# Patient Record
Sex: FEMALE | Race: WHITE | Employment: OTHER | URBAN - METROPOLITAN AREA
[De-identification: names, ages, dates, MRNs, and addresses within clinical notes are randomized per-mention and may not be internally consistent; named-entity substitution may affect disease eponyms.]

---

## 2022-05-11 ENCOUNTER — HOSPITAL ENCOUNTER (OUTPATIENT)
Dept: MEDSURG UNIT | Age: 67
Discharge: ANOTHER HOSPITAL | End: 2022-05-11
Attending: INTERNAL MEDICINE | Admitting: INTERNAL MEDICINE

## 2022-05-12 LAB
CALCIUM IONIZED: 1.2 MMOL/L (ref 1.13–1.33)
ESTIMATED AVERAGE GLUCOSE: 117 MG/DL
HBA1C MFR BLD: 5.7 % (ref 4–6)
TSH SERPL DL<=0.05 MIU/L-ACNC: 9.15 UIU/ML (ref 0.3–5)
VITAMIN D 25-HYDROXY: <6 NG/ML

## 2022-05-12 PROCEDURE — 82306 VITAMIN D 25 HYDROXY: CPT

## 2022-05-12 PROCEDURE — 82330 ASSAY OF CALCIUM: CPT

## 2022-05-12 PROCEDURE — 83036 HEMOGLOBIN GLYCOSYLATED A1C: CPT

## 2022-05-12 PROCEDURE — 84443 ASSAY THYROID STIM HORMONE: CPT

## 2022-05-13 LAB — AMMONIA: 21 UMOL/L (ref 11–51)

## 2022-05-13 PROCEDURE — 82140 ASSAY OF AMMONIA: CPT

## 2022-05-14 LAB
T3 FREE: 1.97 PG/ML (ref 2.02–4.43)
THYROXINE, FREE: 0.73 NG/DL (ref 0.93–1.7)

## 2022-05-14 PROCEDURE — 84481 FREE ASSAY (FT-3): CPT

## 2022-05-14 PROCEDURE — 84439 ASSAY OF FREE THYROXINE: CPT

## 2022-05-16 LAB
ABSOLUTE EOS #: 0.58 K/UL (ref 0–0.44)
ABSOLUTE IMMATURE GRANULOCYTE: 0.18 K/UL (ref 0–0.3)
ABSOLUTE LYMPH #: 1.38 K/UL (ref 1.1–3.7)
ABSOLUTE MONO #: 1.14 K/UL (ref 0.1–1.2)
AMMONIA: 24 UMOL/L (ref 11–51)
BASOPHILS # BLD: 1 % (ref 0–2)
BASOPHILS ABSOLUTE: 0.09 K/UL (ref 0–0.2)
EOSINOPHILS RELATIVE PERCENT: 5 % (ref 1–4)
HCT VFR BLD CALC: 24.7 % (ref 36.3–47.1)
HEMOGLOBIN: 7.3 G/DL (ref 11.9–15.1)
IMMATURE GRANULOCYTES: 2 %
LYMPHOCYTES # BLD: 13 % (ref 24–43)
MCH RBC QN AUTO: 29.6 PG (ref 25.2–33.5)
MCHC RBC AUTO-ENTMCNC: 29.6 G/DL (ref 28.4–34.8)
MCV RBC AUTO: 100 FL (ref 82.6–102.9)
MONOCYTES # BLD: 11 % (ref 3–12)
NRBC AUTOMATED: 0 PER 100 WBC
PDW BLD-RTO: 16.1 % (ref 11.8–14.4)
PLATELET # BLD: 539 K/UL (ref 138–453)
PMV BLD AUTO: 10.6 FL (ref 8.1–13.5)
RBC # BLD: 2.47 M/UL (ref 3.95–5.11)
RBC # BLD: ABNORMAL 10*6/UL
SEG NEUTROPHILS: 68 % (ref 36–65)
SEGMENTED NEUTROPHILS ABSOLUTE COUNT: 7.41 K/UL (ref 1.5–8.1)
WBC # BLD: 10.8 K/UL (ref 3.5–11.3)

## 2022-05-16 PROCEDURE — 85025 COMPLETE CBC W/AUTO DIFF WBC: CPT

## 2022-05-16 PROCEDURE — 82140 ASSAY OF AMMONIA: CPT

## 2022-05-18 LAB
CALCIUM IONIZED: 1.36 MMOL/L (ref 1.13–1.33)
D-DIMER QUANTITATIVE: >20 MG/L FEU (ref 0–0.59)

## 2022-05-18 PROCEDURE — 82330 ASSAY OF CALCIUM: CPT

## 2022-05-18 PROCEDURE — 85379 FIBRIN DEGRADATION QUANT: CPT

## 2022-05-19 PROCEDURE — 86850 RBC ANTIBODY SCREEN: CPT

## 2022-05-19 PROCEDURE — 36415 COLL VENOUS BLD VENIPUNCTURE: CPT

## 2022-05-19 PROCEDURE — P9016 RBC LEUKOCYTES REDUCED: HCPCS

## 2022-05-19 PROCEDURE — 86920 COMPATIBILITY TEST SPIN: CPT

## 2022-05-19 PROCEDURE — 86900 BLOOD TYPING SEROLOGIC ABO: CPT

## 2022-05-19 PROCEDURE — 86901 BLOOD TYPING SEROLOGIC RH(D): CPT

## 2022-05-20 LAB
FERRITIN: 3054 NG/ML (ref 13–150)
FOLATE: 9.2 NG/ML
IRON SATURATION: 30 % (ref 20–55)
IRON: 49 UG/DL (ref 37–145)
TOTAL IRON BINDING CAPACITY: 164 UG/DL (ref 250–450)
UNSATURATED IRON BINDING CAPACITY: 115 UG/DL (ref 112–347)
VITAMIN B-12: 587 PG/ML (ref 232–1245)

## 2022-05-20 PROCEDURE — 83540 ASSAY OF IRON: CPT

## 2022-05-20 PROCEDURE — 82607 VITAMIN B-12: CPT

## 2022-05-20 PROCEDURE — 83550 IRON BINDING TEST: CPT

## 2022-05-20 PROCEDURE — 82746 ASSAY OF FOLIC ACID SERUM: CPT

## 2022-05-20 PROCEDURE — 83880 ASSAY OF NATRIURETIC PEPTIDE: CPT

## 2022-05-20 PROCEDURE — 82728 ASSAY OF FERRITIN: CPT

## 2022-05-23 LAB — AMMONIA: 35 UMOL/L (ref 11–51)

## 2022-05-23 PROCEDURE — 82140 ASSAY OF AMMONIA: CPT

## 2022-05-24 LAB
PRO-BNP: ABNORMAL PG/ML
TROPONIN, HIGH SENSITIVITY: 167 NG/L (ref 0–14)
TROPONIN, HIGH SENSITIVITY: 168 NG/L (ref 0–14)

## 2022-05-24 PROCEDURE — 84484 ASSAY OF TROPONIN QUANT: CPT

## 2022-05-25 LAB
PRO-BNP: ABNORMAL PG/ML
TROPONIN, HIGH SENSITIVITY: 206 NG/L (ref 0–14)
TROPONIN, HIGH SENSITIVITY: 226 NG/L (ref 0–14)

## 2022-05-25 PROCEDURE — 84484 ASSAY OF TROPONIN QUANT: CPT

## 2022-05-25 PROCEDURE — 83880 ASSAY OF NATRIURETIC PEPTIDE: CPT

## 2022-05-26 ENCOUNTER — HOSPITAL ENCOUNTER (OUTPATIENT)
Dept: ULTRASOUND IMAGING | Age: 67
Discharge: HOME | End: 2022-05-28
Payer: COMMERCIAL

## 2022-05-26 VITALS
HEART RATE: 106 BPM | SYSTOLIC BLOOD PRESSURE: 169 MMHG | TEMPERATURE: 97.7 F | RESPIRATION RATE: 24 BRPM | OXYGEN SATURATION: 97 % | DIASTOLIC BLOOD PRESSURE: 67 MMHG

## 2022-05-26 DIAGNOSIS — J90 PLEURAL EFFUSION: ICD-10-CM

## 2022-05-26 LAB
GLUCOSE, FLUID: 120 MG/DL
LACTATE DEHYDROGENASE, FLUID: 86 U/L
SPECIMEN TYPE: NORMAL
SPECIMEN TYPE: NORMAL

## 2022-05-26 PROCEDURE — 7100000001 HC PACU RECOVERY - ADDTL 15 MIN

## 2022-05-26 PROCEDURE — 7100000000 HC PACU RECOVERY - FIRST 15 MIN

## 2022-05-26 PROCEDURE — 88342 IMHCHEM/IMCYTCHM 1ST ANTB: CPT

## 2022-05-26 PROCEDURE — 87075 CULTR BACTERIA EXCEPT BLOOD: CPT

## 2022-05-26 PROCEDURE — 88305 TISSUE EXAM BY PATHOLOGIST: CPT

## 2022-05-26 PROCEDURE — 82945 GLUCOSE OTHER FLUID: CPT

## 2022-05-26 PROCEDURE — 87205 SMEAR GRAM STAIN: CPT

## 2022-05-26 PROCEDURE — 89051 BODY FLUID CELL COUNT: CPT

## 2022-05-26 PROCEDURE — 87070 CULTURE OTHR SPECIMN AEROBIC: CPT

## 2022-05-26 PROCEDURE — 88341 IMHCHEM/IMCYTCHM EA ADD ANTB: CPT

## 2022-05-26 PROCEDURE — 2709999900 US THORACENTESIS

## 2022-05-26 PROCEDURE — 83615 LACTATE (LD) (LDH) ENZYME: CPT

## 2022-05-26 PROCEDURE — 88112 CYTOPATH CELL ENHANCE TECH: CPT

## 2022-05-26 PROCEDURE — 84157 ASSAY OF PROTEIN OTHER: CPT

## 2022-05-26 PROCEDURE — 83986 ASSAY PH BODY FLUID NOS: CPT

## 2022-05-26 ASSESSMENT — PAIN - FUNCTIONAL ASSESSMENT: PAIN_FUNCTIONAL_ASSESSMENT: 0-10

## 2022-05-26 ASSESSMENT — PAIN DESCRIPTION - ORIENTATION: ORIENTATION: LEFT

## 2022-05-26 ASSESSMENT — PAIN DESCRIPTION - DESCRIPTORS: DESCRIPTORS: OTHER (COMMENT)

## 2022-05-26 ASSESSMENT — PAIN SCALES - GENERAL: PAINLEVEL_OUTOF10: 9

## 2022-05-26 ASSESSMENT — PAIN DESCRIPTION - LOCATION: LOCATION: BACK

## 2022-05-26 NOTE — H&P
History and Physical    Pt Name: Mary Luciano  MRN: [id number]  YOB: 1955  Date of evaluation: 5/26/2022  Primary Care Physician: Cecelia Osorio MD    SUBJECTIVE:   History of Chief Complaint:    Mary Luciano is a 77 y.o. female who is scheduled today for IR GUIDED THORACENTESIS PLEURAL. Patient is extremely anxious, vague historian, accompanied by respiratory therapist. Patient has a history of chronic respiratory failure, currently on nasal cannula, wears Bipap at night only. Patient states she has chronic shortness of breath, dependent on oxygen. Allergies  is allergic to compazine [prochlorperazine maleate], lithium, other, sulfa antibiotics, and tetracyclines & related. Medications  Prior to Admission medications    Medication Sig Start Date End Date Taking?  Authorizing Provider   amLODIPine (NORVASC) 10 MG tablet Take 10 mg by mouth daily    Historical Provider, MD   atorvastatin (LIPITOR) 10 MG tablet Take 10 mg by mouth daily    Historical Provider, MD   tiZANidine (ZANAFLEX) 4 MG tablet Take 4 mg by mouth every 6 hours as needed Indications: prn    Historical Provider, MD   clopidogrel (PLAVIX) 75 MG tablet Take 1 tablet by mouth daily 3/10/22   Tamir Figures, DO   lovastatin (MEVACOR) 10 MG tablet Take 1 tablet by mouth nightly  Patient not taking: Reported on 4/8/2022 3/10/22 4/9/22  Tamir Figures, DO   diazepam (VALIUM) 5 MG tablet Take 1 tablet by mouth every 8 hours as needed for Anxiety (pain, spasm)  Patient not taking: Reported on 4/8/2022 11/5/15   Bassam Mcpherson MD   traZODone (DESYREL) 100 MG tablet Take 200 mg by mouth nightly  Patient not taking: Reported on 4/8/2022    Historical Provider, MD   OXcarbazepine (TRILEPTAL PO) Take 450 mg by mouth daily q am  Patient not taking: Reported on 4/8/2022    Historical Provider, MD   mirtazapine (REMERON) 30 MG tablet Take 30 mg by mouth nightly  Patient not taking: Reported on 4/8/2022    Historical Provider, MD   QUEtiapine Fumarate (SEROQUEL PO) Take by mouth 50mg q am / 100mg q hs  Patient not taking: Reported on 4/8/2022    Historical Provider, MD   lisinopril (PRINIVIL;ZESTRIL) 10 MG tablet Take 10 mg by mouth daily    Historical Provider, MD   Amphetamine-Dextroamphetamine (ADDERALL PO) Take by mouth 20mg q am / 10 mg q noon  Patient not taking: Reported on 4/8/2022    Historical Provider, MD   ibuprofen (ADVIL;MOTRIN) 800 MG tablet Take 1 tablet by mouth every 8 hours as needed for Pain 5/31/15   Aleksander Pineda MD     Past Medical History    has a past medical history of ADHD (attention deficit hyperactivity disorder), Anxiety, BiPAP (biphasic positive airway pressure) dependence, Bipolar 1 disorder ([address] Chronic back pain, Depression, Valentino catheter in place, Hyperlipidemia, Hypertension, Nursing home resident, Pancreatitis, PICC (peripherally inserted central catheter) in place, Pleural effusion, and Respiratory failure ([address] Past Surgical History   has a past surgical history that includes Appendectomy; Hysterectomy; Cholecystectomy; tumor removal; back surgery; Endoscopy, colon, diagnostic; Foot surgery; and tumor removal.  Social History   reports that she has been smoking. She has never used smokeless tobacco.   reports no history of alcohol use. reports no history of drug use. Family History  No family status information on file. family history is not on file.     Review of Systems:  CONSTITUTIONAL:   negative for fevers, chills, fatigue and malaise    EYES:   negative for double vision, blurred vision and photophobia    HEENT:   negative for tinnitus, epistaxis and sore throat     RESPIRATORY:   Patient reports chronic shortness of breath   CARDIOVASCULAR:   negative for chest pain, palpitations, syncope, edema     GASTROINTESTINAL:   negative for nausea, vomiting     GENITOURINARY:   negative for incontinence     MUSCULOSKELETAL:   negative for neck or back pain     NEUROLOGICAL:   Negative for weakness and tingling  negative for headaches and dizziness     PSYCHIATRIC:   negative for anxiety       OBJECTIVE:   VITALS:  temporal temperature is 99.3 °F (37.4 °C). Her blood pressure is 185/70 (abnormal) and her pulse is 95. Her respiration is 16 and oxygen saturation is 94%. CONSTITUTIONAL:alert & oriented x 3, very anxious. SKIN:  Warm and dry, no rashes to exposed areas of skin. HEAD:  Normocephalic, atraumatic. EYES: PERRL. EOMs intact. EARS:  Intact and equal bilaterally. No edema or thickening, without lumps, lesions, or discharge. Hearing grossly WNL. NOSE:  Nares patent. No rhinorrhea. MOUTH/THROAT:  Mucous membranes pink and moist.  NECK: Supple, no lymphadenopathy. LUNGS: Respirations even and non-labored. Mild inspiratory wheezes to all lobes, on NC.  CARDIOVASCULAR: Regular rate and rhythm, no murmurs/rubs/gallops. ABDOMEN: soft, non-tender and non-distended. EXTREMITIES: Bilateral lower extremities in EPC cuffs. NEUROLOGIC: CN II-XII are grossly intact. Gait not assessed. IMPRESSIONS:   Chronic respiratory failure. PLANS:   IR GUIDED THORACENTESIS PLEURAL.     SARAHI Duke CNP   Electronically signed 5/26/2022 at 2:08 PM

## 2022-05-26 NOTE — PROGRESS NOTES
Pt arrives to room for left thora  Sitting up on side of bed   RDMS to bedside    Site prepped and draped  DIANA Stout PA to room  access obtained and draining dark yellow fluid  Specimen obtained  850ml removed  Tolerated well  Return to [facility]

## 2022-05-26 NOTE — PROGRESS NOTES
Bedside report from IR due to patient requires medical transport due to arrive at 1530. Patient to be transferred back to [facility].  Patient accompanied by RT Jarrod.

## 2022-05-26 NOTE — BRIEF OP NOTE
Brief Postoperative Note for Thoracentesis    Na Mascorro  YOB: 1955  [id number]    Pre-operative Diagnosis: left Pleural effusion      Post-operative Diagnosis: Same    Procedure: Ultrasound guided Thoracentesis     Anesthesia: 1% Lidocaine     Surgeons/Assistants: Mirza Hernandez PA-C    Complications: none    EBL: Minimal    Specimens: were obtained    Ultrasound guided left thoracentesis performed. 850 ml clear yellow fluid obtained. Dressing applied. Left pleural effusion identified.      Electronically signed by MARLO Brown on 5/26/2022 at 3:02 PM

## 2022-05-26 NOTE — PROGRESS NOTES
Writer attempted to call report to [facility]. Was unable to reach patients RN.

## 2022-05-27 LAB
AMYLASE: 152 U/L (ref 28–100)
CULTURE: NORMAL
LIPASE: 160 U/L (ref 13–60)
LYMPHOCYTES, BODY FLUID: 12 %
NEUTROPHIL, FLUID: 60 %
OTHER CELLS FLUID: NORMAL %
PH FLUID: 8.5
RBC FLUID: <3000 /MM3
SPECIMEN DESCRIPTION: NORMAL
SPECIMEN TYPE: NORMAL
TOTAL PROTEIN, BODY FLUID: 1.3 G/DL
WBC FLUID: 410 /MM3

## 2022-05-27 PROCEDURE — 82150 ASSAY OF AMYLASE: CPT

## 2022-05-27 PROCEDURE — 83690 ASSAY OF LIPASE: CPT

## 2022-05-30 LAB — AMMONIA: 15 UMOL/L (ref 11–51)

## 2022-05-30 PROCEDURE — 82140 ASSAY OF AMMONIA: CPT

## 2022-05-31 LAB — SURGICAL PATHOLOGY REPORT: NORMAL

## 2022-06-01 LAB
CALCIUM IONIZED: 1.39 MMOL/L (ref 1.13–1.33)
CULTURE: ABNORMAL
DIRECT EXAM: ABNORMAL
DIRECT EXAM: ABNORMAL
SPECIMEN DESCRIPTION: ABNORMAL

## 2022-06-01 PROCEDURE — 82330 ASSAY OF CALCIUM: CPT

## 2022-06-02 LAB
ABSOLUTE EOS #: 0.29 K/UL (ref 0–0.4)
ABSOLUTE IMMATURE GRANULOCYTE: 0.15 K/UL (ref 0–0.3)
ABSOLUTE LYMPH #: 1.74 K/UL (ref 1–4.8)
ABSOLUTE MONO #: 1.16 K/UL (ref 0.2–0.8)
BASOPHILS # BLD: 1 %
BASOPHILS ABSOLUTE: 0.15 K/UL (ref 0–0.2)
EOSINOPHILS RELATIVE PERCENT: 2 % (ref 1–4)
HCT VFR BLD CALC: 32.3 % (ref 36.3–47.1)
HEMOGLOBIN: 9.6 G/DL (ref 11.9–15.1)
IMMATURE GRANULOCYTES: 1 %
LYMPHOCYTES # BLD: 12 % (ref 24–44)
MCH RBC QN AUTO: 29.2 PG (ref 25.2–33.5)
MCHC RBC AUTO-ENTMCNC: 29.7 G/DL (ref 28.4–34.8)
MCV RBC AUTO: 98.2 FL (ref 82.6–102.9)
MONOCYTES # BLD: 8 % (ref 1–7)
PDW BLD-RTO: 15.4 % (ref 11.8–14.4)
PLATELET # BLD: 514 K/UL (ref 138–453)
PMV BLD AUTO: 11.7 FL (ref 8.1–13.5)
RBC # BLD: 3.29 M/UL (ref 3.95–5.11)
SEG NEUTROPHILS: 76 % (ref 36–66)
SEGMENTED NEUTROPHILS ABSOLUTE COUNT: 11.01 K/UL (ref 1.8–7.7)
WBC # BLD: 14.5 K/UL (ref 3.5–11.3)

## 2022-06-02 PROCEDURE — 85025 COMPLETE CBC W/AUTO DIFF WBC: CPT

## 2022-06-06 LAB — AMMONIA: 19 UMOL/L (ref 11–51)

## 2022-06-06 PROCEDURE — 82140 ASSAY OF AMMONIA: CPT

## 2022-06-08 LAB — CALCIUM IONIZED: 1.59 MMOL/L (ref 1.13–1.33)

## 2022-06-08 PROCEDURE — 82330 ASSAY OF CALCIUM: CPT

## 2022-06-15 LAB
ABO/RH: NORMAL
ANTIBODY SCREEN: NEGATIVE
ARM BAND NUMBER: NORMAL
BLD PROD TYP BPU: NORMAL
BPU ID: NORMAL
CROSSMATCH RESULT: NORMAL
DISPENSE STATUS BLOOD BANK: NORMAL
EXPIRATION DATE: NORMAL
TRANSFUSION STATUS: NORMAL
UNIT DIVISION: 0

## 2022-06-22 ENCOUNTER — HOSPITAL ENCOUNTER (OUTPATIENT)
Dept: MEDSURG UNIT | Age: 67
Discharge: NURSING, REHAB OR LONG-TERM CARE | End: 2022-06-22
Attending: INTERNAL MEDICINE | Admitting: INTERNAL MEDICINE

## 2022-06-23 LAB
TSH SERPL DL<=0.05 MIU/L-ACNC: 0.44 UIU/ML (ref 0.3–5)
VITAMIN D 25-HYDROXY: 26.8 NG/ML

## 2022-06-23 PROCEDURE — 83036 HEMOGLOBIN GLYCOSYLATED A1C: CPT

## 2022-06-23 PROCEDURE — 84443 ASSAY THYROID STIM HORMONE: CPT

## 2022-06-23 PROCEDURE — 82306 VITAMIN D 25 HYDROXY: CPT

## 2022-06-24 LAB
ESTIMATED AVERAGE GLUCOSE: 120 MG/DL
HBA1C MFR BLD: 5.8 % (ref 4–6)

## 2022-06-27 LAB
-: NORMAL
AMMONIA: 21 UMOL/L (ref 11–51)
BILIRUBIN URINE: NEGATIVE
COLOR: YELLOW
EPITHELIAL CELLS UA: NORMAL /HPF (ref 0–5)
GLUCOSE URINE: NEGATIVE
KETONES, URINE: NEGATIVE
LEUKOCYTE ESTERASE, URINE: NEGATIVE
NITRITE, URINE: NEGATIVE
PH UA: 7.5 (ref 5–8)
PROTEIN UA: NEGATIVE
RBC UA: NORMAL /HPF (ref 0–2)
SPECIFIC GRAVITY UA: 1.01 (ref 1–1.03)
TURBIDITY: ABNORMAL
URINE HGB: NEGATIVE
UROBILINOGEN, URINE: NORMAL
WBC UA: NORMAL /HPF (ref 0–5)

## 2022-06-27 PROCEDURE — 82140 ASSAY OF AMMONIA: CPT

## 2022-06-27 PROCEDURE — 87086 URINE CULTURE/COLONY COUNT: CPT

## 2022-06-27 PROCEDURE — 81001 URINALYSIS AUTO W/SCOPE: CPT

## 2022-06-28 LAB
CULTURE: NO GROWTH
SPECIMEN DESCRIPTION: NORMAL

## 2022-06-30 LAB
-: ABNORMAL
BACTERIA: ABNORMAL
BILIRUBIN URINE: NEGATIVE
CASTS UA: ABNORMAL /LPF
COLOR: YELLOW
CRYSTALS, UA: ABNORMAL /HPF
EPITHELIAL CELLS UA: ABNORMAL /HPF (ref 0–5)
GLUCOSE URINE: NEGATIVE
KETONES, URINE: NEGATIVE
LEUKOCYTE ESTERASE, URINE: NEGATIVE
NITRITE, URINE: NEGATIVE
PH UA: 8.5 (ref 5–8)
PROTEIN UA: ABNORMAL
RBC UA: ABNORMAL /HPF (ref 0–2)
SPECIFIC GRAVITY UA: 1.01 (ref 1–1.03)
TURBIDITY: ABNORMAL
URINE HGB: NEGATIVE
UROBILINOGEN, URINE: NORMAL
WBC UA: ABNORMAL /HPF (ref 0–5)

## 2022-06-30 PROCEDURE — 81001 URINALYSIS AUTO W/SCOPE: CPT

## 2022-07-04 LAB — AMMONIA: 29 UMOL/L (ref 11–51)

## 2022-07-04 PROCEDURE — 82140 ASSAY OF AMMONIA: CPT

## 2022-07-07 LAB
HCT VFR BLD CALC: 27.6 % (ref 36.3–47.1)
HEMOGLOBIN: 8.6 G/DL (ref 11.9–15.1)
MCH RBC QN AUTO: 29.6 PG (ref 25.2–33.5)
MCHC RBC AUTO-ENTMCNC: 31.2 G/DL (ref 28.4–34.8)
MCV RBC AUTO: 94.8 FL (ref 82.6–102.9)
NRBC AUTOMATED: 0 PER 100 WBC
PDW BLD-RTO: 15.5 % (ref 11.8–14.4)
PLATELET # BLD: 557 K/UL (ref 138–453)
PMV BLD AUTO: 11.9 FL (ref 8.1–13.5)
RBC # BLD: 2.91 M/UL (ref 3.95–5.11)
WBC # BLD: 11.1 K/UL (ref 3.5–11.3)

## 2022-07-07 PROCEDURE — 85027 COMPLETE CBC AUTOMATED: CPT

## 2022-07-11 LAB — AMMONIA: <10 UMOL/L (ref 11–51)

## 2022-07-11 PROCEDURE — 82140 ASSAY OF AMMONIA: CPT

## 2022-07-13 PROCEDURE — U0005 INFEC AGEN DETEC AMPLI PROBE: HCPCS

## 2022-07-13 PROCEDURE — U0003 INFECTIOUS AGENT DETECTION BY NUCLEIC ACID (DNA OR RNA); SEVERE ACUTE RESPIRATORY SYNDROME CORONAVIRUS 2 (SARS-COV-2) (CORONAVIRUS DISEASE [COVID-19]), AMPLIFIED PROBE TECHNIQUE, MAKING USE OF HIGH THROUGHPUT TECHNOLOGIES AS DESCRIBED BY CMS-2020-01-R: HCPCS

## 2022-07-14 LAB
SARS-COV-2: ABNORMAL
SARS-COV-2: DETECTED
SOURCE: ABNORMAL

## 2022-07-14 PROCEDURE — 87077 CULTURE AEROBIC IDENTIFY: CPT

## 2022-07-14 PROCEDURE — 81001 URINALYSIS AUTO W/SCOPE: CPT

## 2022-07-14 PROCEDURE — 87088 URINE BACTERIA CULTURE: CPT

## 2022-07-14 PROCEDURE — 87086 URINE CULTURE/COLONY COUNT: CPT

## 2022-07-14 PROCEDURE — 87186 SC STD MICRODIL/AGAR DIL: CPT

## 2022-07-15 LAB
-: ABNORMAL
BACTERIA: ABNORMAL
BILIRUBIN URINE: NEGATIVE
C-REACTIVE PROTEIN: 26.1 MG/L (ref 0–5)
CASTS UA: ABNORMAL /LPF (ref 0–8)
COLOR: YELLOW
EPITHELIAL CELLS UA: ABNORMAL /HPF (ref 0–5)
GLUCOSE URINE: NEGATIVE
KETONES, URINE: NEGATIVE
LEUKOCYTE ESTERASE, URINE: ABNORMAL
NITRITE, URINE: POSITIVE
PH UA: 7.5 (ref 5–8)
PROTEIN UA: ABNORMAL
RBC UA: ABNORMAL /HPF (ref 0–4)
SPECIFIC GRAVITY UA: 1.02 (ref 1–1.03)
TURBIDITY: ABNORMAL
URINE HGB: ABNORMAL
UROBILINOGEN, URINE: NORMAL
WBC UA: ABNORMAL /HPF (ref 0–5)

## 2022-07-15 PROCEDURE — 86140 C-REACTIVE PROTEIN: CPT

## 2022-07-15 PROCEDURE — 83880 ASSAY OF NATRIURETIC PEPTIDE: CPT

## 2022-07-16 LAB
C-REACTIVE PROTEIN: 28.5 MG/L (ref 0–5)
CULTURE: ABNORMAL
CULTURE: ABNORMAL
Lab: ABNORMAL
Lab: ABNORMAL
PRO-BNP: 427 PG/ML
SPECIMEN DESCRIPTION: ABNORMAL
SPECIMEN DESCRIPTION: ABNORMAL

## 2022-07-16 PROCEDURE — 86140 C-REACTIVE PROTEIN: CPT

## 2022-07-17 LAB — C-REACTIVE PROTEIN: 30 MG/L (ref 0–5)

## 2022-07-17 PROCEDURE — 86140 C-REACTIVE PROTEIN: CPT

## 2022-07-18 LAB
AMMONIA: 17 UMOL/L (ref 11–51)
SARS-COV-2: NORMAL
SARS-COV-2: NOT DETECTED
SOURCE: NORMAL

## 2022-07-18 PROCEDURE — 82140 ASSAY OF AMMONIA: CPT

## 2022-07-20 LAB — C-REACTIVE PROTEIN: 66.1 MG/L (ref 0–5)

## 2022-07-20 PROCEDURE — 86140 C-REACTIVE PROTEIN: CPT

## 2022-07-21 LAB
C-REACTIVE PROTEIN: 47.3 MG/L (ref 0–5)
POTASSIUM SERPL-SCNC: 3.4 MMOL/L (ref 3.7–5.3)

## 2022-07-21 PROCEDURE — 84132 ASSAY OF SERUM POTASSIUM: CPT

## 2022-07-21 PROCEDURE — 86140 C-REACTIVE PROTEIN: CPT

## 2022-07-22 LAB — C-REACTIVE PROTEIN: 20.9 MG/L (ref 0–5)

## 2022-07-22 PROCEDURE — 86140 C-REACTIVE PROTEIN: CPT

## 2022-07-25 LAB
AMMONIA: 27 UMOL/L (ref 11–51)
SARS-COV-2: NORMAL
SARS-COV-2: NOT DETECTED
SOURCE: NORMAL

## 2022-07-25 PROCEDURE — 82140 ASSAY OF AMMONIA: CPT

## 2022-07-27 ENCOUNTER — APPOINTMENT (OUTPATIENT)
Dept: GENERAL RADIOLOGY | Age: 67
End: 2022-07-27
Payer: MEDICARE

## 2022-07-27 ENCOUNTER — HOSPITAL ENCOUNTER (EMERGENCY)
Age: 67
Discharge: HOME | End: 2022-07-27
Attending: EMERGENCY MEDICINE
Payer: MEDICARE

## 2022-07-27 ENCOUNTER — APPOINTMENT (OUTPATIENT)
Dept: CT IMAGING | Age: 67
End: 2022-07-27
Payer: MEDICARE

## 2022-07-27 VITALS
WEIGHT: 102 LBS | DIASTOLIC BLOOD PRESSURE: 55 MMHG | RESPIRATION RATE: 15 BRPM | HEART RATE: 64 BPM | SYSTOLIC BLOOD PRESSURE: 135 MMHG | OXYGEN SATURATION: 95 % | HEIGHT: 64 IN | BODY MASS INDEX: 17.42 KG/M2 | TEMPERATURE: 98.6 F
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
## 2022-07-27 DIAGNOSIS — M79.18 MUSCULOSKELETAL PAIN: ICD-10-CM

## 2022-07-27 PROCEDURE — 72125 CT NECK SPINE W/O DYE: CPT

## 2022-07-27 PROCEDURE — 72128 CT CHEST SPINE W/O DYE: CPT

## 2022-07-27 PROCEDURE — 96372 THER/PROPH/DIAG INJ SC/IM: CPT

## 2022-07-27 PROCEDURE — 6370000000 HC RX 637 (ALT 250 FOR IP): Performed by: EMERGENCY MEDICINE

## 2022-07-27 PROCEDURE — 99284 EMERGENCY DEPT VISIT MOD MDM: CPT

## 2022-07-27 PROCEDURE — 6360000002 HC RX W HCPCS: Performed by: EMERGENCY MEDICINE

## 2022-07-27 PROCEDURE — 73030 X-RAY EXAM OF SHOULDER: CPT

## 2022-07-27 PROCEDURE — 70450 CT HEAD/BRAIN W/O DYE: CPT

## 2022-07-27 RX ORDER — OXYCODONE HYDROCHLORIDE AND ACETAMINOPHEN 5; 325 MG/1; MG/1
1 TABLET ORAL ONCE
Status: COMPLETED | OUTPATIENT
Start: 2022-07-27 | End: 2022-07-27

## 2022-07-27 RX ORDER — HYDROCODONE BITARTRATE AND ACETAMINOPHEN 5; 325 MG/1; MG/1
1 TABLET ORAL EVERY 4 HOURS PRN
Qty: 12 TABLET | Refills: 0 | Status: SHIPPED | OUTPATIENT
Start: 2022-07-27 | End: 2022-07-30

## 2022-07-27 RX ORDER — MORPHINE SULFATE 4 MG/ML
4 INJECTION, SOLUTION INTRAMUSCULAR; INTRAVENOUS ONCE
Status: COMPLETED | OUTPATIENT
Start: 2022-07-27 | End: 2022-07-27

## 2022-07-27 RX ORDER — ACETAMINOPHEN 500 MG
1000 TABLET ORAL ONCE
Status: DISCONTINUED | OUTPATIENT
Start: 2022-07-27 | End: 2022-07-27 | Stop reason: HOSPADM

## 2022-07-27 RX ADMIN — OXYCODONE AND ACETAMINOPHEN 1 TABLET: 5; 325 TABLET ORAL at 07:50

## 2022-07-27 RX ADMIN — MORPHINE SULFATE 4 MG: 4 INJECTION, SOLUTION INTRAMUSCULAR; INTRAVENOUS at 06:43

## 2022-07-27 ASSESSMENT — ENCOUNTER SYMPTOMS
VOMITING: 0
SHORTNESS OF BREATH: 0
CHEST TIGHTNESS: 0
SINUS PAIN: 0
BACK PAIN: 1
DIARRHEA: 0
CONSTIPATION: 0
EYES NEGATIVE: 1
ABDOMINAL DISTENTION: 0
COLOR CHANGE: 0
APNEA: 0

## 2022-07-27 ASSESSMENT — PAIN SCALES - GENERAL
PAINLEVEL_OUTOF10: 10
PAINLEVEL_OUTOF10: 10

## 2022-07-27 ASSESSMENT — PAIN - FUNCTIONAL ASSESSMENT: PAIN_FUNCTIONAL_ASSESSMENT: 0-10

## 2022-07-27 NOTE — ED PROVIDER NOTES
EMERGENCY DEPARTMENT ENCOUNTER    Pt Name: Karyn Bob  MRN: [id number]  [place] 1955  Date of evaluation: 7/27/22  CHIEF COMPLAINT       Chief Complaint   Patient presents with    Fall    Head Injury     HISTORY OF PRESENT ILLNESS   31-year-old female presents emergency room after a fall. Patient lives at an extended care facility at the moment and she reports the beds are very narrow and do not have rales. She had fallen out of the bed this morning. She did not lose consciousness but did hit her head. She is having pain to her head neck right shoulder and right back. REVIEW OF SYSTEMS     Review of Systems   Constitutional:  Negative for activity change, chills and diaphoresis. HENT:  Negative for congestion, sinus pain and tinnitus. Eyes: Negative. Respiratory:  Negative for apnea, chest tightness and shortness of breath. Gastrointestinal:  Negative for abdominal distention, constipation, diarrhea and vomiting. Genitourinary:  Negative for difficulty urinating and frequency. Musculoskeletal:  Positive for arthralgias, back pain and neck pain. Negative for myalgias. Skin:  Negative for color change and rash. Neurological:  Negative for dizziness. Hematological: Negative. Psychiatric/Behavioral: Negative. PASTMEDICAL HISTORY     Past Medical History:   Diagnosis Date    ADHD (attention deficit hyperactivity disorder)     Anxiety     BiPAP (biphasic positive airway pressure) dependence     Bipolar 1 disorder (HCC)     Chronic back pain     denies pain mgmnt    Depression     Valentino catheter in place     Hyperlipidemia     Hypertension     Nursing home resident     [facility] / [address] Pancreatitis     PICC (peripherally inserted central catheter) in place     Pleural effusion     bilat. Respiratory failure ([address]     acute hypoxemic     Past Problem List  There is no problem list on file for this patient.     SURGICAL HISTORY       Past Surgical History:   Procedure Laterality Date    APPENDECTOMY      BACK SURGERY      CHOLECYSTECTOMY      ENDOSCOPY, COLON, DIAGNOSTIC      egd    FOOT SURGERY      HYSTERECTOMY (CERVIX STATUS UNKNOWN)      THORACENTESIS Left 05/26/2022     THORACENTESIS    TUMOR REMOVAL      benign tumor removed from rt leg as child    TUMOR REMOVAL       CURRENT MEDICATIONS       Previous Medications    AMLODIPINE (NORVASC) 10 MG TABLET    Take 10 mg by mouth daily    AMPHETAMINE-DEXTROAMPHETAMINE (ADDERALL PO)    Take by mouth 20mg q am / 10 mg q noon    ATORVASTATIN (LIPITOR) 10 MG TABLET    Take 10 mg by mouth daily    CLOPIDOGREL (PLAVIX) 75 MG TABLET    Take 1 tablet by mouth daily    DIAZEPAM (VALIUM) 5 MG TABLET    Take 1 tablet by mouth every 8 hours as needed for Anxiety (pain, spasm)    IBUPROFEN (ADVIL;MOTRIN) 800 MG TABLET    Take 1 tablet by mouth every 8 hours as needed for Pain    LISINOPRIL (PRINIVIL;ZESTRIL) 10 MG TABLET    Take 10 mg by mouth daily    LOVASTATIN (MEVACOR) 10 MG TABLET    Take 1 tablet by mouth nightly    MIRTAZAPINE (REMERON) 30 MG TABLET    Take 30 mg by mouth nightly    OXCARBAZEPINE (TRILEPTAL PO)    Take 450 mg by mouth daily q am    QUETIAPINE FUMARATE (SEROQUEL PO)    Take by mouth 50mg q am / 100mg q hs    TIZANIDINE (ZANAFLEX) 4 MG TABLET    Take 4 mg by mouth every 6 hours as needed Indications: prn    TRAZODONE (DESYREL) 100 MG TABLET    Take 200 mg by mouth nightly     ALLERGIES     is allergic to compazine [prochlorperazine maleate], lithium, other, sulfa antibiotics, and tetracyclines & related. FAMILY HISTORY     has no family status information on file.       SOCIAL HISTORY       Social History     Tobacco Use    Smoking status: Former     Types: Cigarettes    Smokeless tobacco: Never   Substance Use Topics    Alcohol use: No    Drug use: No     PHYSICAL EXAM     INITIAL VITALS: BP (!) 135/55   Pulse 64   Temp 98.6 °F (37 °C) (Oral)   Resp 15   Ht 5' 4\" (1.626 m)   Wt 102 lb (46.3 kg)   SpO2 95%   BMI 17.51 kg/m²    Physical Exam  Constitutional:       General: She is not in acute distress. Appearance: She is well-developed. HENT:      Head: Normocephalic. Eyes:      Pupils: Pupils are equal, round, and reactive to light. Cardiovascular:      Rate and Rhythm: Normal rate and regular rhythm. Heart sounds: Normal heart sounds. Pulmonary:      Effort: Pulmonary effort is normal. No respiratory distress. Breath sounds: Normal breath sounds. Abdominal:      General: Bowel sounds are normal.      Palpations: Abdomen is soft. Tenderness: There is no abdominal tenderness. Musculoskeletal:         General: Normal range of motion. Arms:    Skin:     General: Skin is warm and dry. Neurological:      Mental Status: She is alert and oriented to person, place, and time. MEDICAL DECISION MAKIN-year-old female presenting to the emergency room with right-sided head neck upper and lower back pain after a fall out of bed. Patient has generalized soreness and tenderness to the right side of the body. She does not have any obvious bruising. There is no visible trauma to the head. CT imaging of the head neck and thoracic spine were completed and are negative. Patient did have x-ray of the shoulder which is also negative for acute bony pathology. I have low suspicion for malignant or emergent process. I updated patient results here in the ED. disposition and plan were reviewed with the patient. Patient is aware that ED testing may be falsely reassuring and patient understands if symptoms worsen reassessment may be necessary.      CRITICAL CARE:       PROCEDURES:    Procedures    DIAGNOSTIC RESULTS   EKG:All EKG's are interpreted by the Emergency Department Physician who either signs or Co-signs this chart in the absence of a cardiologist.        RADIOLOGY:All plain film, CT, MRI, and formal ultrasound images (except ED bedside ultrasound) are read by the radiologist, see reports below, unless otherwisenoted in MDM or here. XR SHOULDER RIGHT (MIN 2 VIEWS)   Final Result   No acute fracture or dislocation right shoulder. CT THORACIC SPINE WO CONTRAST   Final Result   CT HEAD:      No CT evidence for acute intracranial abnormality. .      Cerebral atrophy. CT CERVICAL SPINE:      No acute fracture or subluxation of the cervical spine. Mild degenerative changes. CT THORACIC SPINE:      No evidence for acute fracture of the thoracic spine. Incidental bilateral layering small pleural effusion left larger than right. CT CERVICAL SPINE WO CONTRAST   Final Result   CT HEAD:      No CT evidence for acute intracranial abnormality. .      Cerebral atrophy. CT CERVICAL SPINE:      No acute fracture or subluxation of the cervical spine. Mild degenerative changes. CT THORACIC SPINE:      No evidence for acute fracture of the thoracic spine. Incidental bilateral layering small pleural effusion left larger than right. CT HEAD WO CONTRAST   Final Result   CT HEAD:      No CT evidence for acute intracranial abnormality. .      Cerebral atrophy. CT CERVICAL SPINE:      No acute fracture or subluxation of the cervical spine. Mild degenerative changes. CT THORACIC SPINE:      No evidence for acute fracture of the thoracic spine. Incidental bilateral layering small pleural effusion left larger than right. LABS: All lab results were reviewed by myself, and all abnormals are listed below.   Labs Reviewed - No data to display    EMERGENCY DEPARTMENTCOURSE:         Vitals:    Vitals:    07/27/22 0618 07/27/22 0630 07/27/22 0645   BP: (!) 163/59 113/83 (!) 135/55   Pulse: 70 77 64   Resp: 18 12 15   Temp: 98.6 °F (37 °C)     TempSrc: Oral     SpO2: 95% 98% 95%   Weight: 102 lb (46.3 kg)     Height: 5' 4\" (1.626 m)         The patient was given the following medications while in the emergency department:  Orders Placed This Encounter   Medications    acetaminophen (TYLENOL) tablet 1,000 mg    morphine sulfate (PF) injection 4 mg    oxyCODONE-acetaminophen (PERCOCET) 5-325 MG per tablet 1 tablet    HYDROcodone-acetaminophen (NORCO) 5-325 MG per tablet     Sig: Take 1 tablet by mouth every 4 hours as needed for Pain for up to 3 days. Intended supply: 3 days. Take lowest dose possible to manage pain     Dispense:  12 tablet     Refill:  0     CONSULTS:  None    FINAL IMPRESSION      1. Fall, initial encounter    2. Musculoskeletal pain          DISPOSITION/PLAN   DISPOSITION Decision To Discharge 07/27/2022 08:31:11 AM      PATIENT REFERRED TO:  Christina Weiner MD  [address] [place] [address]  [phone]    Schedule an appointment as soon as possible for a visit   As needed    DISCHARGE MEDICATIONS:  New Prescriptions    HYDROCODONE-ACETAMINOPHEN (NORCO) 5-325 MG PER TABLET    Take 1 tablet by mouth every 4 hours as needed for Pain for up to 3 days. Intended supply: 3 days. Take lowest dose possible to manage pain     Lanny Arzate MD  Attending Emergency Physician      Care during this encounter was due to an unprecedented national emergency due to COVID-19.              Adolfo Stanford MD  07/27/22 0769

## 2022-07-27 NOTE — ED NOTES
Pt to ED via EMS coming from [facility]. Pt reports she is at the facility for gangrene on her L foot. Pt c/o fall out of bed while sleeping. Pt reports \"the beds are small and there's no side rails, I woke up to me falling on the ground\". Pt c/o L and R sided neck pain, and frontal lobe head pain. Pt reports she did hit her head on the R side. Pt states she does not know if she lost consciousness but the staff helped her back into bed. Pt reports she is on Plavix. Pt A&Ox4. Pt answering questions appropriately. C-collar on and aligned upon arrival to ED.        KESHA Garcia  07/27/22 Chago Raymundo RN  07/27/22 [id number]

## 2022-09-26 NOTE — PROGRESS NOTES
[facility] - Initial Office Consult Note  Today's Date and Time: 9/26/2022, 3:51 PM    Impression :   Prior Acute hypoxic respiratory failure  S/P Mechanical ventilation. 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 Depression  Essential HTN  Prior Bilateral pleural effusions  S/P Lt thoracentesis with removal 850 ml fluid on 5-26-22. Prior small bowel obstruction. S/P laparotomy with lysis of adhesions, resection of affected segment on 8-18-22  S/P PEG tube insertion on 9-26-22    Recommendations:   Patient cleared for angiogram and angioplasty of left leg as planned by Dr Gustavo Foster. No current evidence of active infection to prevent procedures    Medical Decision Making/Summary/Discussion:9/26/2022       Infection Control Recommendations   Ivel Precautions    Antimicrobial Stewardship Recommendations     Discontinuation of therapy  Coordination of Outpatient Care:   Estimated Length of IV antimicrobials: None   Patient will need Midline Catheter Insertion: No  Patient will need PICC line Insertion:No  Patient will need: Home IV , [place],  SNF,  LTAC: No  Patient will need outpatient wound care: No    Chief complaint/reason for consultation:   ID clearance for angioplasty      History of Present Illness:   Ras Vang is a 79y.o.-year-old  female who was seen in the office on 9/27/2022. Patient seen at the request of Logan Gonzalez. INITIAL HISTORY:    Patient known to our service from prior admission to [facility] on 6-22-22.     [facility] admission: 4-16-22:   Patient admitted to [facility] on 4-16-22 until transfer to [facility] on 5-11-22. Patient has a past medical history of Bipolar disorder, depression, hyperlipidemia, and essential hypertension. The patient was admitted because of generalized weakness, abdominal pain and nausea vomiting. The patient lived with her sister in [place] until her sister had to be hospitalized. Her daughters then moved her to [place]. She apparently had not been doing well for the last few months. She lost about 50 lb. She was unable to maintain adequate oral intake and felt severe nausea and sometimes vomited after eating. She reported persistent epigastric pain. She also stabbed her toes. A fracture was excluded but she developed dry gangrene of the left third and fourth toes. In the ER she had leukocytosis WBC 56445. She was started on cefepime and vancomycin. She underwent CT scan of the abdomen on 4-17-22 which showed a masslike enlargement in the superior recess of the lesser sac between stomach and pancreas. The patient had had a prior CT at [facility] which also had suggested a prior pancreatic abnormality that needed to be followed. An EGD on 4-18-22 showed erosive gastritis. A MRCP on 4-21-22 showed that the pancreatic lesion centered in the body-tail junction, displacing the stomach, multiple additional pancreatic cysts were also present. A CT A/P 4/25; showed cystic-appearing pancreatic mass, new edema around pancreas, ascites and diffuse anasarca. Further evaluation by the GI service came to the conclusion that the abnormality likely represented the effects of prior necrotizing pancreatitis. Her CA 19.9 was elevated but a EUS was considered too risky. The patient also developed acute hypoxic respiratory failure and was intubated from 4-1-22 to 4-25-22. This was attributed to anxiety and exacerbation of COPD. She was evaluated by the ID service of the [facility] and recieved antimicrobial treatment for a presumptive multifocal health care associated pneumonia.  Sepsis small-bowel resection, side-to-side stapled enteroenterostomy 8/18/2022. She subsequently completed a seven day course of Zosyn. CURRENT EVALUATION: 9/27/2022    Office Visit 9/27/2022     Patient has residual problems with nutrition so she underwent a PEG tube insertion on 9-26-22. She has also been evaluated by Dr Gregor Christiansen because of the Lt foot ischemic toes. He has suggested angioplasty to improve her circulation    Patient evaluated and examined in the office  Patient stable  No new complaints reported  Her abdominal incision has healed well. Her PEG site looks fine. She is currently residing at [facility] 3rd and 4th toes with dry gangrene. Sacral area with excoriation of skin. Exam does not show any signs of residual infection. Pertinent Labs and X rays reviewed:     BUN: 38-->35-->32-->36  Cr. : 0.74-->0.69-->0.79    WBC: 16.5-->14.5-->12.2  Hb: 9.0-->8.6-->9.6-->9.2  Plat: 514-->527-->514-->482    CXR:   5-11-22: Multifocal patchy airspace disease in both lungs   5-18-22: Pulmonary edema   5-20-22: Cardiomegaly, with moderate congestive heart failure, bilateral lower lobe infiltrate and pleural effusions. Overall, these findings have slightly improved since the prior study. 5-23-22: Improving pulmonary edema, persistent left base infiltrate. 5-27-22: Patchy opacity of both lung fields    CT angiogram:  · 5-19-22: Has bilateral pleural effusions with small areas of compressive atelectasis. CT abdomen:   · 5-27-22: Large residual Rt side effusion. Smaller Lt side effusion. Abdomen with distended bowel loops, thickened wall of proximal small bowel. Bilateral renal cysts. Loculated pockets of fluid in the abdomen. Ileus appears to be present. Cultures:  Urine:  · 8-10-22 E coli and Klebsiella  Blood:  · 8-10-22: E coli    Wound:  · 5-26-22: Thoracentesis: No growth    Discussed with patient.     9/27/2022 Ischemic left toes        I have personally reviewed the past medical history, past surgical history, medications, social history, and family history, and I have updated the database accordingly. Past Medical History:     Past Medical History:   Diagnosis Date    ADHD (attention deficit hyperactivity disorder)     Anxiety     BiPAP (biphasic positive airway pressure) dependence     Bipolar 1 disorder (HCC)     Chronic back pain     denies pain mgmnt    Depression     Valentino catheter in place     Hyperlipidemia     Hypertension     Nursing home resident     [facility] [address] Pancreatitis     PICC (peripherally inserted central catheter) in place     Pleural effusion     bilat. Respiratory failure ([address]     acute hypoxemic       Past Surgical  History:     Past Surgical History:   Procedure Laterality Date    APPENDECTOMY      BACK SURGERY      CHOLECYSTECTOMY      ENDOSCOPY, COLON, DIAGNOSTIC      egd    FOOT SURGERY      HYSTERECTOMY (CERVIX STATUS UNKNOWN)      THORACENTESIS Left 05/26/2022     THORACENTESIS    TUMOR REMOVAL      benign tumor removed from rt leg as child    TUMOR REMOVAL         Medications:       Social History:     Social History     Socioeconomic History    Marital status:       Spouse name: Not on file    Number of children: Not on file    Years of education: Not on file    Highest education level: Not on file   Occupational History    Not on file   Tobacco Use    Smoking status: Former     Types: Cigarettes    Smokeless tobacco: Never   Substance and Sexual Activity    Alcohol use: No    Drug use: No    Sexual activity: Not on file   Other Topics Concern    Not on file   Social History Narrative    Not on file     Social Determinants of Health     Financial Resource Strain: Not on file   Food Insecurity: Not on file   Transportation Needs: Not on file   Physical Activity: Not on file   Stress: Not on file   Social Connections: Not on file   Intimate Partner Violence: Not on file   Housing Stability: Not on file       Family History:   No family history on file. Allergies:   Compazine [prochlorperazine maleate], Lithium, Other, Sulfa antibiotics, and Tetracyclines & related     Review of Systems:   Constitutional: No fevers or chills. No systemic complaints  Head: No headaches  Eyes: No double vision or blurry vision. No conjunctival inflammation. ENT: No sore throat or runny nose. . No hearing loss, tinnitus or vertigo. Cardiovascular: No chest pain or palpitations. No shortness of breath. No PARRISH  Lung: No shortness of breath or cough. No sputum production  Abdomen: No nausea, vomiting, diarrhea, or abdominal pain. [place] No cramps. Genitourinary: No increased urinary frequency, or dysuria. No hematuria. No suprapubic or CVA pain  Musculoskeletal: No muscle aches or pains. No joint effusions, swelling or deformities  Hematologic: No bleeding or bruising. Neurologic: No headache, weakness, numbness, or tingling. Integument: No rash, no ulcers. Psychiatric: No depression. Endocrine: No polyuria, no polydipsia, no polyphagia. Physical Examination :   No data found. General Appearance: Awake, alert, and in no apparent distress  Head:  Normocephalic, no trauma  Eyes: Pupils equal, round, reactive to light and accommodation; extraocular movements intact; sclera anicteric; conjunctivae pink. No embolic phenomena. ENT: Oropharynx clear, without erythema, exudate, or thrush. No tenderness of sinuses. Mouth/throat: mucosa pink and moist. No lesions. Dentition in good repair. Neck:Supple, without lymphadenopathy. Thyroid normal, No bruits. Pulmonary/Chest: Clear to auscultation, without wheezes, rales, or rhonchi. No dullness to percussion. Cardiovascular: Regular rate and rhythm without murmurs, rubs, or gallops. Abdomen: Soft, non tender. Bowel sounds normal. No organomegaly. PEG in place  All four Extremities: No cyanosis, clubbing, edema, or effusions. Ischemic toes Lt foot  Neurologic: No gross sensory or motor deficits.   Skin: Warm and dry with good turgor. Signs of peripheral arterial insufficiency. schemic toes Lt foot    Medical Decision Making -Laboratory:   I have independently reviewed/ordered the following labs:    CBC with Differential: No results for input(s): WBC, HGB, HCT, PLT, SEGSPCT, BANDSPCT, LYMPHOPCT, MONOPCT, EOSPCT in the last 72 hours. BMP: No results for input(s): NA, K, CL, CO2, BUN, CREATININE, CA, MG in the last 72 hours. Hepatic Function Panel: No results for input(s): PROT, LABALBU, BILIDIR, IBILI, BILITOT, ALKPHOS, ALT, AST in the last 72 hours. No results for input(s): RPR in the last 72 hours. No results for input(s): HIV in the last 72 hours. No results for input(s): BC in the last 72 hours. Lab Results   Component Value Date/Time    MUCUS NOT REPORTED 05/31/2015 01:19 PM    RBC 2.91 07/07/2022 04:15 AM    RBC 4.45 03/10/2022 11:37 AM    TRICHOMONAS NOT REPORTED 05/31/2015 01:19 PM    WBC 11.1 07/07/2022 04:15 AM    YEAST NOT REPORTED 05/31/2015 01:19 PM    TURBIDITY Turbid 07/14/2022 04:35 PM     Lab Results   Component Value Date/Time    CREATININE 0.73 03/10/2022 11:37 AM    GLUCOSE 104 03/10/2022 11:37 AM       Medical Decision Making-Imaging:       Medical Decision Jygset-Bbhfvdzh-Cxidg:       Medical Decision Making-Other:     Note:  Labs, medications, radiologic studies were reviewed with personal review of films  Moderate Large amounts of data were reviewed  Discussed with nursing Staff, Discharge planner  Infection Control and Prevention measures reviewed  All prior entries were reviewed  Administer medications as ordered  Prognosis: Good  Discharge planning reviewed  Follow up as outpatient. Thank you for allowing us to participate in the care of this patient. Please call with questions.     Bren Valencia MD  Pager: [phone] - Office: [phone]

## 2022-09-27 ENCOUNTER — OFFICE VISIT (OUTPATIENT)
Dept: INFECTIOUS DISEASES | Age: 67
End: 2022-09-27
Payer: MEDICARE

## 2022-09-27 VITALS
DIASTOLIC BLOOD PRESSURE: 47 MMHG | BODY MASS INDEX: 17.07 KG/M2 | WEIGHT: 100 LBS | HEART RATE: 83 BPM | SYSTOLIC BLOOD PRESSURE: 75 MMHG | OXYGEN SATURATION: 97 % | HEIGHT: 64 IN | RESPIRATION RATE: 16 BRPM

## 2022-09-27 DIAGNOSIS — K86.1 OTHER CHRONIC PANCREATITIS (HCC): ICD-10-CM

## 2022-09-27 DIAGNOSIS — I96 GANGRENE OF TOE OF LEFT FOOT (HCC): ICD-10-CM

## 2022-09-27 DIAGNOSIS — I73.9 PERIPHERAL ARTERIAL DISEASE (HCC): ICD-10-CM

## 2022-09-27 PROCEDURE — G8400 PT W/DXA NO RESULTS DOC: HCPCS | Performed by: INTERNAL MEDICINE

## 2022-09-27 PROCEDURE — G8419 CALC BMI OUT NRM PARAM NOF/U: HCPCS | Performed by: INTERNAL MEDICINE

## 2022-09-27 PROCEDURE — 3017F COLORECTAL CA SCREEN DOC REV: CPT | Performed by: INTERNAL MEDICINE

## 2022-09-27 PROCEDURE — 1123F ACP DISCUSS/DSCN MKR DOCD: CPT | Performed by: INTERNAL MEDICINE

## 2022-09-27 PROCEDURE — 99213 OFFICE O/P EST LOW 20 MIN: CPT | Performed by: INTERNAL MEDICINE

## 2022-09-27 PROCEDURE — 1036F TOBACCO NON-USER: CPT | Performed by: INTERNAL MEDICINE

## 2022-09-27 PROCEDURE — G8427 DOCREV CUR MEDS BY ELIG CLIN: HCPCS | Performed by: INTERNAL MEDICINE

## 2022-09-27 PROCEDURE — 1090F PRES/ABSN URINE INCON ASSESS: CPT | Performed by: INTERNAL MEDICINE

## 2022-09-27 NOTE — PATIENT INSTRUCTIONS
Patient evaluated to provide clearance for angioplasty Left leg by Dr Christiana Boeck. Patient has no active infection that would preclude the angioplasty. Letter will be sent to Dr Christiana Boeck giving ID clearance for the procedure.     No follow up visit needed with ID Service

## 2022-09-30 ENCOUNTER — TELEPHONE (OUTPATIENT)
Dept: INFECTIOUS DISEASES | Age: 67
End: 2022-09-30

## 2022-09-30 NOTE — TELEPHONE ENCOUNTER
Dr Lloyd Viera asked Jose Boyle to fax his office note from 9/27/22 to Dr Franc Pendleton (at [facility]). Faxed as requested to [phone].

## 2022-12-17 ENCOUNTER — HOSPITAL ENCOUNTER (EMERGENCY)
Age: 67
Discharge: HOME | End: 2022-12-18
Attending: EMERGENCY MEDICINE
Payer: MEDICARE

## 2022-12-17 ENCOUNTER — APPOINTMENT (OUTPATIENT)
Dept: CT IMAGING | Age: 67
End: 2022-12-17
Payer: MEDICARE

## 2022-12-17 VITALS
HEIGHT: 64 IN | SYSTOLIC BLOOD PRESSURE: 150 MMHG | HEART RATE: 86 BPM | OXYGEN SATURATION: 99 % | TEMPERATURE: 97.7 F | DIASTOLIC BLOOD PRESSURE: 71 MMHG | RESPIRATION RATE: 16 BRPM | WEIGHT: 104 LBS | BODY MASS INDEX: 17.75 KG/M2

## 2022-12-17 DIAGNOSIS — S00.83XA CONTUSION OF OTHER PART OF HEAD, INITIAL ENCOUNTER: ICD-10-CM

## 2022-12-17 DIAGNOSIS — S09.90XA INJURY OF HEAD, INITIAL ENCOUNTER: Primary | ICD-10-CM

## 2022-12-17 LAB
ABSOLUTE EOS #: 0.2 K/UL (ref 0–0.4)
ABSOLUTE LYMPH #: 2.3 K/UL (ref 1–4.8)
ABSOLUTE MONO #: 0.9 K/UL (ref 0.1–1.2)
ANION GAP SERPL CALCULATED.3IONS-SCNC: 9 MMOL/L (ref 9–17)
BASOPHILS # BLD: 1 % (ref 0–2)
BASOPHILS ABSOLUTE: 0.1 K/UL (ref 0–0.2)
BUN BLDV-MCNC: 19 MG/DL (ref 8–23)
CALCIUM SERPL-MCNC: 8.9 MG/DL (ref 8.6–10.4)
CHLORIDE BLD-SCNC: 104 MMOL/L (ref 98–107)
CO2: 24 MMOL/L (ref 20–31)
CREAT SERPL-MCNC: 0.98 MG/DL (ref 0.5–0.9)
EOSINOPHILS RELATIVE PERCENT: 2 % (ref 1–4)
GFR SERPL CREATININE-BSD FRML MDRD: >60 ML/MIN/1.73M2
GLUCOSE BLD-MCNC: 105 MG/DL (ref 70–99)
HCT VFR BLD CALC: 33.8 % (ref 36–46)
HEMOGLOBIN: 11 G/DL (ref 12–16)
LYMPHOCYTES # BLD: 23 % (ref 24–44)
MCH RBC QN AUTO: 28 PG (ref 26–34)
MCHC RBC AUTO-ENTMCNC: 32.7 G/DL (ref 31–37)
MCV RBC AUTO: 85.8 FL (ref 80–100)
MONOCYTES # BLD: 9 % (ref 2–11)
PDW BLD-RTO: 13.8 % (ref 12.5–15.4)
PLATELET # BLD: 399 K/UL (ref 140–450)
PMV BLD AUTO: 8.1 FL (ref 6–12)
POTASSIUM SERPL-SCNC: 4 MMOL/L (ref 3.7–5.3)
RBC # BLD: 3.94 M/UL (ref 4–5.2)
SEG NEUTROPHILS: 65 % (ref 36–66)
SEGMENTED NEUTROPHILS ABSOLUTE COUNT: 6.5 K/UL (ref 1.8–7.7)
SODIUM BLD-SCNC: 137 MMOL/L (ref 135–144)
WBC # BLD: 9.9 K/UL (ref 3.5–11)

## 2022-12-17 PROCEDURE — 80048 BASIC METABOLIC PNL TOTAL CA: CPT

## 2022-12-17 PROCEDURE — 6360000002 HC RX W HCPCS

## 2022-12-17 PROCEDURE — 96361 HYDRATE IV INFUSION ADD-ON: CPT

## 2022-12-17 PROCEDURE — 99284 EMERGENCY DEPT VISIT MOD MDM: CPT

## 2022-12-17 PROCEDURE — 36415 COLL VENOUS BLD VENIPUNCTURE: CPT

## 2022-12-17 PROCEDURE — 96375 TX/PRO/DX INJ NEW DRUG ADDON: CPT

## 2022-12-17 PROCEDURE — 6360000002 HC RX W HCPCS: Performed by: EMERGENCY MEDICINE

## 2022-12-17 PROCEDURE — 2580000003 HC RX 258: Performed by: EMERGENCY MEDICINE

## 2022-12-17 PROCEDURE — 70450 CT HEAD/BRAIN W/O DYE: CPT

## 2022-12-17 PROCEDURE — 85025 COMPLETE CBC W/AUTO DIFF WBC: CPT

## 2022-12-17 PROCEDURE — 6370000000 HC RX 637 (ALT 250 FOR IP): Performed by: EMERGENCY MEDICINE

## 2022-12-17 PROCEDURE — 96374 THER/PROPH/DIAG INJ IV PUSH: CPT

## 2022-12-17 RX ORDER — METOCLOPRAMIDE HYDROCHLORIDE 5 MG/ML
10 INJECTION INTRAMUSCULAR; INTRAVENOUS ONCE
Status: COMPLETED | OUTPATIENT
Start: 2022-12-18 | End: 2022-12-18

## 2022-12-17 RX ORDER — 0.9 % SODIUM CHLORIDE 0.9 %
1000 INTRAVENOUS SOLUTION INTRAVENOUS ONCE
Status: DISCONTINUED | OUTPATIENT
Start: 2022-12-18 | End: 2022-12-18 | Stop reason: HOSPADM

## 2022-12-17 RX ORDER — OXYCODONE HYDROCHLORIDE AND ACETAMINOPHEN 5; 325 MG/1; MG/1
1 TABLET ORAL ONCE
Status: COMPLETED | OUTPATIENT
Start: 2022-12-17 | End: 2022-12-17

## 2022-12-17 RX ORDER — AMOXICILLIN 250 MG
2 CAPSULE ORAL NIGHTLY
COMMUNITY

## 2022-12-17 RX ORDER — 0.9 % SODIUM CHLORIDE 0.9 %
1000 INTRAVENOUS SOLUTION INTRAVENOUS ONCE
Status: COMPLETED | OUTPATIENT
Start: 2022-12-17 | End: 2022-12-17

## 2022-12-17 RX ORDER — PANTOPRAZOLE SODIUM 40 MG/1
40 TABLET, DELAYED RELEASE ORAL DAILY
COMMUNITY

## 2022-12-17 RX ORDER — GABAPENTIN 300 MG/1
300 CAPSULE ORAL 3 TIMES DAILY
COMMUNITY

## 2022-12-17 RX ORDER — DEXAMETHASONE SODIUM PHOSPHATE 10 MG/ML
10 INJECTION, SOLUTION INTRAMUSCULAR; INTRAVENOUS ONCE
Status: COMPLETED | OUTPATIENT
Start: 2022-12-18 | End: 2022-12-18

## 2022-12-17 RX ORDER — ONDANSETRON 2 MG/ML
4 INJECTION INTRAMUSCULAR; INTRAVENOUS ONCE
Status: COMPLETED | OUTPATIENT
Start: 2022-12-17 | End: 2022-12-17

## 2022-12-17 RX ORDER — ACETAMINOPHEN 325 MG/1
650 TABLET ORAL ONCE
Status: COMPLETED | OUTPATIENT
Start: 2022-12-17 | End: 2022-12-17

## 2022-12-17 RX ORDER — DULOXETINE 40 MG/1
CAPSULE, DELAYED RELEASE ORAL DAILY
COMMUNITY

## 2022-12-17 RX ORDER — ONDANSETRON 2 MG/ML
INJECTION INTRAMUSCULAR; INTRAVENOUS
Status: COMPLETED
Start: 2022-12-17 | End: 2022-12-17

## 2022-12-17 RX ORDER — KETOROLAC TROMETHAMINE 30 MG/ML
30 INJECTION, SOLUTION INTRAMUSCULAR; INTRAVENOUS ONCE
Status: COMPLETED | OUTPATIENT
Start: 2022-12-18 | End: 2022-12-17

## 2022-12-17 RX ORDER — DIPHENHYDRAMINE HYDROCHLORIDE 50 MG/ML
25 INJECTION INTRAMUSCULAR; INTRAVENOUS ONCE
Status: COMPLETED | OUTPATIENT
Start: 2022-12-18 | End: 2022-12-18

## 2022-12-17 RX ORDER — MAGNESIUM OXIDE 400 MG/1
800 TABLET ORAL 2 TIMES DAILY
COMMUNITY

## 2022-12-17 RX ORDER — CILOSTAZOL 50 MG/1
50 TABLET ORAL 2 TIMES DAILY
COMMUNITY

## 2022-12-17 RX ORDER — LORAZEPAM 1 MG/1
1 TABLET ORAL EVERY 8 HOURS PRN
COMMUNITY

## 2022-12-17 RX ORDER — BUSPIRONE HYDROCHLORIDE 5 MG/1
5 TABLET ORAL 3 TIMES DAILY
COMMUNITY

## 2022-12-17 RX ADMIN — KETOROLAC TROMETHAMINE 30 MG: 30 INJECTION, SOLUTION INTRAMUSCULAR; INTRAVENOUS at 23:59

## 2022-12-17 RX ADMIN — OXYCODONE HYDROCHLORIDE AND ACETAMINOPHEN 1 TABLET: 5; 325 TABLET ORAL at 22:39

## 2022-12-17 RX ADMIN — SODIUM CHLORIDE 1000 ML: 9 INJECTION, SOLUTION INTRAVENOUS at 21:36

## 2022-12-17 RX ADMIN — ONDANSETRON 4 MG: 2 INJECTION INTRAMUSCULAR; INTRAVENOUS at 21:36

## 2022-12-17 RX ADMIN — ACETAMINOPHEN 650 MG: 325 TABLET ORAL at 22:15

## 2022-12-17 ASSESSMENT — PAIN DESCRIPTION - ORIENTATION
ORIENTATION: RIGHT
ORIENTATION: RIGHT

## 2022-12-17 ASSESSMENT — PAIN - FUNCTIONAL ASSESSMENT: PAIN_FUNCTIONAL_ASSESSMENT: 0-10

## 2022-12-17 ASSESSMENT — ENCOUNTER SYMPTOMS
RESPIRATORY NEGATIVE: 1
GASTROINTESTINAL NEGATIVE: 1
ALLERGIC/IMMUNOLOGIC NEGATIVE: 1
EYES NEGATIVE: 1

## 2022-12-17 ASSESSMENT — PAIN DESCRIPTION - LOCATION
LOCATION: HEAD
LOCATION: HEAD

## 2022-12-17 ASSESSMENT — LIFESTYLE VARIABLES: HOW OFTEN DO YOU HAVE A DRINK CONTAINING ALCOHOL: NEVER

## 2022-12-17 ASSESSMENT — PAIN DESCRIPTION - DESCRIPTORS: DESCRIPTORS: ACHING

## 2022-12-17 ASSESSMENT — PAIN SCALES - GENERAL
PAINLEVEL_OUTOF10: 9
PAINLEVEL_OUTOF10: 9

## 2022-12-18 PROCEDURE — 6360000002 HC RX W HCPCS: Performed by: EMERGENCY MEDICINE

## 2022-12-18 RX ADMIN — DIPHENHYDRAMINE HYDROCHLORIDE 25 MG: 50 INJECTION, SOLUTION INTRAMUSCULAR; INTRAVENOUS at 00:00

## 2022-12-18 RX ADMIN — DEXAMETHASONE SODIUM PHOSPHATE 10 MG: 10 INJECTION, SOLUTION INTRAMUSCULAR; INTRAVENOUS at 00:00

## 2022-12-18 RX ADMIN — METOCLOPRAMIDE 10 MG: 5 INJECTION, SOLUTION INTRAMUSCULAR; INTRAVENOUS at 00:00

## 2022-12-18 NOTE — ED TRIAGE NOTES
Pt arrives to ED via EMS after getting dizzy and hitting head on floor while washing it. Pt does not think she had LOC but is not sure. Pt takes Plavix. Pt c/o headache over R eye and forehead area. Ice pack applied.

## 2022-12-18 NOTE — ED PROVIDER NOTES
eMERGENCY dEPARTMENT eNCOUnter      Pt Name: Terri Alvarado  MRN: [id number]  [place] 1955  Date of evaluation: 12/17/2022      CHIEF COMPLAINT       Chief Complaint   Patient presents with    Head Injury          HISTORY OF PRESENT ILLNESS    Terri Alvarado is a 79 y.o. female who presents to the emergency department for evaluation of injury sustained to her frontal head status post a fall at home she was on the ground basically standing was trying to dust off a [place] trees bent over and bent over too far and lost her balance. Patient states though that she has been feeling little bit lightheaded throughout the day. She denies loss of consciousness or neck pain she has no nausea no vomiting no changes in mental status. REVIEW OF SYSTEMS         Review of Systems   Constitutional: Negative. HENT:          Contusion head   Eyes: Negative. Respiratory: Negative. Cardiovascular: Negative. Gastrointestinal: Negative. Endocrine: Negative. Genitourinary: Negative. Musculoskeletal: Negative. Skin: Negative. Allergic/Immunologic: Negative. Neurological: Negative. Hematological: Negative. Psychiatric/Behavioral: Negative. PAST MEDICAL HISTORY    has a past medical history of ADHD (attention deficit hyperactivity disorder), Anxiety, BiPAP (biphasic positive airway pressure) dependence, Bipolar 1 disorder ([address] Chronic back pain, Depression, Valentino catheter in place, Hyperlipidemia, Hypertension, Nursing home resident, Pancreatitis, PICC (peripherally inserted central catheter) in place, Pleural effusion, and Respiratory failure ([address] SURGICAL HISTORY      has a past surgical history that includes Appendectomy; Hysterectomy; Cholecystectomy; tumor removal; back surgery; Endoscopy, colon, diagnostic; Foot surgery (Left, 11/04/2022); tumor removal; and thoracentesis (Left, 05/26/2022).     CURRENT MEDICATIONS       Previous Medications    AMLODIPINE (NORVASC) 5 MG TABLET    Take 5 mg by mouth daily    ATORVASTATIN (LIPITOR) 10 MG TABLET    Take 10 mg by mouth daily    BUSPIRONE (BUSPAR) 5 MG TABLET    Take 5 mg by mouth 3 times daily    CILOSTAZOL (PLETAL) 50 MG TABLET    Take 50 mg by mouth 2 times daily    CLOPIDOGREL (PLAVIX) 75 MG TABLET    Take 1 tablet by mouth daily    DULOXETINE HCL 40 MG CPEP    Take by mouth daily    GABAPENTIN (NEURONTIN) 300 MG CAPSULE    Take 300 mg by mouth 3 times daily. IBUPROFEN (ADVIL;MOTRIN) 800 MG TABLET    Take 1 tablet by mouth every 8 hours as needed for Pain    LIPASE-PROTEASE-AMYLASE (CREON) 05899-88851 UNITS DELAYED RELEASE CAPSULE    Take 12,000 Units by mouth 3 times daily (with meals)    LORAZEPAM (ATIVAN) 1 MG TABLET    Take 1 mg by mouth every 8 hours as needed for Anxiety. MAGNESIUM OXIDE (MAG-OX) 400 MG TABLET    Take 800 mg by mouth 2 times daily    MIRTAZAPINE (REMERON) 30 MG TABLET    Take 30 mg by mouth nightly    OXCARBAZEPINE (TRILEPTAL PO)    Take 450 mg by mouth daily q am    PANTOPRAZOLE (PROTONIX) 40 MG TABLET    Take 40 mg by mouth daily    SENNA-DOCUSATE (PERICOLACE) 8.6-50 MG PER TABLET    Take 2 tablets by mouth nightly    TRAZODONE (DESYREL) 100 MG TABLET    Take 200 mg by mouth nightly       ALLERGIES     is allergic to compazine [prochlorperazine maleate], lithium, other, sulfa antibiotics, and tetracyclines & related. FAMILY HISTORY     has no family status information on file. family history is not on file. SOCIAL HISTORY      reports that she has quit smoking. Her smoking use included cigarettes. She has never used smokeless tobacco. She reports that she does not drink alcohol and does not use drugs. PHYSICAL EXAM     INITIAL VITALS:  height is 5' 4\" (1.626 m) and weight is 47.2 kg (104 lb). Her oral temperature is 97.7 °F (36.5 °C). Her blood pressure is 150/71 (abnormal) and her pulse is 86. Her respiration is 16 and oxygen saturation is 99%.      Constitutional: Alert, oriented x3, nontoxic, afebrile, answering questions appropriately, acting properly for age, in no acute distress  HEENT: Extraocular muscles intact, mucus membranes moist, TMs clear bilaterally, no posterior pharyngeal erythema or exudates, Pupils equal, round, reactive to light,   Neck: Trachea midline, Supple without lymphadenopathy, no posterior midline neck tenderness to palpation  Cardiovascular: Regular rhythm and rate no S3, S4, or murmurs  Respiratory: Clear to auscultation bilaterally no wheezes, rhonchi, rales, no respiratory distress  Gastrointestinal: Soft, nontender, nondistended, positive bowel sounds. No rebound, rigidity, or guarding. Musculoskeletal: No extremity pain or swelling  Neurologic: Moving all 4 extremities without difficulty there are no gross focal neurologic deficits  Skin: Warm and dry    DIFFERENTIAL DIAGNOSIS/ MDM:     Fall with head injury patient will get some x-rays and reevaluation. Also get some fluid boluses and some laboratories.     DIAGNOSTIC RESULTS     EKG: All EKG's are interpreted by the Emergency Department Physician who either signs or Co-signs this chart in the absence of a cardiologist.        Not indicated unless otherwise documented above    LABS:  Results for orders placed or performed during the hospital encounter of 12/17/22   CBC with Auto Differential   Result Value Ref Range    WBC 9.9 3.5 - 11.0 k/uL    RBC 3.94 (L) 4.0 - 5.2 m/uL    Hemoglobin 11.0 (L) 12.0 - 16.0 g/dL    Hematocrit 33.8 (L) 36 - 46 %    MCV 85.8 80 - 100 fL    MCH 28.0 26 - 34 pg    MCHC 32.7 31 - 37 g/dL    RDW 13.8 12.5 - 15.4 %    Platelets 105 606 - 806 k/uL    MPV 8.1 6.0 - 12.0 fL    Seg Neutrophils 65 36 - 66 %    Lymphocytes 23 (L) 24 - 44 %    Monocytes 9 2 - 11 %    Eosinophils % 2 1 - 4 %    Basophils 1 0 - 2 %    Segs Absolute 6.50 1.8 - 7.7 k/uL    Absolute Lymph # 2.30 1.0 - 4.8 k/uL    Absolute Mono # 0.90 0.1 - 1.2 k/uL    Absolute Eos # 0.20 0.0 - 0.4 k/uL    Basophils Absolute 0.10 0.0 - 0.2 k/uL   Basic Metabolic Panel   Result Value Ref Range    Glucose 105 (H) 70 - 99 mg/dL    BUN 19 8 - 23 mg/dL    Creatinine 0.98 (H) 0.50 - 0.90 mg/dL    Est, Glom Filt Rate >60 >60 mL/min/1.73m2    Calcium 8.9 8.6 - 10.4 mg/dL    Sodium 137 135 - 144 mmol/L    Potassium 4.0 3.7 - 5.3 mmol/L    Chloride 104 98 - 107 mmol/L    CO2 24 20 - 31 mmol/L    Anion Gap 9 9 - 17 mmol/L       Not indicated unless otherwise documented above    RADIOLOGY:   I reviewed the radiologist interpretations:  CT HEAD WO CONTRAST   Final Result   No acute intracranial abnormality. Not indicated unless otherwise documented above    EMERGENCY DEPARTMENT COURSE:     The patient was given the following medications:  Orders Placed This Encounter   Medications    0.9 % sodium chloride bolus    ondansetron (ZOFRAN) injection 4 mg    ondansetron (ZOFRAN) 4 MG/2ML injection     Jennifer Wadsworth: cabinet override    acetaminophen (TYLENOL) tablet 650 mg        Vitals:    Vitals:    12/17/22 2116 12/17/22 2226   BP: 132/85 (!) 150/71   Pulse: 87 86   Resp: 16 16   Temp: 97.7 °F (36.5 °C)    TempSrc: Oral    SpO2: 100% 99%   Weight: 47.2 kg (104 lb)    Height: 5' 4\" (1.626 m)      -------------------------  BP (!) 150/71   Pulse 86   Temp 97.7 °F (36.5 °C) (Oral)   Resp 16   Ht 5' 4\" (1.626 m)   Wt 47.2 kg (104 lb)   SpO2 99%   BMI 17.85 kg/m²         I have reviewed the disposition diagnosis with the patient and or their family/guardian. I have answered their questions and given discharge instructions. They voiced understanding of these instructions and did not have any further questions or complaints. CRITICAL CARE:    None    CONSULTS:    None    PROCEDURES:    None      OARRS Report if indicated             FINAL IMPRESSION      1. Injury of head, initial encounter    2.  Contusion of other part of head, initial encounter          DISPOSITION/PLAN   DISPOSITION Decision To Discharge    I have reviewed the disposition diagnosis with the patient and or their family/guardian. I have answered their questions and given discharge instructions. They voiced understanding of these instructions and did not have any further questions or complaints. Reevaluation: Patient CT of her head is negative for any acute disease her laboratories are unremarkable she has been given some fluids and antiemetics I believe the patient is stable for discharge home.       PATIENT REFERRED TO:  Garrick Youg, [address]  [phone]    In 2 days      DISCHARGE MEDICATIONS:  New Prescriptions    No medications on file       (Please note that portions of this note were completed with a voice recognition program.  Efforts were made to edit the dictations but occasionally words are mis-transcribed.)    Tricia Patterson MD,  Attending Emergency Physician           Tricia Patterson MD  12/17/22 [id number]

## 2022-12-18 NOTE — DISCHARGE INSTRUCTIONS
For pain ice to the painful areas, make sure you are drinking plenty of fluids, follow-up with your doctor in 2 days and return to emergency setting if worsening in any way.

## 2022-12-19 ENCOUNTER — HOSPITAL ENCOUNTER (EMERGENCY)
Age: 67
Discharge: HOME | End: 2022-12-19
Attending: EMERGENCY MEDICINE
Payer: MEDICARE

## 2022-12-19 ENCOUNTER — APPOINTMENT (OUTPATIENT)
Dept: CT IMAGING | Age: 67
End: 2022-12-19
Payer: MEDICARE

## 2022-12-19 VITALS
SYSTOLIC BLOOD PRESSURE: 139 MMHG | OXYGEN SATURATION: 96 % | HEART RATE: 82 BPM | DIASTOLIC BLOOD PRESSURE: 79 MMHG | TEMPERATURE: 98.4 F | WEIGHT: 112 LBS | BODY MASS INDEX: 19.12 KG/M2 | HEIGHT: 64 IN | RESPIRATION RATE: 16 BRPM

## 2022-12-19 DIAGNOSIS — R10.33 PERIUMBILICAL ABDOMINAL PAIN: Primary | ICD-10-CM

## 2022-12-19 LAB
ABSOLUTE EOS #: 0.1 K/UL (ref 0–0.4)
ABSOLUTE LYMPH #: 2.2 K/UL (ref 1–4.8)
ABSOLUTE MONO #: 1 K/UL (ref 0.1–1.2)
ALBUMIN SERPL-MCNC: 3.5 G/DL (ref 3.5–5.2)
ALBUMIN/GLOBULIN RATIO: 1.4 (ref 1–2.5)
ALP BLD-CCNC: 148 U/L (ref 35–104)
ALT SERPL-CCNC: 6 U/L (ref 5–33)
ANION GAP SERPL CALCULATED.3IONS-SCNC: 10 MMOL/L (ref 9–17)
AST SERPL-CCNC: 8 U/L
BASOPHILS # BLD: 1 % (ref 0–2)
BASOPHILS ABSOLUTE: 0.1 K/UL (ref 0–0.2)
BILIRUB SERPL-MCNC: <0.1 MG/DL (ref 0.3–1.2)
BILIRUBIN URINE: NEGATIVE
BUN BLDV-MCNC: 21 MG/DL (ref 8–23)
CALCIUM SERPL-MCNC: 8.7 MG/DL (ref 8.6–10.4)
CHLORIDE BLD-SCNC: 107 MMOL/L (ref 98–107)
CO2: 21 MMOL/L (ref 20–31)
COLOR: YELLOW
COMMENT UA: NORMAL
CREAT SERPL-MCNC: 1.14 MG/DL (ref 0.5–0.9)
EOSINOPHILS RELATIVE PERCENT: 1 % (ref 1–4)
GFR SERPL CREATININE-BSD FRML MDRD: 53 ML/MIN/1.73M2
GLUCOSE BLD-MCNC: 106 MG/DL (ref 70–99)
GLUCOSE URINE: NEGATIVE
HCT VFR BLD CALC: 32.3 % (ref 36–46)
HEMOGLOBIN: 10.5 G/DL (ref 12–16)
KETONES, URINE: NEGATIVE
LEUKOCYTE ESTERASE, URINE: NEGATIVE
LIPASE: 38 U/L (ref 13–60)
LYMPHOCYTES # BLD: 20 % (ref 24–44)
MAGNESIUM: 1.6 MG/DL (ref 1.6–2.6)
MCH RBC QN AUTO: 28 PG (ref 26–34)
MCHC RBC AUTO-ENTMCNC: 32.6 G/DL (ref 31–37)
MCV RBC AUTO: 86 FL (ref 80–100)
MONOCYTES # BLD: 9 % (ref 2–11)
NITRITE, URINE: NEGATIVE
PDW BLD-RTO: 13.9 % (ref 12.5–15.4)
PH UA: 6 (ref 5–8)
PLATELET # BLD: 415 K/UL (ref 140–450)
PMV BLD AUTO: 8.2 FL (ref 6–12)
POTASSIUM SERPL-SCNC: 3.9 MMOL/L (ref 3.7–5.3)
PROTEIN UA: NEGATIVE
RBC # BLD: 3.76 M/UL (ref 4–5.2)
SEG NEUTROPHILS: 69 % (ref 36–66)
SEGMENTED NEUTROPHILS ABSOLUTE COUNT: 7.7 K/UL (ref 1.8–7.7)
SODIUM BLD-SCNC: 138 MMOL/L (ref 135–144)
SPECIFIC GRAVITY UA: 1.02 (ref 1–1.03)
TOTAL PROTEIN: 6 G/DL (ref 6.4–8.3)
TROPONIN, HIGH SENSITIVITY: 16 NG/L (ref 0–14)
TROPONIN, HIGH SENSITIVITY: 17 NG/L (ref 0–14)
TURBIDITY: CLEAR
URINE HGB: NEGATIVE
UROBILINOGEN, URINE: NORMAL
WBC # BLD: 11.1 K/UL (ref 3.5–11)

## 2022-12-19 PROCEDURE — 81003 URINALYSIS AUTO W/O SCOPE: CPT

## 2022-12-19 PROCEDURE — 96374 THER/PROPH/DIAG INJ IV PUSH: CPT

## 2022-12-19 PROCEDURE — 96375 TX/PRO/DX INJ NEW DRUG ADDON: CPT

## 2022-12-19 PROCEDURE — 84484 ASSAY OF TROPONIN QUANT: CPT

## 2022-12-19 PROCEDURE — 2580000003 HC RX 258: Performed by: EMERGENCY MEDICINE

## 2022-12-19 PROCEDURE — 74176 CT ABD & PELVIS W/O CONTRAST: CPT

## 2022-12-19 PROCEDURE — 99284 EMERGENCY DEPT VISIT MOD MDM: CPT

## 2022-12-19 PROCEDURE — 93005 ELECTROCARDIOGRAM TRACING: CPT | Performed by: EMERGENCY MEDICINE

## 2022-12-19 PROCEDURE — 85025 COMPLETE CBC W/AUTO DIFF WBC: CPT

## 2022-12-19 PROCEDURE — 80053 COMPREHEN METABOLIC PANEL: CPT

## 2022-12-19 PROCEDURE — A4216 STERILE WATER/SALINE, 10 ML: HCPCS | Performed by: EMERGENCY MEDICINE

## 2022-12-19 PROCEDURE — 2500000003 HC RX 250 WO HCPCS: Performed by: EMERGENCY MEDICINE

## 2022-12-19 PROCEDURE — 83735 ASSAY OF MAGNESIUM: CPT

## 2022-12-19 PROCEDURE — 83690 ASSAY OF LIPASE: CPT

## 2022-12-19 PROCEDURE — 6370000000 HC RX 637 (ALT 250 FOR IP): Performed by: EMERGENCY MEDICINE

## 2022-12-19 PROCEDURE — 36415 COLL VENOUS BLD VENIPUNCTURE: CPT

## 2022-12-19 PROCEDURE — 6360000002 HC RX W HCPCS: Performed by: EMERGENCY MEDICINE

## 2022-12-19 RX ORDER — KETOROLAC TROMETHAMINE 30 MG/ML
30 INJECTION, SOLUTION INTRAMUSCULAR; INTRAVENOUS ONCE
Status: DISCONTINUED | OUTPATIENT
Start: 2022-12-19 | End: 2022-12-20 | Stop reason: HOSPADM

## 2022-12-19 RX ORDER — OXYCODONE HYDROCHLORIDE AND ACETAMINOPHEN 5; 325 MG/1; MG/1
1 TABLET ORAL ONCE
Status: COMPLETED | OUTPATIENT
Start: 2022-12-19 | End: 2022-12-19

## 2022-12-19 RX ORDER — MORPHINE SULFATE 4 MG/ML
4 INJECTION, SOLUTION INTRAMUSCULAR; INTRAVENOUS ONCE
Status: COMPLETED | OUTPATIENT
Start: 2022-12-19 | End: 2022-12-19

## 2022-12-19 RX ORDER — 0.9 % SODIUM CHLORIDE 0.9 %
1000 INTRAVENOUS SOLUTION INTRAVENOUS ONCE
Status: COMPLETED | OUTPATIENT
Start: 2022-12-19 | End: 2022-12-19

## 2022-12-19 RX ORDER — ONDANSETRON 2 MG/ML
4 INJECTION INTRAMUSCULAR; INTRAVENOUS ONCE
Status: COMPLETED | OUTPATIENT
Start: 2022-12-19 | End: 2022-12-19

## 2022-12-19 RX ADMIN — FAMOTIDINE 20 MG: 10 INJECTION, SOLUTION INTRAVENOUS at 20:06

## 2022-12-19 RX ADMIN — MORPHINE SULFATE 4 MG: 4 INJECTION INTRAVENOUS at 21:40

## 2022-12-19 RX ADMIN — OXYCODONE HYDROCHLORIDE AND ACETAMINOPHEN 1 TABLET: 5; 325 TABLET ORAL at 23:28

## 2022-12-19 RX ADMIN — ONDANSETRON 4 MG: 2 INJECTION INTRAMUSCULAR; INTRAVENOUS at 20:06

## 2022-12-19 RX ADMIN — SODIUM CHLORIDE 1000 ML: 9 INJECTION, SOLUTION INTRAVENOUS at 20:07

## 2022-12-19 ASSESSMENT — ENCOUNTER SYMPTOMS
SORE THROAT: 0
ABDOMINAL PAIN: 1
COUGH: 0
SHORTNESS OF BREATH: 0
EYE PAIN: 0
VOMITING: 0
NAUSEA: 1
RHINORRHEA: 0
BACK PAIN: 0
DIARRHEA: 0

## 2022-12-19 ASSESSMENT — PAIN SCALES - GENERAL
PAINLEVEL_OUTOF10: 10
PAINLEVEL_OUTOF10: 9
PAINLEVEL_OUTOF10: 10

## 2022-12-19 ASSESSMENT — PAIN - FUNCTIONAL ASSESSMENT: PAIN_FUNCTIONAL_ASSESSMENT: 0-10

## 2022-12-19 ASSESSMENT — PAIN DESCRIPTION - PAIN TYPE: TYPE: ACUTE PAIN;CHRONIC PAIN

## 2022-12-19 ASSESSMENT — PAIN DESCRIPTION - LOCATION: LOCATION: ABDOMEN

## 2022-12-20 NOTE — ED PROVIDER NOTES
[facility] Emergency Department  [id number] [facility] [address] [facility] [address]  Phone: [phone]  Fax: [phone] [facility]          Pt Name: Kaelyn Calderon  MRN: [id number]  [place] 1955  Date of evaluation: 12/19/2022      CHIEF COMPLAINT       Chief Complaint   Patient presents with    Abdominal Pain     Pt c/o upper left abd pain. Pt states pain started approx 45 mins pta. Pt states she has history of pancreatitis and last flare up was 3 wks ago. Pt states she was given antibiotics and pain medicine. HISTORY OF PRESENT ILLNESS       Kaelyn Calderon is a 79 y.o. female who presents with an exacerbation of her chronic pancreatitis. Began earlier today. Last exacerbation was about a month ago. Does have a history of chronic pancreatitis and sounds like a pancreatic mass as well. She reports no history of pancreatic cancer. She arrived via EMS. No prearrival medications given by EMS. Patient does not take anything for the pain. She does have chronic opiates prescribed to her. She denies other symptoms or concerns at this time. Some nausea without vomiting    REVIEW OF SYSTEMS       Review of Systems   Constitutional:  Negative for chills, fatigue and fever. HENT:  Negative for rhinorrhea and sore throat. Eyes:  Negative for pain. Respiratory:  Negative for cough and shortness of breath. Cardiovascular:  Negative for chest pain. Gastrointestinal:  Positive for abdominal pain and nausea. Negative for diarrhea and vomiting. Genitourinary:  Negative for difficulty urinating. Musculoskeletal:  Negative for back pain and neck pain. Skin:  Negative for rash. Neurological:  Negative for weakness and headaches.       PAST MEDICAL HISTORY    has a past medical history of ADHD (attention deficit hyperactivity disorder), Anxiety, BiPAP (biphasic positive airway pressure) dependence, Bipolar 1 disorder ([facility] [address] Chronic back pain, Depression, Valentino catheter in place, Hyperlipidemia, Hypertension, Nursing home resident, Pancreatitis, PICC (peripherally inserted central catheter) in place, Pleural effusion, and Respiratory failure ([facility] [address] SURGICAL HISTORY      has a past surgical history that includes Appendectomy; Hysterectomy; Cholecystectomy; tumor removal; back surgery; Endoscopy, colon, diagnostic; Foot surgery (Left, 11/04/2022); tumor removal; and thoracentesis (Left, 05/26/2022). CURRENT MEDICATIONS       Discharge Medication List as of 12/19/2022 11:19 PM        CONTINUE these medications which have NOT CHANGED    Details   busPIRone (BUSPAR) 5 MG tablet Take 5 mg by mouth 3 times dailyHistorical Med      cilostazol (PLETAL) 50 MG tablet Take 50 mg by mouth 2 times dailyHistorical Med      lipase-protease-amylase (CREON) 03055-57472 units delayed release capsule Take 12,000 Units by mouth 3 times daily (with meals), Oral, 3 TIMES DAILY WITH MEALS, Historical Med      DULoxetine HCl 40 MG CPEP Take by mouth dailyHistorical Med      gabapentin (NEURONTIN) 300 MG capsule Take 300 mg by mouth 3 times daily. Historical Med      LORazepam (ATIVAN) 1 MG tablet Take 1 mg by mouth every 8 hours as needed for Anxiety. Historical Med      magnesium oxide (MAG-OX) 400 MG tablet Take 800 mg by mouth 2 times dailyHistorical Med      pantoprazole (PROTONIX) 40 MG tablet Take 40 mg by mouth dailyHistorical Med      senna-docusate (PERICOLACE) 8.6-50 MG per tablet Take 2 tablets by mouth nightlyHistorical Med      amLODIPine (NORVASC) 5 MG tablet Take 5 mg by mouth dailyHistorical Med      atorvastatin (LIPITOR) 10 MG tablet Take 10 mg by mouth dailyHistorical Med      clopidogrel (PLAVIX) 75 MG tablet Take 1 tablet by mouth daily, Disp-30 tablet, R-0Print      traZODone (DESYREL) 100 MG tablet Take 200 mg by mouth nightlyHistorical Med      OXcarbazepine (TRILEPTAL PO) Take 450 mg by mouth daily q amHistorical Med      mirtazapine (REMERON) 30 MG tablet Take 30 mg by mouth nightlyHistorical Med      ibuprofen (ADVIL;MOTRIN) 800 MG tablet Take 1 tablet by mouth every 8 hours as needed for Pain, Disp-20 tablet, R-0             ALLERGIES     is allergic to compazine [prochlorperazine maleate], lithium, other, sulfa antibiotics, and tetracyclines & related. FAMILY HISTORY     has no family status information on file. family history is not on file. SOCIAL HISTORY      reports that she has quit smoking. Her smoking use included cigarettes. She has never used smokeless tobacco. She reports that she does not drink alcohol and does not use drugs. PHYSICAL EXAM     INITIAL VITALS:  height is 5' 4\" (1.626 m) and weight is 50.8 kg (112 lb). Her oral temperature is 98.4 °F (36.9 °C). Her blood pressure is 139/79 and her pulse is 82. Her respiration is 16 and oxygen saturation is 96%. Physical Exam  Vitals reviewed. Constitutional:       General: She is not in acute distress. Appearance: She is well-developed. She is not ill-appearing or toxic-appearing. HENT:      Head: Normocephalic and atraumatic. Right Ear: External ear normal.      Left Ear: External ear normal.   Eyes:      General: Lids are normal.   Neck:      Trachea: No tracheal deviation. Cardiovascular:      Rate and Rhythm: Normal rate and regular rhythm. Pulmonary:      Effort: Pulmonary effort is normal. No respiratory distress. Breath sounds: Normal breath sounds. Abdominal:      Palpations: Abdomen is soft. Tenderness: There is abdominal tenderness in the epigastric area and periumbilical area. Comments: Mild periumbilical and epigastric lower tenderness. Otherwise benign abdominal exam.  No peritoneal signs   Skin:     General: Skin is warm and dry. Neurological:      Mental Status: She is alert. GCS: GCS eye subscore is 4. GCS verbal subscore is 5. GCS motor subscore is 6.    Psychiatric:         Speech: Speech normal.         DIFFERENTIAL DIAGNOSIS/ MDM:     Plan at this time be to check baseline labs. Clinically she does not appear toxic or septic. We will treat her pain as well. OARRS report reviewed. Does not seem like she is obviously opiate seeking however she does have a fairly high score. DIAGNOSTIC RESULTS     EKG: All EKG's are interpreted by the Emergency Department Physician who either signs or Co-signs this chart in the absence of a cardiologist.  Interpreted by Javier Miranda, DO    Rhythm: normal sinus   Rate: 76  Axis: Leftward  Ectopy: None  Conduction: Sinus rhythm. Normal ME interval.  Normal QRS duration. Normal QTC. ST Segments: No elevation or depression  T Waves: No acute findings    Clinical Impression: Nonspecific ECG. Sinus rhythm. No acute infarction/ischemia noted. RADIOLOGY:   Interpretation per the Radiologist below, if available at the time of this note:  CT ABDOMEN PELVIS WO CONTRAST Additional Contrast? None   Final Result   Increased stool. Localized ileus/adynamic segment and anastomotic sutures   mid segment small bowel. Sensitivity limited without IV and oral contrast.      Simple and hemorrhagic bilateral renal cortical cysts. Follow-up nonemergent   CT urogram recommended             CT HEAD WO CONTRAST    Result Date: 12/17/2022  EXAMINATION: CT OF THE HEAD WITHOUT CONTRAST  12/17/2022 9:58 pm TECHNIQUE: CT of the head was performed without the administration of intravenous contrast. Automated exposure control, iterative reconstruction, and/or weight based adjustment of the mA/kV was utilized to reduce the radiation dose to as low as reasonably achievable.  COMPARISON: 07/27/2022 HISTORY: ORDERING SYSTEM PROVIDED HISTORY: Trauma TECHNOLOGIST PROVIDED HISTORY: Trauma Decision Support Exception - unselect if not a suspected or confirmed emergency medical condition->Emergency Medical Condition (MA) Reason for Exam: injury FINDINGS: BRAIN/VENTRICLES: There is no acute intracranial hemorrhage, mass effect or midline shift. No abnormal extra-axial fluid collection. The gray-white differentiation is maintained without evidence of an acute infarct. There is no evidence of hydrocephalus. There is an empty sella. ORBITS: The visualized portion of the orbits demonstrate no acute abnormality. SINUSES: The visualized paranasal sinuses and mastoid air cells demonstrate no acute abnormality. SOFT TISSUES/SKULL:  No acute abnormality of the visualized skull or soft tissues. No acute intracranial abnormality.        LABS:  Results for orders placed or performed during the hospital encounter of 12/19/22   CBC with Auto Differential   Result Value Ref Range    WBC 11.1 (H) 3.5 - 11.0 k/uL    RBC 3.76 (L) 4.0 - 5.2 m/uL    Hemoglobin 10.5 (L) 12.0 - 16.0 g/dL    Hematocrit 32.3 (L) 36 - 46 %    MCV 86.0 80 - 100 fL    MCH 28.0 26 - 34 pg    MCHC 32.6 31 - 37 g/dL    RDW 13.9 12.5 - 15.4 %    Platelets 512 916 - 234 k/uL    MPV 8.2 6.0 - 12.0 fL    Seg Neutrophils 69 (H) 36 - 66 %    Lymphocytes 20 (L) 24 - 44 %    Monocytes 9 2 - 11 %    Eosinophils % 1 1 - 4 %    Basophils 1 0 - 2 %    Segs Absolute 7.70 1.8 - 7.7 k/uL    Absolute Lymph # 2.20 1.0 - 4.8 k/uL    Absolute Mono # 1.00 0.1 - 1.2 k/uL    Absolute Eos # 0.10 0.0 - 0.4 k/uL    Basophils Absolute 0.10 0.0 - 0.2 k/uL   Comprehensive Metabolic Panel   Result Value Ref Range    Glucose 106 (H) 70 - 99 mg/dL    BUN 21 8 - 23 mg/dL    Creatinine 1.14 (H) 0.50 - 0.90 mg/dL    Est, Glom Filt Rate 53 (L) >60 mL/min/1.73m2    Calcium 8.7 8.6 - 10.4 mg/dL    Sodium 138 135 - 144 mmol/L    Potassium 3.9 3.7 - 5.3 mmol/L    Chloride 107 98 - 107 mmol/L    CO2 21 20 - 31 mmol/L    Anion Gap 10 9 - 17 mmol/L    Alkaline Phosphatase 148 (H) 35 - 104 U/L    ALT 6 5 - 33 U/L    AST 8 <32 U/L    Total Bilirubin <0.1 (L) 0.3 - 1.2 mg/dL    Total Protein 6.0 (L) 6.4 - 8.3 g/dL    Albumin 3.5 3.5 - 5.2 g/dL    Albumin/Globulin Ratio 1.4 1.0 - 2.5   Lipase   Result Value Ref Range    Lipase 38 13 - 60 U/L   Magnesium   Result Value Ref Range    Magnesium 1.6 1.6 - 2.6 mg/dL   Urinalysis with Reflex to Culture    Specimen: Urine, clean catch   Result Value Ref Range    Color, UA Yellow Yellow    Turbidity UA Clear Clear    Glucose, Ur NEGATIVE NEGATIVE    Bilirubin Urine NEGATIVE NEGATIVE    Ketones, Urine NEGATIVE NEGATIVE    Specific Gravity, UA 1.025 1.005 - 1.030    Urine Hgb NEGATIVE NEGATIVE    pH, UA 6.0 5.0 - 8.0    Protein, UA NEGATIVE NEGATIVE    Urobilinogen, Urine Normal Normal    Nitrite, Urine NEGATIVE NEGATIVE    Leukocyte Esterase, Urine NEGATIVE NEGATIVE    Urinalysis Comments       Microscopic exam not performed based on chemical results unless requested in original order. Urinalysis Comments          Urinalysis Comments       Utilizing a urinalysis as the only screening method to exclude a potential uropathogen can be unreliable in many patient populations. Rapid screening tests are less sensitive than culture and if UTI is a clinical possibility, culture should be considered despite a negative urinalysis.      Troponin   Result Value Ref Range    Troponin, High Sensitivity 17 (H) 0 - 14 ng/L   Troponin   Result Value Ref Range    Troponin, High Sensitivity 16 (H) 0 - 14 ng/L       EMERGENCY DEPARTMENT COURSE:     The patient was given the following medications:  Orders Placed This Encounter   Medications    0.9 % sodium chloride bolus    DISCONTD: ketorolac (TORADOL) injection 30 mg    ondansetron (ZOFRAN) injection 4 mg    famotidine (PEPCID) 20 mg in sodium chloride (PF) 0.9 % 10 mL injection    morphine injection 4 mg    oxyCODONE-acetaminophen (PERCOCET) 5-325 MG per tablet 1 tablet        Vitals:    Vitals:    12/19/22 1926   BP: 139/79   Pulse: 82   Resp: 16   Temp: 98.4 °F (36.9 °C)   TempSrc: Oral   SpO2: 96%   Weight: 50.8 kg (112 lb)   Height: 5' 4\" (1.626 m)     -------------------------  BP: 139/79, Temp: 98.4 °F (36.9 °C), Heart Rate: 82, Resp: 16      Re-evaluation Notes    Patient doing better on reevaluation. No acute changes. Electrolytes are within normal limits. Cardiac enzymes are at baseline and both are stable. CT scan does show increased stool but no other acute findings. Clinically I do not feel she has an ileus or any small bowel obstruction. I did advise MiraLAX to help with the increase stool load. Advised to follow-up with her PCP or return right away if worsening or for any new or concerning symptoms. Her vital signs are normal.  Clinically she appears well and nontoxic or septic. Lactic acid is negative. Very mild elevation of renal function. She was advised to discuss further with her PCP. Urinalysis shows no evidence of infection. She is comfortable with this plan and knows to follow-up or return right away if worsening. I estimate there is LOW risk for ACUTE APPENDICITIS, BOWEL OBSTRUCTION, CHOLECYSTITIS, DIVERTICULITIS, INCARCERATED HERNIA, PANCREATITIS, or PERFORATED BOWEL or ULCER, thus I consider the discharge disposition reasonable. Re-evaluation of the abdomen is benign. No guarding, peritoneal signs or significant tenderness noted. Also, there is no evidence or peritonitis, sepsis, or toxicity. The patient and/or family and I have discussed the diagnosis and risks, and we agree with discharging home to follow-up with their primary doctor. The patient presents with abdominal pain without signs of peritonitis or other life-threatening or serious etiology. The patient appears stable for discharge and has been instructed to return immediately if the symptoms worsen in any way, or in 8-12 hr if not improved for re-evaluation. We also discussed returning to the Emergency Department immediately if new or worsening symptoms occur.  We have discussed the symptoms which are most concerning (e.g., bloody stool, fever, changing or worsening pain, persistent vomiting, chest pain shortness of breath, numbness or weakness to the arms or legs, coolness or color change to the arms or legs) that necessitate immediate return. The patient understands that at this time there is no evidence for a more malignant underlying process, but the patient also understands that early in the process of an illness or injury, an emergency department workup can be falsely reassuring. Routine discharge counseling was given, and the patient understands that worsening, changing or persistent symptoms should prompt an immediate call or follow up with their primary physician or return to the emergency department. The importance of appropriate follow up was also discussed. I have reviewed the disposition diagnosis with the patient and or their family/guardian. I have answered their questions and given discharge instructions. They voiced understanding of these instructions and did not have any further questions or complaints. CONSULTS:    None    CRITICAL CARE:     None    PROCEDURES:    None    FINAL IMPRESSION      1.  Periumbilical abdominal pain          DISPOSITION/PLAN   DISPOSITION Decision To Discharge 12/19/2022 11:18:30 PM      Condition on Disposition    Improved    PATIENT REFERRED TO:  Azael Marshall MD  [address] [place] [address]  [phone]    Schedule an appointment as soon as possible for a visit in 1 day      DISCHARGE MEDICATIONS:  Discharge Medication List as of 12/19/2022 11:19 PM          (Please note that portions of this note were completed with a voice recognition program.  Efforts were made to edit the dictations but occasionally words are mis-transcribed.)    Mayda Lr DO, DO  Attending Emergency Physician         Mayda Lr,   12/20/22 [facility],   12/20/22 2192

## 2022-12-20 NOTE — DISCHARGE INSTRUCTIONS
PLEASE RETURN TO THE EMERGENCY DEPARTMENT IMMEDIATELY if your symptoms worsen in anyway or in 8-12 hours if not improved for re-evaluation. You should immediately return to the ER for symptoms such as increasing pain, bloody stool, fever, a feeling of passing out, light headed, dizziness, chest pain, shortness of breath, persistent nausea and/or vomiting, numbness or weakness to the arms or legs, coolness or color change of the arms or legs. Take your medication as indicated and prescribed. If you are given an antibiotic then, make sure you get the prescription filled and take the antibiotics until finished. Please understand that at this time there is no evidence for a more serious underlying process, but that early in the process of an illness or injury, an emergency department workup can be falsely reassuring. You should contact your family doctor within the next 24 hours for a follow up appointment    Yvrose Angel!!!    From [facility] ([facility]) and [facility] Emergency Services    On behalf of the Emergency Department staff at [facility]), I would like to thank you for giving us the opportunity to address your health care needs and concerns. We hope that during your visit, our service was delivered in a professional and caring manner. Please keep [facility] ([facility]) in mind as we walk with you down the path to your own personal wellness. Please expect an automated text message or email from us so we can ask a few questions about your health and progress. Based on your answers, a clinician may call you back to offer help and instructions. Please understand that early in the process of an illness or injury, an emergency department workup can be falsely reassuring. If you notice any worsening, changing or persistent symptoms please call your family doctor or return to the ER immediately. Tell us how we did during your visit at [email or web address]   and let us know about your experience

## 2022-12-22 LAB
EKG ATRIAL RATE: 76 BPM
EKG P AXIS: 48 DEGREES
EKG P-R INTERVAL: 136 MS
EKG Q-T INTERVAL: 396 MS
EKG QRS DURATION: 76 MS
EKG QTC CALCULATION (BAZETT): 445 MS
EKG R AXIS: -14 DEGREES
EKG T AXIS: 39 DEGREES
EKG VENTRICULAR RATE: 76 BPM

## 2023-01-08 ENCOUNTER — HOSPITAL ENCOUNTER (EMERGENCY)
Age: 68
Discharge: HOME | End: 2023-01-09
Attending: EMERGENCY MEDICINE
Payer: MEDICARE

## 2023-01-08 ENCOUNTER — APPOINTMENT (OUTPATIENT)
Dept: GENERAL RADIOLOGY | Age: 68
End: 2023-01-08
Payer: MEDICARE

## 2023-01-08 VITALS
SYSTOLIC BLOOD PRESSURE: 159 MMHG | HEIGHT: 64 IN | DIASTOLIC BLOOD PRESSURE: 84 MMHG | WEIGHT: 129 LBS | RESPIRATION RATE: 18 BRPM | TEMPERATURE: 98.1 F | HEART RATE: 98 BPM | BODY MASS INDEX: 22.02 KG/M2 | OXYGEN SATURATION: 95 %

## 2023-01-08 DIAGNOSIS — S39.012A BACK STRAIN, INITIAL ENCOUNTER: Primary | ICD-10-CM

## 2023-01-08 PROCEDURE — 93005 ELECTROCARDIOGRAM TRACING: CPT | Performed by: EMERGENCY MEDICINE

## 2023-01-08 PROCEDURE — 96374 THER/PROPH/DIAG INJ IV PUSH: CPT

## 2023-01-08 PROCEDURE — 96375 TX/PRO/DX INJ NEW DRUG ADDON: CPT

## 2023-01-08 PROCEDURE — 71045 X-RAY EXAM CHEST 1 VIEW: CPT

## 2023-01-08 PROCEDURE — 99285 EMERGENCY DEPT VISIT HI MDM: CPT

## 2023-01-08 RX ORDER — ONDANSETRON 2 MG/ML
4 INJECTION INTRAMUSCULAR; INTRAVENOUS ONCE
Status: COMPLETED | OUTPATIENT
Start: 2023-01-08 | End: 2023-01-09

## 2023-01-08 RX ORDER — MORPHINE SULFATE 4 MG/ML
4 INJECTION, SOLUTION INTRAMUSCULAR; INTRAVENOUS ONCE
Status: COMPLETED | OUTPATIENT
Start: 2023-01-08 | End: 2023-01-09

## 2023-01-08 RX ORDER — 0.9 % SODIUM CHLORIDE 0.9 %
1000 INTRAVENOUS SOLUTION INTRAVENOUS ONCE
Status: COMPLETED | OUTPATIENT
Start: 2023-01-08 | End: 2023-01-09

## 2023-01-08 ASSESSMENT — PAIN - FUNCTIONAL ASSESSMENT: PAIN_FUNCTIONAL_ASSESSMENT: 0-10

## 2023-01-08 ASSESSMENT — PAIN SCALES - GENERAL: PAINLEVEL_OUTOF10: 7

## 2023-01-09 LAB
ABSOLUTE EOS #: 0.3 K/UL (ref 0–0.4)
ABSOLUTE LYMPH #: 2.6 K/UL (ref 1–4.8)
ABSOLUTE MONO #: 0.9 K/UL (ref 0.1–1.2)
ALBUMIN SERPL-MCNC: 3.7 G/DL (ref 3.5–5.2)
ALBUMIN/GLOBULIN RATIO: 1.3 (ref 1–2.5)
ALP BLD-CCNC: 251 U/L (ref 35–104)
ALT SERPL-CCNC: 13 U/L (ref 5–33)
ANION GAP SERPL CALCULATED.3IONS-SCNC: 14 MMOL/L (ref 9–17)
AST SERPL-CCNC: 13 U/L
BASOPHILS # BLD: 1 % (ref 0–2)
BASOPHILS ABSOLUTE: 0.1 K/UL (ref 0–0.2)
BILIRUB SERPL-MCNC: <0.1 MG/DL (ref 0.3–1.2)
BUN BLDV-MCNC: 23 MG/DL (ref 8–23)
CALCIUM SERPL-MCNC: 9.3 MG/DL (ref 8.6–10.4)
CHLORIDE BLD-SCNC: 104 MMOL/L (ref 98–107)
CO2: 22 MMOL/L (ref 20–31)
CREAT SERPL-MCNC: 1.14 MG/DL (ref 0.5–0.9)
EKG ATRIAL RATE: 96 BPM
EKG P AXIS: 44 DEGREES
EKG P-R INTERVAL: 130 MS
EKG Q-T INTERVAL: 368 MS
EKG QRS DURATION: 74 MS
EKG QTC CALCULATION (BAZETT): 464 MS
EKG R AXIS: -12 DEGREES
EKG T AXIS: 77 DEGREES
EKG VENTRICULAR RATE: 96 BPM
EOSINOPHILS RELATIVE PERCENT: 3 % (ref 1–4)
GFR SERPL CREATININE-BSD FRML MDRD: 53 ML/MIN/1.73M2
GLUCOSE BLD-MCNC: 122 MG/DL (ref 70–99)
HCT VFR BLD CALC: 38.1 % (ref 36–46)
HEMOGLOBIN: 12.6 G/DL (ref 12–16)
LIPASE: 53 U/L (ref 13–60)
LYMPHOCYTES # BLD: 27 % (ref 24–44)
MAGNESIUM: 1.9 MG/DL (ref 1.6–2.6)
MCH RBC QN AUTO: 28.4 PG (ref 26–34)
MCHC RBC AUTO-ENTMCNC: 33.1 G/DL (ref 31–37)
MCV RBC AUTO: 85.8 FL (ref 80–100)
MONOCYTES # BLD: 9 % (ref 2–11)
PDW BLD-RTO: 13.8 % (ref 12.5–15.4)
PLATELET # BLD: 400 K/UL (ref 140–450)
PMV BLD AUTO: 8.4 FL (ref 6–12)
POTASSIUM SERPL-SCNC: 4.2 MMOL/L (ref 3.7–5.3)
RBC # BLD: 4.44 M/UL (ref 4–5.2)
SEG NEUTROPHILS: 60 % (ref 36–66)
SEGMENTED NEUTROPHILS ABSOLUTE COUNT: 5.8 K/UL (ref 1.8–7.7)
SODIUM BLD-SCNC: 140 MMOL/L (ref 135–144)
TOTAL PROTEIN: 6.6 G/DL (ref 6.4–8.3)
TROPONIN, HIGH SENSITIVITY: 18 NG/L (ref 0–14)
WBC # BLD: 9.7 K/UL (ref 3.5–11)

## 2023-01-09 PROCEDURE — 2580000003 HC RX 258: Performed by: EMERGENCY MEDICINE

## 2023-01-09 PROCEDURE — 6360000002 HC RX W HCPCS: Performed by: EMERGENCY MEDICINE

## 2023-01-09 PROCEDURE — 85025 COMPLETE CBC W/AUTO DIFF WBC: CPT

## 2023-01-09 PROCEDURE — 84484 ASSAY OF TROPONIN QUANT: CPT

## 2023-01-09 PROCEDURE — A4216 STERILE WATER/SALINE, 10 ML: HCPCS | Performed by: EMERGENCY MEDICINE

## 2023-01-09 PROCEDURE — 80053 COMPREHEN METABOLIC PANEL: CPT

## 2023-01-09 PROCEDURE — 83735 ASSAY OF MAGNESIUM: CPT

## 2023-01-09 PROCEDURE — 96375 TX/PRO/DX INJ NEW DRUG ADDON: CPT

## 2023-01-09 PROCEDURE — 83690 ASSAY OF LIPASE: CPT

## 2023-01-09 PROCEDURE — 2500000003 HC RX 250 WO HCPCS: Performed by: EMERGENCY MEDICINE

## 2023-01-09 PROCEDURE — 96374 THER/PROPH/DIAG INJ IV PUSH: CPT

## 2023-01-09 RX ORDER — ORPHENADRINE CITRATE 30 MG/ML
60 INJECTION INTRAMUSCULAR; INTRAVENOUS ONCE
Status: COMPLETED | OUTPATIENT
Start: 2023-01-09 | End: 2023-01-09

## 2023-01-09 RX ORDER — KETOROLAC TROMETHAMINE 15 MG/ML
15 INJECTION, SOLUTION INTRAMUSCULAR; INTRAVENOUS ONCE
Status: COMPLETED | OUTPATIENT
Start: 2023-01-09 | End: 2023-01-09

## 2023-01-09 RX ORDER — CYCLOBENZAPRINE HCL 10 MG
10 TABLET ORAL 3 TIMES DAILY PRN
Qty: 30 TABLET | Refills: 0 | Status: SHIPPED | OUTPATIENT
Start: 2023-01-09 | End: 2023-01-19

## 2023-01-09 RX ORDER — NAPROXEN 500 MG/1
500 TABLET ORAL 2 TIMES DAILY WITH MEALS
Qty: 30 TABLET | Refills: 0 | Status: SHIPPED | OUTPATIENT
Start: 2023-01-09 | End: 2023-01-24

## 2023-01-09 RX ADMIN — KETOROLAC TROMETHAMINE 15 MG: 15 INJECTION, SOLUTION INTRAMUSCULAR; INTRAVENOUS at 01:47

## 2023-01-09 RX ADMIN — FAMOTIDINE 20 MG: 10 INJECTION, SOLUTION INTRAVENOUS at 00:04

## 2023-01-09 RX ADMIN — MORPHINE SULFATE 4 MG: 4 INJECTION, SOLUTION INTRAMUSCULAR; INTRAVENOUS at 00:04

## 2023-01-09 RX ADMIN — SODIUM CHLORIDE 1000 ML: 9 INJECTION, SOLUTION INTRAVENOUS at 00:04

## 2023-01-09 RX ADMIN — ORPHENADRINE CITRATE 60 MG: 30 INJECTION INTRAMUSCULAR; INTRAVENOUS at 01:47

## 2023-01-09 RX ADMIN — ONDANSETRON 4 MG: 2 INJECTION INTRAMUSCULAR; INTRAVENOUS at 00:05

## 2023-01-09 ASSESSMENT — ENCOUNTER SYMPTOMS
SORE THROAT: 0
ABDOMINAL PAIN: 1
SHORTNESS OF BREATH: 0
BACK PAIN: 1
NAUSEA: 0
VOMITING: 0
DIARRHEA: 0

## 2023-01-09 NOTE — ED PROVIDER NOTES
[address] Emergency Department  [address] [facility] [address] [address]  Phone: [phone]  Fax: [phone] [facility]          Pt Name: Emma Yao  MRN: [id number]  [place] 1955  Date of evaluation: 1/8/2023      CHIEF COMPLAINT       Chief Complaint   Patient presents with    Back Pain       HISTORY OF PRESENT ILLNESS       Emma Yao is a 79 y.o. female who presents via EMS with right-sided midthoracic pain. Began a few days ago but worse today. Does have a history of back pain and back surgeries in the past.  She cannot think of any trauma. She reports it does radiate minimally cephalad. She also reports some epigastric abdominal pain. Does have a history of chronic pancreatitis. Nothing otherwise makes it better or worse. Has not taken anything for pain. No nausea or vomiting. No other symptoms at this time. Denies any neurologic symptoms. REVIEW OF SYSTEMS       Review of Systems   Constitutional:  Negative for chills and fever. HENT:  Negative for congestion and sore throat. Respiratory:  Negative for shortness of breath. Cardiovascular:  Negative for chest pain. Gastrointestinal:  Positive for abdominal pain. Negative for diarrhea, nausea and vomiting. Musculoskeletal:  Positive for back pain. Negative for gait problem and neck pain. Skin:  Negative for rash. Neurological:  Negative for weakness and numbness. PAST MEDICAL HISTORY    has a past medical history of ADHD (attention deficit hyperactivity disorder), Anxiety, BiPAP (biphasic positive airway pressure) dependence, Bipolar 1 disorder ([address] Chronic back pain, Depression, Valentino catheter in place, Hyperlipidemia, Hypertension, Nursing home resident, Pancreatitis, PICC (peripherally inserted central catheter) in place, Pleural effusion, and Respiratory failure ([address]     SURGICAL HISTORY      has a past surgical history that includes Appendectomy; Hysterectomy; Cholecystectomy; tumor removal; back surgery; Endoscopy, colon, diagnostic; Foot surgery (Left, 11/04/2022); tumor removal; and thoracentesis (Left, 05/26/2022). CURRENT MEDICATIONS       Previous Medications    AMLODIPINE (NORVASC) 5 MG TABLET    Take 5 mg by mouth daily    ATORVASTATIN (LIPITOR) 10 MG TABLET    Take 10 mg by mouth daily    BUSPIRONE (BUSPAR) 5 MG TABLET    Take 5 mg by mouth 3 times daily    CILOSTAZOL (PLETAL) 50 MG TABLET    Take 50 mg by mouth 2 times daily    CLOPIDOGREL (PLAVIX) 75 MG TABLET    Take 1 tablet by mouth daily    DULOXETINE HCL 40 MG CPEP    Take by mouth daily    GABAPENTIN (NEURONTIN) 300 MG CAPSULE    Take 300 mg by mouth 3 times daily. IBUPROFEN (ADVIL;MOTRIN) 800 MG TABLET    Take 1 tablet by mouth every 8 hours as needed for Pain    LIPASE-PROTEASE-AMYLASE (CREON) 02844-98533 UNITS DELAYED RELEASE CAPSULE    Take 12,000 Units by mouth 3 times daily (with meals)    LORAZEPAM (ATIVAN) 1 MG TABLET    Take 1 mg by mouth every 8 hours as needed for Anxiety. MAGNESIUM OXIDE (MAG-OX) 400 MG TABLET    Take 800 mg by mouth 2 times daily    MIRTAZAPINE (REMERON) 30 MG TABLET    Take 30 mg by mouth nightly    OXCARBAZEPINE (TRILEPTAL PO)    Take 450 mg by mouth daily q am    PANTOPRAZOLE (PROTONIX) 40 MG TABLET    Take 40 mg by mouth daily    SENNA-DOCUSATE (PERICOLACE) 8.6-50 MG PER TABLET    Take 2 tablets by mouth nightly    TRAZODONE (DESYREL) 100 MG TABLET    Take 200 mg by mouth nightly       ALLERGIES     is allergic to compazine [prochlorperazine maleate], lithium, other, sulfa antibiotics, and tetracyclines & related. FAMILY HISTORY     has no family status information on file. family history is not on file. SOCIAL HISTORY      reports that she has quit smoking. Her smoking use included cigarettes. She has never used smokeless tobacco. She reports that she does not drink alcohol and does not use drugs.     PHYSICAL EXAM     INITIAL VITALS: height is 5' 4\" (1.626 m) and weight is 58.5 kg (129 lb). Her oral temperature is 98.1 °F (36.7 °C). Her blood pressure is 159/84 (abnormal) and her pulse is 98. Her respiration is 18 and oxygen saturation is 95%. Physical Exam  Vitals reviewed. Constitutional:       General: She is not in acute distress. Appearance: She is well-developed. She is not ill-appearing or toxic-appearing. HENT:      Head: Normocephalic and atraumatic. Right Ear: External ear normal.      Left Ear: External ear normal.   Eyes:      General: Lids are normal.   Neck:      Trachea: No tracheal deviation. Cardiovascular:      Rate and Rhythm: Normal rate and regular rhythm. Pulmonary:      Effort: Pulmonary effort is normal. No respiratory distress. Breath sounds: Normal breath sounds. Comments: Benign pulmonary exam  Abdominal:      Palpations: Abdomen is soft. Tenderness: There is abdominal tenderness in the epigastric area. Comments: Patient does have mild epigastric tenderness. Otherwise benign abdominal exam.  No peritoneal signs   Musculoskeletal:      Comments: Patient does have some right mid thoracic tenderness in the muscle area that reproduces the symptoms she is experiencing. Otherwise unremarkable back exam.  No midline tenderness. Skin exam normal without evidence of rash, shingles or other abnormality. Skin:     General: Skin is warm and dry. Neurological:      Mental Status: She is alert. GCS: GCS eye subscore is 4. GCS verbal subscore is 5. GCS motor subscore is 6. Psychiatric:         Speech: Speech normal.         DIFFERENTIAL DIAGNOSIS/ MDM:     Plan at this time will be to check a chest x-ray. Clinically I feel that her back pain is secondary to muscle spasms as it seems to spasm on her. We will give morphine to help with the pain. I do not feel she requires CT imaging at this time.   She does have a CT scan when I saw her the last time she was here for the abdominal pain. We will check baseline labs as well to evaluate for possible acute on chronic pancreatitis exacerbation. She is comfortable with this plan. DIAGNOSTIC RESULTS     EKG: All EKG's are interpreted by the Emergency Department Physician who either signs or Co-signs this chart in the absence of a cardiologist.    Interpreted by Alma Power DO     Rhythm: normal sinus   Rate: normal  Axis: normal  Ectopy: none  Conduction: normal  ST Segments: no acute change  T Waves: no acute change    Clinical Impression: normal sinus rhythm with no acute changes/normal EKG. No acute infarction/ischemia noted. RADIOLOGY:   Interpretation per the Radiologist below, if available at the time of this note:  XR CHEST PORTABLE   Final Result   No acute cardiopulmonary abnormality. Evidence of old granulomatous disease. CT ABDOMEN PELVIS WO CONTRAST Additional Contrast? None    Result Date: 12/19/2022  EXAMINATION: CT OF THE ABDOMEN AND PELVIS WITHOUT CONTRAST 12/19/2022 10:20 pm TECHNIQUE: CT of the abdomen and pelvis was performed without the administration of intravenous contrast. Multiplanar reformatted images are provided for review. Automated exposure control, iterative reconstruction, and/or weight based adjustment of the mA/kV was utilized to reduce the radiation dose to as low as reasonably achievable. COMPARISON: None. HISTORY: ORDERING SYSTEM PROVIDED HISTORY: abdominal pain TECHNOLOGIST PROVIDED HISTORY: abdominal pain Decision Support Exception - unselect if not a suspected or confirmed emergency medical condition->Emergency Medical Condition (MA) Reason for Exam: LEFT SIDE ABDOMINAL PAIN FINDINGS: Lower Chest: Clear Organs: The abdominal wall appears normal. The liver, spleen,, and adrenals appear normal.  Gallbladder surgically absent. Calcifications noted in the pancreas. Gallbladder surgically absent.  Multiple simple bilateral renal cortical cysts and hemorrhagic cysts measuring up to 15 mm on the right. The bladder appears normal. GI/Bowel: The stomach,small bowel, and colon appear normal. Increased stool throughout the colon. Anastomotic sutures and adynamic segment mid segment small bowel. Appendix not identified. Pelvis: Normal Peritoneum/Retroperitoneum: The abdominal aorta and iliac arteries are normal in caliber. There is no pathologic adenopathy. Calcified plaque along the aorta and its branches. Bones/Soft Tissues: Laminectomies L4-5 and L3-4. Increased stool. Localized ileus/adynamic segment and anastomotic sutures mid segment small bowel. Sensitivity limited without IV and oral contrast. Simple and hemorrhagic bilateral renal cortical cysts. Follow-up nonemergent CT urogram recommended     CT HEAD WO CONTRAST    Result Date: 12/17/2022  EXAMINATION: CT OF THE HEAD WITHOUT CONTRAST  12/17/2022 9:58 pm TECHNIQUE: CT of the head was performed without the administration of intravenous contrast. Automated exposure control, iterative reconstruction, and/or weight based adjustment of the mA/kV was utilized to reduce the radiation dose to as low as reasonably achievable. COMPARISON: 07/27/2022 HISTORY: ORDERING SYSTEM PROVIDED HISTORY: Trauma TECHNOLOGIST PROVIDED HISTORY: Trauma Decision Support Exception - unselect if not a suspected or confirmed emergency medical condition->Emergency Medical Condition (MA) Reason for Exam: injury FINDINGS: BRAIN/VENTRICLES: There is no acute intracranial hemorrhage, mass effect or midline shift. No abnormal extra-axial fluid collection. The gray-white differentiation is maintained without evidence of an acute infarct. There is no evidence of hydrocephalus. There is an empty sella. ORBITS: The visualized portion of the orbits demonstrate no acute abnormality. SINUSES: The visualized paranasal sinuses and mastoid air cells demonstrate no acute abnormality.  SOFT TISSUES/SKULL:  No acute abnormality of the visualized skull or soft tissues. No acute intracranial abnormality. XR CHEST PORTABLE    Result Date: 1/9/2023  EXAMINATION: ONE XRAY VIEW OF THE CHEST 1/8/2023 11:46 pm COMPARISON: 05/31/2015 HISTORY: ORDERING SYSTEM PROVIDED HISTORY: upper abd pain TECHNOLOGIST PROVIDED HISTORY: upper abd pain Reason for Exam: abd pain FINDINGS: Heart size and pulmonary vasculature are normal.  The lungs are clear and normally expanded. Incidental note of stable calcified granuloma laterally at right lower lung zone. Surrounding osseous and soft tissue structures are unremarkable. No acute cardiopulmonary abnormality. Evidence of old granulomatous disease.        LABS:  Results for orders placed or performed during the hospital encounter of 01/08/23   Comprehensive Metabolic Panel   Result Value Ref Range    Glucose 122 (H) 70 - 99 mg/dL    BUN 23 8 - 23 mg/dL    Creatinine 1.14 (H) 0.50 - 0.90 mg/dL    Est, Glom Filt Rate 53 (L) >60 mL/min/1.73m2    Calcium 9.3 8.6 - 10.4 mg/dL    Sodium 140 135 - 144 mmol/L    Potassium 4.2 3.7 - 5.3 mmol/L    Chloride 104 98 - 107 mmol/L    CO2 22 20 - 31 mmol/L    Anion Gap 14 9 - 17 mmol/L    Alkaline Phosphatase 251 (H) 35 - 104 U/L    ALT 13 5 - 33 U/L    AST 13 <32 U/L    Total Bilirubin <0.1 (L) 0.3 - 1.2 mg/dL    Total Protein 6.6 6.4 - 8.3 g/dL    Albumin 3.7 3.5 - 5.2 g/dL    Albumin/Globulin Ratio 1.3 1.0 - 2.5   CBC with Auto Differential   Result Value Ref Range    WBC 9.7 3.5 - 11.0 k/uL    RBC 4.44 4.0 - 5.2 m/uL    Hemoglobin 12.6 12.0 - 16.0 g/dL    Hematocrit 38.1 36 - 46 %    MCV 85.8 80 - 100 fL    MCH 28.4 26 - 34 pg    MCHC 33.1 31 - 37 g/dL    RDW 13.8 12.5 - 15.4 %    Platelets 418 284 - 695 k/uL    MPV 8.4 6.0 - 12.0 fL    Seg Neutrophils 60 36 - 66 %    Lymphocytes 27 24 - 44 %    Monocytes 9 2 - 11 %    Eosinophils % 3 1 - 4 %    Basophils 1 0 - 2 %    Segs Absolute 5.80 1.8 - 7.7 k/uL    Absolute Lymph # 2.60 1.0 - 4.8 k/uL    Absolute Mono # 0.90 0.1 - 1.2 k/uL    Absolute Eos # 0.30 0.0 - 0.4 k/uL    Basophils Absolute 0.10 0.0 - 0.2 k/uL   Lipase   Result Value Ref Range    Lipase 53 13 - 60 U/L   Magnesium   Result Value Ref Range    Magnesium 1.9 1.6 - 2.6 mg/dL   Troponin   Result Value Ref Range    Troponin, High Sensitivity 18 (H) 0 - 14 ng/L   EKG 12 Lead   Result Value Ref Range    Ventricular Rate 96 BPM    Atrial Rate 96 BPM    P-R Interval 130 ms    QRS Duration 74 ms    Q-T Interval 368 ms    QTc Calculation (Bazett) 464 ms    P Axis 44 degrees    R Axis -12 degrees    T Axis 77 degrees       EMERGENCY DEPARTMENT COURSE:     The patient was given the following medications:  Orders Placed This Encounter   Medications    0.9 % sodium chloride bolus    ondansetron (ZOFRAN) injection 4 mg    morphine injection 4 mg    famotidine (PEPCID) 20 mg in sodium chloride (PF) 0.9 % 10 mL injection    orphenadrine (NORFLEX) injection 60 mg    cyclobenzaprine (FLEXERIL) 10 MG tablet     Sig: Take 1 tablet by mouth 3 times daily as needed for Muscle spasms     Dispense:  30 tablet     Refill:  0    naproxen (NAPROSYN) 500 MG tablet     Sig: Take 1 tablet by mouth 2 times daily (with meals) for 30 doses     Dispense:  30 tablet     Refill:  0    ketorolac (TORADOL) injection 15 mg        Vitals:    Vitals:    01/08/23 2336   BP: (!) 159/84   Pulse: 98   Resp: 18   Temp: 98.1 °F (36.7 °C)   TempSrc: Oral   SpO2: 95%   Weight: 58.5 kg (129 lb)   Height: 5' 4\" (1.626 m)     -------------------------  BP: (!) 159/84, Temp: 98.1 °F (36.7 °C), Heart Rate: 98, Resp: 18      Re-evaluation Notes    Patient is feeling better on reevaluation. No acute changes. Plan to be to discharge patient home. Lipase is normal.  Creatinine is at baseline. Rest of labs show no acute significant findings or changes. No leukocytosis. I do not feel she requires CT imaging. Do not feel she has An acute intrathoracic or intra-abdominal Pathologic process. She was advised follow-up with her PCP.   Advised to return right away if worsening or for any new or concerning symptoms. She is comfortable with this plan. The patient presents with low back pain without signs of spinal cord compression, cauda equina syndrome, infection, aneurysm or other serious etiology. The patient is neurologically intact and appears stable for discharge. No skin lesions were seen. The pulses are 2/4 bilaterally. The motor is 5/5 bilaterally. The sensation is intact. Given the extremely low risk of these diagnoses further testing and evaluation for these possibilities does not appear to be indicated at this time. The patient was advised to return to the Emergency Department for worsening pain, numbness, weakness, difficulty with urination or defecation, difficulty with ambulation, fever, abdominal pain, coolness or color change to the extremity. The patient understands that at this time there is no evidence for a more malignant underlying process, but the patient also understands that early in the process of an illness or injury, an emergency department workup can be falsely reassuring. Routine discharge counseling was given, and the patient understands that worsening, changing or persistent symptoms should prompt an immediate call or follow up with their primary physician or return to the emergency department. The importance of appropriate follow up was also discussed. I have reviewed the disposition diagnosis with the patient and or their family/guardian. I have answered their questions and given discharge instructions. They voiced understanding of these instructions and did not have any further questions or complaints. CONSULTS:    None    CRITICAL CARE:     None    PROCEDURES:    None    FINAL IMPRESSION      1.  Back strain, initial encounter          DISPOSITION/PLAN   DISPOSITION Decision To Discharge 01/09/2023 01:40:18 AM      Condition on Disposition    Improved    PATIENT REFERRED TO:  [address] BRIGGS MD  [address] [place] [address]  [phone]    Schedule an appointment as soon as possible for a visit in 2 days      DISCHARGE MEDICATIONS:  New Prescriptions    CYCLOBENZAPRINE (FLEXERIL) 10 MG TABLET    Take 1 tablet by mouth 3 times daily as needed for Muscle spasms    NAPROXEN (NAPROSYN) 500 MG TABLET    Take 1 tablet by mouth 2 times daily (with meals) for 30 doses       (Please note that portions of this note were completed with a voice recognition program.  Efforts were made to edit the dictations but occasionally words are mis-transcribed.)    Mayda Lr DO, DO  Attending Emergency Physician       Mayda Lr DO  01/09/23 0144

## 2023-01-09 NOTE — DISCHARGE INSTRUCTIONS
PLEASE RETURN TO THE EMERGENCY DEPARTMENT IMMEDIATELY if your symptoms worsen in anyway or in 1-2 days if not improved for re-evaluation. You should immediately return to the ER for symptoms such as worsening pain, abdominal pain, bloody stools, numbness or weakness to the arms or legs, difficulty with urination or defecation, difficulty with ambulation, fever, coolness or color change to the extremity, chest pain, shortness of breath, a feeling that you are going to pass out, light headed, dizziness. Take your medication as indicated and prescribed. If you are given an antibiotic then, make sure you get the prescription filled and take the antibiotics until finished. Please understand that at this time there is no evidence for a more serious underlying process, but that early in the process of an illness or injury, an emergency department workup can be falsely reassuring. You should contact your family doctor within the next 48 hours for a follow up appointment    Miriam Quiñonez!!!    From [facility]) and [facility] Emergency Services    On behalf of the Emergency Department staff at [facility]), I would like to thank you for giving us the opportunity to address your health care needs and concerns. We hope that during your visit, our service was delivered in a professional and caring manner. Please keep [facility]) in mind as we walk with you down the path to your own personal wellness. Please expect an automated text message or email from us so we can ask a few questions about your health and progress. Based on your answers, a clinician may call you back to offer help and instructions. Please understand that early in the process of an illness or injury, an emergency department workup can be falsely reassuring. If you notice any worsening, changing or persistent symptoms please call your family doctor or return to the ER immediately.      Tell us how we did during your visit at [email or web address]   and let us know about your experience

## 2023-05-04 ENCOUNTER — APPOINTMENT (OUTPATIENT)
Dept: CT IMAGING | Age: 68
End: 2023-05-04
Payer: MEDICARE

## 2023-05-04 ENCOUNTER — HOSPITAL ENCOUNTER (EMERGENCY)
Age: 68
Discharge: HOME | End: 2023-05-04
Attending: EMERGENCY MEDICINE
Payer: MEDICARE

## 2023-05-04 VITALS
HEIGHT: 64 IN | DIASTOLIC BLOOD PRESSURE: 90 MMHG | WEIGHT: 140 LBS | OXYGEN SATURATION: 93 % | RESPIRATION RATE: 16 BRPM | BODY MASS INDEX: 23.9 KG/M2 | TEMPERATURE: 98.5 F | SYSTOLIC BLOOD PRESSURE: 186 MMHG | HEART RATE: 101 BPM

## 2023-05-04 DIAGNOSIS — K85.90 ACUTE PANCREATITIS, UNSPECIFIED COMPLICATION STATUS, UNSPECIFIED PANCREATITIS TYPE: Primary | ICD-10-CM

## 2023-05-04 DIAGNOSIS — K86.1 CHRONIC PANCREATITIS, UNSPECIFIED PANCREATITIS TYPE (HCC): ICD-10-CM

## 2023-05-04 LAB
ABSOLUTE EOS #: 0.2 K/UL (ref 0–0.4)
ABSOLUTE LYMPH #: 2.7 K/UL (ref 1–4.8)
ABSOLUTE MONO #: 1.1 K/UL (ref 0.1–1.2)
ALBUMIN SERPL-MCNC: 4.1 G/DL (ref 3.5–5.2)
ALBUMIN/GLOBULIN RATIO: 1.5 (ref 1–2.5)
ALP SERPL-CCNC: 331 U/L (ref 35–104)
ALT SERPL-CCNC: 13 U/L (ref 5–33)
ANION GAP SERPL CALCULATED.3IONS-SCNC: 13 MMOL/L (ref 9–17)
AST SERPL-CCNC: 18 U/L
BACTERIA: ABNORMAL
BASOPHILS # BLD: 1 % (ref 0–2)
BASOPHILS ABSOLUTE: 0.1 K/UL (ref 0–0.2)
BILIRUB SERPL-MCNC: 0.2 MG/DL (ref 0.3–1.2)
BILIRUBIN URINE: NEGATIVE
BUN SERPL-MCNC: 14 MG/DL (ref 8–23)
CALCIUM SERPL-MCNC: 9.5 MG/DL (ref 8.6–10.4)
CHLORIDE SERPL-SCNC: 105 MMOL/L (ref 98–107)
CO2 SERPL-SCNC: 21 MMOL/L (ref 20–31)
COLOR: YELLOW
CREAT SERPL-MCNC: 1.09 MG/DL (ref 0.5–0.9)
EOSINOPHILS RELATIVE PERCENT: 1 % (ref 1–4)
EPITHELIAL CELLS UA: ABNORMAL /HPF (ref 0–5)
GFR SERPL CREATININE-BSD FRML MDRD: 56 ML/MIN/1.73M2
GLUCOSE SERPL-MCNC: 138 MG/DL (ref 70–99)
GLUCOSE UR STRIP.AUTO-MCNC: NEGATIVE MG/DL
HCT VFR BLD AUTO: 40.5 % (ref 36–46)
HGB BLD-MCNC: 13.4 G/DL (ref 12–16)
KETONES UR STRIP.AUTO-MCNC: NEGATIVE MG/DL
LEUKOCYTE ESTERASE UR QL STRIP.AUTO: NEGATIVE
LIPASE SERPL-CCNC: 38 U/L (ref 13–60)
LYMPHOCYTES # BLD: 18 % (ref 24–44)
MCH RBC QN AUTO: 28.6 PG (ref 26–34)
MCHC RBC AUTO-ENTMCNC: 33.2 G/DL (ref 31–37)
MCV RBC AUTO: 86.3 FL (ref 80–100)
MONOCYTES # BLD: 7 % (ref 2–11)
NITRITE UR QL STRIP.AUTO: NEGATIVE
OTHER OBSERVATIONS UA: ABNORMAL
PDW BLD-RTO: 13.7 % (ref 12.5–15.4)
PLATELET # BLD AUTO: 286 K/UL (ref 140–450)
PMV BLD AUTO: 8.8 FL (ref 6–12)
POTASSIUM SERPL-SCNC: 4 MMOL/L (ref 3.7–5.3)
PROT SERPL-MCNC: 6.9 G/DL (ref 6.4–8.3)
PROT UR STRIP.AUTO-MCNC: 6.5 MG/DL (ref 5–8)
PROT UR STRIP.AUTO-MCNC: NEGATIVE MG/DL
RBC # BLD: 4.69 M/UL (ref 4–5.2)
RBC CLUMPS #/AREA URNS AUTO: ABNORMAL /HPF (ref 0–2)
SEG NEUTROPHILS: 73 % (ref 36–66)
SEGMENTED NEUTROPHILS ABSOLUTE COUNT: 10.8 K/UL (ref 1.8–7.7)
SODIUM SERPL-SCNC: 139 MMOL/L (ref 135–144)
SPECIFIC GRAVITY UA: 1.01 (ref 1–1.03)
TROPONIN I SERPL DL<=0.01 NG/ML-MCNC: 14 NG/L (ref 0–14)
TURBIDITY: CLEAR
URINE HGB: ABNORMAL
UROBILINOGEN, URINE: NORMAL
WBC # BLD AUTO: 14.8 K/UL (ref 3.5–11)
WBC UA: ABNORMAL /HPF (ref 0–5)

## 2023-05-04 PROCEDURE — 6360000002 HC RX W HCPCS: Performed by: NURSE PRACTITIONER

## 2023-05-04 PROCEDURE — 96375 TX/PRO/DX INJ NEW DRUG ADDON: CPT

## 2023-05-04 PROCEDURE — 85025 COMPLETE CBC W/AUTO DIFF WBC: CPT

## 2023-05-04 PROCEDURE — 93005 ELECTROCARDIOGRAM TRACING: CPT | Performed by: NURSE PRACTITIONER

## 2023-05-04 PROCEDURE — 84484 ASSAY OF TROPONIN QUANT: CPT

## 2023-05-04 PROCEDURE — 2580000003 HC RX 258: Performed by: EMERGENCY MEDICINE

## 2023-05-04 PROCEDURE — 80053 COMPREHEN METABOLIC PANEL: CPT

## 2023-05-04 PROCEDURE — 74177 CT ABD & PELVIS W/CONTRAST: CPT

## 2023-05-04 PROCEDURE — 36415 COLL VENOUS BLD VENIPUNCTURE: CPT

## 2023-05-04 PROCEDURE — 96374 THER/PROPH/DIAG INJ IV PUSH: CPT

## 2023-05-04 PROCEDURE — 83690 ASSAY OF LIPASE: CPT

## 2023-05-04 PROCEDURE — 81001 URINALYSIS AUTO W/SCOPE: CPT

## 2023-05-04 PROCEDURE — 2580000003 HC RX 258: Performed by: NURSE PRACTITIONER

## 2023-05-04 PROCEDURE — 6360000004 HC RX CONTRAST MEDICATION: Performed by: EMERGENCY MEDICINE

## 2023-05-04 PROCEDURE — 99285 EMERGENCY DEPT VISIT HI MDM: CPT

## 2023-05-04 RX ORDER — OXYCODONE HYDROCHLORIDE AND ACETAMINOPHEN 5; 325 MG/1; MG/1
1 TABLET ORAL EVERY 6 HOURS PRN
Qty: 12 TABLET | Refills: 0 | Status: ON HOLD | OUTPATIENT
Start: 2023-05-04 | End: 2023-05-09 | Stop reason: HOSPADM

## 2023-05-04 RX ORDER — ONDANSETRON 2 MG/ML
4 INJECTION INTRAMUSCULAR; INTRAVENOUS ONCE
Status: COMPLETED | OUTPATIENT
Start: 2023-05-04 | End: 2023-05-04

## 2023-05-04 RX ORDER — 0.9 % SODIUM CHLORIDE 0.9 %
1000 INTRAVENOUS SOLUTION INTRAVENOUS ONCE
Status: COMPLETED | OUTPATIENT
Start: 2023-05-04 | End: 2023-05-04

## 2023-05-04 RX ORDER — MORPHINE SULFATE 4 MG/ML
4 INJECTION, SOLUTION INTRAMUSCULAR; INTRAVENOUS ONCE
Status: COMPLETED | OUTPATIENT
Start: 2023-05-04 | End: 2023-05-04

## 2023-05-04 RX ORDER — 0.9 % SODIUM CHLORIDE 0.9 %
80 INTRAVENOUS SOLUTION INTRAVENOUS ONCE
Status: DISCONTINUED | OUTPATIENT
Start: 2023-05-04 | End: 2023-05-04 | Stop reason: HOSPADM

## 2023-05-04 RX ORDER — ONDANSETRON 4 MG/1
4 TABLET, ORALLY DISINTEGRATING ORAL 3 TIMES DAILY PRN
Qty: 21 TABLET | Refills: 0 | Status: SHIPPED | OUTPATIENT
Start: 2023-05-04

## 2023-05-04 RX ORDER — SODIUM CHLORIDE 0.9 % (FLUSH) 0.9 %
10 SYRINGE (ML) INJECTION PRN
Status: DISCONTINUED | OUTPATIENT
Start: 2023-05-04 | End: 2023-05-04 | Stop reason: HOSPADM

## 2023-05-04 RX ORDER — KETOROLAC TROMETHAMINE 15 MG/ML
15 INJECTION, SOLUTION INTRAMUSCULAR; INTRAVENOUS ONCE
Status: COMPLETED | OUTPATIENT
Start: 2023-05-04 | End: 2023-05-04

## 2023-05-04 RX ADMIN — KETOROLAC TROMETHAMINE 15 MG: 15 INJECTION, SOLUTION INTRAMUSCULAR; INTRAVENOUS at 15:44

## 2023-05-04 RX ADMIN — ONDANSETRON 4 MG: 2 INJECTION INTRAMUSCULAR; INTRAVENOUS at 15:35

## 2023-05-04 RX ADMIN — SODIUM CHLORIDE 1000 ML: 9 INJECTION, SOLUTION INTRAVENOUS at 15:35

## 2023-05-04 RX ADMIN — SODIUM CHLORIDE, PRESERVATIVE FREE 10 ML: 5 INJECTION INTRAVENOUS at 16:04

## 2023-05-04 RX ADMIN — Medication 80 ML: at 16:03

## 2023-05-04 RX ADMIN — MORPHINE SULFATE 4 MG: 4 INJECTION, SOLUTION INTRAMUSCULAR; INTRAVENOUS at 16:29

## 2023-05-04 RX ADMIN — IOPAMIDOL 75 ML: 755 INJECTION, SOLUTION INTRAVENOUS at 16:04

## 2023-05-04 ASSESSMENT — PAIN DESCRIPTION - LOCATION: LOCATION: ABDOMEN

## 2023-05-04 ASSESSMENT — PAIN DESCRIPTION - ORIENTATION: ORIENTATION: LOWER

## 2023-05-04 ASSESSMENT — ENCOUNTER SYMPTOMS
DIARRHEA: 1
RECTAL PAIN: 0
ABDOMINAL DISTENTION: 0
ABDOMINAL PAIN: 1
BLOOD IN STOOL: 0
VOMITING: 0
NAUSEA: 1
CONSTIPATION: 1
RESPIRATORY NEGATIVE: 1
ANAL BLEEDING: 0
EYES NEGATIVE: 1

## 2023-05-04 ASSESSMENT — PAIN - FUNCTIONAL ASSESSMENT: PAIN_FUNCTIONAL_ASSESSMENT: 0-10

## 2023-05-04 ASSESSMENT — PAIN SCALES - GENERAL
PAINLEVEL_OUTOF10: 10
PAINLEVEL_OUTOF10: 9
PAINLEVEL_OUTOF10: 8

## 2023-05-04 NOTE — ED PROVIDER NOTES
Abnormal; Notable for the following components:    Glucose 138 (*)     Creatinine 1.09 (*)     Est, Glom Filt Rate 56 (*)     Alkaline Phosphatase 331 (*)     Total Bilirubin 0.2 (*)     All other components within normal limits   URINALYSIS WITH REFLEX TO CULTURE - Abnormal; Notable for the following components:    Urine Hgb TRACE (*)     All other components within normal limits   MICROSCOPIC URINALYSIS - Abnormal; Notable for the following components:    Bacteria, UA FEW (*)     Other Observations UA   (*)     Value: Utilizing a urinalysis as the only screening method to exclude a potential uropathogen can be unreliable in many patient populations. Rapid screening tests are less sensitive than culture and if UTI is a clinical possibility, culture should be considered despite a negative urinalysis. All other components within normal limits   LIPASE   TROPONIN        CT ABDOMEN PELVIS W IV CONTRAST Additional Contrast? None (Final result)  Result time 05/04/23 16:24:40  Final result by Clementina Napoles MD (05/04/23 16:24:40)                Impression:    1. Acute on chronic pancreatitis. Narrative:    EXAMINATION:   CT OF THE ABDOMEN AND PELVIS WITH CONTRAST 5/4/2023 3:55 pm     TECHNIQUE:   CT of the abdomen and pelvis was performed with the administration of   intravenous contrast. Multiplanar reformatted images are provided for review. Automated exposure control, iterative reconstruction, and/or weight based   adjustment of the mA/kV was utilized to reduce the radiation dose to as low   as reasonably achievable. COMPARISON:   None.      HISTORY:   ORDERING SYSTEM PROVIDED HISTORY: constipation; nausea/diarrhea; pain   TECHNOLOGIST PROVIDED HISTORY:   constipation; nausea/diarrhea; pain     Decision Support Exception - unselect if not a suspected or confirmed   emergency medical condition->Emergency Medical Condition (MA)   Reason for Exam: pt c/o abdominal pain, constipation, nausea,
NEGATIVE NEGATIVE    Ketones, Urine NEGATIVE NEGATIVE    Specific Gravity, UA 1.007 1.005 - 1.030    Urine Hgb TRACE (A) NEGATIVE    pH, UA 6.5 5.0 - 8.0    Protein, UA NEGATIVE NEGATIVE    Urobilinogen, Urine Normal Normal    Nitrite, Urine NEGATIVE NEGATIVE    Leukocyte Esterase, Urine NEGATIVE NEGATIVE   Troponin   Result Value Ref Range    Troponin, High Sensitivity 14 0 - 14 ng/L   Microscopic Urinalysis   Result Value Ref Range    WBC, UA 0 TO 2 0 - 5 /HPF    RBC, UA 0 TO 2 0 - 2 /HPF    Epithelial Cells UA 2 TO 5 0 - 5 /HPF    Bacteria, UA FEW (A) None    Other Observations UA (A) NOT REQ. Utilizing a urinalysis as the only screening method to exclude a potential uropathogen can be unreliable in many patient populations. Rapid screening tests are less sensitive than culture and if UTI is a clinical possibility, culture should be considered despite a negative urinalysis. EMERGENCY DEPARTMENT COURSE           Vitals:    Vitals:    05/04/23 1506   BP: (!) 186/90   Pulse: (!) 101   Resp: 16   Temp: 98.5 °F (36.9 °C)   TempSrc: Oral   SpO2: 93%   Weight: 63.5 kg (140 lb)   Height: 5' 4\" (1.626 m)     -------------------------  BP: (!) 186/90, Temp: 98.5 °F (36.9 °C), Pulse: (!) 101, Respirations: 16      RE-EVALUATION:  See ED Course notes above. CONSULTS:  None    PROCEDURES:  None    FINAL IMPRESSION      1. Acute pancreatitis, unspecified complication status, unspecified pancreatitis type    2. Chronic pancreatitis, unspecified pancreatitis type ([facility]          DISPOSITION / PLAN     CONDITION ON DISPOSITION:   Good / Stable for discharge. PATIENT REFERRED TO:  Johnny Leon, [address] [facility]  [phone]    Schedule an appointment as soon as possible for a visit       [facility] Emergency Department  [address] [facility].   [address] [place], [place] [address] [phone]  Go to   If symptoms worsen      DISCHARGE MEDICATIONS:  Discharge Medication List as

## 2023-05-05 ENCOUNTER — HOSPITAL ENCOUNTER (INPATIENT)
Age: 68
LOS: 3 days | Discharge: HOME | DRG: 439 | End: 2023-05-09
Attending: EMERGENCY MEDICINE | Admitting: STUDENT IN AN ORGANIZED HEALTH CARE EDUCATION/TRAINING PROGRAM
Payer: MEDICARE

## 2023-05-05 ENCOUNTER — APPOINTMENT (OUTPATIENT)
Dept: CT IMAGING | Age: 68
DRG: 439 | End: 2023-05-05
Payer: MEDICARE

## 2023-05-05 ENCOUNTER — APPOINTMENT (OUTPATIENT)
Dept: GENERAL RADIOLOGY | Age: 68
DRG: 439 | End: 2023-05-05
Payer: MEDICARE

## 2023-05-05 DIAGNOSIS — S22.000A COMPRESSION FX, THORACIC SPINE, CLOSED, INITIAL ENCOUNTER (HCC): ICD-10-CM

## 2023-05-05 DIAGNOSIS — R10.9 ACUTE ABDOMINAL PAIN: ICD-10-CM

## 2023-05-05 DIAGNOSIS — K86.1 OTHER CHRONIC PANCREATITIS (HCC): ICD-10-CM

## 2023-05-05 DIAGNOSIS — K86.1 ACUTE ON CHRONIC PANCREATITIS (HCC): Primary | ICD-10-CM

## 2023-05-05 DIAGNOSIS — K85.90 ACUTE ON CHRONIC PANCREATITIS (HCC): Primary | ICD-10-CM

## 2023-05-05 LAB
ABSOLUTE EOS #: 0.1 K/UL (ref 0–0.4)
ABSOLUTE LYMPH #: 2.3 K/UL (ref 1–4.8)
ABSOLUTE MONO #: 0.9 K/UL (ref 0.1–1.2)
ALBUMIN SERPL-MCNC: 4 G/DL (ref 3.5–5.2)
ALBUMIN/GLOBULIN RATIO: 1.4 (ref 1–2.5)
ALP SERPL-CCNC: 303 U/L (ref 35–104)
ALT SERPL-CCNC: 18 U/L (ref 5–33)
ANION GAP SERPL CALCULATED.3IONS-SCNC: 10 MMOL/L (ref 9–17)
AST SERPL-CCNC: 25 U/L
BACTERIA: ABNORMAL
BASOPHILS # BLD: 1 % (ref 0–2)
BASOPHILS ABSOLUTE: 0.1 K/UL (ref 0–0.2)
BILIRUB DIRECT SERPL-MCNC: 0.1 MG/DL
BILIRUB INDIRECT SERPL-MCNC: 0.2 MG/DL (ref 0–1)
BILIRUB SERPL-MCNC: 0.3 MG/DL (ref 0.3–1.2)
BILIRUBIN URINE: NEGATIVE
BUN SERPL-MCNC: 10 MG/DL (ref 8–23)
CALCIUM SERPL-MCNC: 9.2 MG/DL (ref 8.6–10.4)
CHLORIDE SERPL-SCNC: 102 MMOL/L (ref 98–107)
CO2 SERPL-SCNC: 25 MMOL/L (ref 20–31)
COLOR: YELLOW
CREAT SERPL-MCNC: 1.04 MG/DL (ref 0.5–0.9)
D DIMER BLD IA.RAPID-MCNC: 0.73 UG/ML FEU
EKG ATRIAL RATE: 90 BPM
EKG P AXIS: 37 DEGREES
EKG P-R INTERVAL: 132 MS
EKG Q-T INTERVAL: 362 MS
EKG QRS DURATION: 82 MS
EKG QTC CALCULATION (BAZETT): 442 MS
EKG R AXIS: -24 DEGREES
EKG T AXIS: 72 DEGREES
EKG VENTRICULAR RATE: 90 BPM
EOSINOPHILS RELATIVE PERCENT: 1 % (ref 1–4)
EPITHELIAL CELLS UA: ABNORMAL /HPF (ref 0–5)
GFR SERPL CREATININE-BSD FRML MDRD: 59 ML/MIN/1.73M2
GLUCOSE SERPL-MCNC: 118 MG/DL (ref 70–99)
GLUCOSE UR STRIP.AUTO-MCNC: NEGATIVE MG/DL
HCT VFR BLD AUTO: 38.8 % (ref 36–46)
HGB BLD-MCNC: 12.9 G/DL (ref 12–16)
KETONES UR STRIP.AUTO-MCNC: NEGATIVE MG/DL
LEUKOCYTE ESTERASE UR QL STRIP.AUTO: NEGATIVE
LIPASE SERPL-CCNC: 25 U/L (ref 13–60)
LYMPHOCYTES # BLD: 17 % (ref 24–44)
MCH RBC QN AUTO: 29.1 PG (ref 26–34)
MCHC RBC AUTO-ENTMCNC: 33.1 G/DL (ref 31–37)
MCV RBC AUTO: 87.8 FL (ref 80–100)
MONOCYTES # BLD: 7 % (ref 2–11)
MUCUS: ABNORMAL
NITRITE UR QL STRIP.AUTO: NEGATIVE
PDW BLD-RTO: 14.2 % (ref 12.5–15.4)
PLATELET # BLD AUTO: 282 K/UL (ref 140–450)
PMV BLD AUTO: 8.6 FL (ref 6–12)
POTASSIUM SERPL-SCNC: 4.5 MMOL/L (ref 3.7–5.3)
PROT SERPL-MCNC: 6.8 G/DL (ref 6.4–8.3)
PROT UR STRIP.AUTO-MCNC: 5.5 MG/DL (ref 5–8)
PROT UR STRIP.AUTO-MCNC: NEGATIVE MG/DL
RBC # BLD: 4.42 M/UL (ref 4–5.2)
RBC CLUMPS #/AREA URNS AUTO: ABNORMAL /HPF (ref 0–2)
SARS-COV-2 RDRP RESP QL NAA+PROBE: NOT DETECTED
SEG NEUTROPHILS: 74 % (ref 36–66)
SEGMENTED NEUTROPHILS ABSOLUTE COUNT: 10.4 K/UL (ref 1.8–7.7)
SODIUM SERPL-SCNC: 137 MMOL/L (ref 135–144)
SPECIFIC GRAVITY UA: 1.02 (ref 1–1.03)
SPECIMEN DESCRIPTION: NORMAL
TROPONIN I SERPL DL<=0.01 NG/ML-MCNC: 15 NG/L (ref 0–14)
TROPONIN I SERPL DL<=0.01 NG/ML-MCNC: 15 NG/L (ref 0–14)
TURBIDITY: ABNORMAL
URINE HGB: ABNORMAL
UROBILINOGEN, URINE: NORMAL
WBC # BLD AUTO: 13.8 K/UL (ref 3.5–11)
WBC UA: ABNORMAL /HPF (ref 0–5)

## 2023-05-05 PROCEDURE — 84484 ASSAY OF TROPONIN QUANT: CPT

## 2023-05-05 PROCEDURE — 93005 ELECTROCARDIOGRAM TRACING: CPT | Performed by: EMERGENCY MEDICINE

## 2023-05-05 PROCEDURE — 85025 COMPLETE CBC W/AUTO DIFF WBC: CPT

## 2023-05-05 PROCEDURE — 83690 ASSAY OF LIPASE: CPT

## 2023-05-05 PROCEDURE — 81001 URINALYSIS AUTO W/SCOPE: CPT

## 2023-05-05 PROCEDURE — 70450 CT HEAD/BRAIN W/O DYE: CPT

## 2023-05-05 PROCEDURE — 71260 CT THORAX DX C+: CPT

## 2023-05-05 PROCEDURE — 6360000002 HC RX W HCPCS: Performed by: EMERGENCY MEDICINE

## 2023-05-05 PROCEDURE — 99285 EMERGENCY DEPT VISIT HI MDM: CPT

## 2023-05-05 PROCEDURE — 80048 BASIC METABOLIC PNL TOTAL CA: CPT

## 2023-05-05 PROCEDURE — 2580000003 HC RX 258: Performed by: EMERGENCY MEDICINE

## 2023-05-05 PROCEDURE — 36415 COLL VENOUS BLD VENIPUNCTURE: CPT

## 2023-05-05 PROCEDURE — 96374 THER/PROPH/DIAG INJ IV PUSH: CPT

## 2023-05-05 PROCEDURE — 6360000004 HC RX CONTRAST MEDICATION: Performed by: EMERGENCY MEDICINE

## 2023-05-05 PROCEDURE — 85379 FIBRIN DEGRADATION QUANT: CPT

## 2023-05-05 PROCEDURE — 80076 HEPATIC FUNCTION PANEL: CPT

## 2023-05-05 PROCEDURE — 87635 SARS-COV-2 COVID-19 AMP PRB: CPT

## 2023-05-05 PROCEDURE — 6360000002 HC RX W HCPCS: Performed by: SPECIALIST

## 2023-05-05 PROCEDURE — 96376 TX/PRO/DX INJ SAME DRUG ADON: CPT

## 2023-05-05 PROCEDURE — 71045 X-RAY EXAM CHEST 1 VIEW: CPT

## 2023-05-05 RX ORDER — 0.9 % SODIUM CHLORIDE 0.9 %
80 INTRAVENOUS SOLUTION INTRAVENOUS ONCE
Status: DISCONTINUED | OUTPATIENT
Start: 2023-05-05 | End: 2023-05-09 | Stop reason: HOSPADM

## 2023-05-05 RX ORDER — ONDANSETRON 2 MG/ML
4 INJECTION INTRAMUSCULAR; INTRAVENOUS ONCE
Status: COMPLETED | OUTPATIENT
Start: 2023-05-05 | End: 2023-05-06

## 2023-05-05 RX ORDER — FENTANYL CITRATE 50 UG/ML
50 INJECTION, SOLUTION INTRAMUSCULAR; INTRAVENOUS ONCE
Status: COMPLETED | OUTPATIENT
Start: 2023-05-05 | End: 2023-05-05

## 2023-05-05 RX ORDER — SODIUM CHLORIDE 0.9 % (FLUSH) 0.9 %
10 SYRINGE (ML) INJECTION PRN
Status: DISCONTINUED | OUTPATIENT
Start: 2023-05-05 | End: 2023-05-09 | Stop reason: HOSPADM

## 2023-05-05 RX ADMIN — SODIUM CHLORIDE, PRESERVATIVE FREE 10 ML: 5 INJECTION INTRAVENOUS at 19:25

## 2023-05-05 RX ADMIN — IOPAMIDOL 100 ML: 755 INJECTION, SOLUTION INTRAVENOUS at 19:23

## 2023-05-05 RX ADMIN — Medication 80 ML: at 19:25

## 2023-05-05 RX ADMIN — FENTANYL CITRATE 50 MCG: 50 INJECTION, SOLUTION INTRAMUSCULAR; INTRAVENOUS at 17:45

## 2023-05-05 RX ADMIN — FENTANYL CITRATE 50 MCG: 50 INJECTION, SOLUTION INTRAMUSCULAR; INTRAVENOUS at 20:44

## 2023-05-05 ASSESSMENT — PAIN - FUNCTIONAL ASSESSMENT: PAIN_FUNCTIONAL_ASSESSMENT: 0-10

## 2023-05-05 ASSESSMENT — ENCOUNTER SYMPTOMS
BACK PAIN: 1
SHORTNESS OF BREATH: 1
CHEST TIGHTNESS: 1
CONSTIPATION: 0
VOMITING: 0
DIARRHEA: 1
COUGH: 0
PHOTOPHOBIA: 0
NAUSEA: 0
SORE THROAT: 0
ABDOMINAL PAIN: 1

## 2023-05-05 ASSESSMENT — PAIN SCALES - GENERAL
PAINLEVEL_OUTOF10: 4
PAINLEVEL_OUTOF10: 9
PAINLEVEL_OUTOF10: 8
PAINLEVEL_OUTOF10: 9

## 2023-05-06 PROBLEM — K85.90 ACUTE PANCREATITIS WITHOUT INFECTION OR NECROSIS: Status: ACTIVE | Noted: 2023-05-06

## 2023-05-06 PROBLEM — F41.9 ANXIETY: Status: ACTIVE | Noted: 2023-05-06

## 2023-05-06 PROBLEM — K86.1 ACUTE ON CHRONIC PANCREATITIS (HCC): Status: ACTIVE | Noted: 2023-05-06

## 2023-05-06 PROBLEM — R09.02 HYPOXIA: Status: ACTIVE | Noted: 2023-05-06

## 2023-05-06 PROBLEM — F31.9 BIPOLAR 1 DISORDER (HCC): Status: ACTIVE | Noted: 2023-05-06

## 2023-05-06 PROBLEM — K56.699: Status: ACTIVE | Noted: 2023-05-06

## 2023-05-06 PROBLEM — S22.000A COMPRESSION FRACTURE OF THORACIC VERTEBRA (HCC): Status: ACTIVE | Noted: 2023-05-06

## 2023-05-06 PROBLEM — I10 HYPERTENSION: Status: ACTIVE | Noted: 2023-05-06

## 2023-05-06 LAB
EKG ATRIAL RATE: 80 BPM
EKG P AXIS: 54 DEGREES
EKG P-R INTERVAL: 132 MS
EKG Q-T INTERVAL: 360 MS
EKG QRS DURATION: 78 MS
EKG QTC CALCULATION (BAZETT): 415 MS
EKG R AXIS: -18 DEGREES
EKG T AXIS: 90 DEGREES
EKG VENTRICULAR RATE: 80 BPM
LIPASE SERPL-CCNC: 22 U/L (ref 13–60)

## 2023-05-06 PROCEDURE — 36415 COLL VENOUS BLD VENIPUNCTURE: CPT

## 2023-05-06 PROCEDURE — 99222 1ST HOSP IP/OBS MODERATE 55: CPT | Performed by: INTERNAL MEDICINE

## 2023-05-06 PROCEDURE — 83690 ASSAY OF LIPASE: CPT

## 2023-05-06 PROCEDURE — 94761 N-INVAS EAR/PLS OXIMETRY MLT: CPT

## 2023-05-06 PROCEDURE — 2700000000 HC OXYGEN THERAPY PER DAY

## 2023-05-06 PROCEDURE — 94760 N-INVAS EAR/PLS OXIMETRY 1: CPT

## 2023-05-06 PROCEDURE — 2060000000 HC ICU INTERMEDIATE R&B

## 2023-05-06 PROCEDURE — 2580000003 HC RX 258: Performed by: NURSE PRACTITIONER

## 2023-05-06 PROCEDURE — 6360000002 HC RX W HCPCS: Performed by: SPECIALIST

## 2023-05-06 PROCEDURE — 6360000002 HC RX W HCPCS: Performed by: NURSE PRACTITIONER

## 2023-05-06 PROCEDURE — 6370000000 HC RX 637 (ALT 250 FOR IP): Performed by: INTERNAL MEDICINE

## 2023-05-06 RX ORDER — SENNA AND DOCUSATE SODIUM 50; 8.6 MG/1; MG/1
2 TABLET, FILM COATED ORAL NIGHTLY
Status: DISCONTINUED | OUTPATIENT
Start: 2023-05-06 | End: 2023-05-09 | Stop reason: HOSPADM

## 2023-05-06 RX ORDER — BUSPIRONE HYDROCHLORIDE 5 MG/1
5 TABLET ORAL 3 TIMES DAILY
Status: DISCONTINUED | OUTPATIENT
Start: 2023-05-06 | End: 2023-05-09 | Stop reason: HOSPADM

## 2023-05-06 RX ORDER — MAGNESIUM SULFATE 1 G/100ML
1000 INJECTION INTRAVENOUS PRN
Status: DISCONTINUED | OUTPATIENT
Start: 2023-05-06 | End: 2023-05-09 | Stop reason: HOSPADM

## 2023-05-06 RX ORDER — ONDANSETRON 4 MG/1
4 TABLET, ORALLY DISINTEGRATING ORAL EVERY 8 HOURS PRN
Status: DISCONTINUED | OUTPATIENT
Start: 2023-05-06 | End: 2023-05-09 | Stop reason: HOSPADM

## 2023-05-06 RX ORDER — PANTOPRAZOLE SODIUM 40 MG/1
40 TABLET, DELAYED RELEASE ORAL DAILY
Status: DISCONTINUED | OUTPATIENT
Start: 2023-05-06 | End: 2023-05-09 | Stop reason: HOSPADM

## 2023-05-06 RX ORDER — SODIUM CHLORIDE 9 MG/ML
INJECTION, SOLUTION INTRAVENOUS CONTINUOUS
Status: DISCONTINUED | OUTPATIENT
Start: 2023-05-06 | End: 2023-05-09 | Stop reason: HOSPADM

## 2023-05-06 RX ORDER — ATORVASTATIN CALCIUM 10 MG/1
10 TABLET, FILM COATED ORAL DAILY
Status: DISCONTINUED | OUTPATIENT
Start: 2023-05-06 | End: 2023-05-09 | Stop reason: HOSPADM

## 2023-05-06 RX ORDER — ONDANSETRON 2 MG/ML
4 INJECTION INTRAMUSCULAR; INTRAVENOUS EVERY 6 HOURS PRN
Status: DISCONTINUED | OUTPATIENT
Start: 2023-05-06 | End: 2023-05-09 | Stop reason: HOSPADM

## 2023-05-06 RX ORDER — TRAZODONE HYDROCHLORIDE 100 MG/1
200 TABLET ORAL NIGHTLY
Status: DISCONTINUED | OUTPATIENT
Start: 2023-05-06 | End: 2023-05-09 | Stop reason: HOSPADM

## 2023-05-06 RX ORDER — LORAZEPAM 1 MG/1
1 TABLET ORAL EVERY 8 HOURS PRN
Status: DISCONTINUED | OUTPATIENT
Start: 2023-05-06 | End: 2023-05-09 | Stop reason: HOSPADM

## 2023-05-06 RX ORDER — POTASSIUM CHLORIDE 7.45 MG/ML
10 INJECTION INTRAVENOUS PRN
Status: DISCONTINUED | OUTPATIENT
Start: 2023-05-06 | End: 2023-05-09 | Stop reason: HOSPADM

## 2023-05-06 RX ORDER — SODIUM CHLORIDE 0.9 % (FLUSH) 0.9 %
5-40 SYRINGE (ML) INJECTION EVERY 12 HOURS SCHEDULED
Status: DISCONTINUED | OUTPATIENT
Start: 2023-05-06 | End: 2023-05-09 | Stop reason: HOSPADM

## 2023-05-06 RX ORDER — AMLODIPINE BESYLATE 5 MG/1
5 TABLET ORAL DAILY
Status: DISCONTINUED | OUTPATIENT
Start: 2023-05-06 | End: 2023-05-09 | Stop reason: HOSPADM

## 2023-05-06 RX ORDER — CILOSTAZOL 100 MG/1
50 TABLET ORAL 2 TIMES DAILY
Status: DISCONTINUED | OUTPATIENT
Start: 2023-05-06 | End: 2023-05-09 | Stop reason: HOSPADM

## 2023-05-06 RX ORDER — CLOPIDOGREL BISULFATE 75 MG/1
75 TABLET ORAL DAILY
Status: DISCONTINUED | OUTPATIENT
Start: 2023-05-06 | End: 2023-05-09 | Stop reason: HOSPADM

## 2023-05-06 RX ORDER — MIRTAZAPINE 15 MG/1
30 TABLET, FILM COATED ORAL NIGHTLY
Status: DISCONTINUED | OUTPATIENT
Start: 2023-05-06 | End: 2023-05-09 | Stop reason: HOSPADM

## 2023-05-06 RX ORDER — ENOXAPARIN SODIUM 100 MG/ML
40 INJECTION SUBCUTANEOUS DAILY
Status: DISCONTINUED | OUTPATIENT
Start: 2023-05-06 | End: 2023-05-09 | Stop reason: HOSPADM

## 2023-05-06 RX ORDER — LANOLIN ALCOHOL/MO/W.PET/CERES
800 CREAM (GRAM) TOPICAL 2 TIMES DAILY
Status: DISCONTINUED | OUTPATIENT
Start: 2023-05-06 | End: 2023-05-09 | Stop reason: HOSPADM

## 2023-05-06 RX ORDER — SODIUM CHLORIDE 0.9 % (FLUSH) 0.9 %
5-40 SYRINGE (ML) INJECTION PRN
Status: DISCONTINUED | OUTPATIENT
Start: 2023-05-06 | End: 2023-05-09 | Stop reason: HOSPADM

## 2023-05-06 RX ORDER — SODIUM CHLORIDE 9 MG/ML
INJECTION, SOLUTION INTRAVENOUS PRN
Status: DISCONTINUED | OUTPATIENT
Start: 2023-05-06 | End: 2023-05-09 | Stop reason: HOSPADM

## 2023-05-06 RX ADMIN — HYDROMORPHONE HYDROCHLORIDE 0.5 MG: 1 INJECTION, SOLUTION INTRAMUSCULAR; INTRAVENOUS; SUBCUTANEOUS at 17:00

## 2023-05-06 RX ADMIN — ONDANSETRON 4 MG: 2 INJECTION INTRAMUSCULAR; INTRAVENOUS at 10:12

## 2023-05-06 RX ADMIN — HYDROMORPHONE HYDROCHLORIDE 0.5 MG: 1 INJECTION, SOLUTION INTRAMUSCULAR; INTRAVENOUS; SUBCUTANEOUS at 13:54

## 2023-05-06 RX ADMIN — CILOSTAZOL 50 MG: 100 TABLET ORAL at 13:51

## 2023-05-06 RX ADMIN — ENOXAPARIN SODIUM 40 MG: 100 INJECTION SUBCUTANEOUS at 10:12

## 2023-05-06 RX ADMIN — HYDROMORPHONE HYDROCHLORIDE 0.5 MG: 1 INJECTION, SOLUTION INTRAMUSCULAR; INTRAVENOUS; SUBCUTANEOUS at 00:31

## 2023-05-06 RX ADMIN — Medication 800 MG: at 21:37

## 2023-05-06 RX ADMIN — ONDANSETRON 4 MG: 2 INJECTION INTRAMUSCULAR; INTRAVENOUS at 04:22

## 2023-05-06 RX ADMIN — PANCRELIPASE LIPASE, PANCRELIPASE PROTEASE, PANCRELIPASE AMYLASE 10000 UNITS: 5000; 17000; 24000 CAPSULE, DELAYED RELEASE ORAL at 13:50

## 2023-05-06 RX ADMIN — AMLODIPINE BESYLATE 5 MG: 5 TABLET ORAL at 13:51

## 2023-05-06 RX ADMIN — CILOSTAZOL 50 MG: 100 TABLET ORAL at 21:37

## 2023-05-06 RX ADMIN — HYDROMORPHONE HYDROCHLORIDE 0.5 MG: 1 INJECTION, SOLUTION INTRAMUSCULAR; INTRAVENOUS; SUBCUTANEOUS at 04:22

## 2023-05-06 RX ADMIN — Medication 800 MG: at 13:50

## 2023-05-06 RX ADMIN — HYDROMORPHONE HYDROCHLORIDE 0.5 MG: 1 INJECTION, SOLUTION INTRAMUSCULAR; INTRAVENOUS; SUBCUTANEOUS at 10:12

## 2023-05-06 RX ADMIN — MIRTAZAPINE 30 MG: 15 TABLET, FILM COATED ORAL at 21:37

## 2023-05-06 RX ADMIN — CLOPIDOGREL BISULFATE 75 MG: 75 TABLET ORAL at 13:50

## 2023-05-06 RX ADMIN — ONDANSETRON 4 MG: 2 INJECTION INTRAMUSCULAR; INTRAVENOUS at 17:00

## 2023-05-06 RX ADMIN — ONDANSETRON 4 MG: 2 INJECTION INTRAMUSCULAR; INTRAVENOUS at 21:37

## 2023-05-06 RX ADMIN — BUSPIRONE HYDROCHLORIDE 5 MG: 5 TABLET ORAL at 21:37

## 2023-05-06 RX ADMIN — PANTOPRAZOLE SODIUM 40 MG: 40 TABLET, DELAYED RELEASE ORAL at 13:50

## 2023-05-06 RX ADMIN — SODIUM CHLORIDE, PRESERVATIVE FREE 10 ML: 5 INJECTION INTRAVENOUS at 21:38

## 2023-05-06 RX ADMIN — SODIUM CHLORIDE: 9 INJECTION, SOLUTION INTRAVENOUS at 00:51

## 2023-05-06 RX ADMIN — TRAZODONE HYDROCHLORIDE 200 MG: 100 TABLET ORAL at 21:37

## 2023-05-06 RX ADMIN — SENNOSIDES AND DOCUSATE SODIUM 2 TABLET: 50; 8.6 TABLET ORAL at 21:37

## 2023-05-06 RX ADMIN — BUSPIRONE HYDROCHLORIDE 5 MG: 5 TABLET ORAL at 13:51

## 2023-05-06 RX ADMIN — HYDROMORPHONE HYDROCHLORIDE 0.5 MG: 1 INJECTION, SOLUTION INTRAMUSCULAR; INTRAVENOUS; SUBCUTANEOUS at 20:13

## 2023-05-06 RX ADMIN — ONDANSETRON 4 MG: 2 INJECTION INTRAMUSCULAR; INTRAVENOUS at 00:30

## 2023-05-06 RX ADMIN — ATORVASTATIN CALCIUM 10 MG: 10 TABLET, FILM COATED ORAL at 13:51

## 2023-05-06 ASSESSMENT — PAIN SCALES - GENERAL
PAINLEVEL_OUTOF10: 8
PAINLEVEL_OUTOF10: 8

## 2023-05-06 ASSESSMENT — PAIN DESCRIPTION - ORIENTATION: ORIENTATION: MID;LOWER

## 2023-05-06 ASSESSMENT — PAIN DESCRIPTION - LOCATION
LOCATION: ABDOMEN;BACK
LOCATION: ABDOMEN;BACK

## 2023-05-06 ASSESSMENT — PAIN DESCRIPTION - DESCRIPTORS: DESCRIPTORS: ACHING

## 2023-05-07 LAB
ABSOLUTE EOS #: 0.1 K/UL (ref 0–0.4)
ABSOLUTE LYMPH #: 1.4 K/UL (ref 1–4.8)
ABSOLUTE MONO #: 0.6 K/UL (ref 0.1–1.2)
ALBUMIN SERPL-MCNC: 3.5 G/DL (ref 3.5–5.2)
ALBUMIN/GLOBULIN RATIO: 1.3 (ref 1–2.5)
ALP SERPL-CCNC: 247 U/L (ref 35–104)
ALT SERPL-CCNC: 14 U/L (ref 5–33)
ANION GAP SERPL CALCULATED.3IONS-SCNC: 12 MMOL/L (ref 9–17)
AST SERPL-CCNC: 17 U/L
BASOPHILS # BLD: 1 % (ref 0–2)
BASOPHILS ABSOLUTE: 0 K/UL (ref 0–0.2)
BILIRUB SERPL-MCNC: 0.4 MG/DL (ref 0.3–1.2)
BUN SERPL-MCNC: 8 MG/DL (ref 8–23)
CA-I BLD-SCNC: 1.25 MMOL/L (ref 1.13–1.33)
CALCIUM SERPL-MCNC: 9 MG/DL (ref 8.6–10.4)
CHLORIDE SERPL-SCNC: 102 MMOL/L (ref 98–107)
CO2 SERPL-SCNC: 25 MMOL/L (ref 20–31)
CREAT SERPL-MCNC: 1.08 MG/DL (ref 0.5–0.9)
EOSINOPHILS RELATIVE PERCENT: 1 % (ref 1–4)
GFR SERPL CREATININE-BSD FRML MDRD: 56 ML/MIN/1.73M2
GLUCOSE SERPL-MCNC: 121 MG/DL (ref 70–99)
HCT VFR BLD AUTO: 38.1 % (ref 36–46)
HGB BLD-MCNC: 12.6 G/DL (ref 12–16)
LYMPHOCYTES # BLD: 16 % (ref 24–44)
MAGNESIUM SERPL-MCNC: 2 MG/DL (ref 1.6–2.6)
MCH RBC QN AUTO: 28.6 PG (ref 26–34)
MCHC RBC AUTO-ENTMCNC: 33.2 G/DL (ref 31–37)
MCV RBC AUTO: 86.2 FL (ref 80–100)
MONOCYTES # BLD: 8 % (ref 2–11)
PDW BLD-RTO: 13.7 % (ref 12.5–15.4)
PHOSPHATE SERPL-MCNC: 3.9 MG/DL (ref 2.6–4.5)
PLATELET # BLD AUTO: 266 K/UL (ref 140–450)
PMV BLD AUTO: 8.8 FL (ref 6–12)
POTASSIUM SERPL-SCNC: 4.4 MMOL/L (ref 3.7–5.3)
PROT SERPL-MCNC: 6.3 G/DL (ref 6.4–8.3)
RBC # BLD: 4.42 M/UL (ref 4–5.2)
SEG NEUTROPHILS: 74 % (ref 36–66)
SEGMENTED NEUTROPHILS ABSOLUTE COUNT: 6.3 K/UL (ref 1.8–7.7)
SODIUM SERPL-SCNC: 139 MMOL/L (ref 135–144)
WBC # BLD AUTO: 8.5 K/UL (ref 3.5–11)

## 2023-05-07 PROCEDURE — 2580000003 HC RX 258: Performed by: NURSE PRACTITIONER

## 2023-05-07 PROCEDURE — 99232 SBSQ HOSP IP/OBS MODERATE 35: CPT | Performed by: STUDENT IN AN ORGANIZED HEALTH CARE EDUCATION/TRAINING PROGRAM

## 2023-05-07 PROCEDURE — 83735 ASSAY OF MAGNESIUM: CPT

## 2023-05-07 PROCEDURE — 2700000000 HC OXYGEN THERAPY PER DAY

## 2023-05-07 PROCEDURE — 84100 ASSAY OF PHOSPHORUS: CPT

## 2023-05-07 PROCEDURE — 85025 COMPLETE CBC W/AUTO DIFF WBC: CPT

## 2023-05-07 PROCEDURE — 80053 COMPREHEN METABOLIC PANEL: CPT

## 2023-05-07 PROCEDURE — 82330 ASSAY OF CALCIUM: CPT

## 2023-05-07 PROCEDURE — 2060000000 HC ICU INTERMEDIATE R&B

## 2023-05-07 PROCEDURE — 94760 N-INVAS EAR/PLS OXIMETRY 1: CPT

## 2023-05-07 PROCEDURE — 36415 COLL VENOUS BLD VENIPUNCTURE: CPT

## 2023-05-07 PROCEDURE — 6360000002 HC RX W HCPCS: Performed by: NURSE PRACTITIONER

## 2023-05-07 PROCEDURE — 6370000000 HC RX 637 (ALT 250 FOR IP): Performed by: INTERNAL MEDICINE

## 2023-05-07 RX ADMIN — CLOPIDOGREL BISULFATE 75 MG: 75 TABLET ORAL at 08:16

## 2023-05-07 RX ADMIN — HYDROMORPHONE HYDROCHLORIDE 0.5 MG: 1 INJECTION, SOLUTION INTRAMUSCULAR; INTRAVENOUS; SUBCUTANEOUS at 04:37

## 2023-05-07 RX ADMIN — HYDROMORPHONE HYDROCHLORIDE 0.5 MG: 1 INJECTION, SOLUTION INTRAMUSCULAR; INTRAVENOUS; SUBCUTANEOUS at 00:05

## 2023-05-07 RX ADMIN — ONDANSETRON 4 MG: 2 INJECTION INTRAMUSCULAR; INTRAVENOUS at 11:03

## 2023-05-07 RX ADMIN — PANCRELIPASE LIPASE, PANCRELIPASE PROTEASE, PANCRELIPASE AMYLASE 10000 UNITS: 5000; 17000; 24000 CAPSULE, DELAYED RELEASE ORAL at 12:00

## 2023-05-07 RX ADMIN — HYDROMORPHONE HYDROCHLORIDE 0.5 MG: 1 INJECTION, SOLUTION INTRAMUSCULAR; INTRAVENOUS; SUBCUTANEOUS at 08:14

## 2023-05-07 RX ADMIN — HYDROMORPHONE HYDROCHLORIDE 0.5 MG: 1 INJECTION, SOLUTION INTRAMUSCULAR; INTRAVENOUS; SUBCUTANEOUS at 11:25

## 2023-05-07 RX ADMIN — SODIUM CHLORIDE, PRESERVATIVE FREE 10 ML: 5 INJECTION INTRAVENOUS at 21:02

## 2023-05-07 RX ADMIN — SODIUM CHLORIDE, PRESERVATIVE FREE 10 ML: 5 INJECTION INTRAVENOUS at 08:17

## 2023-05-07 RX ADMIN — PANTOPRAZOLE SODIUM 40 MG: 40 TABLET, DELAYED RELEASE ORAL at 08:16

## 2023-05-07 RX ADMIN — HYDROMORPHONE HYDROCHLORIDE 0.5 MG: 1 INJECTION, SOLUTION INTRAMUSCULAR; INTRAVENOUS; SUBCUTANEOUS at 18:05

## 2023-05-07 RX ADMIN — Medication 800 MG: at 08:16

## 2023-05-07 RX ADMIN — CILOSTAZOL 50 MG: 100 TABLET ORAL at 21:00

## 2023-05-07 RX ADMIN — HYDROMORPHONE HYDROCHLORIDE 0.5 MG: 1 INJECTION, SOLUTION INTRAMUSCULAR; INTRAVENOUS; SUBCUTANEOUS at 21:05

## 2023-05-07 RX ADMIN — LORAZEPAM 1 MG: 1 TABLET ORAL at 11:03

## 2023-05-07 RX ADMIN — MIRTAZAPINE 30 MG: 15 TABLET, FILM COATED ORAL at 21:00

## 2023-05-07 RX ADMIN — Medication 800 MG: at 21:00

## 2023-05-07 RX ADMIN — ONDANSETRON 4 MG: 2 INJECTION INTRAMUSCULAR; INTRAVENOUS at 04:37

## 2023-05-07 RX ADMIN — ONDANSETRON 4 MG: 2 INJECTION INTRAMUSCULAR; INTRAVENOUS at 18:05

## 2023-05-07 RX ADMIN — CILOSTAZOL 50 MG: 100 TABLET ORAL at 08:16

## 2023-05-07 RX ADMIN — BUSPIRONE HYDROCHLORIDE 5 MG: 5 TABLET ORAL at 08:16

## 2023-05-07 RX ADMIN — LORAZEPAM 1 MG: 1 TABLET ORAL at 19:10

## 2023-05-07 RX ADMIN — TRAZODONE HYDROCHLORIDE 200 MG: 100 TABLET ORAL at 21:00

## 2023-05-07 RX ADMIN — ENOXAPARIN SODIUM 40 MG: 100 INJECTION SUBCUTANEOUS at 08:16

## 2023-05-07 RX ADMIN — ATORVASTATIN CALCIUM 10 MG: 10 TABLET, FILM COATED ORAL at 08:16

## 2023-05-07 RX ADMIN — PANCRELIPASE LIPASE, PANCRELIPASE PROTEASE, PANCRELIPASE AMYLASE 10000 UNITS: 5000; 17000; 24000 CAPSULE, DELAYED RELEASE ORAL at 18:07

## 2023-05-07 RX ADMIN — BUSPIRONE HYDROCHLORIDE 5 MG: 5 TABLET ORAL at 14:05

## 2023-05-07 RX ADMIN — BUSPIRONE HYDROCHLORIDE 5 MG: 5 TABLET ORAL at 21:00

## 2023-05-07 RX ADMIN — AMLODIPINE BESYLATE 5 MG: 5 TABLET ORAL at 08:16

## 2023-05-07 ASSESSMENT — PAIN SCALES - GENERAL
PAINLEVEL_OUTOF10: 7
PAINLEVEL_OUTOF10: 9
PAINLEVEL_OUTOF10: 8
PAINLEVEL_OUTOF10: 8

## 2023-05-07 ASSESSMENT — PAIN DESCRIPTION - LOCATION
LOCATION: ABDOMEN;BACK
LOCATION: HEAD;ABDOMEN;BACK
LOCATION: GENERALIZED
LOCATION: ARM

## 2023-05-07 ASSESSMENT — PAIN DESCRIPTION - DESCRIPTORS
DESCRIPTORS: ACHING
DESCRIPTORS: ACHING

## 2023-05-08 LAB
BNP SERPL-MCNC: 155 PG/ML
GLUCOSE BLD-MCNC: 101 MG/DL (ref 65–105)
GLUCOSE BLD-MCNC: 110 MG/DL (ref 65–105)

## 2023-05-08 PROCEDURE — 6370000000 HC RX 637 (ALT 250 FOR IP): Performed by: NURSE PRACTITIONER

## 2023-05-08 PROCEDURE — 6370000000 HC RX 637 (ALT 250 FOR IP): Performed by: INTERNAL MEDICINE

## 2023-05-08 PROCEDURE — 94760 N-INVAS EAR/PLS OXIMETRY 1: CPT

## 2023-05-08 PROCEDURE — 2700000000 HC OXYGEN THERAPY PER DAY

## 2023-05-08 PROCEDURE — 83880 ASSAY OF NATRIURETIC PEPTIDE: CPT

## 2023-05-08 PROCEDURE — 99232 SBSQ HOSP IP/OBS MODERATE 35: CPT | Performed by: STUDENT IN AN ORGANIZED HEALTH CARE EDUCATION/TRAINING PROGRAM

## 2023-05-08 PROCEDURE — 6360000002 HC RX W HCPCS: Performed by: NURSE PRACTITIONER

## 2023-05-08 PROCEDURE — 2060000000 HC ICU INTERMEDIATE R&B

## 2023-05-08 PROCEDURE — 2580000003 HC RX 258: Performed by: NURSE PRACTITIONER

## 2023-05-08 PROCEDURE — 2580000003 HC RX 258: Performed by: INTERNAL MEDICINE

## 2023-05-08 PROCEDURE — 36415 COLL VENOUS BLD VENIPUNCTURE: CPT

## 2023-05-08 PROCEDURE — 82947 ASSAY GLUCOSE BLOOD QUANT: CPT

## 2023-05-08 RX ADMIN — HYDROMORPHONE HYDROCHLORIDE 0.5 MG: 1 INJECTION, SOLUTION INTRAMUSCULAR; INTRAVENOUS; SUBCUTANEOUS at 21:50

## 2023-05-08 RX ADMIN — BUSPIRONE HYDROCHLORIDE 5 MG: 5 TABLET ORAL at 21:45

## 2023-05-08 RX ADMIN — PANCRELIPASE LIPASE, PANCRELIPASE PROTEASE, PANCRELIPASE AMYLASE 10000 UNITS: 5000; 17000; 24000 CAPSULE, DELAYED RELEASE ORAL at 08:07

## 2023-05-08 RX ADMIN — MIRTAZAPINE 30 MG: 15 TABLET, FILM COATED ORAL at 21:46

## 2023-05-08 RX ADMIN — CILOSTAZOL 50 MG: 100 TABLET ORAL at 08:07

## 2023-05-08 RX ADMIN — AMLODIPINE BESYLATE 5 MG: 5 TABLET ORAL at 08:07

## 2023-05-08 RX ADMIN — SODIUM CHLORIDE, PRESERVATIVE FREE 10 ML: 5 INJECTION INTRAVENOUS at 08:01

## 2023-05-08 RX ADMIN — HYDROMORPHONE HYDROCHLORIDE 0.5 MG: 1 INJECTION, SOLUTION INTRAMUSCULAR; INTRAVENOUS; SUBCUTANEOUS at 03:40

## 2023-05-08 RX ADMIN — LORAZEPAM 1 MG: 1 TABLET ORAL at 10:27

## 2023-05-08 RX ADMIN — SODIUM CHLORIDE: 9 INJECTION, SOLUTION INTRAVENOUS at 15:52

## 2023-05-08 RX ADMIN — BUSPIRONE HYDROCHLORIDE 5 MG: 5 TABLET ORAL at 08:07

## 2023-05-08 RX ADMIN — ENOXAPARIN SODIUM 40 MG: 100 INJECTION SUBCUTANEOUS at 08:06

## 2023-05-08 RX ADMIN — ONDANSETRON 4 MG: 4 TABLET, ORALLY DISINTEGRATING ORAL at 18:45

## 2023-05-08 RX ADMIN — SODIUM CHLORIDE: 9 INJECTION, SOLUTION INTRAVENOUS at 08:12

## 2023-05-08 RX ADMIN — HYDROMORPHONE HYDROCHLORIDE 0.5 MG: 1 INJECTION, SOLUTION INTRAMUSCULAR; INTRAVENOUS; SUBCUTANEOUS at 08:01

## 2023-05-08 RX ADMIN — HYDROMORPHONE HYDROCHLORIDE 0.5 MG: 1 INJECTION, SOLUTION INTRAMUSCULAR; INTRAVENOUS; SUBCUTANEOUS at 15:46

## 2023-05-08 RX ADMIN — ONDANSETRON 4 MG: 2 INJECTION INTRAMUSCULAR; INTRAVENOUS at 03:40

## 2023-05-08 RX ADMIN — PANTOPRAZOLE SODIUM 40 MG: 40 TABLET, DELAYED RELEASE ORAL at 08:08

## 2023-05-08 RX ADMIN — CILOSTAZOL 50 MG: 100 TABLET ORAL at 21:46

## 2023-05-08 RX ADMIN — ATORVASTATIN CALCIUM 10 MG: 10 TABLET, FILM COATED ORAL at 08:07

## 2023-05-08 RX ADMIN — PANCRELIPASE LIPASE, PANCRELIPASE PROTEASE, PANCRELIPASE AMYLASE 10000 UNITS: 5000; 17000; 24000 CAPSULE, DELAYED RELEASE ORAL at 18:34

## 2023-05-08 RX ADMIN — HYDROMORPHONE HYDROCHLORIDE 0.5 MG: 1 INJECTION, SOLUTION INTRAMUSCULAR; INTRAVENOUS; SUBCUTANEOUS at 11:21

## 2023-05-08 RX ADMIN — ONDANSETRON 4 MG: 4 TABLET, ORALLY DISINTEGRATING ORAL at 10:28

## 2023-05-08 RX ADMIN — TRAZODONE HYDROCHLORIDE 200 MG: 100 TABLET ORAL at 22:40

## 2023-05-08 RX ADMIN — PANCRELIPASE LIPASE, PANCRELIPASE PROTEASE, PANCRELIPASE AMYLASE 10000 UNITS: 5000; 17000; 24000 CAPSULE, DELAYED RELEASE ORAL at 11:18

## 2023-05-08 RX ADMIN — Medication 800 MG: at 21:45

## 2023-05-08 RX ADMIN — HYDROMORPHONE HYDROCHLORIDE 0.5 MG: 1 INJECTION, SOLUTION INTRAMUSCULAR; INTRAVENOUS; SUBCUTANEOUS at 18:37

## 2023-05-08 RX ADMIN — BUSPIRONE HYDROCHLORIDE 5 MG: 5 TABLET ORAL at 15:38

## 2023-05-08 RX ADMIN — CLOPIDOGREL BISULFATE 75 MG: 75 TABLET ORAL at 08:07

## 2023-05-08 RX ADMIN — LORAZEPAM 1 MG: 1 TABLET ORAL at 19:29

## 2023-05-08 RX ADMIN — Medication 800 MG: at 08:07

## 2023-05-08 ASSESSMENT — PAIN DESCRIPTION - ORIENTATION
ORIENTATION: LOWER;MID
ORIENTATION: LOWER;MID
ORIENTATION: LEFT

## 2023-05-08 ASSESSMENT — PAIN DESCRIPTION - DESCRIPTORS
DESCRIPTORS: ACHING
DESCRIPTORS: ACHING
DESCRIPTORS: ACHING;SHARP

## 2023-05-08 ASSESSMENT — PAIN SCALES - GENERAL
PAINLEVEL_OUTOF10: 0
PAINLEVEL_OUTOF10: 8
PAINLEVEL_OUTOF10: 7
PAINLEVEL_OUTOF10: 9
PAINLEVEL_OUTOF10: 8
PAINLEVEL_OUTOF10: 7
PAINLEVEL_OUTOF10: 7

## 2023-05-08 ASSESSMENT — PAIN DESCRIPTION - LOCATION
LOCATION: ABDOMEN

## 2023-05-08 NOTE — PLAN OF CARE
Problem: Discharge Planning  Goal: Discharge to home or other facility with appropriate resources  Outcome: Progressing  Flowsheets (Taken 5/8/2023 0800)  Discharge to home or other facility with appropriate resources: Identify barriers to discharge with patient and caregiver     Problem: Pain  Goal: Verbalizes/displays adequate comfort level or baseline comfort level  Outcome: Progressing     Problem: Safety - Adult  Goal: Free from fall injury  Outcome: Progressing

## 2023-05-08 NOTE — PROGRESS NOTES
[unfilled]    [facility] 16.    Progress Note    5/8/2023    10:43 AM    Name:   Debbie Jaimes  MRN:     [id number]     [place]:      [de-identified]   Room:   323/[facility] Day:  2  Admit Date:  5/5/2023  4:04 PM    PCP:   Earline Holloway MD  Code Status:  Full Code    Subjective:     Patient is a 70-year-old female with acute on chronic pancreatitis and hypoxia. Patient was seen and examined at bedside. She is currently afebrile on 2 L oxygen nasal cannula satting 95%. She continues to report left-sided abdominal pain that is an 8 out of 10 and back pain. She is currently being managed with IV fluids and Dilaudid as needed for pain providing adequate relief. She was able to tolerate clear fluids yesterday and will advance diet as tolerated. She also reported a single episode of loose stools and fecal incontinence yesterday. She reports improvement in shortness of breath. Patient denies nausea, vomiting, chest pain, palpitations, dysuria. Patient reports sweats, chills, abdominal pain with loose stools. Medications: Allergies:     Allergies   Allergen Reactions    Compazine [Prochlorperazine Maleate]     Lithium     Other      Unknown \"mental health medication\"    Sulfa Antibiotics     Tetracyclines & Related        Current Meds:   Scheduled Meds:    sodium chloride flush  5-40 mL IntraVENous 2 times per day    enoxaparin  40 mg SubCUTAneous Daily    traZODone  200 mg Oral Nightly    mirtazapine  30 mg Oral Nightly    clopidogrel  75 mg Oral Daily    amLODIPine  5 mg Oral Daily    atorvastatin  10 mg Oral Daily    busPIRone  5 mg Oral TID    cilostazol  50 mg Oral BID    magnesium oxide  800 mg Oral BID    pantoprazole  40 mg Oral Daily    sennosides-docusate sodium  2 tablet Oral Nightly    lipase-protease-amylase  10,000 Units Oral TID WC    sodium chloride  80 mL IntraVENous Once    sodium chloride  80 mL IntraVENous Once     Continuous Infusions:

## 2023-05-08 NOTE — CARE COORDINATION
Case Management Assessment  Initial Evaluation    Date/Time of Evaluation: 5/8/2023 5:10 PM  Assessment Completed by: Jimena Hamilton RN    If patient is discharged prior to next notation, then this note serves as note for discharge by case management. Patient Name: Natali Ocasio                   YOB: 1955  Diagnosis: Compression fx, thoracic spine, closed, initial encounter [facility]) [S22.000A]  Acute on chronic pancreatitis ([facility] [K85.90, K86.1]  Acute pancreatitis without infection or necrosis [K85.90]                   Date / Time: 5/5/2023  4:04 PM    Patient Admission Status: Inpatient   Readmission Risk (Low < 19, Mod (19-27), High > 27): Readmission Risk Score: 10.1    Current PCP: Sandra Malone MD  PCP verified by CM? Yes    Chart Reviewed: Yes      History Provided by: Patient  Patient Orientation: Alert and Oriented    Patient Cognition: Alert    Hospitalization in the last 30 days (Readmission):  No    If yes, Readmission Assessment in CM Navigator will be completed. Advance Directives:      Code Status: Full Code   Patient's Primary Decision Maker is: Legal Next of Kin      Discharge Planning:    Patient lives with: Alone Type of Home: Independent Living  Primary Care Giver: Self  Patient Support Systems include: Family Members   Current Financial resources: None  Current community resources: Housing  Current services prior to admission: Durable Medical Equipment            Current DME: Walker            Type of Home Care services:  None    ADLS  Prior functional level: Independent in ADLs/IADLs  Current functional level: Independent in ADLs/IADLs    PT AM-PAC:   /24  OT AM-PAC:   /24    Family can provide assistance at DC: Would you like Case Management to discuss the discharge plan with any other family members/significant others, and if so, who?     Plans to Return to Present Housing: Yes  Other Identified Issues/Barriers to RETURNING to current housing:   Potential

## 2023-05-09 VITALS
SYSTOLIC BLOOD PRESSURE: 149 MMHG | HEIGHT: 64 IN | HEART RATE: 74 BPM | BODY MASS INDEX: 27.66 KG/M2 | DIASTOLIC BLOOD PRESSURE: 80 MMHG | RESPIRATION RATE: 16 BRPM | WEIGHT: 162.04 LBS | OXYGEN SATURATION: 97 % | TEMPERATURE: 98.1 F

## 2023-05-09 LAB — GLUCOSE BLD-MCNC: 98 MG/DL (ref 65–105)

## 2023-05-09 PROCEDURE — 2580000003 HC RX 258: Performed by: NURSE PRACTITIONER

## 2023-05-09 PROCEDURE — 6370000000 HC RX 637 (ALT 250 FOR IP): Performed by: INTERNAL MEDICINE

## 2023-05-09 PROCEDURE — 2580000003 HC RX 258: Performed by: INTERNAL MEDICINE

## 2023-05-09 PROCEDURE — 6370000000 HC RX 637 (ALT 250 FOR IP)

## 2023-05-09 PROCEDURE — 82947 ASSAY GLUCOSE BLOOD QUANT: CPT

## 2023-05-09 PROCEDURE — 6360000002 HC RX W HCPCS: Performed by: NURSE PRACTITIONER

## 2023-05-09 RX ORDER — HYDROCODONE BITARTRATE AND ACETAMINOPHEN 5; 325 MG/1; MG/1
1 TABLET ORAL EVERY 6 HOURS PRN
Qty: 28 TABLET | Refills: 0 | Status: SHIPPED | OUTPATIENT
Start: 2023-05-09 | End: 2023-05-16

## 2023-05-09 RX ORDER — OXYCODONE HYDROCHLORIDE AND ACETAMINOPHEN 5; 325 MG/1; MG/1
1 TABLET ORAL EVERY 4 HOURS PRN
Status: DISCONTINUED | OUTPATIENT
Start: 2023-05-09 | End: 2023-05-09 | Stop reason: HOSPADM

## 2023-05-09 RX ORDER — HYDROCODONE BITARTRATE AND ACETAMINOPHEN 5; 325 MG/1; MG/1
1 TABLET ORAL EVERY 6 HOURS PRN
Qty: 28 TABLET | Refills: 0 | Status: SHIPPED | OUTPATIENT
Start: 2023-05-09 | End: 2023-05-09 | Stop reason: SDUPTHER

## 2023-05-09 RX ADMIN — BUSPIRONE HYDROCHLORIDE 5 MG: 5 TABLET ORAL at 12:15

## 2023-05-09 RX ADMIN — SODIUM CHLORIDE: 9 INJECTION, SOLUTION INTRAVENOUS at 10:12

## 2023-05-09 RX ADMIN — CILOSTAZOL 50 MG: 100 TABLET ORAL at 09:44

## 2023-05-09 RX ADMIN — PANCRELIPASE LIPASE, PANCRELIPASE PROTEASE, PANCRELIPASE AMYLASE 10000 UNITS: 5000; 17000; 24000 CAPSULE, DELAYED RELEASE ORAL at 08:14

## 2023-05-09 RX ADMIN — HYDROMORPHONE HYDROCHLORIDE 0.5 MG: 1 INJECTION, SOLUTION INTRAMUSCULAR; INTRAVENOUS; SUBCUTANEOUS at 09:49

## 2023-05-09 RX ADMIN — LORAZEPAM 1 MG: 1 TABLET ORAL at 12:51

## 2023-05-09 RX ADMIN — ATORVASTATIN CALCIUM 10 MG: 10 TABLET, FILM COATED ORAL at 09:45

## 2023-05-09 RX ADMIN — HYDROMORPHONE HYDROCHLORIDE 0.5 MG: 1 INJECTION, SOLUTION INTRAMUSCULAR; INTRAVENOUS; SUBCUTANEOUS at 03:05

## 2023-05-09 RX ADMIN — PANCRELIPASE LIPASE, PANCRELIPASE PROTEASE, PANCRELIPASE AMYLASE 10000 UNITS: 5000; 17000; 24000 CAPSULE, DELAYED RELEASE ORAL at 12:14

## 2023-05-09 RX ADMIN — OXYCODONE HYDROCHLORIDE AND ACETAMINOPHEN 1 TABLET: 5; 325 TABLET ORAL at 13:59

## 2023-05-09 RX ADMIN — ENOXAPARIN SODIUM 40 MG: 100 INJECTION SUBCUTANEOUS at 09:15

## 2023-05-09 RX ADMIN — SODIUM CHLORIDE: 9 INJECTION, SOLUTION INTRAVENOUS at 00:18

## 2023-05-09 RX ADMIN — SODIUM CHLORIDE, PRESERVATIVE FREE 10 ML: 5 INJECTION INTRAVENOUS at 09:46

## 2023-05-09 RX ADMIN — BUSPIRONE HYDROCHLORIDE 5 MG: 5 TABLET ORAL at 09:45

## 2023-05-09 RX ADMIN — Medication 800 MG: at 09:45

## 2023-05-09 RX ADMIN — AMLODIPINE BESYLATE 5 MG: 5 TABLET ORAL at 09:47

## 2023-05-09 RX ADMIN — PANTOPRAZOLE SODIUM 40 MG: 40 TABLET, DELAYED RELEASE ORAL at 09:45

## 2023-05-09 RX ADMIN — CLOPIDOGREL BISULFATE 75 MG: 75 TABLET ORAL at 09:46

## 2023-05-09 ASSESSMENT — PAIN SCALES - GENERAL
PAINLEVEL_OUTOF10: 0
PAINLEVEL_OUTOF10: 0
PAINLEVEL_OUTOF10: 8
PAINLEVEL_OUTOF10: 0
PAINLEVEL_OUTOF10: 0
PAINLEVEL_OUTOF10: 8
PAINLEVEL_OUTOF10: 8
PAINLEVEL_OUTOF10: 0
PAINLEVEL_OUTOF10: 0

## 2023-05-09 ASSESSMENT — PAIN - FUNCTIONAL ASSESSMENT: PAIN_FUNCTIONAL_ASSESSMENT: ACTIVITIES ARE NOT PREVENTED

## 2023-05-09 ASSESSMENT — PAIN DESCRIPTION - DESCRIPTORS
DESCRIPTORS: ACHING;DISCOMFORT;SORE;TENDER
DESCRIPTORS: SHARP

## 2023-05-09 ASSESSMENT — PAIN DESCRIPTION - LOCATION
LOCATION: ABDOMEN;OTHER (COMMENT)
LOCATION: ABDOMEN

## 2023-05-09 ASSESSMENT — PAIN DESCRIPTION - ORIENTATION
ORIENTATION: LEFT;UPPER
ORIENTATION: LEFT

## 2023-05-09 NOTE — CARE COORDINATION
Social work: Per patient request, transport arranged for return to [facility] via ambulette(cost explained).

## 2023-05-09 NOTE — PLAN OF CARE
Problem: Discharge Planning  Goal: Discharge to home or other facility with appropriate resources  Outcome: Progressing  Flowsheets (Taken 5/9/2023 0815)  Discharge to home or other facility with appropriate resources:   Identify barriers to discharge with patient and caregiver   Arrange for needed discharge resources and transportation as appropriate   Identify discharge learning needs (meds, wound care, etc)     Problem: Pain  Goal: Verbalizes/displays adequate comfort level or baseline comfort level  Outcome: Progressing     Problem: Safety - Adult  Goal: Free from fall injury  Outcome: Progressing

## 2023-05-09 NOTE — PLAN OF CARE
Problem: Discharge Planning  Goal: Discharge to home or other facility with appropriate resources  5/9/2023 1832 by Lorraine Barriga RN  Outcome: Adequate for Discharge  5/9/2023 1831 by Lorraine Barriga RN  Outcome: Progressing  Flowsheets (Taken 5/9/2023 0815)  Discharge to home or other facility with appropriate resources:   Identify barriers to discharge with patient and caregiver   Arrange for needed discharge resources and transportation as appropriate   Identify discharge learning needs (meds, wound care, etc)     Problem: Pain  Goal: Verbalizes/displays adequate comfort level or baseline comfort level  5/9/2023 1832 by Lorraine Barriga RN  Outcome: Adequate for Discharge  5/9/2023 1831 by Lorraine Barriga RN  Outcome: Progressing     Problem: Safety - Adult  Goal: Free from fall injury  5/9/2023 1832 by Lorraine Barriga RN  Outcome: Adequate for Discharge  5/9/2023 1831 by Lorraine Barriga RN  Outcome: Progressing

## 2023-05-09 NOTE — DISCHARGE INSTRUCTIONS
Please follow up with your PCP within the next 2-3 days. If you develop a fever greater than 104 F  that does not respond to tylenol, nausea or vomiting that prevents oral intake, or severe abdominal pain go to the emergency department to be evaluated.

## 2023-05-09 NOTE — PLAN OF CARE
Problem: Discharge Planning  Goal: Discharge to home or other facility with appropriate resources  5/8/2023 2353 by Rosalia Guajardo RN  Outcome: Progressing     Problem: Pain  Goal: Verbalizes/displays adequate comfort level or baseline comfort level  5/8/2023 2353 by Rosalia Guajardo RN  Outcome: Progressing     Problem: Safety - Adult  Goal: Free from fall injury  5/8/2023 2353 by Rosalia Guajardo RN  Outcome: Progressing

## 2023-05-09 NOTE — PROGRESS NOTES
[unfilled]    [facility] 16.    Progress Note    5/9/2023    8:47 AM    Name:   Wendie Del Real  MRN:     [id number]     Sandra:      [de-identified]   Room:   323/[facility] Day:  3  Admit Date:  5/5/2023  4:04 PM    PCP:   John Grady MD  Code Status:  Full Code    Subjective:     Patient is a 26-year-old female with acute on chronic pancreatitis and hypoxia. Patient was seen and examined at bedside this morning. She is currently afebrile on room air satting 91%. She continues to report left-sided abdominal pain that is an 8 out of 10 and right-sided lower back pain. She is currently being managed with IV fluids and Dilaudid as needed for pain providing adequate relief. She was advanced to regular diet yesterday and has tolerated well. She reports 3 stools overnight with solid consistency and no episodes of incontinence. She reports improvement in shortness of breath since admission. Patient denies vomiting, chest pain. Patient reports nausea, abdominal pain, mild shortness of breath and palpitations on occasion. Medications: Allergies:     Allergies   Allergen Reactions    Compazine [Prochlorperazine Maleate]     Lithium     Other      Unknown \"mental health medication\"    Sulfa Antibiotics     Tetracyclines & Related        Current Meds:   Scheduled Meds:    sodium chloride flush  5-40 mL IntraVENous 2 times per day    enoxaparin  40 mg SubCUTAneous Daily    traZODone  200 mg Oral Nightly    mirtazapine  30 mg Oral Nightly    clopidogrel  75 mg Oral Daily    amLODIPine  5 mg Oral Daily    atorvastatin  10 mg Oral Daily    busPIRone  5 mg Oral TID    cilostazol  50 mg Oral BID    magnesium oxide  800 mg Oral BID    pantoprazole  40 mg Oral Daily    sennosides-docusate sodium  2 tablet Oral Nightly    lipase-protease-amylase  10,000 Units Oral TID WC    sodium chloride  80 mL IntraVENous Once    sodium chloride  80 mL IntraVENous Once     Continuous

## 2023-05-09 NOTE — DISCHARGE SUMMARY
[unfilled]    [facility] 16.    Discharge Summary     Patient ID: Lolis Zhao  :  1955   MRN: [id number]     ACCOUNT:  [de-identified]   Patient's PCP: Uri Melara MD  Admit Date: 2023   Discharge Date: 2023  Length of Stay: 3  Code Status:  Full Code  Admitting Physician: Jamal Lane DO  Discharge Physician: Chago Hughes MD     Active Discharge Diagnoses:     Hospital Problem Lists:  Principal Problem:    Acute on chronic pancreatitis [facility])  Active Problems:    Anxiety    Primary hypertension    Bipolar 1 disorder ([facility]    Stenosis of intestine ([facility]    Hypoxia    Multiple compression fractures of thoracic vertebra ([facility]  Resolved Problems:    * No resolved hospital problems. *      Admission Condition:  poor     Discharged Condition: good    Hospital Stay:     Hospital Course:    Lolis Zhao is a 79 y.o. female presented via EMS for dizziness, vertigo, abdominal pain, and hypoxia 86% on room air and admitted to the hospital for management of acute on chronic pancreatitis. Patient has a known history of chronic pancreatitis with h/o necrotizing pancreatitis and subsequent partial bowel resection history. During admission she received IVF, pain control, and antiemetics. Hypoxia was thought to be secondary to atelectasis as she was weaned off oxygen use of incentive spirometer. Pain was well controlled and she was able to tolerate p.o. pain medication and tolerate oral intake without difficulty. Patient discharged at her baseline function in stable condition. Patient to follow up with PCP and gastroenterology. Significant therapeutic interventions: IV pain control, supplemental oxygen, pain control.   Significant Diagnostic Studies: CT angio chest, CT abdomen and pelvis  Labs:  Hematology:  Recent Labs     23  0459   WBC 8.5   RBC 4.42   HGB 12.6   HCT 38.1   MCV 86.2   MCH 28.6   MCHC 33.2   RDW 13.7      MPV 8.8

## 2023-07-10 ENCOUNTER — APPOINTMENT (OUTPATIENT)
Dept: CT IMAGING | Age: 68
End: 2023-07-10
Payer: MEDICARE

## 2023-07-10 ENCOUNTER — HOSPITAL ENCOUNTER (EMERGENCY)
Age: 68
Discharge: HOME | End: 2023-07-10
Attending: EMERGENCY MEDICINE
Payer: MEDICARE

## 2023-07-10 VITALS
RESPIRATION RATE: 14 BRPM | DIASTOLIC BLOOD PRESSURE: 69 MMHG | WEIGHT: 162 LBS | SYSTOLIC BLOOD PRESSURE: 143 MMHG | BODY MASS INDEX: 27.66 KG/M2 | HEART RATE: 93 BPM | HEIGHT: 64 IN | OXYGEN SATURATION: 94 %

## 2023-07-10 DIAGNOSIS — R10.30 LOWER ABDOMINAL PAIN: Primary | ICD-10-CM

## 2023-07-10 LAB
ALBUMIN SERPL-MCNC: 4.6 G/DL (ref 3.5–5.2)
ALBUMIN/GLOB SERPL: 1.6 {RATIO} (ref 1–2.5)
ALP SERPL-CCNC: 304 U/L (ref 35–104)
ALT SERPL-CCNC: 9 U/L (ref 5–33)
ANION GAP SERPL CALCULATED.3IONS-SCNC: 12 MMOL/L (ref 9–17)
AST SERPL-CCNC: 11 U/L
BACTERIA URNS QL MICRO: ABNORMAL
BASOPHILS # BLD: 0.1 K/UL (ref 0–0.2)
BASOPHILS NFR BLD: 1 % (ref 0–2)
BILIRUB SERPL-MCNC: 0.2 MG/DL (ref 0.3–1.2)
BILIRUB UR QL STRIP: NEGATIVE
BUN SERPL-MCNC: 14 MG/DL (ref 8–23)
CALCIUM SERPL-MCNC: 9.8 MG/DL (ref 8.6–10.4)
CHARACTER UR: ABNORMAL
CHLORIDE SERPL-SCNC: 104 MMOL/L (ref 98–107)
CLARITY UR: CLEAR
CO2 SERPL-SCNC: 25 MMOL/L (ref 20–31)
COLOR UR: YELLOW
CREAT SERPL-MCNC: 1 MG/DL (ref 0.5–0.9)
EOSINOPHIL # BLD: 0.1 K/UL (ref 0–0.4)
EOSINOPHILS RELATIVE PERCENT: 2 % (ref 1–4)
EPI CELLS #/AREA URNS HPF: ABNORMAL /HPF (ref 0–5)
ERYTHROCYTE [DISTWIDTH] IN BLOOD BY AUTOMATED COUNT: 13.9 % (ref 12.5–15.4)
GFR SERPL CREATININE-BSD FRML MDRD: >60 ML/MIN/1.73M2
GLUCOSE SERPL-MCNC: 97 MG/DL (ref 70–99)
GLUCOSE UR STRIP-MCNC: NEGATIVE MG/DL
HCT VFR BLD AUTO: 41.6 % (ref 36–46)
HGB BLD-MCNC: 14.2 G/DL (ref 12–16)
HGB UR QL STRIP.AUTO: ABNORMAL
KETONES UR STRIP-MCNC: NEGATIVE MG/DL
LEUKOCYTE ESTERASE UR QL STRIP: NEGATIVE
LIPASE SERPL-CCNC: 101 U/L (ref 13–60)
LYMPHOCYTES # BLD: 18 % (ref 24–44)
LYMPHOCYTES NFR BLD: 1.5 K/UL (ref 1–4.8)
MCH RBC QN AUTO: 29.4 PG (ref 26–34)
MCHC RBC AUTO-ENTMCNC: 34.1 G/DL (ref 31–37)
MCV RBC AUTO: 86.2 FL (ref 80–100)
MONOCYTES NFR BLD: 0.7 K/UL (ref 0.1–1.2)
MONOCYTES NFR BLD: 8 % (ref 2–11)
NEUTROPHILS NFR BLD: 71 % (ref 36–66)
NEUTS SEG NFR BLD: 6 K/UL (ref 1.8–7.7)
NITRITE UR QL STRIP: NEGATIVE
PH UR STRIP: 6.5 [PH] (ref 5–8)
PLATELET # BLD AUTO: 290 K/UL (ref 140–450)
PMV BLD AUTO: 8.6 FL (ref 6–12)
POTASSIUM SERPL-SCNC: 3.8 MMOL/L (ref 3.7–5.3)
PROT SERPL-MCNC: 7.4 G/DL (ref 6.4–8.3)
PROT UR STRIP-MCNC: NEGATIVE MG/DL
RBC # BLD AUTO: 4.83 M/UL (ref 4–5.2)
RBC #/AREA URNS HPF: ABNORMAL /HPF (ref 0–2)
SODIUM SERPL-SCNC: 141 MMOL/L (ref 135–144)
SP GR UR STRIP: 1 (ref 1–1.03)
TROPONIN I SERPL HS-MCNC: 14 NG/L (ref 0–14)
UROBILINOGEN UR STRIP-ACNC: NORMAL
WBC #/AREA URNS HPF: ABNORMAL /HPF (ref 0–5)
WBC OTHER # BLD: 8.5 K/UL (ref 3.5–11)

## 2023-07-10 PROCEDURE — 96374 THER/PROPH/DIAG INJ IV PUSH: CPT

## 2023-07-10 PROCEDURE — 85027 COMPLETE CBC AUTOMATED: CPT

## 2023-07-10 PROCEDURE — 36415 COLL VENOUS BLD VENIPUNCTURE: CPT

## 2023-07-10 PROCEDURE — 83690 ASSAY OF LIPASE: CPT

## 2023-07-10 PROCEDURE — 99284 EMERGENCY DEPT VISIT MOD MDM: CPT

## 2023-07-10 PROCEDURE — 81001 URINALYSIS AUTO W/SCOPE: CPT

## 2023-07-10 PROCEDURE — 84484 ASSAY OF TROPONIN QUANT: CPT

## 2023-07-10 PROCEDURE — 6360000002 HC RX W HCPCS: Performed by: NURSE PRACTITIONER

## 2023-07-10 PROCEDURE — 2580000003 HC RX 258: Performed by: NURSE PRACTITIONER

## 2023-07-10 PROCEDURE — 74176 CT ABD & PELVIS W/O CONTRAST: CPT

## 2023-07-10 PROCEDURE — 80053 COMPREHEN METABOLIC PANEL: CPT

## 2023-07-10 PROCEDURE — 96372 THER/PROPH/DIAG INJ SC/IM: CPT

## 2023-07-10 RX ORDER — DICYCLOMINE HYDROCHLORIDE 10 MG/ML
20 INJECTION INTRAMUSCULAR ONCE
Status: COMPLETED | OUTPATIENT
Start: 2023-07-10 | End: 2023-07-10

## 2023-07-10 RX ORDER — 0.9 % SODIUM CHLORIDE 0.9 %
1000 INTRAVENOUS SOLUTION INTRAVENOUS ONCE
Status: COMPLETED | OUTPATIENT
Start: 2023-07-10 | End: 2023-07-10

## 2023-07-10 RX ORDER — FENTANYL CITRATE 50 UG/ML
25 INJECTION, SOLUTION INTRAMUSCULAR; INTRAVENOUS ONCE
Status: COMPLETED | OUTPATIENT
Start: 2023-07-10 | End: 2023-07-10

## 2023-07-10 RX ORDER — KETOROLAC TROMETHAMINE 15 MG/ML
15 INJECTION, SOLUTION INTRAMUSCULAR; INTRAVENOUS ONCE
Status: DISCONTINUED | OUTPATIENT
Start: 2023-07-10 | End: 2023-07-10 | Stop reason: HOSPADM

## 2023-07-10 RX ADMIN — FENTANYL CITRATE 25 MCG: 50 INJECTION, SOLUTION INTRAMUSCULAR; INTRAVENOUS at 14:38

## 2023-07-10 RX ADMIN — DICYCLOMINE HYDROCHLORIDE 20 MG: 20 INJECTION, SOLUTION INTRAMUSCULAR at 14:33

## 2023-07-10 RX ADMIN — SODIUM CHLORIDE 1000 ML: 9 INJECTION, SOLUTION INTRAVENOUS at 13:32

## 2023-07-10 ASSESSMENT — ENCOUNTER SYMPTOMS
VOMITING: 0
ABDOMINAL PAIN: 1
CONSTIPATION: 0
COUGH: 0
SHORTNESS OF BREATH: 0
DIARRHEA: 0
NAUSEA: 0

## 2023-07-10 ASSESSMENT — PAIN DESCRIPTION - ORIENTATION: ORIENTATION: LOWER

## 2023-07-10 ASSESSMENT — PAIN DESCRIPTION - LOCATION: LOCATION: ABDOMEN

## 2023-07-10 ASSESSMENT — PAIN - FUNCTIONAL ASSESSMENT: PAIN_FUNCTIONAL_ASSESSMENT: 0-10

## 2023-07-10 ASSESSMENT — PAIN SCALES - GENERAL: PAINLEVEL_OUTOF10: 8

## 2023-07-10 NOTE — ED NOTES
Patient provided with discharge instructions, prescriptions, and follow up information. Verbalized understanding. IV discontinued and dry dressing in place. A&OX3. Steady gait noted at discharge. Wheelchair declined by patient.       Adelfo Ames RN  07/10/23 8551

## 2023-07-10 NOTE — ED PROVIDER NOTES
[address] [facility]  Emergency Department Encounter  Mid Level Provider     Pt Name: Rubina Ortega  MRN: [id number]  [address] [facility] [place] 1955  Date of evaluation: 7/10/23  PCP:  Ace Saez MD    [address]       Chief Complaint   Patient presents with    Abdominal Pain     Started this morning, diffuse across lower abdomen, hx of bowel resections    Loss of Consciousness     X2 once with ems and once on arrival, arm dropped and patient went out        HISTORY OF PRESENT ILLNESS  (Location/Symptom, Timing/Onset,Context/Setting, Quality, Duration, Modifying Factors, Severity.)      Rubina Ortega is a 76 y.o. female who presents with diffuse lower abdominal pain that started today. No nausea vomiting diarrhea or constipation. No fever or chills. No cough or cold symptoms. No urinary symptoms. No chest pain or shortness of breath. She was brought in by EMS. PAST MEDICAL /SURGICAL / SOCIAL / FAMILY HISTORY      has a past medical history of ADHD (attention deficit hyperactivity disorder), Anxiety, BiPAP (biphasic positive airway pressure) dependence, Bipolar 1 disorder ([address]), Chronic back pain, Depression, Valentino catheter in place, Hyperlipidemia, Hypertension, Nursing home resident, Pancreatitis, PICC (peripherally inserted central catheter) in place, Pleural effusion, Respiratory failure ([address]), and Stenosis of intestine ([address] has a past surgical history that includes Appendectomy; Hysterectomy; Cholecystectomy; tumor removal; back surgery; Endoscopy, colon, diagnostic; Foot surgery (Left, 11/04/2022); tumor removal; and thoracentesis (Left, 05/26/2022). Social History     Socioeconomic History    Marital status:       Spouse name: Not on file    Number of children: Not on file    Years of education: Not on file    Highest education level: Not on file   Occupational History    Not on file   Tobacco Use    Smoking status: Former     Types: Cigarettes

## 2023-07-10 NOTE — ED PROVIDER NOTES
[facility] Emergency Department  [address] [address] [facility] [address]  Phone: [phone]  Fax: [phone]      Attending Physician Attestation    I performed a history and physical examination of the patient and discussed management with the mid level provider. I reviewed the mid level provider's note and agree with the documented findings and plan of care. Any areas of disagreement are noted on the chart. I was personally present for the key portions of any procedures. I have documented in the chart those procedures where I was not present during the key portions. I have reviewed the emergency nurses triage note. I agree with the chief complaint, past medical history, past surgical history, allergies, medications, social and family history as documented unless otherwise noted below. Documentation of the HPI, Physical Exam and Medical Decision Making performed by mid level providers is based on my personal performance of the HPI, PE and MDM. For Physician Assistant/ Nurse Practitioner cases/documentation I have personally evaluated this patient and have completed at least one if not all key elements of the E/M (history, physical exam, and MDM). Additional findings are as noted. CHIEF COMPLAINT       Chief Complaint   Patient presents with    Abdominal Pain     Started this morning, diffuse across lower abdomen, hx of bowel resections    Loss of Consciousness     X2 once with ems and once on arrival, arm dropped and patient went out          [address] is a 76 y.o. female who presents complaining of lower abdominal pain. She has had a history of chronic pancreatitis as well as chronic abdominal pain. She reports that she had a syncopal event. She had short prodrome of feeling lightheaded and dizzy like she was going to pass out and woke up on the floor. She denies injuring herself. She denies striking her head.   She never had problems

## 2023-07-11 LAB
EKG ATRIAL RATE: 85 BPM
EKG P AXIS: 52 DEGREES
EKG P-R INTERVAL: 142 MS
EKG Q-T INTERVAL: 386 MS
EKG QRS DURATION: 78 MS
EKG QTC CALCULATION (BAZETT): 459 MS
EKG R AXIS: -9 DEGREES
EKG T AXIS: 72 DEGREES
EKG VENTRICULAR RATE: 85 BPM

## 2023-07-12 ASSESSMENT — ENCOUNTER SYMPTOMS
ABDOMINAL PAIN: 1
DIARRHEA: 0
VOMITING: 0
NAUSEA: 0
SHORTNESS OF BREATH: 0
COUGH: 0
CONSTIPATION: 0

## 2023-08-08 ENCOUNTER — APPOINTMENT (OUTPATIENT)
Dept: CT IMAGING | Age: 68
End: 2023-08-08
Payer: MEDICARE

## 2023-08-08 ENCOUNTER — HOSPITAL ENCOUNTER (EMERGENCY)
Age: 68
Discharge: HOME | End: 2023-08-08
Attending: EMERGENCY MEDICINE
Payer: MEDICARE

## 2023-08-08 VITALS
TEMPERATURE: 97.7 F | HEIGHT: 64 IN | HEART RATE: 85 BPM | OXYGEN SATURATION: 96 % | WEIGHT: 150 LBS | BODY MASS INDEX: 25.61 KG/M2 | RESPIRATION RATE: 16 BRPM | DIASTOLIC BLOOD PRESSURE: 91 MMHG | SYSTOLIC BLOOD PRESSURE: 179 MMHG

## 2023-08-08 DIAGNOSIS — R10.84 GENERALIZED ABDOMINAL PAIN: Primary | ICD-10-CM

## 2023-08-08 DIAGNOSIS — K86.1 CHRONIC PANCREATITIS, UNSPECIFIED PANCREATITIS TYPE (HCC): ICD-10-CM

## 2023-08-08 LAB
ALBUMIN SERPL-MCNC: 4.4 G/DL (ref 3.5–5.2)
ALBUMIN/GLOB SERPL: 1.6 {RATIO} (ref 1–2.5)
ALP SERPL-CCNC: 281 U/L (ref 35–104)
ALT SERPL-CCNC: 17 U/L (ref 5–33)
ANION GAP SERPL CALCULATED.3IONS-SCNC: 12 MMOL/L (ref 9–17)
AST SERPL-CCNC: 15 U/L
BASOPHILS # BLD: 0.09 K/UL (ref 0–0.2)
BASOPHILS NFR BLD: 1 % (ref 0–2)
BILIRUB SERPL-MCNC: 0.2 MG/DL (ref 0.3–1.2)
BUN SERPL-MCNC: 17 MG/DL (ref 8–23)
CALCIUM SERPL-MCNC: 9.5 MG/DL (ref 8.6–10.4)
CHLORIDE SERPL-SCNC: 105 MMOL/L (ref 98–107)
CO2 SERPL-SCNC: 24 MMOL/L (ref 20–31)
CREAT SERPL-MCNC: 0.9 MG/DL (ref 0.5–0.9)
EOSINOPHIL # BLD: 0.18 K/UL (ref 0–0.4)
EOSINOPHILS RELATIVE PERCENT: 2 % (ref 1–4)
ERYTHROCYTE [DISTWIDTH] IN BLOOD BY AUTOMATED COUNT: 14 % (ref 12.5–15.4)
GFR SERPL CREATININE-BSD FRML MDRD: >60 ML/MIN/1.73M2
GLUCOSE SERPL-MCNC: 107 MG/DL (ref 70–99)
HCT VFR BLD AUTO: 43.2 % (ref 36–46)
HGB BLD-MCNC: 13.9 G/DL (ref 12–16)
LACTATE BLDV-SCNC: 1.4 MMOL/L (ref 0.5–2.2)
LIPASE SERPL-CCNC: 67 U/L (ref 13–60)
LYMPHOCYTES NFR BLD: 1.5 K/UL (ref 1–4.8)
LYMPHOCYTES RELATIVE PERCENT: 17 % (ref 24–44)
MCH RBC QN AUTO: 28.5 PG (ref 26–34)
MCHC RBC AUTO-ENTMCNC: 32.2 G/DL (ref 31–37)
MCV RBC AUTO: 88.5 FL (ref 80–100)
METAMYELOCYTES ABSOLUTE COUNT: 0.09 K/UL
METAMYELOCYTES: 1 %
MONOCYTES NFR BLD: 0.7 K/UL (ref 0.1–0.8)
MONOCYTES NFR BLD: 8 % (ref 1–7)
MORPHOLOGY: NORMAL
NEUTROPHILS NFR BLD: 71 % (ref 36–66)
NEUTS SEG NFR BLD: 6.24 K/UL (ref 1.8–7.7)
PLATELET # BLD AUTO: 288 K/UL (ref 140–450)
PMV BLD AUTO: 11.3 FL (ref 8–14)
POTASSIUM SERPL-SCNC: 3.8 MMOL/L (ref 3.7–5.3)
PROT SERPL-MCNC: 7.2 G/DL (ref 6.4–8.3)
RBC # BLD AUTO: 4.88 M/UL (ref 4–5.2)
SODIUM SERPL-SCNC: 141 MMOL/L (ref 135–144)
WBC OTHER # BLD: 8.8 K/UL (ref 3.5–11)

## 2023-08-08 PROCEDURE — 2580000003 HC RX 258: Performed by: EMERGENCY MEDICINE

## 2023-08-08 PROCEDURE — 96376 TX/PRO/DX INJ SAME DRUG ADON: CPT

## 2023-08-08 PROCEDURE — 6360000002 HC RX W HCPCS: Performed by: NURSE PRACTITIONER

## 2023-08-08 PROCEDURE — 96374 THER/PROPH/DIAG INJ IV PUSH: CPT

## 2023-08-08 PROCEDURE — 83690 ASSAY OF LIPASE: CPT

## 2023-08-08 PROCEDURE — 99285 EMERGENCY DEPT VISIT HI MDM: CPT

## 2023-08-08 PROCEDURE — 85025 COMPLETE CBC W/AUTO DIFF WBC: CPT

## 2023-08-08 PROCEDURE — 2580000003 HC RX 258: Performed by: NURSE PRACTITIONER

## 2023-08-08 PROCEDURE — 6360000004 HC RX CONTRAST MEDICATION: Performed by: EMERGENCY MEDICINE

## 2023-08-08 PROCEDURE — 36415 COLL VENOUS BLD VENIPUNCTURE: CPT

## 2023-08-08 PROCEDURE — 80053 COMPREHEN METABOLIC PANEL: CPT

## 2023-08-08 PROCEDURE — 83605 ASSAY OF LACTIC ACID: CPT

## 2023-08-08 PROCEDURE — 96375 TX/PRO/DX INJ NEW DRUG ADDON: CPT

## 2023-08-08 PROCEDURE — 74177 CT ABD & PELVIS W/CONTRAST: CPT

## 2023-08-08 PROCEDURE — 6370000000 HC RX 637 (ALT 250 FOR IP): Performed by: NURSE PRACTITIONER

## 2023-08-08 RX ORDER — OXCARBAZEPINE 150 MG/1
150 TABLET, FILM COATED ORAL 2 TIMES DAILY
COMMUNITY

## 2023-08-08 RX ORDER — 0.9 % SODIUM CHLORIDE 0.9 %
80 INTRAVENOUS SOLUTION INTRAVENOUS ONCE
Status: DISCONTINUED | OUTPATIENT
Start: 2023-08-08 | End: 2023-08-08 | Stop reason: HOSPADM

## 2023-08-08 RX ORDER — FENTANYL CITRATE 50 UG/ML
25 INJECTION, SOLUTION INTRAMUSCULAR; INTRAVENOUS ONCE
Status: COMPLETED | OUTPATIENT
Start: 2023-08-08 | End: 2023-08-08

## 2023-08-08 RX ORDER — SODIUM CHLORIDE 0.9 % (FLUSH) 0.9 %
10 SYRINGE (ML) INJECTION PRN
Status: DISCONTINUED | OUTPATIENT
Start: 2023-08-08 | End: 2023-08-08 | Stop reason: HOSPADM

## 2023-08-08 RX ORDER — HYDROCODONE BITARTRATE AND ACETAMINOPHEN 5; 325 MG/1; MG/1
1 TABLET ORAL ONCE
Status: COMPLETED | OUTPATIENT
Start: 2023-08-08 | End: 2023-08-08

## 2023-08-08 RX ORDER — HYDROCODONE BITARTRATE AND ACETAMINOPHEN 5; 325 MG/1; MG/1
1 TABLET ORAL EVERY 6 HOURS PRN
Qty: 12 TABLET | Refills: 0 | Status: SHIPPED | OUTPATIENT
Start: 2023-08-08 | End: 2023-08-11

## 2023-08-08 RX ORDER — DULOXETIN HYDROCHLORIDE 20 MG/1
20 CAPSULE, DELAYED RELEASE ORAL DAILY
COMMUNITY

## 2023-08-08 RX ORDER — ONDANSETRON 2 MG/ML
4 INJECTION INTRAMUSCULAR; INTRAVENOUS ONCE
Status: COMPLETED | OUTPATIENT
Start: 2023-08-08 | End: 2023-08-08

## 2023-08-08 RX ORDER — 0.9 % SODIUM CHLORIDE 0.9 %
1000 INTRAVENOUS SOLUTION INTRAVENOUS ONCE
Status: COMPLETED | OUTPATIENT
Start: 2023-08-08 | End: 2023-08-08

## 2023-08-08 RX ADMIN — FENTANYL CITRATE 25 MCG: 50 INJECTION INTRAMUSCULAR; INTRAVENOUS at 17:43

## 2023-08-08 RX ADMIN — IOPAMIDOL 75 ML: 755 INJECTION, SOLUTION INTRAVENOUS at 18:34

## 2023-08-08 RX ADMIN — ONDANSETRON 4 MG: 2 INJECTION INTRAMUSCULAR; INTRAVENOUS at 17:43

## 2023-08-08 RX ADMIN — SODIUM CHLORIDE 1000 ML: 9 INJECTION, SOLUTION INTRAVENOUS at 17:43

## 2023-08-08 RX ADMIN — FENTANYL CITRATE 25 MCG: 50 INJECTION INTRAMUSCULAR; INTRAVENOUS at 19:11

## 2023-08-08 RX ADMIN — SODIUM CHLORIDE, PRESERVATIVE FREE 10 ML: 5 INJECTION INTRAVENOUS at 18:34

## 2023-08-08 RX ADMIN — Medication 80 ML: at 18:34

## 2023-08-08 RX ADMIN — HYDROCODONE BITARTRATE AND ACETAMINOPHEN 1 TABLET: 5; 325 TABLET ORAL at 20:03

## 2023-08-08 ASSESSMENT — ENCOUNTER SYMPTOMS
NAUSEA: 1
ABDOMINAL PAIN: 1
VOMITING: 1

## 2023-08-08 ASSESSMENT — PAIN DESCRIPTION - LOCATION: LOCATION: ABDOMEN

## 2023-08-08 ASSESSMENT — PAIN - FUNCTIONAL ASSESSMENT: PAIN_FUNCTIONAL_ASSESSMENT: 0-10

## 2023-08-08 ASSESSMENT — PAIN SCALES - GENERAL
PAINLEVEL_OUTOF10: 8
PAINLEVEL_OUTOF10: 9
PAINLEVEL_OUTOF10: 7

## 2023-08-08 NOTE — DISCHARGE INSTRUCTIONS
Follow up with GI and pcp  in 1-2 days. Return to the ER for pain that worsens, nausea, vomiting, fevers, any worsening symptoms or concerns.

## 2023-08-08 NOTE — ED PROVIDER NOTES
[address] [facility]      Pt Name: Rhonda Espinoza  MRN: [id number]  [address] [facility] [place] 1955  Date of evaluation: 8/8/2023  Provider: SARAHI Burns CNP    CHIEF COMPLAINT       Chief Complaint   Patient presents with    Abdominal Pain     For a few hours, nausea, no vomiting, hx of pancreatitits         HISTORY OF PRESENT ILLNESS   (Location/Symptom, Timing/Onset, Context/Setting, Quality, Duration, Modifying Factors, Severity)  Note limiting factors. Rhonda Espinoza is a 76 y.o. female who presents to the emergency department for evaluation of what she believes might be her pancreatitis flaring up. Patient states she has a known history of a cyst on the pancreas. She will get these bouts of pancreatitis frequently. She was last recently admitted here in May, in July she went to [facility] and was evaluated for a bowel obstruction. She states she typically tries to watch what she eats. She does not drink any alcohol. No fevers or chills. She is only had the pain for a few hours today but she states she knows exactly what type of pain this is. She feels bloated and distended. No urinary symptoms no flank pain no back pain no chest pain no shortness of breath    HPI    Nursing Notes were reviewed. REVIEW OF SYSTEMS    (2-9 systems for level 4, 10 or more for level 5)     Review of Systems   Gastrointestinal:  Positive for abdominal pain, nausea and vomiting. All other systems reviewed and are negative. Except as noted above the remainder of the review of systems was reviewed and negative.        PAST MEDICAL HISTORY     Past Medical History:   Diagnosis Date    ADHD (attention deficit hyperactivity disorder)     Anxiety     BiPAP (biphasic positive airway pressure) dependence     Bipolar 1 disorder (HCC)     Chronic back pain     denies pain mgmnt    Depression     Valentino catheter in place     Hyperlipidemia     Hypertension     Nursing

## 2023-08-09 NOTE — ED NOTES
Patient provided with discharge instructions, prescriptions, and follow up information. Verbalized understanding. IV discontinued and dry dressing in place. A&OX3. Steady gait noted at discharge. Wheelchair declined by patient.       Asia Archibald RN  08/08/23 2016

## 2023-10-09 ENCOUNTER — APPOINTMENT (OUTPATIENT)
Dept: CT IMAGING | Age: 68
End: 2023-10-09
Payer: MEDICARE

## 2023-10-09 ENCOUNTER — APPOINTMENT (OUTPATIENT)
Dept: GENERAL RADIOLOGY | Age: 68
End: 2023-10-09
Payer: MEDICARE

## 2023-10-09 ENCOUNTER — HOSPITAL ENCOUNTER (EMERGENCY)
Age: 68
Discharge: HOME | End: 2023-10-09
Attending: STUDENT IN AN ORGANIZED HEALTH CARE EDUCATION/TRAINING PROGRAM
Payer: MEDICARE

## 2023-10-09 VITALS
HEART RATE: 74 BPM | TEMPERATURE: 97.9 F | DIASTOLIC BLOOD PRESSURE: 82 MMHG | OXYGEN SATURATION: 97 % | WEIGHT: 150 LBS | RESPIRATION RATE: 16 BRPM | BODY MASS INDEX: 25.61 KG/M2 | HEIGHT: 64 IN | SYSTOLIC BLOOD PRESSURE: 128 MMHG

## 2023-10-09 DIAGNOSIS — B34.9 VIRAL ILLNESS: Primary | ICD-10-CM

## 2023-10-09 DIAGNOSIS — G44.89 OTHER HEADACHE SYNDROME: ICD-10-CM

## 2023-10-09 LAB
ANION GAP SERPL CALCULATED.3IONS-SCNC: 14 MMOL/L (ref 9–17)
BASOPHILS # BLD: 0.1 K/UL (ref 0–0.2)
BASOPHILS NFR BLD: 1 % (ref 0–2)
BILIRUB UR QL STRIP: NEGATIVE
BUN SERPL-MCNC: 12 MG/DL (ref 8–23)
CALCIUM SERPL-MCNC: 9.7 MG/DL (ref 8.6–10.4)
CHLORIDE SERPL-SCNC: 102 MMOL/L (ref 98–107)
CLARITY UR: CLEAR
CO2 SERPL-SCNC: 23 MMOL/L (ref 20–31)
COLOR UR: YELLOW
COMMENT: NORMAL
CREAT SERPL-MCNC: 1.1 MG/DL (ref 0.5–0.9)
EOSINOPHIL # BLD: 0.2 K/UL (ref 0–0.4)
EOSINOPHILS RELATIVE PERCENT: 2 % (ref 1–4)
ERYTHROCYTE [DISTWIDTH] IN BLOOD BY AUTOMATED COUNT: 14.5 % (ref 12.5–15.4)
FLUAV AG SPEC QL: NEGATIVE
FLUBV AG SPEC QL: NEGATIVE
GFR SERPL CREATININE-BSD FRML MDRD: 55 ML/MIN/1.73M2
GLUCOSE SERPL-MCNC: 103 MG/DL (ref 70–99)
GLUCOSE UR STRIP-MCNC: NEGATIVE MG/DL
HCT VFR BLD AUTO: 40.3 % (ref 36–46)
HGB BLD-MCNC: 13.7 G/DL (ref 12–16)
HGB UR QL STRIP.AUTO: NEGATIVE
KETONES UR STRIP-MCNC: NEGATIVE MG/DL
LEUKOCYTE ESTERASE UR QL STRIP: NEGATIVE
LYMPHOCYTES NFR BLD: 1.7 K/UL (ref 1–4.8)
LYMPHOCYTES RELATIVE PERCENT: 18 % (ref 24–44)
MAGNESIUM SERPL-MCNC: 2.1 MG/DL (ref 1.6–2.6)
MCH RBC QN AUTO: 29.7 PG (ref 26–34)
MCHC RBC AUTO-ENTMCNC: 34 G/DL (ref 31–37)
MCV RBC AUTO: 87.4 FL (ref 80–100)
MONOCYTES NFR BLD: 0.8 K/UL (ref 0.1–1.2)
MONOCYTES NFR BLD: 8 % (ref 2–11)
NEUTROPHILS NFR BLD: 71 % (ref 36–66)
NEUTS SEG NFR BLD: 6.9 K/UL (ref 1.8–7.7)
NITRITE UR QL STRIP: NEGATIVE
PH UR STRIP: 7 [PH] (ref 5–8)
PLATELET # BLD AUTO: 295 K/UL (ref 140–450)
PMV BLD AUTO: 9.1 FL (ref 6–12)
POTASSIUM SERPL-SCNC: 3.9 MMOL/L (ref 3.7–5.3)
PROT UR STRIP-MCNC: NEGATIVE MG/DL
RBC # BLD AUTO: 4.62 M/UL (ref 4–5.2)
SARS-COV-2 RDRP RESP QL NAA+PROBE: NOT DETECTED
SODIUM SERPL-SCNC: 139 MMOL/L (ref 135–144)
SP GR UR STRIP: 1.01 (ref 1–1.03)
SPECIMEN DESCRIPTION: NORMAL
TROPONIN I SERPL HS-MCNC: 10 NG/L (ref 0–14)
UROBILINOGEN UR STRIP-ACNC: NORMAL EU/DL (ref 0–1)
WBC OTHER # BLD: 9.6 K/UL (ref 3.5–11)

## 2023-10-09 PROCEDURE — 96374 THER/PROPH/DIAG INJ IV PUSH: CPT

## 2023-10-09 PROCEDURE — 71046 X-RAY EXAM CHEST 2 VIEWS: CPT

## 2023-10-09 PROCEDURE — 84484 ASSAY OF TROPONIN QUANT: CPT

## 2023-10-09 PROCEDURE — 87635 SARS-COV-2 COVID-19 AMP PRB: CPT

## 2023-10-09 PROCEDURE — 80048 BASIC METABOLIC PNL TOTAL CA: CPT

## 2023-10-09 PROCEDURE — 81003 URINALYSIS AUTO W/O SCOPE: CPT

## 2023-10-09 PROCEDURE — 36415 COLL VENOUS BLD VENIPUNCTURE: CPT

## 2023-10-09 PROCEDURE — 70450 CT HEAD/BRAIN W/O DYE: CPT

## 2023-10-09 PROCEDURE — 83735 ASSAY OF MAGNESIUM: CPT

## 2023-10-09 PROCEDURE — 6360000002 HC RX W HCPCS

## 2023-10-09 PROCEDURE — 96375 TX/PRO/DX INJ NEW DRUG ADDON: CPT

## 2023-10-09 PROCEDURE — 93005 ELECTROCARDIOGRAM TRACING: CPT

## 2023-10-09 PROCEDURE — 6370000000 HC RX 637 (ALT 250 FOR IP)

## 2023-10-09 PROCEDURE — 99285 EMERGENCY DEPT VISIT HI MDM: CPT

## 2023-10-09 PROCEDURE — 2580000003 HC RX 258

## 2023-10-09 PROCEDURE — 87804 INFLUENZA ASSAY W/OPTIC: CPT

## 2023-10-09 PROCEDURE — 85025 COMPLETE CBC W/AUTO DIFF WBC: CPT

## 2023-10-09 RX ORDER — ACETAMINOPHEN 500 MG
1000 TABLET ORAL ONCE
Status: COMPLETED | OUTPATIENT
Start: 2023-10-09 | End: 2023-10-09

## 2023-10-09 RX ORDER — METOCLOPRAMIDE HYDROCHLORIDE 5 MG/ML
5 INJECTION INTRAMUSCULAR; INTRAVENOUS ONCE
Status: COMPLETED | OUTPATIENT
Start: 2023-10-09 | End: 2023-10-09

## 2023-10-09 RX ORDER — WATER 1000 ML/1000ML
INJECTION, SOLUTION INTRAVENOUS
Status: DISCONTINUED
Start: 2023-10-09 | End: 2023-10-10 | Stop reason: HOSPADM

## 2023-10-09 RX ORDER — KETOROLAC TROMETHAMINE 15 MG/ML
15 INJECTION, SOLUTION INTRAMUSCULAR; INTRAVENOUS ONCE
Status: COMPLETED | OUTPATIENT
Start: 2023-10-09 | End: 2023-10-09

## 2023-10-09 RX ORDER — 0.9 % SODIUM CHLORIDE 0.9 %
1000 INTRAVENOUS SOLUTION INTRAVENOUS ONCE
Status: COMPLETED | OUTPATIENT
Start: 2023-10-09 | End: 2023-10-09

## 2023-10-09 RX ADMIN — ACETAMINOPHEN 1000 MG: 500 TABLET ORAL at 18:12

## 2023-10-09 RX ADMIN — SODIUM CHLORIDE 1000 ML: 9 INJECTION, SOLUTION INTRAVENOUS at 17:09

## 2023-10-09 RX ADMIN — KETOROLAC TROMETHAMINE 15 MG: 15 INJECTION, SOLUTION INTRAMUSCULAR; INTRAVENOUS at 18:13

## 2023-10-09 RX ADMIN — METOCLOPRAMIDE 5 MG: 5 INJECTION, SOLUTION INTRAMUSCULAR; INTRAVENOUS at 21:13

## 2023-10-09 ASSESSMENT — PAIN DESCRIPTION - LOCATION
LOCATION: HEAD;OTHER (COMMENT)
LOCATION: HEAD
LOCATION: HEAD

## 2023-10-09 ASSESSMENT — PAIN - FUNCTIONAL ASSESSMENT: PAIN_FUNCTIONAL_ASSESSMENT: 0-10

## 2023-10-09 ASSESSMENT — PAIN SCALES - GENERAL
PAINLEVEL_OUTOF10: 8

## 2023-10-11 LAB
EKG ATRIAL RATE: 74 BPM
EKG P AXIS: 45 DEGREES
EKG P-R INTERVAL: 146 MS
EKG Q-T INTERVAL: 388 MS
EKG QRS DURATION: 80 MS
EKG QTC CALCULATION (BAZETT): 430 MS
EKG R AXIS: -18 DEGREES
EKG T AXIS: 59 DEGREES
EKG VENTRICULAR RATE: 74 BPM

## 2023-10-11 ASSESSMENT — ENCOUNTER SYMPTOMS
CHEST TIGHTNESS: 1
SORE THROAT: 1
DIARRHEA: 1
RHINORRHEA: 1
COUGH: 1

## 2025-01-21 ENCOUNTER — HOSPITAL ENCOUNTER (OUTPATIENT)
Age: 70
Setting detail: SPECIMEN
Discharge: HOME | End: 2025-01-21
Payer: MEDICARE

## 2025-01-21 LAB
ANION GAP SERPL CALCULATED.3IONS-SCNC: 10 MMOL/L (ref 9–16)
BUN SERPL-MCNC: 26 MG/DL (ref 8–23)
CALCIUM SERPL-MCNC: 9.2 MG/DL (ref 8.8–10.2)
CHLORIDE SERPL-SCNC: 104 MMOL/L (ref 98–107)
CO2 SERPL-SCNC: 24 MMOL/L (ref 20–31)
CREAT SERPL-MCNC: 1.3 MG/DL (ref 0.5–0.9)
ERYTHROCYTE [DISTWIDTH] IN BLOOD BY AUTOMATED COUNT: 13.2 % (ref 11.8–14.4)
EST. AVERAGE GLUCOSE BLD GHB EST-MCNC: 160 MG/DL
GFR, ESTIMATED: 44 ML/MIN/1.73M2
GLUCOSE SERPL-MCNC: 181 MG/DL (ref 82–115)
HBA1C MFR BLD: 7.2 % (ref 4–6)
HCT VFR BLD AUTO: 32.7 % (ref 36.3–47.1)
HGB BLD-MCNC: 10.5 G/DL (ref 11.9–15.1)
MCH RBC QN AUTO: 30.1 PG (ref 25.2–33.5)
MCHC RBC AUTO-ENTMCNC: 32.1 G/DL (ref 28.4–34.8)
MCV RBC AUTO: 93.7 FL (ref 82.6–102.9)
NRBC BLD-RTO: 0 PER 100 WBC
PLATELET # BLD AUTO: 234 K/UL (ref 138–453)
PMV BLD AUTO: 11.9 FL (ref 8.1–13.5)
POTASSIUM SERPL-SCNC: 5.8 MMOL/L (ref 3.7–5.3)
RBC # BLD AUTO: 3.49 M/UL (ref 3.95–5.11)
SODIUM SERPL-SCNC: 138 MMOL/L (ref 136–145)
WBC OTHER # BLD: 8 K/UL (ref 3.5–11.3)

## 2025-01-21 PROCEDURE — 83036 HEMOGLOBIN GLYCOSYLATED A1C: CPT

## 2025-01-21 PROCEDURE — 85027 COMPLETE CBC AUTOMATED: CPT

## 2025-01-21 PROCEDURE — 36415 COLL VENOUS BLD VENIPUNCTURE: CPT

## 2025-01-21 PROCEDURE — 80048 BASIC METABOLIC PNL TOTAL CA: CPT

## 2025-01-21 PROCEDURE — P9603 ONE-WAY ALLOW PRORATED MILES: HCPCS

## 2025-01-28 ENCOUNTER — HOSPITAL ENCOUNTER (OUTPATIENT)
Age: 70
Setting detail: SPECIMEN
Discharge: HOME | End: 2025-01-28
Payer: MEDICARE

## 2025-01-28 LAB
ANION GAP SERPL CALCULATED.3IONS-SCNC: 15 MMOL/L (ref 9–16)
BUN SERPL-MCNC: 24 MG/DL (ref 8–23)
CALCIUM SERPL-MCNC: 9.6 MG/DL (ref 8.8–10.2)
CHLORIDE SERPL-SCNC: 106 MMOL/L (ref 98–107)
CO2 SERPL-SCNC: 21 MMOL/L (ref 20–31)
CREAT SERPL-MCNC: 1.2 MG/DL (ref 0.5–0.9)
ERYTHROCYTE [DISTWIDTH] IN BLOOD BY AUTOMATED COUNT: 13.2 % (ref 11.8–14.4)
GFR, ESTIMATED: 49 ML/MIN/1.73M2
GLUCOSE SERPL-MCNC: 144 MG/DL (ref 82–115)
HCT VFR BLD AUTO: 37.3 % (ref 36.3–47.1)
HGB BLD-MCNC: 12.1 G/DL (ref 11.9–15.1)
MCH RBC QN AUTO: 30.6 PG (ref 25.2–33.5)
MCHC RBC AUTO-ENTMCNC: 32.4 G/DL (ref 28.4–34.8)
MCV RBC AUTO: 94.2 FL (ref 82.6–102.9)
NRBC BLD-RTO: 0 PER 100 WBC
PLATELET # BLD AUTO: 269 K/UL (ref 138–453)
PMV BLD AUTO: 11.7 FL (ref 8.1–13.5)
POTASSIUM SERPL-SCNC: 4.6 MMOL/L (ref 3.7–5.3)
RBC # BLD AUTO: 3.96 M/UL (ref 3.95–5.11)
SODIUM SERPL-SCNC: 142 MMOL/L (ref 136–145)
WBC OTHER # BLD: 9.2 K/UL (ref 3.5–11.3)

## 2025-01-28 PROCEDURE — P9603 ONE-WAY ALLOW PRORATED MILES: HCPCS

## 2025-01-28 PROCEDURE — 36415 COLL VENOUS BLD VENIPUNCTURE: CPT

## 2025-01-28 PROCEDURE — 85027 COMPLETE CBC AUTOMATED: CPT

## 2025-01-28 PROCEDURE — 80048 BASIC METABOLIC PNL TOTAL CA: CPT

## 2025-03-23 ENCOUNTER — APPOINTMENT (OUTPATIENT)
Dept: GENERAL RADIOLOGY | Age: 70
End: 2025-03-23
Payer: MEDICARE

## 2025-03-23 ENCOUNTER — APPOINTMENT (OUTPATIENT)
Dept: ULTRASOUND IMAGING | Age: 70
End: 2025-03-23
Payer: MEDICARE

## 2025-03-23 ENCOUNTER — APPOINTMENT (OUTPATIENT)
Dept: CT IMAGING | Age: 70
End: 2025-03-23
Payer: MEDICARE

## 2025-03-23 ENCOUNTER — HOSPITAL ENCOUNTER (EMERGENCY)
Age: 70
Discharge: ANOTHER HOSPITAL | End: 2025-03-23
Attending: EMERGENCY MEDICINE
Payer: MEDICARE

## 2025-03-23 ENCOUNTER — HOSPITAL ENCOUNTER (INPATIENT)
Age: 70
LOS: 5 days | Discharge: HOME | End: 2025-03-28
Attending: EMERGENCY MEDICINE | Admitting: HOSPITALIST
Payer: MEDICARE

## 2025-03-23 VITALS
OXYGEN SATURATION: 99 % | HEART RATE: 45 BPM | WEIGHT: 200 LBS | BODY MASS INDEX: 34.15 KG/M2 | SYSTOLIC BLOOD PRESSURE: 181 MMHG | TEMPERATURE: 98.5 F | DIASTOLIC BLOOD PRESSURE: 57 MMHG | RESPIRATION RATE: 11 BRPM | HEIGHT: 64 IN

## 2025-03-23 DIAGNOSIS — R07.9 CHEST PAIN, UNSPECIFIED TYPE: ICD-10-CM

## 2025-03-23 DIAGNOSIS — N28.9 ACUTE RENAL INSUFFICIENCY: ICD-10-CM

## 2025-03-23 DIAGNOSIS — G43.011 INTRACTABLE MIGRAINE WITHOUT AURA AND WITH STATUS MIGRAINOSUS: ICD-10-CM

## 2025-03-23 DIAGNOSIS — I47.20 VENTRICULAR TACHYCARDIA (HCC): ICD-10-CM

## 2025-03-23 DIAGNOSIS — E87.5 HYPERKALEMIA: Primary | ICD-10-CM

## 2025-03-23 DIAGNOSIS — E87.5 HYPERKALEMIA: ICD-10-CM

## 2025-03-23 DIAGNOSIS — R00.1 BRADYCARDIA: ICD-10-CM

## 2025-03-23 DIAGNOSIS — N17.9 ACUTE KIDNEY INJURY: Primary | ICD-10-CM

## 2025-03-23 DIAGNOSIS — I47.20 VENTRICULAR TACHYARRHYTHMIA (HCC): ICD-10-CM

## 2025-03-23 DIAGNOSIS — R94.31 ABNORMAL ELECTROCARDIOGRAPHY: ICD-10-CM

## 2025-03-23 DIAGNOSIS — E87.1 HYPONATREMIA: ICD-10-CM

## 2025-03-23 PROBLEM — N18.31 STAGE 3A CHRONIC KIDNEY DISEASE (HCC): Status: ACTIVE | Noted: 2025-03-23

## 2025-03-23 PROBLEM — E78.5 DYSLIPIDEMIA: Status: ACTIVE | Noted: 2025-03-23

## 2025-03-23 LAB
ALBUMIN SERPL-MCNC: 4.2 G/DL (ref 3.5–5.2)
ALBUMIN/GLOB SERPL: 1.6 {RATIO} (ref 1–2.5)
ALP SERPL-CCNC: 107 U/L (ref 35–104)
ALT SERPL-CCNC: 29 U/L (ref 5–33)
AMPHET UR QL SCN: NEGATIVE
ANION GAP SERPL CALCULATED.3IONS-SCNC: 11 MMOL/L (ref 9–17)
ANION GAP SERPL CALCULATED.3IONS-SCNC: 15 MMOL/L (ref 9–16)
ANION GAP SERPL CALCULATED.3IONS-SCNC: 9 MMOL/L (ref 9–16)
ANION GAP SERPL CALCULATED.3IONS-SCNC: 9 MMOL/L (ref 9–16)
AST SERPL-CCNC: 25 U/L
B PARAP IS1001 DNA NPH QL NAA+NON-PROBE: NOT DETECTED
B PERT DNA SPEC QL NAA+PROBE: NOT DETECTED
BACTERIA URNS QL MICRO: NORMAL
BARBITURATES UR QL SCN: NEGATIVE
BASOPHILS # BLD: 0 K/UL (ref 0–0.2)
BASOPHILS NFR BLD: 0 % (ref 0–2)
BENZODIAZ UR QL: NEGATIVE
BILIRUB SERPL-MCNC: 0.3 MG/DL (ref 0.3–1.2)
BILIRUB UR QL STRIP: NEGATIVE
BUN SERPL-MCNC: 26 MG/DL (ref 8–23)
BUN SERPL-MCNC: 54 MG/DL (ref 8–23)
BUN SERPL-MCNC: 68 MG/DL (ref 8–23)
BUN SERPL-MCNC: 68 MG/DL (ref 8–23)
C PNEUM DNA NPH QL NAA+NON-PROBE: NOT DETECTED
C3 SERPL-MCNC: 172 MG/DL (ref 90–180)
C4 SERPL-MCNC: 22 MG/DL (ref 10–40)
CALCIUM SERPL-MCNC: 6.7 MG/DL (ref 8.6–10.4)
CALCIUM SERPL-MCNC: 8.3 MG/DL (ref 8.6–10.4)
CALCIUM SERPL-MCNC: 8.9 MG/DL (ref 8.6–10.4)
CALCIUM SERPL-MCNC: 9.4 MG/DL (ref 8.6–10.4)
CANNABINOIDS UR QL SCN: NEGATIVE
CASTS #/AREA URNS LPF: NORMAL /LPF (ref 0–8)
CHLORIDE SERPL-SCNC: 102 MMOL/L (ref 98–107)
CHLORIDE SERPL-SCNC: 109 MMOL/L (ref 98–107)
CHLORIDE SERPL-SCNC: 97 MMOL/L (ref 98–107)
CHLORIDE SERPL-SCNC: 98 MMOL/L (ref 98–107)
CHLORIDE UR-SCNC: 44 MMOL/L
CHP ED QC CHECK: NORMAL
CHP ED QC CHECK: NORMAL
CLARITY UR: CLEAR
CO2 SERPL-SCNC: 18 MMOL/L (ref 20–31)
CO2 SERPL-SCNC: 22 MMOL/L (ref 20–31)
CO2 SERPL-SCNC: 22 MMOL/L (ref 20–31)
CO2 SERPL-SCNC: 23 MMOL/L (ref 20–31)
COCAINE UR QL SCN: NEGATIVE
COLOR UR: YELLOW
CREAT SERPL-MCNC: 1.8 MG/DL (ref 0.6–0.9)
CREAT SERPL-MCNC: 2.3 MG/DL (ref 0.6–0.9)
CREAT SERPL-MCNC: 3 MG/DL (ref 0.6–0.9)
CREAT SERPL-MCNC: 3.2 MG/DL (ref 0.5–0.9)
CREAT UR-MCNC: 108 MG/DL (ref 28–217)
EOSINOPHIL # BLD: 0.1 K/UL (ref 0–0.4)
EOSINOPHILS RELATIVE PERCENT: 1 % (ref 1–4)
EPI CELLS #/AREA URNS HPF: NORMAL /HPF (ref 0–5)
ERYTHROCYTE [DISTWIDTH] IN BLOOD BY AUTOMATED COUNT: 14.5 % (ref 12.5–15.4)
FENTANYL UR QL: POSITIVE
FLUAV RNA NPH QL NAA+NON-PROBE: NOT DETECTED
FLUBV RNA NPH QL NAA+NON-PROBE: NOT DETECTED
FREE KAPPA/LAMBDA RATIO: 1.12 (ref 0.22–1.74)
GFR, ESTIMATED: 15 ML/MIN/1.73M2
GFR, ESTIMATED: 16 ML/MIN/1.73M2
GFR, ESTIMATED: 22 ML/MIN/1.73M2
GFR, ESTIMATED: 30 ML/MIN/1.73M2
GLUCOSE BLD-MCNC: 164 MG/DL (ref 65–105)
GLUCOSE BLD-MCNC: 168 MG/DL
GLUCOSE BLD-MCNC: 174 MG/DL
GLUCOSE BLD-MCNC: 174 MG/DL (ref 65–105)
GLUCOSE SERPL-MCNC: 183 MG/DL (ref 74–99)
GLUCOSE SERPL-MCNC: 201 MG/DL (ref 74–99)
GLUCOSE SERPL-MCNC: 245 MG/DL (ref 70–99)
GLUCOSE SERPL-MCNC: 250 MG/DL (ref 74–99)
GLUCOSE UR STRIP-MCNC: ABNORMAL MG/DL
HADV DNA NPH QL NAA+NON-PROBE: NOT DETECTED
HAV IGM SERPL QL IA: NONREACTIVE
HBV CORE IGM SERPL QL IA: NONREACTIVE
HBV SURFACE AG SERPL QL IA: NONREACTIVE
HCOV 229E RNA NPH QL NAA+NON-PROBE: NOT DETECTED
HCOV HKU1 RNA NPH QL NAA+NON-PROBE: NOT DETECTED
HCOV NL63 RNA NPH QL NAA+NON-PROBE: NOT DETECTED
HCOV OC43 RNA NPH QL NAA+NON-PROBE: NOT DETECTED
HCT VFR BLD AUTO: 31.8 % (ref 36–46)
HCV AB SERPL QL IA: NONREACTIVE
HGB BLD-MCNC: 10.7 G/DL (ref 12–16)
HGB UR QL STRIP.AUTO: NEGATIVE
HMPV RNA NPH QL NAA+NON-PROBE: NOT DETECTED
HPIV1 RNA NPH QL NAA+NON-PROBE: NOT DETECTED
HPIV2 RNA NPH QL NAA+NON-PROBE: NOT DETECTED
HPIV3 RNA NPH QL NAA+NON-PROBE: NOT DETECTED
HPIV4 RNA NPH QL NAA+NON-PROBE: NOT DETECTED
KAPPA LC FREE SER-MCNC: 41.3 MG/L
KETONES UR STRIP-MCNC: NEGATIVE MG/DL
L PNEUMO1 AG UR QL IA.RAPID: NEGATIVE
LACTATE BLDV-SCNC: 1.7 MMOL/L (ref 0.5–1.9)
LAMBDA LC FREE SERPL-MCNC: 36.8 MG/L (ref 4.2–27.7)
LEUKOCYTE ESTERASE UR QL STRIP: NEGATIVE
LIPASE SERPL-CCNC: 71 U/L (ref 13–60)
LYMPHOCYTES NFR BLD: 1.8 K/UL (ref 1–4.8)
LYMPHOCYTES RELATIVE PERCENT: 13 % (ref 24–44)
M PNEUMO DNA NPH QL NAA+NON-PROBE: NOT DETECTED
MAGNESIUM SERPL-MCNC: 1.8 MG/DL (ref 1.6–2.4)
MAGNESIUM SERPL-MCNC: 2.1 MG/DL (ref 1.6–2.6)
MCH RBC QN AUTO: 30.5 PG (ref 26–34)
MCHC RBC AUTO-ENTMCNC: 33.8 G/DL (ref 31–37)
MCV RBC AUTO: 90.3 FL (ref 80–100)
METHADONE UR QL: NEGATIVE
MONOCYTES NFR BLD: 0.9 K/UL (ref 0.1–1.2)
MONOCYTES NFR BLD: 6 % (ref 2–11)
NEUTROPHILS NFR BLD: 80 % (ref 36–66)
NEUTS SEG NFR BLD: 11.1 K/UL (ref 1.8–7.7)
NITRITE UR QL STRIP: NEGATIVE
OPIATES UR QL SCN: NEGATIVE
OSMOLALITY UR: 458 MOSM/KG (ref 80–1300)
OXYCODONE UR QL SCN: NEGATIVE
PCP UR QL SCN: NEGATIVE
PH UR STRIP: 5.5 [PH] (ref 5–8)
PLATELET # BLD AUTO: 268 K/UL (ref 140–450)
PMV BLD AUTO: 9.6 FL (ref 6–12)
POTASSIUM SERPL-SCNC: 4.6 MMOL/L (ref 3.7–5.3)
POTASSIUM SERPL-SCNC: 5.6 MMOL/L (ref 3.7–5.3)
POTASSIUM SERPL-SCNC: 7.4 MMOL/L (ref 3.7–5.3)
POTASSIUM SERPL-SCNC: 7.5 MMOL/L (ref 3.7–5.3)
PROT SERPL-MCNC: 6.8 G/DL (ref 6.4–8.3)
PROT UR STRIP-MCNC: ABNORMAL MG/DL
RBC # BLD AUTO: 3.52 M/UL (ref 4–5.2)
RBC #/AREA URNS HPF: NORMAL /HPF (ref 0–4)
RSV RNA NPH QL NAA+NON-PROBE: NOT DETECTED
RV+EV RNA NPH QL NAA+NON-PROBE: NOT DETECTED
S PNEUM AG SPEC QL: NEGATIVE
SARS-COV-2 RNA NPH QL NAA+NON-PROBE: NOT DETECTED
SODIUM SERPL-SCNC: 131 MMOL/L (ref 135–144)
SODIUM SERPL-SCNC: 133 MMOL/L (ref 136–145)
SODIUM SERPL-SCNC: 135 MMOL/L (ref 136–145)
SODIUM SERPL-SCNC: 136 MMOL/L (ref 136–145)
SODIUM UR-SCNC: 59 MMOL/L
SP GR UR STRIP: 1.02 (ref 1–1.03)
SPECIMEN DESCRIPTION: NORMAL
SPECIMEN SOURCE: NORMAL
TEST INFORMATION: ABNORMAL
TOTAL PROTEIN, URINE: 11 MG/DL
TROPONIN I SERPL HS-MCNC: 24 NG/L (ref 0–14)
TROPONIN I SERPL HS-MCNC: 26 NG/L (ref 0–14)
TROPONIN I SERPL HS-MCNC: 27 NG/L (ref 0–14)
TSH SERPL DL<=0.05 MIU/L-ACNC: 1.16 UIU/ML (ref 0.27–4.2)
URINE TOTAL PROTEIN CREATININE RATIO: 0.1 (ref 0–0.2)
UROBILINOGEN UR STRIP-ACNC: NORMAL EU/DL (ref 0–1)
WBC #/AREA URNS HPF: NORMAL /HPF (ref 0–5)
WBC OTHER # BLD: 14 K/UL (ref 3.5–11)

## 2025-03-23 PROCEDURE — 99285 EMERGENCY DEPT VISIT HI MDM: CPT

## 2025-03-23 PROCEDURE — 85025 COMPLETE CBC W/AUTO DIFF WBC: CPT

## 2025-03-23 PROCEDURE — 6360000002 HC RX W HCPCS

## 2025-03-23 PROCEDURE — 84166 PROTEIN E-PHORESIS/URINE/CSF: CPT

## 2025-03-23 PROCEDURE — 71045 X-RAY EXAM CHEST 1 VIEW: CPT

## 2025-03-23 PROCEDURE — 2500000003 HC RX 250 WO HCPCS

## 2025-03-23 PROCEDURE — 6360000002 HC RX W HCPCS: Performed by: EMERGENCY MEDICINE

## 2025-03-23 PROCEDURE — 99223 1ST HOSP IP/OBS HIGH 75: CPT | Performed by: INTERNAL MEDICINE

## 2025-03-23 PROCEDURE — 2580000003 HC RX 258

## 2025-03-23 PROCEDURE — 5A1D70Z PERFORMANCE OF URINARY FILTRATION, INTERMITTENT, LESS THAN 6 HOURS PER DAY: ICD-10-PCS | Performed by: HOSPITALIST

## 2025-03-23 PROCEDURE — 84156 ASSAY OF PROTEIN URINE: CPT

## 2025-03-23 PROCEDURE — 94640 AIRWAY INHALATION TREATMENT: CPT

## 2025-03-23 PROCEDURE — 83690 ASSAY OF LIPASE: CPT

## 2025-03-23 PROCEDURE — 93005 ELECTROCARDIOGRAM TRACING: CPT

## 2025-03-23 PROCEDURE — 6370000000 HC RX 637 (ALT 250 FOR IP)

## 2025-03-23 PROCEDURE — 80053 COMPREHEN METABOLIC PANEL: CPT

## 2025-03-23 PROCEDURE — 74176 CT ABD & PELVIS W/O CONTRAST: CPT

## 2025-03-23 PROCEDURE — 36415 COLL VENOUS BLD VENIPUNCTURE: CPT

## 2025-03-23 PROCEDURE — 90935 HEMODIALYSIS ONE EVALUATION: CPT

## 2025-03-23 PROCEDURE — 80307 DRUG TEST PRSMV CHEM ANLYZR: CPT

## 2025-03-23 PROCEDURE — 83735 ASSAY OF MAGNESIUM: CPT

## 2025-03-23 PROCEDURE — 82436 ASSAY OF URINE CHLORIDE: CPT

## 2025-03-23 PROCEDURE — 82947 ASSAY GLUCOSE BLOOD QUANT: CPT

## 2025-03-23 PROCEDURE — 86334 IMMUNOFIX E-PHORESIS SERUM: CPT

## 2025-03-23 PROCEDURE — 84443 ASSAY THYROID STIM HORMONE: CPT

## 2025-03-23 PROCEDURE — 2580000003 HC RX 258: Performed by: NURSE PRACTITIONER

## 2025-03-23 PROCEDURE — 86038 ANTINUCLEAR ANTIBODIES: CPT

## 2025-03-23 PROCEDURE — 87899 AGENT NOS ASSAY W/OPTIC: CPT

## 2025-03-23 PROCEDURE — 87040 BLOOD CULTURE FOR BACTERIA: CPT

## 2025-03-23 PROCEDURE — 84300 ASSAY OF URINE SODIUM: CPT

## 2025-03-23 PROCEDURE — 99291 CRITICAL CARE FIRST HOUR: CPT | Performed by: INTERNAL MEDICINE

## 2025-03-23 PROCEDURE — 84155 ASSAY OF PROTEIN SERUM: CPT

## 2025-03-23 PROCEDURE — 6370000000 HC RX 637 (ALT 250 FOR IP): Performed by: EMERGENCY MEDICINE

## 2025-03-23 PROCEDURE — 84484 ASSAY OF TROPONIN QUANT: CPT

## 2025-03-23 PROCEDURE — 82570 ASSAY OF URINE CREATININE: CPT

## 2025-03-23 PROCEDURE — 83935 ASSAY OF URINE OSMOLALITY: CPT

## 2025-03-23 PROCEDURE — 86225 DNA ANTIBODY NATIVE: CPT

## 2025-03-23 PROCEDURE — 84165 PROTEIN E-PHORESIS SERUM: CPT

## 2025-03-23 PROCEDURE — 2500000003 HC RX 250 WO HCPCS: Performed by: EMERGENCY MEDICINE

## 2025-03-23 PROCEDURE — 76770 US EXAM ABDO BACK WALL COMP: CPT

## 2025-03-23 PROCEDURE — 81001 URINALYSIS AUTO W/SCOPE: CPT

## 2025-03-23 PROCEDURE — 02HV33Z INSERTION OF INFUSION DEVICE INTO SUPERIOR VENA CAVA, PERCUTANEOUS APPROACH: ICD-10-PCS | Performed by: STUDENT IN AN ORGANIZED HEALTH CARE EDUCATION/TRAINING PROGRAM

## 2025-03-23 PROCEDURE — 83605 ASSAY OF LACTIC ACID: CPT

## 2025-03-23 PROCEDURE — 0202U NFCT DS 22 TRGT SARS-COV-2: CPT

## 2025-03-23 PROCEDURE — 87641 MR-STAPH DNA AMP PROBE: CPT

## 2025-03-23 PROCEDURE — 86160 COMPLEMENT ANTIGEN: CPT

## 2025-03-23 PROCEDURE — 2000000000 HC ICU R&B

## 2025-03-23 PROCEDURE — 6360000002 HC RX W HCPCS: Performed by: INTERNAL MEDICINE

## 2025-03-23 PROCEDURE — 93005 ELECTROCARDIOGRAM TRACING: CPT | Performed by: EMERGENCY MEDICINE

## 2025-03-23 PROCEDURE — 87449 NOS EACH ORGANISM AG IA: CPT

## 2025-03-23 PROCEDURE — 80074 ACUTE HEPATITIS PANEL: CPT

## 2025-03-23 PROCEDURE — 80048 BASIC METABOLIC PNL TOTAL CA: CPT

## 2025-03-23 PROCEDURE — 83521 IG LIGHT CHAINS FREE EACH: CPT

## 2025-03-23 RX ORDER — ALBUTEROL SULFATE 5 MG/ML
5 SOLUTION RESPIRATORY (INHALATION)
Status: DISCONTINUED | OUTPATIENT
Start: 2025-03-23 | End: 2025-03-28 | Stop reason: HOSPADM

## 2025-03-23 RX ORDER — BUDESONIDE AND FORMOTEROL FUMARATE DIHYDRATE 160; 4.5 UG/1; UG/1
2 AEROSOL RESPIRATORY (INHALATION)
Status: DISCONTINUED | OUTPATIENT
Start: 2025-03-23 | End: 2025-03-28 | Stop reason: HOSPADM

## 2025-03-23 RX ORDER — GABAPENTIN 300 MG/1
300 CAPSULE ORAL 3 TIMES DAILY
COMMUNITY

## 2025-03-23 RX ORDER — HEPARIN SODIUM 1000 [USP'U]/ML
1700 INJECTION, SOLUTION INTRAVENOUS; SUBCUTANEOUS PRN
Status: DISCONTINUED | OUTPATIENT
Start: 2025-03-23 | End: 2025-03-28 | Stop reason: HOSPADM

## 2025-03-23 RX ORDER — HEPARIN SODIUM 5000 [USP'U]/ML
5000 INJECTION, SOLUTION INTRAVENOUS; SUBCUTANEOUS EVERY 8 HOURS SCHEDULED
Status: DISCONTINUED | OUTPATIENT
Start: 2025-03-23 | End: 2025-03-24

## 2025-03-23 RX ORDER — ALBUTEROL SULFATE 1.25 MG/3ML
1 SOLUTION RESPIRATORY (INHALATION) EVERY 6 HOURS PRN
COMMUNITY

## 2025-03-23 RX ORDER — MIDAZOLAM HYDROCHLORIDE 2 MG/2ML
2 INJECTION, SOLUTION INTRAMUSCULAR; INTRAVENOUS ONCE
Status: COMPLETED | OUTPATIENT
Start: 2025-03-23 | End: 2025-03-23

## 2025-03-23 RX ORDER — GABAPENTIN 300 MG/1
300 CAPSULE ORAL 3 TIMES DAILY
Status: DISCONTINUED | OUTPATIENT
Start: 2025-03-23 | End: 2025-03-23

## 2025-03-23 RX ORDER — ONDANSETRON 4 MG/1
4 TABLET, ORALLY DISINTEGRATING ORAL EVERY 8 HOURS PRN
Status: DISCONTINUED | OUTPATIENT
Start: 2025-03-23 | End: 2025-03-28 | Stop reason: HOSPADM

## 2025-03-23 RX ORDER — ONDANSETRON 2 MG/ML
4 INJECTION INTRAMUSCULAR; INTRAVENOUS EVERY 6 HOURS PRN
Status: DISCONTINUED | OUTPATIENT
Start: 2025-03-23 | End: 2025-03-28 | Stop reason: HOSPADM

## 2025-03-23 RX ORDER — HEPARIN SODIUM 1000 [USP'U]/ML
1500 INJECTION, SOLUTION INTRAVENOUS; SUBCUTANEOUS PRN
Status: DISCONTINUED | OUTPATIENT
Start: 2025-03-23 | End: 2025-03-28 | Stop reason: HOSPADM

## 2025-03-23 RX ORDER — SODIUM CHLORIDE 9 MG/ML
INJECTION, SOLUTION INTRAVENOUS PRN
Status: DISCONTINUED | OUTPATIENT
Start: 2025-03-23 | End: 2025-03-28 | Stop reason: HOSPADM

## 2025-03-23 RX ORDER — FENTANYL CITRATE 50 UG/ML
25 INJECTION, SOLUTION INTRAMUSCULAR; INTRAVENOUS
Status: DISCONTINUED | OUTPATIENT
Start: 2025-03-23 | End: 2025-03-28 | Stop reason: HOSPADM

## 2025-03-23 RX ORDER — ALBUTEROL SULFATE 0.83 MG/ML
2.5 SOLUTION RESPIRATORY (INHALATION) ONCE
Status: COMPLETED | OUTPATIENT
Start: 2025-03-23 | End: 2025-03-23

## 2025-03-23 RX ORDER — MIRTAZAPINE 15 MG/1
30 TABLET, FILM COATED ORAL NIGHTLY
Status: DISCONTINUED | OUTPATIENT
Start: 2025-03-23 | End: 2025-03-24

## 2025-03-23 RX ORDER — HEPARIN SODIUM 5000 [USP'U]/ML
1500 INJECTION, SOLUTION INTRAVENOUS; SUBCUTANEOUS ONCE
Status: DISCONTINUED | OUTPATIENT
Start: 2025-03-23 | End: 2025-03-23

## 2025-03-23 RX ORDER — INSULIN LISPRO 100 [IU]/ML
0-4 INJECTION, SOLUTION INTRAVENOUS; SUBCUTANEOUS
Status: DISCONTINUED | OUTPATIENT
Start: 2025-03-23 | End: 2025-03-28 | Stop reason: HOSPADM

## 2025-03-23 RX ORDER — ACETAMINOPHEN 325 MG/1
650 TABLET ORAL EVERY 6 HOURS PRN
Status: DISCONTINUED | OUTPATIENT
Start: 2025-03-23 | End: 2025-03-27

## 2025-03-23 RX ORDER — FENTANYL CITRATE 50 UG/ML
25 INJECTION, SOLUTION INTRAMUSCULAR; INTRAVENOUS ONCE
Status: COMPLETED | OUTPATIENT
Start: 2025-03-23 | End: 2025-03-23

## 2025-03-23 RX ORDER — MIDAZOLAM HYDROCHLORIDE 1 MG/ML
INJECTION, SOLUTION INTRAMUSCULAR; INTRAVENOUS
Status: COMPLETED
Start: 2025-03-23 | End: 2025-03-23

## 2025-03-23 RX ORDER — CARVEDILOL 12.5 MG/1
12.5 TABLET ORAL 2 TIMES DAILY WITH MEALS
Status: ON HOLD | COMMUNITY
End: 2025-03-28 | Stop reason: HOSPADM

## 2025-03-23 RX ORDER — HEPARIN SODIUM 5000 [USP'U]/ML
1700 INJECTION, SOLUTION INTRAVENOUS; SUBCUTANEOUS ONCE
Status: DISCONTINUED | OUTPATIENT
Start: 2025-03-23 | End: 2025-03-23

## 2025-03-23 RX ORDER — BUDESONIDE AND FORMOTEROL FUMARATE DIHYDRATE 80; 4.5 UG/1; UG/1
2 AEROSOL RESPIRATORY (INHALATION) 2 TIMES DAILY
COMMUNITY

## 2025-03-23 RX ORDER — FLUMAZENIL 0.1 MG/ML
0.2 INJECTION INTRAVENOUS ONCE
Status: COMPLETED | OUTPATIENT
Start: 2025-03-23 | End: 2025-03-23

## 2025-03-23 RX ORDER — DEXTROSE MONOHYDRATE 25 G/50ML
25 INJECTION, SOLUTION INTRAVENOUS ONCE
Status: COMPLETED | OUTPATIENT
Start: 2025-03-23 | End: 2025-03-23

## 2025-03-23 RX ORDER — POLYETHYLENE GLYCOL 3350 17 G/17G
17 POWDER, FOR SOLUTION ORAL DAILY PRN
Status: DISCONTINUED | OUTPATIENT
Start: 2025-03-23 | End: 2025-03-28 | Stop reason: HOSPADM

## 2025-03-23 RX ORDER — DULOXETIN HYDROCHLORIDE 20 MG/1
20 CAPSULE, DELAYED RELEASE ORAL DAILY
Status: DISCONTINUED | OUTPATIENT
Start: 2025-03-23 | End: 2025-03-28 | Stop reason: HOSPADM

## 2025-03-23 RX ORDER — CYANOCOBALAMIN 500 UG/1
1 SPRAY, METERED NASAL WEEKLY
COMMUNITY

## 2025-03-23 RX ORDER — DEXTROSE MONOHYDRATE 100 MG/ML
INJECTION, SOLUTION INTRAVENOUS CONTINUOUS PRN
Status: DISCONTINUED | OUTPATIENT
Start: 2025-03-23 | End: 2025-03-24 | Stop reason: SDUPTHER

## 2025-03-23 RX ORDER — CALCIUM GLUCONATE 94 MG/ML
1000 INJECTION, SOLUTION INTRAVENOUS ONCE
Status: COMPLETED | OUTPATIENT
Start: 2025-03-23 | End: 2025-03-23

## 2025-03-23 RX ORDER — GABAPENTIN 400 MG/1
400 CAPSULE ORAL 2 TIMES DAILY
Status: DISCONTINUED | OUTPATIENT
Start: 2025-03-23 | End: 2025-03-28 | Stop reason: HOSPADM

## 2025-03-23 RX ORDER — SODIUM CHLORIDE 0.9 % (FLUSH) 0.9 %
5-40 SYRINGE (ML) INJECTION PRN
Status: DISCONTINUED | OUTPATIENT
Start: 2025-03-23 | End: 2025-03-28 | Stop reason: HOSPADM

## 2025-03-23 RX ORDER — OXCARBAZEPINE 150 MG/1
150 TABLET, FILM COATED ORAL 2 TIMES DAILY
Status: DISCONTINUED | OUTPATIENT
Start: 2025-03-23 | End: 2025-03-28 | Stop reason: HOSPADM

## 2025-03-23 RX ORDER — ACETAMINOPHEN 650 MG/1
650 SUPPOSITORY RECTAL EVERY 6 HOURS PRN
Status: DISCONTINUED | OUTPATIENT
Start: 2025-03-23 | End: 2025-03-27

## 2025-03-23 RX ORDER — CALCIUM GLUCONATE 20 MG/ML
1000 INJECTION, SOLUTION INTRAVENOUS ONCE
Status: COMPLETED | OUTPATIENT
Start: 2025-03-23 | End: 2025-03-23

## 2025-03-23 RX ORDER — IPRATROPIUM BROMIDE AND ALBUTEROL SULFATE 2.5; .5 MG/3ML; MG/3ML
1 SOLUTION RESPIRATORY (INHALATION) 2 TIMES DAILY
Status: DISCONTINUED | OUTPATIENT
Start: 2025-03-23 | End: 2025-03-28 | Stop reason: HOSPADM

## 2025-03-23 RX ORDER — ATORVASTATIN CALCIUM 10 MG/1
10 TABLET, FILM COATED ORAL DAILY
Status: DISCONTINUED | OUTPATIENT
Start: 2025-03-23 | End: 2025-03-24

## 2025-03-23 RX ORDER — FENTANYL CITRATE 50 UG/ML
INJECTION, SOLUTION INTRAMUSCULAR; INTRAVENOUS
Status: COMPLETED
Start: 2025-03-23 | End: 2025-03-23

## 2025-03-23 RX ORDER — BUSPIRONE HYDROCHLORIDE 5 MG/1
5 TABLET ORAL 3 TIMES DAILY
Status: DISCONTINUED | OUTPATIENT
Start: 2025-03-23 | End: 2025-03-28 | Stop reason: HOSPADM

## 2025-03-23 RX ORDER — SODIUM CHLORIDE 0.9 % (FLUSH) 0.9 %
5-40 SYRINGE (ML) INJECTION EVERY 12 HOURS SCHEDULED
Status: DISCONTINUED | OUTPATIENT
Start: 2025-03-23 | End: 2025-03-28 | Stop reason: HOSPADM

## 2025-03-23 RX ORDER — SODIUM CHLORIDE 9 MG/ML
INJECTION, SOLUTION INTRAVENOUS CONTINUOUS
Status: DISCONTINUED | OUTPATIENT
Start: 2025-03-23 | End: 2025-03-24

## 2025-03-23 RX ORDER — GLUCAGON 1 MG/ML
1 KIT INJECTION PRN
Status: DISCONTINUED | OUTPATIENT
Start: 2025-03-23 | End: 2025-03-24 | Stop reason: SDUPTHER

## 2025-03-23 RX ORDER — TRAZODONE HYDROCHLORIDE 100 MG/1
100 TABLET ORAL NIGHTLY
Status: DISCONTINUED | OUTPATIENT
Start: 2025-03-23 | End: 2025-03-28 | Stop reason: HOSPADM

## 2025-03-23 RX ORDER — HEPARIN SODIUM 1000 [USP'U]/ML
1700 INJECTION, SOLUTION INTRAVENOUS; SUBCUTANEOUS ONCE
Status: COMPLETED | OUTPATIENT
Start: 2025-03-23 | End: 2025-03-23

## 2025-03-23 RX ORDER — GLUCAGON 1 MG/ML
5 KIT INJECTION ONCE
Status: DISCONTINUED | OUTPATIENT
Start: 2025-03-23 | End: 2025-03-23

## 2025-03-23 RX ORDER — LISINOPRIL 10 MG/1
10 TABLET ORAL 2 TIMES DAILY
Status: ON HOLD | COMMUNITY
End: 2025-03-28 | Stop reason: HOSPADM

## 2025-03-23 RX ORDER — HEPARIN SODIUM 1000 [USP'U]/ML
1500 INJECTION, SOLUTION INTRAVENOUS; SUBCUTANEOUS ONCE
Status: COMPLETED | OUTPATIENT
Start: 2025-03-23 | End: 2025-03-23

## 2025-03-23 RX ADMIN — HEPARIN SODIUM 1700 UNITS: 1000 INJECTION INTRAVENOUS; SUBCUTANEOUS at 18:10

## 2025-03-23 RX ADMIN — FLUMAZENIL 0.2 MG: 0.1 INJECTION, SOLUTION INTRAVENOUS at 10:52

## 2025-03-23 RX ADMIN — MIDAZOLAM HYDROCHLORIDE 2 MG: 2 INJECTION, SOLUTION INTRAMUSCULAR; INTRAVENOUS at 10:40

## 2025-03-23 RX ADMIN — FENTANYL CITRATE 100 MCG: 50 INJECTION, SOLUTION INTRAMUSCULAR; INTRAVENOUS at 10:36

## 2025-03-23 RX ADMIN — ALBUTEROL SULFATE 2.5 MG: 0.83 SOLUTION RESPIRATORY (INHALATION) at 10:39

## 2025-03-23 RX ADMIN — AZITHROMYCIN MONOHYDRATE 500 MG: 500 INJECTION, POWDER, LYOPHILIZED, FOR SOLUTION INTRAVENOUS at 18:52

## 2025-03-23 RX ADMIN — HEPARIN SODIUM 1500 UNITS: 1000 INJECTION, SOLUTION INTRAVENOUS; SUBCUTANEOUS at 14:00

## 2025-03-23 RX ADMIN — HEPARIN SODIUM 5000 UNITS: 5000 INJECTION INTRAVENOUS; SUBCUTANEOUS at 21:07

## 2025-03-23 RX ADMIN — CALCIUM GLUCONATE 1000 MG: 98 INJECTION INTRAVENOUS at 10:22

## 2025-03-23 RX ADMIN — WATER 2000 MG: 1 INJECTION INTRAMUSCULAR; INTRAVENOUS; SUBCUTANEOUS at 18:51

## 2025-03-23 RX ADMIN — AMIODARONE HYDROCHLORIDE 150 MG: 1.5 INJECTION, SOLUTION INTRAVENOUS at 10:36

## 2025-03-23 RX ADMIN — GABAPENTIN 400 MG: 400 CAPSULE ORAL at 21:07

## 2025-03-23 RX ADMIN — CALCIUM GLUCONATE 1000 MG: 20 INJECTION, SOLUTION INTRAVENOUS at 12:47

## 2025-03-23 RX ADMIN — SODIUM CHLORIDE, PRESERVATIVE FREE 10 ML: 5 INJECTION INTRAVENOUS at 21:08

## 2025-03-23 RX ADMIN — OXCARBAZEPINE 150 MG: 150 TABLET, FILM COATED ORAL at 21:07

## 2025-03-23 RX ADMIN — MIDAZOLAM HYDROCHLORIDE 2 MG: 1 INJECTION, SOLUTION INTRAMUSCULAR; INTRAVENOUS at 10:40

## 2025-03-23 RX ADMIN — SODIUM CHLORIDE 100 ML/HR: 9 INJECTION, SOLUTION INTRAVENOUS at 14:45

## 2025-03-23 RX ADMIN — EPINEPHRINE 1 MCG/MIN: 1 INJECTION INTRAMUSCULAR; INTRAVENOUS; SUBCUTANEOUS at 12:38

## 2025-03-23 RX ADMIN — HEPARIN SODIUM 1700 UNITS: 1000 INJECTION, SOLUTION INTRAVENOUS; SUBCUTANEOUS at 14:00

## 2025-03-23 RX ADMIN — HEPARIN SODIUM 5000 UNITS: 5000 INJECTION INTRAVENOUS; SUBCUTANEOUS at 14:54

## 2025-03-23 RX ADMIN — AMIODARONE HYDROCHLORIDE 150 MG: 1.5 INJECTION, SOLUTION INTRAVENOUS at 10:16

## 2025-03-23 RX ADMIN — DEXTROSE 250 ML: 10 SOLUTION INTRAVENOUS at 13:16

## 2025-03-23 RX ADMIN — ACETAMINOPHEN 650 MG: 325 TABLET ORAL at 21:06

## 2025-03-23 RX ADMIN — TRAZODONE HYDROCHLORIDE 100 MG: 100 TABLET ORAL at 22:41

## 2025-03-23 RX ADMIN — ONDANSETRON 4 MG: 2 INJECTION, SOLUTION INTRAMUSCULAR; INTRAVENOUS at 15:41

## 2025-03-23 RX ADMIN — SODIUM ZIRCONIUM CYCLOSILICATE 10 G: 10 POWDER, FOR SUSPENSION ORAL at 10:29

## 2025-03-23 RX ADMIN — INSULIN HUMAN 10 UNITS: 100 INJECTION, SOLUTION PARENTERAL at 10:24

## 2025-03-23 RX ADMIN — EPINEPHRINE 18 MCG/MIN: 1 INJECTION INTRAMUSCULAR; INTRAVENOUS; SUBCUTANEOUS at 18:21

## 2025-03-23 RX ADMIN — MIRTAZAPINE 30 MG: 15 TABLET, FILM COATED ORAL at 21:07

## 2025-03-23 RX ADMIN — FENTANYL CITRATE 25 MCG: 50 INJECTION, SOLUTION INTRAMUSCULAR; INTRAVENOUS at 17:12

## 2025-03-23 RX ADMIN — FENTANYL CITRATE 25 MCG: 50 INJECTION, SOLUTION INTRAMUSCULAR; INTRAVENOUS at 13:30

## 2025-03-23 RX ADMIN — SODIUM CHLORIDE 40 MG: 9 INJECTION INTRAMUSCULAR; INTRAVENOUS; SUBCUTANEOUS at 14:54

## 2025-03-23 RX ADMIN — INSULIN HUMAN 5 UNITS: 100 INJECTION, SOLUTION PARENTERAL at 13:14

## 2025-03-23 RX ADMIN — BUSPIRONE HYDROCHLORIDE 5 MG: 5 TABLET ORAL at 21:07

## 2025-03-23 RX ADMIN — DEXTROSE MONOHYDRATE 25 G: 25 INJECTION, SOLUTION INTRAVENOUS at 10:24

## 2025-03-23 RX ADMIN — HEPARIN SODIUM 1500 UNITS: 1000 INJECTION INTRAVENOUS; SUBCUTANEOUS at 18:09

## 2025-03-23 RX ADMIN — WATER 40 MG: 1 INJECTION INTRAMUSCULAR; INTRAVENOUS; SUBCUTANEOUS at 18:50

## 2025-03-23 ASSESSMENT — PAIN DESCRIPTION - LOCATION
LOCATION: CHEST
LOCATION: GENERALIZED;NECK
LOCATION: ABDOMEN;HEAD

## 2025-03-23 ASSESSMENT — PAIN DESCRIPTION - ONSET
ONSET: GRADUAL
ONSET: GRADUAL

## 2025-03-23 ASSESSMENT — PAIN DESCRIPTION - ORIENTATION: ORIENTATION: RIGHT

## 2025-03-23 ASSESSMENT — PAIN DESCRIPTION - PAIN TYPE
TYPE: ACUTE PAIN
TYPE: ACUTE PAIN

## 2025-03-23 ASSESSMENT — PAIN SCALES - GENERAL
PAINLEVEL_OUTOF10: 8
PAINLEVEL_OUTOF10: 7

## 2025-03-23 ASSESSMENT — PAIN DESCRIPTION - DESCRIPTORS
DESCRIPTORS: ACHING;SORE
DESCRIPTORS: ACHING;SORE

## 2025-03-23 ASSESSMENT — PAIN - FUNCTIONAL ASSESSMENT
PAIN_FUNCTIONAL_ASSESSMENT: 0-10
PAIN_FUNCTIONAL_ASSESSMENT: 0-10

## 2025-03-23 ASSESSMENT — PAIN DESCRIPTION - FREQUENCY
FREQUENCY: INTERMITTENT
FREQUENCY: INTERMITTENT

## 2025-03-23 NOTE — H&P
Temp 97.8 °F (36.6 °C)   Resp 16   Wt 90.7 kg (200 lb)   SpO2 100%   BMI 34.33 kg/m²   Tmax over 24 hours:  Temp (24hrs), Av °F (36.7 °C), Min:97.8 °F (36.6 °C), Max:98.5 °F (36.9 °C)      Patient Vitals for the past 8 hrs:   BP Temp Temp src Pulse Resp SpO2 Weight   25 1600 -- -- -- (!) 49 -- 100 % --   25 1548 -- -- -- 51 -- 98 % --   25 1536 (!) 154/69 -- -- (!) 48 -- 92 % --   25 1526 (!) 115/90 -- -- (!) 45 -- -- --   25 1520 (!) 165/69 -- -- (!) 45 -- 96 % --   25 1509 125/69 97.8 °F (36.6 °C) -- (!) 45 16 92 % --   25 1445 (!) 141/99 -- -- (!) 42 -- 95 % --   25 1415 -- -- -- (!) 42 -- -- --   25 1412 -- -- -- (!) 39 -- 100 % --   25 1343 -- -- -- (!) 40 -- 100 % --   25 1330 109/86 -- -- (!) 37 -- -- --   25 1222 -- -- -- (!) 44 -- 96 % --   25 1219 -- -- -- 56 -- 97 % --   25 1215 96/80 -- -- (!) 45 -- 96 % --   25 1202 -- -- -- (!) 39 -- 95 % --   25 1200 (!) 110/50 -- -- 51 -- 95 % --   25 1152 120/88 -- -- 59 -- (!) 87 % --   25 1141 (!) 117/97 -- -- -- -- -- --   25 1135 -- 97.8 °F (36.6 °C) Oral (!) 40 16 96 % 90.7 kg (200 lb)       No intake or output data in the 24 hours ending 25 1609    Wt Readings from Last 3 Encounters:   25 90.7 kg (200 lb)   25 90.7 kg (200 lb)   10/09/23 68 kg (150 lb)     Body mass index is 34.33 kg/m².        PHYSICAL EXAM:  Physical Exam  Constitutional:       General: She is in acute distress.      Appearance: She is obese.   HENT:      Head: Normocephalic and atraumatic.   Cardiovascular:      Rate and Rhythm: Bradycardia present. Rhythm irregular.      Pulses: Normal pulses.      Heart sounds: Normal heart sounds. No murmur heard.     No gallop.   Pulmonary:      Effort: Pulmonary effort is normal. No respiratory distress.      Breath sounds: Wheezing present.   Abdominal:      General: Abdomen is flat. There is distension.       Code    PROPHYLAXIS:  - Stress ulcer: PPI  - DVT: SCDs, heparin         FAMILY UPDATED:                [] No  [x] Yes     HOME MEDICATIONS RECONCILED: [] No  [x] Yes    CONSULTATION NEEDED:                 [] No  [x] Yes - Nephrology, Cardiology      The critical care team assigned to the patient will be following up the patient in the intensive care unit. I have discussed the current plan with the critical care attending.The above mentioned assessment and plan will be reviewed again in detail by the critical care attending at bedside, and can be further changed or modified accordingly by the attending physician.       Gilbert Carballo MD  Department of Internal Medicine/ Critical care service  [place], [place]  3/23/2025 4:09 PM      Please note that part of this chart was generated using voice recognition dictation software. Although every effort was made to ensure the accuracy of this automated transcription, some errors in transcription may have occurred.

## 2025-03-23 NOTE — CONSULTS
Nephrology Consult Note    Reason for Consult:  RUSTAM, life-threatening hyperkalemia  Requesting Physician:  Edward Chilel MD     History Obtained From:  patient, electronic medical record    Chief Complaint:     Chief Complaint   Patient presents with    Chest Pain    Shortness of Breath       History of Present Illness:               This is a 69 y.o. female who presented to the hospital for evaluation of generalized malaise, abdominal pain with chest pain and shortness of breath.  She comes from an extended care facility.  Upon presentation to [facility] ED lab work showed a potassium of 7.4 with a creatinine of 3.2.  Patient was treated with insulin, dextrose, calcium and Lokelma at [facility]. While at [facility] patient EKG showed A-fib with a heart rate of 57.  She went into V. tach on 2 separate occasions and was successfully cardioverted at [facility].  She was treated with amiodarone and transferred to [facility] for initiation of hemodialysis with cardiology evaluation and critical care/medical ICU admission.    Chest x-ray at [facility] showed pulmonary vascular congestion and borderline cardiomegaly with potential reactive airway disease.  Patient asymptomatic for upper respiratory symptoms.    She does endorse abdominal pain but has a history of chronic pancreatitis also states she thought she was coming down with a urinary tract infection and self tested herself at home.    Valentino inserted on admission  Epi infusion running  Starting normal saline  Critical care placing temporary dialysis access    Last kidney imaging on 11/12/2023 demonstrates a right kidney measuring 12.5 cm and a left kidney measuring 11.3 cm both with renal cysts.  Patient follows with nephrology consultants of [facility].  She most recently had an admission in November 2024 for acute kidney injury, deemed prerenal fluctuations.  At that time renal workup included SENA is negative  TIBC 164 05/20/2022 04:00 AM     FERRITIN:    Lab Results   Component Value Date/Time    FERRITIN 3,054 05/20/2022 04:00 AM       Magnesium:   Recent Labs     03/23/25  0935   MG 2.1       Lab Results   Component Value Date/Time    NITRU PENDING 03/23/2025 12:08 PM    COLORU PENDING 03/23/2025 12:08 PM    PHUR PENDING 03/23/2025 12:08 PM    PHUR 7.0 10/09/2023 07:15 PM    WBCUA None 07/10/2023 01:37 PM    WBCUA 0-2 08/05/2019 08:24 AM    RBCUA 0 TO 2 07/10/2023 01:37 PM    RBCUA 6-10 08/05/2019 08:24 AM    MUCUS 1+ 05/05/2023 05:49 PM    TRICHOMONAS NOT REPORTED 05/31/2015 01:19 PM    YEAST NOT REPORTED 05/31/2015 01:19 PM    BACTERIA None 07/10/2023 01:37 PM    CLARITYU clear 05/31/2015 12:56 PM    SPECGRAV 1.020 05/31/2015 12:56 PM    LEUKOCYTESUR PENDING 03/23/2025 12:08 PM    UROBILINOGEN PENDING 03/23/2025 12:08 PM    BILIRUBINUR PENDING 03/23/2025 12:08 PM    BLOODU moderate 05/31/2015 12:56 PM    GLUCOSEU PENDING 03/23/2025 12:08 PM    KETUA PENDING 03/23/2025 12:08 PM    AMORPHOUS NOT REPORTED 05/31/2015 01:19 PM       Imaging     XR CHEST PORTABLE  Result Date: 3/23/2025  1. Pulmonary vascular congestion and at least borderline cardiomegaly. 2. Peribronchial cuffing potentially due to reactive airways disease, bronchitis, or pulmonary vascular congestion.       Physical Exam     General: AAO x 3,speaking in full sentences, no accessory muscle use, myoclonus noted  HEENT: Atraumatic, normocephalic, no throat congestion, moist mucosa.  Eyes:   Pupils equal, round and reactive to light, EOMI.  Neck:   No JVD, no thyromegaly, no lymphadenopathy.  Chest:  Bilateral vesicular breath sounds, no rales or wheezes, low-flow nasal cannula  Cardiac:  Irregular, bradycardic, no murmurs, gallops or rubs .  Abdomen: Obese, soft, non-tender, no masses or organomegaly, BS audible.  Tenderness in right upper quadrant  :   No suprapubic or flank tenderness.  Neuro:  AAO x 3, No FND.  SKIN:  No rashes, good skin  consultation.  Please do not hesitate to call with questions.    Electronically signed by SARAHI Carson NP on 3/23/2025 at 12:20 PM  Attending Physician Statement  I have discussed the care of Sultana Noyola, including pertinent history and exam findings with the resident/fellow. I have reviewed the key elements of all parts of the encounter with the resident/fellow. I have seen and examined the patient with the resident/fellow.  I agree with the assessment and plan and status of the problem list as documented.    Patient seen and examined.  Detailed discussion with patient's daughter conducted as well.  This is a 69-year-old pleasant lady who lives in an assisted living she presented to the emergency room at [facility] because of some shortness of breath, generalized weakness and in general just not feeling well.  She gave me a history that she does monitor her blood pressures at home and she has been having some issues with chronic diarrhea and because of her weakness and dizziness she checked her blood pressure and her blood pressure was running in the 90s systolic range.  She does take lisinopril and Coreg and metformin at home from what she recalls.  Her serum creatinine was noted to be 3.2 but more alarmingly her potassium was elevated at 7.4.  She was somewhat bradycardic presumably from the hyperkalemia.  While she was in the ER she ended up going into V. tach and needed cardioversion x 2 prior to transfer to [facility] emergency room.  She continued to feel poorly upon arrival over here repeat labs were drawn and her potassium was essentially unchanged.  She denies any recent changes in her meds.  She does not seem to take any potassium tablets at home.  She denies taking any spironolactone or any medication such as Bactrim at home.  She was complaining of some burning micturition and I think tested her urine for infection at home.  Her UA over here only showed 0-2 WBCs and no RBC with hyaline

## 2025-03-23 NOTE — ED NOTES
69-year-old female Sultana Jazmín, potassium 7.4, V. tach cardioverted, on amiodarone    Accepted by Dr. Chilel

## 2025-03-23 NOTE — PROGRESS NOTES
Dialysis Post Treatment Note  Vitals:    03/23/25 1830   BP: 151/131   Pulse: 59   Resp: 18   Temp: 98.3 (36.8)   SpO2: 96%     Pre-Weight =    Post-weight = Weight - Scale:  (Unable to obtain; pt is in the ED on a stretcher)  Total Liters Processed = Blood Volume Processed (Liters): 54.05 L  Rinseback Volume (mL) = Rinseback Volume (ml): 300 ml  Net Removal (mL) = 0   Patient's dry weight= TDB  Type of access used= Right IJ temp cath  Length of treatment=3 hrs  Date of last dressing change =3/23/25 new  Outpatient dialysis unit/days=TBD  Outpatient nephrologist=TBD    Pt tolerated tx well; no acute distress noted pre, during or post tx; family at bedside; rept given to Shara REBOLLEDO

## 2025-03-23 NOTE — ED PROVIDER NOTES
[facility]     Emergency Department     Faculty Attestation    I performed a history and physical examination of the patient and discussed management with the resident. I reviewed the resident's note and agree with the documented findings and plan of care. Any areas of disagreement are noted on the chart. I was personally present for the key portions of any procedures. I have documented in the chart those procedures where I was not present during the key portions. I have reviewed the emergency nurses triage note. I agree with the chief complaint, past medical history, past surgical history, allergies, medications, social and family history as documented unless otherwise noted below. For Physician Assistant/ Nurse Practitioner cases/documentation I have personally evaluated this patient and have completed at least one if not all key elements of the E/M (history, physical exam, and MDM). Additional findings are as noted.    Transfer note:  69-year-old female Sultana Noyola, potassium 7.4, V. tach cardioverted, on amiodarone    CRITICAL CARE: There was a high probability of clinically significant/life threatening deterioration in this patient's condition which required my urgent intervention.  Total critical care time was 30 minutes.  This excludes any time for separately reportable procedures.        Edward Chilel MD  03/23/25 1134       EKG Interpretation    Interpreted by emergency department physician    Rhythm: Atrial fibrillation  Rate: 47  Axis: normal/10  Ectopy: none  Conduction: Left bundle branch block  ST Segments: no acute change  T Waves: no acute change  Q Waves: none    Clinical Impression: Abnormal EKG    SATHYA Wang John A, MD  03/23/25 5650

## 2025-03-23 NOTE — PROGRESS NOTES
[facility]  [facility]       Pt Name:Sultana Noyola  MRN: [id number]  Birthdate 1955  Date of evaluation: 3/23/25  PCP:  Azael Friedman MD      Flight Course     The patient is a 69 year old Female who presented to the ED at [facility] for CP and SOA of for the past couple of days. Pt developed runs of V-Tach x2 s/p synch CV @ 150J w/ the second CV receiving Versed 2mg and fentanyl 100mcg. and was found to have hyperkalemia and an elevation in her creatine necessitating transfer for higher level of care. Pt also received 1g Ca Gluconate, 10u insulin, 250cc D10 Bolus, 10g Lokelma. Pt also received Flumazenil 0.2mg as pt became less responsive after receiving versed for synch CV the second time.    They require emergent critical care transport for higher level of care.    The patient on crew arrival pt intermittently bradycardic into the upper 40s maintaining 30s to 50s w/o hypotension, satting 96% on home baseline of 3.5L. Pt was complaining of CP after having received synch CV x2.    The patient is secured to the litter straps x 3 and hearing protection applied with ear muffs..    The patient tolerated transport well w/o issue.    On arrival at [facility] the patient handoff and report was given to Nursing staff and team all questions were answered and patient care was transferred to the staff.      All times approximate.    PROCEDURES:  None    CRITICAL CARE:    CRITICAL CARE: There was a high probability of clinically significant/life threatening deterioration in this patient's condition which required my urgent intervention.  Total critical care time was 30 minutes.  This excludes any time for separately reportable procedures.     Jake Solano,   [facility] Physician     (Please note that portions of this note were completed with a voicerecognition program.  Efforts were made to edit the dictations but occasionally words are mis-transcribed.)

## 2025-03-23 NOTE — PROGRESS NOTES
Dialysis Time Out  To be done by RN and tech or 2 RNs  Staff Names Tristin RN / Og AGUILERARN    [x]  Identity of the patient using 2 patient identifiers  [x]  Consent for treatment  [x]  Equipment-proper machine and dialyzer  [x]  B-Hep B status (neg 3/23/25)  [x]  Orders- to include bath, blood flow, dialyzer, time and fluid removal  [x]  Access-Correct site and in working order  [x]  Time for patient to ask questions.

## 2025-03-23 NOTE — ED NOTES
Pt arrived via [facility] from [facility] ED.   Pt reports she started having CP/SOB couple days ago with worsening symptoms.   Pt noted to have a HR of 20s-30s on arrival to [facility] ED.   Pt received 300 Amiodarone  1G calcium glucanate  Dextrose and 10units insulin ; K+7.4  Pt was noted to experience V-tach 140s-150s and was cardioverted at PTA @ 150 then 100  Pt also received Romazicon and Albuterol   Creat 3.2, BUN 68 & BC drawn.   On arrival pt bradycardic 30s-50s, A&Ox4 and does still reports CP & SOB.   Pt reports use of continuous home O2 at 3.5L via NC

## 2025-03-23 NOTE — ED PROVIDER NOTES
[facility] Emergency Department  [address]  [place] [address]  Phone: [phone]  Fax: [phone]    EMERGENCY DEPARTMENT ENCOUNTER          Pt Name: Sultana Noyola  MRN: [id number]  Birthdate 1955  Date of evaluation: 3/23/2025      CHIEF COMPLAINT       Chief Complaint   Patient presents with    Shortness of Breath    Chest Pain       HISTORY OF PRESENT ILLNESS       Sultana Noyola is a 69 y.o. female who presents with worsening dyspnea that began a few days ago.  History of emphysema.  She reports she has no known cardiac disease however on her med list she is on Plavix.  She denies history of atrial fibrillation.  She reports mild chest discomfort as well.  Lives at a nursing care facility currently.  Nothing otherwise makes her symptoms better or worse.  No other symptoms at this time.  She does report history of fairly recent pneumonia as well    REVIEW OF SYSTEMS       Review of Systems   Constitutional:  Negative for chills, fatigue and fever.   HENT:  Negative for rhinorrhea and sore throat.    Eyes:  Negative for pain.   Respiratory:  Positive for shortness of breath and wheezing. Negative for cough.    Cardiovascular:  Positive for chest pain. Negative for palpitations and leg swelling.   Gastrointestinal:  Negative for abdominal pain, diarrhea, nausea and vomiting.   Genitourinary:  Negative for difficulty urinating.   Musculoskeletal:  Negative for back pain and neck pain.   Skin:  Negative for rash.   Neurological:  Negative for weakness and headaches.        PAST MEDICAL HISTORY    has a past medical history of ADHD (attention deficit hyperactivity disorder), Anxiety, BiPAP (biphasic positive airway pressure) dependence, Bipolar 1 disorder (HCC), Chronic back pain, Depression, Valentino catheter in place, Hyperlipidemia, Hypertension, Nursing home resident, Pancreatitis, PICC (peripherally inserted central catheter) in place, Pleural effusion, Respiratory failure  (HCC), and Stenosis of intestine (HCC).    SURGICAL HISTORY      has a past surgical history that includes Appendectomy; Hysterectomy; Cholecystectomy; tumor removal; back surgery; Endoscopy, colon, diagnostic; Foot surgery (Left, 11/04/2022); tumor removal; and thoracentesis (Left, 05/26/2022).    CURRENT MEDICATIONS       Previous Medications    AMLODIPINE (NORVASC) 5 MG TABLET    Take 1 tablet by mouth daily    ATORVASTATIN (LIPITOR) 10 MG TABLET    Take 1 tablet by mouth daily    BUSPIRONE (BUSPAR) 5 MG TABLET    Take 1 tablet by mouth 3 times daily    CILOSTAZOL (PLETAL) 50 MG TABLET    Take 1 tablet by mouth 2 times daily    CLOPIDOGREL (PLAVIX) 75 MG TABLET    Take 1 tablet by mouth daily    DULOXETINE (CYMBALTA) 20 MG EXTENDED RELEASE CAPSULE    Take 1 capsule by mouth daily    IBUPROFEN (ADVIL;MOTRIN) 800 MG TABLET    Take 1 tablet by mouth every 8 hours as needed for Pain    LIPASE-PROTEASE-AMYLASE (CREON) 45421-93044 UNITS DELAYED RELEASE CAPSULE    Take 1 capsule by mouth 3 times daily (with meals)    LORAZEPAM (ATIVAN) 1 MG TABLET    Take 1 tablet by mouth every 8 hours as needed for Anxiety.    MAGNESIUM OXIDE (MAG-OX) 400 MG TABLET    Take 2 tablets by mouth 2 times daily    MIRTAZAPINE (REMERON) 30 MG TABLET    Take 1 tablet by mouth nightly    NAPROXEN (NAPROSYN) 500 MG TABLET    Take 1 tablet by mouth 2 times daily (with meals) for 30 doses    ONDANSETRON (ZOFRAN-ODT) 4 MG DISINTEGRATING TABLET    Take 1 tablet by mouth 3 times daily as needed for Nausea or Vomiting    OXCARBAZEPINE (TRILEPTAL) 150 MG TABLET    Take 1 tablet by mouth 2 times daily    PANTOPRAZOLE (PROTONIX) 40 MG TABLET    Take 1 tablet by mouth daily    SENNA-DOCUSATE (PERICOLACE) 8.6-50 MG PER TABLET    Take 2 tablets by mouth nightly    TRAZODONE (DESYREL) 100 MG TABLET    Take 2 tablets by mouth nightly       ALLERGIES     is allergic to compazine [prochlorperazine maleate], lithium, other, sulfa antibiotics, and tetracyclines

## 2025-03-23 NOTE — ED PROVIDER NOTES
[facility] EMERGENCY DEPARTMENT  Emergency Department Encounter  Emergency Medicine Resident     Pt Name:Sultana Noyola  MRN: [id number]  Birthdate 1955  Date of evaluation: 3/23/25  PCP:  Azael Friedman MD  Note Started: 1:36 PM EDT      CHIEF COMPLAINT       Chief Complaint   Patient presents with    Chest Pain    Shortness of Breath       HISTORY OF PRESENT ILLNESS  (Location/Symptom, Timing/Onset, Context/Setting, Quality, Duration, Modifying Factors, Severity.)      Sultana Noyola is a 69 y.o. female who presents with shortness of breath.  Patient presented to [facility] with shortness of breath or this morning was found to be hyperkalemic with a creatinine of 3.2 needing emergent dialysis.  Patient was treated medically with insulin dextrose and Lokelma.  Patient was also given calcium gluconate 1 mg.  Patient went into V. tach x 2.  Patient received two 150 mg boluses of amiodarone.  Patient also had to be cardioverted x 2.  Patient was given Ativan for the next cardioversion which was reversed.  Patient states that she does not have a history of needing dialysis however she states that she does have kidney disease.  States that she has previously had pancreatitis.  Denies cardiac history.    PAST MEDICAL / SURGICAL / SOCIAL / FAMILY HISTORY      has a past medical history of ADHD (attention deficit hyperactivity disorder), Anxiety, BiPAP (biphasic positive airway pressure) dependence, Bipolar 1 disorder (HCC), Chronic back pain, Depression, Valentino catheter in place, Hyperlipidemia, Hypertension, Nursing home resident, Pancreatitis, PICC (peripherally inserted central catheter) in place, Pleural effusion, Respiratory failure (HCC), and Stenosis of intestine (HCC).       has a past surgical history that includes Appendectomy; Hysterectomy; Cholecystectomy; tumor removal; back surgery; Endoscopy, colon, diagnostic; Foot surgery (Left, 11/04/2022); tumor removal; and thoracentesis (Left,  Grandfather     Diabetes Paternal Grandfather        Allergies:  Compazine [prochlorperazine maleate], Lithium, Other, Sulfa antibiotics, and Tetracyclines & related    Home Medications:  Prior to Admission medications    Medication Sig Start Date End Date Taking? Authorizing Provider   tiZANidine (ZANAFLEX) 4 MG tablet Take 1 tablet by mouth every 6 hours as needed   Yes ProviderDelia MD   lisinopril (PRINIVIL;ZESTRIL) 10 MG tablet Take 1 tablet by mouth in the morning and at bedtime   Yes Provider, MD Delia   gabapentin (NEURONTIN) 300 MG capsule Take 1 capsule by mouth 3 times daily.   Yes Provider, MD Delia   carvedilol (COREG) 12.5 MG tablet Take 1 tablet by mouth 2 times daily (with meals)   Yes ProviderDelia MD   metFORMIN (GLUCOPHAGE) 500 MG tablet Take 1 tablet by mouth 2 times daily (with meals)   Yes Provider, MD Delia   Cyanocobalamin (NASCOBAL) 500 MCG/0.1ML SOLN nasal spray 1 spray by Nasal route once a week   Yes Provider, MD Delia   albuterol (ACCUNEB) 1.25 MG/3ML nebulizer solution Inhale 3 mLs into the lungs every 6 hours as needed for Wheezing   Yes Provider, MD Delia   budesonide-formoterol (SYMBICORT) 80-4.5 MCG/ACT AERO Inhale 2 puffs into the lungs 2 times daily   Yes Provider, MD Delia   OXcarbazepine (TRILEPTAL) 150 MG tablet Take 1 tablet by mouth 2 times daily   Yes Provider, MD Delia   DULoxetine (CYMBALTA) 20 MG extended release capsule Take 1 capsule by mouth daily   Yes ProviderDelia MD   busPIRone (BUSPAR) 5 MG tablet Take 1 tablet by mouth 3 times daily   Yes ProviderDelia MD   cilostazol (PLETAL) 50 MG tablet Take 1 tablet by mouth 2 times daily   Yes ProviderDelia MD   magnesium oxide (MAG-OX) 400 MG tablet Take 2 tablets by mouth 2 times daily   Yes ProviderDelia MD   pantoprazole (PROTONIX) 40 MG tablet Take 1 tablet by mouth daily   Yes ProviderDelia MD   senna-docusate (PERICOLACE)

## 2025-03-23 NOTE — PROCEDURES
PROCEDURE NOTE - TEMPORARY HD CATHETER (LEE)  PLACEMENT    PATIENT NAME: Sultana Noyola  MEDICAL RECORD NO. [id number]  DATE: 3/23/2025  ATTENDING PHYSICIAN: TOMMY LIZARRAGA MD    PREOPERATIVE DIAGNOSIS:  Need for IV access  POSTOPERATIVE DIAGNOSIS:  Same  PROCEDURE PERFORMED:  Right Internal Jugular Vein HD Catheter Line Insertion  PERFORMING PHYSICIAN: Gilbert Carballo MD  ANESTHESIA:  Local utilizing 1% lidocaine  ESTIMATED BLOOD LOSS:  Less than 25 ml  COMPLICATIONS:  None immediately appreciated.     DISCUSSION:  Sultana Noyola is a 69 y.o.-year-old female who requires central IV access. The history and physical examination were reviewed and confirmed.  The diagnoses, proposed procedure, risks, possible complications, benefits and alternatives were discussed with the patient or family. She was given the opportunity to ask questions, and once answered, informed consent was obtained.  The patient was then prepared for the procedure.    PROCEDURE:  A timeout was initiated by the bedside nurse and was confirmed by those present.  The patient was placed in a supine position. The skin overlying the Right Internal Jugular Vein was prepped with chlorhexadine and draped in sterile fashion. The skin was infiltrated with local anesthetic. The vessel and surrounding anatomy was visualized using ultrasound. Through the anesthetized region, the introducer needle was inserted into the internal jugular vein returning dark red non pulsatile blood. A guidewire was placed through the center of the needle with no resistance. Ultrasound confirmed presence of wire in the vein. A small incision made in the skin with a #11 scalpel blade. The first dilator was inserted into the skin and vein over guidewire using Seldinger technique. The dilator was then removed and the second (bigger) dilator was inserted into the skin and vein over guidewire using Seldinger technique, The dilator was then removed and the catheter was placed in the

## 2025-03-24 ENCOUNTER — APPOINTMENT (OUTPATIENT)
Dept: GENERAL RADIOLOGY | Age: 70
End: 2025-03-24
Payer: MEDICARE

## 2025-03-24 ENCOUNTER — APPOINTMENT (OUTPATIENT)
Age: 70
End: 2025-03-24
Payer: MEDICARE

## 2025-03-24 PROBLEM — I47.20 VENTRICULAR TACHYARRHYTHMIA (HCC): Status: ACTIVE | Noted: 2025-03-24

## 2025-03-24 LAB
ANION GAP SERPL CALCULATED.3IONS-SCNC: 12 MMOL/L (ref 9–16)
ANION GAP SERPL CALCULATED.3IONS-SCNC: 12 MMOL/L (ref 9–16)
ANION GAP SERPL CALCULATED.3IONS-SCNC: 13 MMOL/L (ref 9–16)
ANION GAP SERPL CALCULATED.3IONS-SCNC: 14 MMOL/L (ref 9–16)
ANTI-XA UNFRAC HEPARIN: 0.32 IU/L
BASOPHILS # BLD: <0.03 K/UL (ref 0–0.2)
BASOPHILS NFR BLD: 0 % (ref 0–2)
BODY TEMPERATURE: 37
BUN SERPL-MCNC: 25 MG/DL (ref 8–23)
CALCIUM SERPL-MCNC: 8 MG/DL (ref 8.6–10.4)
CALCIUM SERPL-MCNC: 8.3 MG/DL (ref 8.6–10.4)
CALCIUM SERPL-MCNC: 8.5 MG/DL (ref 8.6–10.4)
CALCIUM SERPL-MCNC: 8.6 MG/DL (ref 8.6–10.4)
CHLORIDE SERPL-SCNC: 100 MMOL/L (ref 98–107)
CHLORIDE SERPL-SCNC: 101 MMOL/L (ref 98–107)
CO2 SERPL-SCNC: 21 MMOL/L (ref 20–31)
CO2 SERPL-SCNC: 22 MMOL/L (ref 20–31)
CO2 SERPL-SCNC: 22 MMOL/L (ref 20–31)
CO2 SERPL-SCNC: 23 MMOL/L (ref 20–31)
COHGB MFR BLD: 0.1 % (ref 0–5)
CREAT SERPL-MCNC: 1.4 MG/DL (ref 0.6–0.9)
CREAT SERPL-MCNC: 1.6 MG/DL (ref 0.6–0.9)
CREAT SERPL-MCNC: 1.6 MG/DL (ref 0.6–0.9)
CREAT SERPL-MCNC: 1.7 MG/DL (ref 0.6–0.9)
ECHO AO ASC DIAM: 3 CM
ECHO AO ASCENDING AORTA INDEX: 1.5 CM/M2
ECHO AO ROOT DIAM: 2.6 CM
ECHO AO ROOT INDEX: 1.3 CM/M2
ECHO AV AREA PEAK VELOCITY: 2.8 CM2
ECHO AV AREA VTI: 2.8 CM2
ECHO AV AREA/BSA PEAK VELOCITY: 1.4 CM2/M2
ECHO AV AREA/BSA VTI: 1.4 CM2/M2
ECHO AV MEAN GRADIENT: 4 MMHG
ECHO AV MEAN VELOCITY: 1 M/S
ECHO AV PEAK GRADIENT: 8 MMHG
ECHO AV PEAK VELOCITY: 1.4 M/S
ECHO AV VELOCITY RATIO: 0.93
ECHO AV VTI: 37.2 CM
ECHO BSA: 2.07 M2
ECHO EST RA PRESSURE: 3 MMHG
ECHO LA AREA 2C: 21.1 CM2
ECHO LA AREA 4C: 20.2 CM2
ECHO LA DIAMETER INDEX: 1.95 CM/M2
ECHO LA DIAMETER: 3.9 CM
ECHO LA MAJOR AXIS: 6.2 CM
ECHO LA MINOR AXIS: 5.8 CM
ECHO LA TO AORTIC ROOT RATIO: 1.5
ECHO LA VOL BP: 61 ML (ref 22–52)
ECHO LA VOL MOD A2C: 68 ML (ref 22–52)
ECHO LA VOL MOD A4C: 55 ML (ref 22–52)
ECHO LA VOL/BSA BIPLANE: 31 ML/M2 (ref 16–34)
ECHO LA VOLUME INDEX MOD A2C: 34 ML/M2 (ref 16–34)
ECHO LA VOLUME INDEX MOD A4C: 28 ML/M2 (ref 16–34)
ECHO LV E' LATERAL VELOCITY: 10 CM/S
ECHO LV E' SEPTAL VELOCITY: 5.33 CM/S
ECHO LV EDV A2C: 58 ML
ECHO LV EDV A4C: 84 ML
ECHO LV EDV INDEX A4C: 42 ML/M2
ECHO LV EDV NDEX A2C: 29 ML/M2
ECHO LV EF PHYSICIAN: 61 %
ECHO LV EJECTION FRACTION A2C: 58 %
ECHO LV EJECTION FRACTION A4C: 65 %
ECHO LV EJECTION FRACTION BIPLANE: 61 % (ref 55–100)
ECHO LV ESV A2C: 25 ML
ECHO LV ESV A4C: 30 ML
ECHO LV ESV INDEX A2C: 13 ML/M2
ECHO LV ESV INDEX A4C: 15 ML/M2
ECHO LV FRACTIONAL SHORTENING: 14 % (ref 28–44)
ECHO LV INTERNAL DIMENSION DIASTOLE INDEX: 1.4 CM/M2
ECHO LV INTERNAL DIMENSION DIASTOLIC: 2.8 CM (ref 3.9–5.3)
ECHO LV INTERNAL DIMENSION SYSTOLIC INDEX: 1.2 CM/M2
ECHO LV INTERNAL DIMENSION SYSTOLIC: 2.4 CM
ECHO LV IVSD: 1 CM (ref 0.6–0.9)
ECHO LV MASS 2D: 190.4 G (ref 67–162)
ECHO LV MASS INDEX 2D: 95.2 G/M2 (ref 43–95)
ECHO LV POSTERIOR WALL DIASTOLIC: 2.5 CM (ref 0.6–0.9)
ECHO LV RELATIVE WALL THICKNESS RATIO: 1.79
ECHO LVOT AREA: 2.8 CM2
ECHO LVOT AV VTI INDEX: 0.99
ECHO LVOT DIAM: 1.9 CM
ECHO LVOT MEAN GRADIENT: 3 MMHG
ECHO LVOT PEAK GRADIENT: 7 MMHG
ECHO LVOT PEAK VELOCITY: 1.3 M/S
ECHO LVOT STROKE VOLUME INDEX: 52.3 ML/M2
ECHO LVOT SV: 104.6 ML
ECHO LVOT VTI: 36.9 CM
ECHO MV A VELOCITY: 0.88 M/S
ECHO MV AREA VTI: 2.4 CM2
ECHO MV E DECELERATION TIME (DT): 251 MS
ECHO MV E VELOCITY: 1.17 M/S
ECHO MV E/A RATIO: 1.33
ECHO MV E/E' LATERAL: 11.7
ECHO MV E/E' RATIO (AVERAGED): 16.83
ECHO MV E/E' SEPTAL: 21.95
ECHO MV LVOT VTI INDEX: 1.18
ECHO MV MAX VELOCITY: 1.6 M/S
ECHO MV MEAN GRADIENT: 3 MMHG
ECHO MV MEAN VELOCITY: 0.8 M/S
ECHO MV PEAK GRADIENT: 10 MMHG
ECHO MV VTI: 43.5 CM
ECHO PV MAX VELOCITY: 0.9 M/S
ECHO PV PEAK GRADIENT: 3 MMHG
ECHO RIGHT VENTRICULAR SYSTOLIC PRESSURE (RVSP): 40 MMHG
ECHO RV FREE WALL PEAK S': 11.9 CM/S
ECHO RV INTERNAL DIMENSION: 3.7 CM
ECHO RV TAPSE: 2.1 CM (ref 1.7–?)
ECHO TV REGURGITANT MAX VELOCITY: 3.04 M/S
ECHO TV REGURGITANT PEAK GRADIENT: 37 MMHG
EKG ATRIAL RATE: 27 BPM
EKG ATRIAL RATE: 42 BPM
EKG ATRIAL RATE: 61 BPM
EKG P AXIS: 53 DEGREES
EKG P-R INTERVAL: 154 MS
EKG Q-T INTERVAL: 412 MS
EKG Q-T INTERVAL: 438 MS
EKG Q-T INTERVAL: 450 MS
EKG Q-T INTERVAL: 452 MS
EKG Q-T INTERVAL: 470 MS
EKG QRS DURATION: 128 MS
EKG QRS DURATION: 140 MS
EKG QRS DURATION: 146 MS
EKG QRS DURATION: 160 MS
EKG QRS DURATION: 92 MS
EKG QTC CALCULATION (BAZETT): 392 MS
EKG QTC CALCULATION (BAZETT): 398 MS
EKG QTC CALCULATION (BAZETT): 426 MS
EKG QTC CALCULATION (BAZETT): 455 MS
EKG QTC CALCULATION (BAZETT): 601 MS
EKG R AXIS: -13 DEGREES
EKG R AXIS: -7 DEGREES
EKG R AXIS: -9 DEGREES
EKG R AXIS: 10 DEGREES
EKG R AXIS: 179 DEGREES
EKG T AXIS: 17 DEGREES
EKG T AXIS: 76 DEGREES
EKG T AXIS: 76 DEGREES
EKG T AXIS: 87 DEGREES
EKG T AXIS: 99 DEGREES
EKG VENTRICULAR RATE: 128 BPM
EKG VENTRICULAR RATE: 42 BPM
EKG VENTRICULAR RATE: 47 BPM
EKG VENTRICULAR RATE: 57 BPM
EKG VENTRICULAR RATE: 61 BPM
EOSINOPHIL # BLD: <0.03 K/UL (ref 0–0.44)
EOSINOPHILS RELATIVE PERCENT: 0 % (ref 1–4)
ERYTHROCYTE [DISTWIDTH] IN BLOOD BY AUTOMATED COUNT: 13.5 % (ref 11.8–14.4)
FIO2 ON VENT: NORMAL %
GFR, ESTIMATED: 32 ML/MIN/1.73M2
GFR, ESTIMATED: 35 ML/MIN/1.73M2
GFR, ESTIMATED: 35 ML/MIN/1.73M2
GFR, ESTIMATED: 41 ML/MIN/1.73M2
GLUCOSE BLD-MCNC: 115 MG/DL (ref 65–105)
GLUCOSE BLD-MCNC: 145 MG/DL (ref 65–105)
GLUCOSE BLD-MCNC: 174 MG/DL (ref 65–105)
GLUCOSE BLD-MCNC: 187 MG/DL (ref 65–105)
GLUCOSE SERPL-MCNC: 164 MG/DL (ref 74–99)
GLUCOSE SERPL-MCNC: 186 MG/DL (ref 74–99)
GLUCOSE SERPL-MCNC: 248 MG/DL (ref 74–99)
GLUCOSE SERPL-MCNC: 257 MG/DL (ref 74–99)
HCO3 VENOUS: 27.1 MMOL/L (ref 24–30)
HCT VFR BLD AUTO: 29.7 % (ref 36.3–47.1)
HGB BLD-MCNC: 9.2 G/DL (ref 11.9–15.1)
IMM GRANULOCYTES # BLD AUTO: 0.06 K/UL (ref 0–0.3)
IMM GRANULOCYTES NFR BLD: 1 %
INR PPP: 1.2
LYMPHOCYTES NFR BLD: 0.79 K/UL (ref 1.1–3.7)
LYMPHOCYTES RELATIVE PERCENT: 9 % (ref 24–43)
MAGNESIUM SERPL-MCNC: 2.2 MG/DL (ref 1.6–2.4)
MAGNESIUM SERPL-MCNC: 2.2 MG/DL (ref 1.6–2.4)
MCH RBC QN AUTO: 29.5 PG (ref 25.2–33.5)
MCHC RBC AUTO-ENTMCNC: 31 G/DL (ref 28.4–34.8)
MCV RBC AUTO: 95.2 FL (ref 82.6–102.9)
MONOCYTES NFR BLD: 0.42 K/UL (ref 0.1–1.2)
MONOCYTES NFR BLD: 5 % (ref 3–12)
MRSA, DNA, NASAL: NEGATIVE
NEUTROPHILS NFR BLD: 85 % (ref 36–65)
NEUTS SEG NFR BLD: 7.16 K/UL (ref 1.5–8.1)
NRBC BLD-RTO: 0 PER 100 WBC
O2 SAT, VEN: 84.2 % (ref 60–85)
P E INTERPRETATION, U: NORMAL
PARTIAL THROMBOPLASTIN TIME: 112.5 SEC (ref 23–36.5)
PATHOLOGIST: NORMAL
PCO2 VENOUS: 47.6 MM HG (ref 39–55)
PH VENOUS: 7.37 (ref 7.32–7.42)
PHOSPHATE SERPL-MCNC: 3.9 MG/DL (ref 2.5–4.5)
PLATELET # BLD AUTO: 198 K/UL (ref 138–453)
PMV BLD AUTO: 11.4 FL (ref 8.1–13.5)
PO2 VENOUS: 50 MM HG (ref 30–50)
POSITIVE BASE EXCESS, VEN: 1.5 MMOL/L (ref 0–2)
POTASSIUM SERPL-SCNC: 5.1 MMOL/L (ref 3.7–5.3)
POTASSIUM SERPL-SCNC: 5.5 MMOL/L (ref 3.7–5.3)
POTASSIUM SERPL-SCNC: 5.5 MMOL/L (ref 3.7–5.3)
POTASSIUM SERPL-SCNC: 5.9 MMOL/L (ref 3.7–5.3)
PROTHROMBIN TIME: 15.4 SEC (ref 11.7–14.9)
RBC # BLD AUTO: 3.12 M/UL (ref 3.95–5.11)
SODIUM SERPL-SCNC: 134 MMOL/L (ref 136–145)
SODIUM SERPL-SCNC: 135 MMOL/L (ref 136–145)
SODIUM SERPL-SCNC: 136 MMOL/L (ref 136–145)
SODIUM SERPL-SCNC: 137 MMOL/L (ref 136–145)
SPECIMEN DESCRIPTION: NORMAL
TROPONIN I SERPL HS-MCNC: 24 NG/L (ref 0–14)
URINE TOTAL PROTEIN: 11 MG/DL
WBC OTHER # BLD: 8.5 K/UL (ref 3.5–11.3)

## 2025-03-24 PROCEDURE — 80048 BASIC METABOLIC PNL TOTAL CA: CPT

## 2025-03-24 PROCEDURE — 6370000000 HC RX 637 (ALT 250 FOR IP)

## 2025-03-24 PROCEDURE — 2060000000 HC ICU INTERMEDIATE R&B

## 2025-03-24 PROCEDURE — 99223 1ST HOSP IP/OBS HIGH 75: CPT | Performed by: STUDENT IN AN ORGANIZED HEALTH CARE EDUCATION/TRAINING PROGRAM

## 2025-03-24 PROCEDURE — 84484 ASSAY OF TROPONIN QUANT: CPT

## 2025-03-24 PROCEDURE — 85520 HEPARIN ASSAY: CPT

## 2025-03-24 PROCEDURE — 82947 ASSAY GLUCOSE BLOOD QUANT: CPT

## 2025-03-24 PROCEDURE — 2500000003 HC RX 250 WO HCPCS: Performed by: INTERNAL MEDICINE

## 2025-03-24 PROCEDURE — 93306 TTE W/DOPPLER COMPLETE: CPT

## 2025-03-24 PROCEDURE — 36415 COLL VENOUS BLD VENIPUNCTURE: CPT

## 2025-03-24 PROCEDURE — 2500000003 HC RX 250 WO HCPCS

## 2025-03-24 PROCEDURE — 2580000003 HC RX 258

## 2025-03-24 PROCEDURE — 99291 CRITICAL CARE FIRST HOUR: CPT | Performed by: INTERNAL MEDICINE

## 2025-03-24 PROCEDURE — 6360000002 HC RX W HCPCS

## 2025-03-24 PROCEDURE — 85610 PROTHROMBIN TIME: CPT

## 2025-03-24 PROCEDURE — 93010 ELECTROCARDIOGRAM REPORT: CPT | Performed by: INTERNAL MEDICINE

## 2025-03-24 PROCEDURE — 6370000000 HC RX 637 (ALT 250 FOR IP): Performed by: INTERNAL MEDICINE

## 2025-03-24 PROCEDURE — 83735 ASSAY OF MAGNESIUM: CPT

## 2025-03-24 PROCEDURE — 85025 COMPLETE CBC W/AUTO DIFF WBC: CPT

## 2025-03-24 PROCEDURE — 84156 ASSAY OF PROTEIN URINE: CPT

## 2025-03-24 PROCEDURE — 71045 X-RAY EXAM CHEST 1 VIEW: CPT

## 2025-03-24 PROCEDURE — 82805 BLOOD GASES W/O2 SATURATION: CPT

## 2025-03-24 PROCEDURE — 2580000003 HC RX 258: Performed by: NURSE PRACTITIONER

## 2025-03-24 PROCEDURE — 84100 ASSAY OF PHOSPHORUS: CPT

## 2025-03-24 PROCEDURE — 85730 THROMBOPLASTIN TIME PARTIAL: CPT

## 2025-03-24 PROCEDURE — 84166 PROTEIN E-PHORESIS/URINE/CSF: CPT

## 2025-03-24 PROCEDURE — 99232 SBSQ HOSP IP/OBS MODERATE 35: CPT | Performed by: INTERNAL MEDICINE

## 2025-03-24 PROCEDURE — 86738 MYCOPLASMA ANTIBODY: CPT

## 2025-03-24 RX ORDER — HEPARIN SODIUM 1000 [USP'U]/ML
2000 INJECTION, SOLUTION INTRAVENOUS; SUBCUTANEOUS PRN
Status: DISCONTINUED | OUTPATIENT
Start: 2025-03-24 | End: 2025-03-26

## 2025-03-24 RX ORDER — HEPARIN SODIUM 1000 [USP'U]/ML
4000 INJECTION, SOLUTION INTRAVENOUS; SUBCUTANEOUS PRN
Status: DISCONTINUED | OUTPATIENT
Start: 2025-03-24 | End: 2025-03-26

## 2025-03-24 RX ORDER — INSULIN GLARGINE 100 [IU]/ML
5 INJECTION, SOLUTION SUBCUTANEOUS DAILY
Status: DISCONTINUED | OUTPATIENT
Start: 2025-03-24 | End: 2025-03-28 | Stop reason: HOSPADM

## 2025-03-24 RX ORDER — GLUCAGON 1 MG/ML
1 KIT INJECTION PRN
Status: DISCONTINUED | OUTPATIENT
Start: 2025-03-24 | End: 2025-03-28 | Stop reason: HOSPADM

## 2025-03-24 RX ORDER — PANTOPRAZOLE SODIUM 40 MG/1
40 TABLET, DELAYED RELEASE ORAL
Status: DISCONTINUED | OUTPATIENT
Start: 2025-03-25 | End: 2025-03-28 | Stop reason: HOSPADM

## 2025-03-24 RX ORDER — CILOSTAZOL 100 MG/1
50 TABLET ORAL 2 TIMES DAILY
Status: DISCONTINUED | OUTPATIENT
Start: 2025-03-24 | End: 2025-03-28 | Stop reason: HOSPADM

## 2025-03-24 RX ORDER — BUTALBITAL, ACETAMINOPHEN AND CAFFEINE 50; 325; 40 MG/1; MG/1; MG/1
1 TABLET ORAL ONCE
Status: COMPLETED | OUTPATIENT
Start: 2025-03-24 | End: 2025-03-24

## 2025-03-24 RX ORDER — AZITHROMYCIN 250 MG/1
500 TABLET, FILM COATED ORAL DAILY
Status: COMPLETED | OUTPATIENT
Start: 2025-03-24 | End: 2025-03-25

## 2025-03-24 RX ORDER — GLUCAGON 1 MG/ML
1 KIT INJECTION PRN
Status: DISCONTINUED | OUTPATIENT
Start: 2025-03-24 | End: 2025-03-24 | Stop reason: SDUPTHER

## 2025-03-24 RX ORDER — HEPARIN SODIUM 10000 [USP'U]/100ML
5-30 INJECTION, SOLUTION INTRAVENOUS CONTINUOUS
Status: DISCONTINUED | OUTPATIENT
Start: 2025-03-24 | End: 2025-03-26

## 2025-03-24 RX ORDER — CALCIUM GLUCONATE 20 MG/ML
1000 INJECTION, SOLUTION INTRAVENOUS ONCE
Status: COMPLETED | OUTPATIENT
Start: 2025-03-24 | End: 2025-03-24

## 2025-03-24 RX ORDER — DEXTROSE MONOHYDRATE 100 MG/ML
INJECTION, SOLUTION INTRAVENOUS CONTINUOUS PRN
Status: DISCONTINUED | OUTPATIENT
Start: 2025-03-24 | End: 2025-03-28 | Stop reason: HOSPADM

## 2025-03-24 RX ORDER — MIRTAZAPINE 15 MG/1
15 TABLET, FILM COATED ORAL NIGHTLY
Status: DISCONTINUED | OUTPATIENT
Start: 2025-03-24 | End: 2025-03-28 | Stop reason: HOSPADM

## 2025-03-24 RX ORDER — ROSUVASTATIN CALCIUM 20 MG/1
20 TABLET, COATED ORAL NIGHTLY
Status: DISCONTINUED | OUTPATIENT
Start: 2025-03-24 | End: 2025-03-28 | Stop reason: HOSPADM

## 2025-03-24 RX ORDER — PREDNISONE 20 MG/1
40 TABLET ORAL DAILY
Status: COMPLETED | OUTPATIENT
Start: 2025-03-25 | End: 2025-03-27

## 2025-03-24 RX ORDER — HEPARIN SODIUM 1000 [USP'U]/ML
4000 INJECTION, SOLUTION INTRAVENOUS; SUBCUTANEOUS ONCE
Status: COMPLETED | OUTPATIENT
Start: 2025-03-24 | End: 2025-03-24

## 2025-03-24 RX ORDER — ASPIRIN 81 MG/1
81 TABLET, CHEWABLE ORAL DAILY
Status: DISCONTINUED | OUTPATIENT
Start: 2025-03-24 | End: 2025-03-24

## 2025-03-24 RX ORDER — DEXTROSE MONOHYDRATE 100 MG/ML
INJECTION, SOLUTION INTRAVENOUS CONTINUOUS PRN
Status: DISCONTINUED | OUTPATIENT
Start: 2025-03-24 | End: 2025-03-24 | Stop reason: SDUPTHER

## 2025-03-24 RX ADMIN — TRAZODONE HYDROCHLORIDE 100 MG: 100 TABLET ORAL at 21:43

## 2025-03-24 RX ADMIN — SODIUM CHLORIDE, PRESERVATIVE FREE 10 ML: 5 INJECTION INTRAVENOUS at 21:46

## 2025-03-24 RX ADMIN — HEPARIN SODIUM 5000 UNITS: 5000 INJECTION INTRAVENOUS; SUBCUTANEOUS at 06:00

## 2025-03-24 RX ADMIN — INSULIN HUMAN 10 UNITS: 100 INJECTION, SOLUTION PARENTERAL at 06:17

## 2025-03-24 RX ADMIN — SODIUM ZIRCONIUM CYCLOSILICATE 10 G: 10 POWDER, FOR SUSPENSION ORAL at 06:00

## 2025-03-24 RX ADMIN — MIRTAZAPINE 15 MG: 15 TABLET, FILM COATED ORAL at 21:43

## 2025-03-24 RX ADMIN — DULOXETINE HYDROCHLORIDE 20 MG: 20 CAPSULE, DELAYED RELEASE ORAL at 09:06

## 2025-03-24 RX ADMIN — WATER 2000 MG: 1 INJECTION INTRAMUSCULAR; INTRAVENOUS; SUBCUTANEOUS at 17:01

## 2025-03-24 RX ADMIN — HEPARIN SODIUM 4000 UNITS: 1000 INJECTION INTRAVENOUS; SUBCUTANEOUS at 13:17

## 2025-03-24 RX ADMIN — GABAPENTIN 400 MG: 400 CAPSULE ORAL at 21:43

## 2025-03-24 RX ADMIN — GABAPENTIN 400 MG: 400 CAPSULE ORAL at 09:05

## 2025-03-24 RX ADMIN — OXCARBAZEPINE 150 MG: 150 TABLET, FILM COATED ORAL at 21:53

## 2025-03-24 RX ADMIN — ACETAMINOPHEN 650 MG: 325 TABLET ORAL at 06:04

## 2025-03-24 RX ADMIN — SODIUM CHLORIDE: 9 INJECTION, SOLUTION INTRAVENOUS at 00:38

## 2025-03-24 RX ADMIN — ROSUVASTATIN CALCIUM 20 MG: 20 TABLET, FILM COATED ORAL at 21:43

## 2025-03-24 RX ADMIN — DEXTROSE 250 ML: 10 SOLUTION INTRAVENOUS at 00:43

## 2025-03-24 RX ADMIN — OXCARBAZEPINE 150 MG: 150 TABLET, FILM COATED ORAL at 09:06

## 2025-03-24 RX ADMIN — SODIUM BICARBONATE: 84 INJECTION, SOLUTION INTRAVENOUS at 12:03

## 2025-03-24 RX ADMIN — AZITHROMYCIN DIHYDRATE 500 MG: 250 TABLET ORAL at 10:28

## 2025-03-24 RX ADMIN — SODIUM CHLORIDE 40 MG: 9 INJECTION INTRAMUSCULAR; INTRAVENOUS; SUBCUTANEOUS at 09:04

## 2025-03-24 RX ADMIN — WATER 40 MG: 1 INJECTION INTRAMUSCULAR; INTRAVENOUS; SUBCUTANEOUS at 09:04

## 2025-03-24 RX ADMIN — INSULIN LISPRO 1 UNITS: 100 INJECTION, SOLUTION INTRAVENOUS; SUBCUTANEOUS at 08:30

## 2025-03-24 RX ADMIN — FENTANYL CITRATE 25 MCG: 50 INJECTION, SOLUTION INTRAMUSCULAR; INTRAVENOUS at 16:58

## 2025-03-24 RX ADMIN — BUSPIRONE HYDROCHLORIDE 5 MG: 5 TABLET ORAL at 13:26

## 2025-03-24 RX ADMIN — BUSPIRONE HYDROCHLORIDE 5 MG: 5 TABLET ORAL at 21:43

## 2025-03-24 RX ADMIN — WATER 40 MG: 1 INJECTION INTRAMUSCULAR; INTRAVENOUS; SUBCUTANEOUS at 00:39

## 2025-03-24 RX ADMIN — CALCIUM GLUCONATE 1000 MG: 20 INJECTION, SOLUTION INTRAVENOUS at 09:16

## 2025-03-24 RX ADMIN — SODIUM CHLORIDE, PRESERVATIVE FREE 10 ML: 5 INJECTION INTRAVENOUS at 09:05

## 2025-03-24 RX ADMIN — HEPARIN SODIUM 10 UNITS/KG/HR: 10000 INJECTION, SOLUTION INTRAVENOUS at 13:20

## 2025-03-24 RX ADMIN — INSULIN GLARGINE 5 UNITS: 100 INJECTION, SOLUTION SUBCUTANEOUS at 21:43

## 2025-03-24 RX ADMIN — CILOSTAZOL 50 MG: 100 TABLET ORAL at 21:53

## 2025-03-24 RX ADMIN — BUTALBITAL, ACETAMINOPHEN, AND CAFFEINE 1 TABLET: 325; 50; 40 TABLET ORAL at 10:30

## 2025-03-24 RX ADMIN — BUSPIRONE HYDROCHLORIDE 5 MG: 5 TABLET ORAL at 09:05

## 2025-03-24 RX ADMIN — SODIUM ZIRCONIUM CYCLOSILICATE 5 G: 5 POWDER, FOR SUSPENSION ORAL at 12:53

## 2025-03-24 RX ADMIN — DEXTROSE 250 ML: 10 SOLUTION INTRAVENOUS at 06:06

## 2025-03-24 RX ADMIN — SODIUM ZIRCONIUM CYCLOSILICATE 10 G: 10 POWDER, FOR SUSPENSION ORAL at 00:48

## 2025-03-24 RX ADMIN — INSULIN HUMAN 10 UNITS: 100 INJECTION, SOLUTION PARENTERAL at 01:31

## 2025-03-24 ASSESSMENT — PAIN DESCRIPTION - PAIN TYPE
TYPE: ACUTE PAIN

## 2025-03-24 ASSESSMENT — ENCOUNTER SYMPTOMS
SHORTNESS OF BREATH: 0
CHEST TIGHTNESS: 0
BLOOD IN STOOL: 0
NAUSEA: 0
COUGH: 1
SHORTNESS OF BREATH: 1
DIARRHEA: 0
CONSTIPATION: 0
ABDOMINAL PAIN: 0
VOMITING: 0
WHEEZING: 1
COUGH: 0

## 2025-03-24 ASSESSMENT — PAIN DESCRIPTION - DESCRIPTORS
DESCRIPTORS: ACHING;DISCOMFORT;SORE
DESCRIPTORS: SHARP;ACHING
DESCRIPTORS: ACHING;DISCOMFORT

## 2025-03-24 ASSESSMENT — PAIN SCALES - GENERAL
PAINLEVEL_OUTOF10: 9
PAINLEVEL_OUTOF10: 2
PAINLEVEL_OUTOF10: 0
PAINLEVEL_OUTOF10: 9
PAINLEVEL_OUTOF10: 5
PAINLEVEL_OUTOF10: 3

## 2025-03-24 ASSESSMENT — PAIN DESCRIPTION - FREQUENCY
FREQUENCY: INTERMITTENT
FREQUENCY: INTERMITTENT
FREQUENCY: CONTINUOUS
FREQUENCY: CONTINUOUS

## 2025-03-24 ASSESSMENT — PAIN DESCRIPTION - ONSET
ONSET: GRADUAL
ONSET: ON-GOING

## 2025-03-24 ASSESSMENT — PAIN DESCRIPTION - ORIENTATION
ORIENTATION: RIGHT;LEFT;OTHER (COMMENT)
ORIENTATION: RIGHT;LEFT;ANTERIOR

## 2025-03-24 ASSESSMENT — PAIN DESCRIPTION - LOCATION
LOCATION: HEAD

## 2025-03-24 ASSESSMENT — PAIN - FUNCTIONAL ASSESSMENT: PAIN_FUNCTIONAL_ASSESSMENT: ACTIVITIES ARE NOT PREVENTED

## 2025-03-24 NOTE — CONSULTS
[facility]    Consult / H&P               Today's Date: 3/24/2025  Patient Name: Sultana Noyola  Date of admission: 3/23/2025 11:32 AM  Patient's age: 69 y.o., 1955  Admission Dx: Ventricular tachyarrhythmia (HCC) [I47.20]  Hyperkalemia [E87.5]  Bradycardia [R00.1]    Requesting Physician: Grayson Galaviz MD    Cardiac Evaluation Reason: V. tach x 2, hyperkalemia    History Obtained From: patient/chart review     History of Present Illness:    This patient 69 y.o. years old female with past medical history as below.     Patient with history of PAD-history of left common iliac stent 10/22, CKD stage III, diabetes, hypertension, COPD presented to [facility] ER for chest pain and shortness of breath for last couple of days.  Patient was feeling shortness of breath and central chest pressure on exertion, lightheaded on standing up, states she has felt palpitations or heart racing or skipped beat for many years.  While in the ER patient developed 2 episodes of V. tach-s/p synchronized cardioversion at 150 J, was found to have hyperkalemia and elevated creatinine requiring transfer to [facility].  Also patient was intermittently bradycardic with rhythm A-fib with slow ventricular rate.  Patient received amiodarone bolus 150 x 2.  Patient was found to be intermittently bradycardic in upper 40s with hypotension, requiring home baseline use of oxygen 3.5 L    EKG-wide QRS tachycardia and subsequently sinus bradycardia  Chest x-ray-pulmonary vascular congestion with borderline cardiomegaly  Patient was started on epinephrine drip however currently she is off epinephrine  Initial potassium 7.5-4 .6-5.9, received dialysis yesterday    Past Medical History:   has a past medical history of ADHD (attention deficit hyperactivity disorder), Anxiety, BiPAP (biphasic positive airway pressure) dependence, Bipolar 1 disorder (HCC), Chronic back pain, Depression, Valentino catheter in place, Hyperlipidemia,  medicine resident, PGY 3  [facility], [facility]  03/24/25 7:00 AM     I performed a history and physical examination of the patient and discussed management with the resident. I reviewed the resident’s note and agree with the documented findings and plan of care. Any areas of disagreement are noted on the chart. I was personally present for the key portions of any procedures. I have documented in the chart those procedures where I was not present during the key portions. I have personally evaluated this patient and have completed at least one if not all key elements of the E/M (history, physical exam, and MDM). Additional findings are as noted.    Kosta Poon MD, FACC, [facility]  Cardiology, Interventional Cardiology, CHIP  [facility]

## 2025-03-24 NOTE — CARE COORDINATION
Case Management Assessment  Initial Evaluation    Date/Time of Evaluation: 3/24/2025 11:17 AM  Assessment Completed by: Latia Dominguez RN    If patient is discharged prior to next notation, then this note serves as note for discharge by case management.    Patient Name: Sultana Noyola                   YOB: 1955  Diagnosis: Ventricular tachyarrhythmia (HCC) [I47.20]  Hyperkalemia [E87.5]  Bradycardia [R00.1]                   Date / Time: 3/23/2025 11:32 AM    Patient Admission Status: Inpatient   Readmission Risk (Low < 19, Mod (19-27), High > 27): Readmission Risk Score: 17    Current PCP: Azael Friedman MD  PCP verified by CM? (P) Yes (Tavia Bonner NP with [facility]    Chart Reviewed: Yes      History Provided by: (P) Patient  Patient Orientation: (P) Alert and Oriented, Person, Place, Situation, Self    Patient Cognition: (P) Alert    Hospitalization in the last 30 days (Readmission):  Yes    If yes, Readmission Assessment in  Navigator will be completed.    Advance Directives:      Code Status: Full Code   Patient's Primary Decision Maker is: (P) Legal Next of Kin      Discharge Planning:    Patient lives with: (P) Alone Type of Home: (P) Apartment  Primary Care Giver: (P) Self  Patient Support Systems include: (P) Children   Current Financial resources: (P) Medicare  Current community resources: (P) ECF/Home Care  Current services prior to admission: (P) Durable Medical Equipment, Home Care            Current DME: (P) Walker            Type of Home Care services:  (P) Nursing Services    ADLS  Prior functional level: (P) Independent in ADLs/IADLs  Current functional level: (P) Assistance with the following:, Mobility, Toileting    PT AM-PAC:   /24  OT AM-PAC:   /24    Family can provide assistance at DC: (P) Yes  Would you like Case Management to discuss the discharge plan with any other family members/significant others, and if so, who? (P) Yes (dtr)  Plans to Return to

## 2025-03-24 NOTE — PLAN OF CARE
Problem: Safety - Adult  Goal: Free from fall injury  3/24/2025 1105 by Magaly Meier RN  Outcome: Progressing  3/24/2025 1105 by Magaly Meier RN  Outcome: Progressing  3/24/2025 0524 by Aleta Blum RN  Outcome: Progressing     Problem: Discharge Planning  Goal: Discharge to home or other facility with appropriate resources  3/24/2025 1105 by Magaly Meier RN  Outcome: Progressing  3/24/2025 1105 by Magaly Meier RN  Outcome: Progressing  3/24/2025 0524 by Aleta Blum RN  Outcome: Progressing     Problem: Pain  Goal: Verbalizes/displays adequate comfort level or baseline comfort level  3/24/2025 1105 by aMgaly Meier RN  Outcome: Progressing  3/24/2025 1105 by Magaly Meier RN  Outcome: Progressing  3/24/2025 0524 by Aleta Blum RN  Outcome: Progressing     Problem: Skin/Tissue Integrity  Goal: Skin integrity remains intact  Description: 1.  Monitor for areas of redness and/or skin breakdown  2.  Assess vascular access sites hourly  3.  Every 4-6 hours minimum:  Change oxygen saturation probe site  4.  Every 4-6 hours:  If on nasal continuous positive airway pressure, respiratory therapy assess nares and determine need for appliance change or resting period  3/24/2025 1105 by Magaly Meier RN  Outcome: Progressing  3/24/2025 1105 by Magaly Meier RN  Outcome: Progressing     Problem: ABCDS Injury Assessment  Goal: Absence of physical injury  3/24/2025 1105 by Magaly Meier RN  Outcome: Progressing  3/24/2025 1105 by Magaly Meier RN  Outcome: Progressing     Problem: Respiratory - Adult  Goal: Achieves optimal ventilation and oxygenation  Outcome: Progressing     Problem: Cardiovascular - Adult  Goal: Maintains optimal cardiac output and hemodynamic stability  Outcome: Progressing     Problem: Skin/Tissue Integrity - Adult  Goal: Skin integrity remains intact  Description:  1.  Monitor for areas of redness and/or skin breakdown  2.  Assess vascular access sites hourly  3.  Every 4-6 hours minimum:  Change oxygen saturation probe site  4.  Every 4-6 hours:  If on nasal continuous positive airway pressure, respiratory therapy assess nares and determine need for appliance change or resting period  3/24/2025 1105 by Magaly Meier RN  Outcome: Progressing  3/24/2025 1105 by Magaly Meier RN  Outcome: Progressing     Problem: Genitourinary - Adult  Goal: Absence of urinary retention  Outcome: Progressing     Problem: Metabolic/Fluid and Electrolytes - Adult  Goal: Electrolytes maintained within normal limits  Outcome: Progressing  Goal: Hemodynamic stability and optimal renal function maintained  Outcome: Progressing  Goal: Glucose maintained within prescribed range  Outcome: Progressing

## 2025-03-24 NOTE — CARE COORDINATION
03/24/25 1108   Readmission Assessment   Number of Days since last admission? 1-7 days  (not readmission, transfer from [facility])   Previous Disposition Other (comment)  (not readmission, transfer from [facility])   Who is being Interviewed Patient  (not readmission, transfer from [facility])   What was the patient's/caregiver's perception as to why they think they needed to return back to the hospital? Other (Comment)  (not readmission, transfer from [facility])   Did you visit your Primary Care Physician after you left the hospital, before you returned this time? No  (not readmission, transfer from [facility])   Why weren't you able to visit your PCP? Other (Comment)  (not readmission, transfer from [facility])   Did you see a specialist, such as Cardiac, Pulmonary, Orthopedic Physician, etc. after you left the hospital? Other (Comment)  (not readmission, transfer from [facility])   Who advised the patient to return to the hospital? Other (Comment)  (not readmission, transfer from [facility])   Does the patient report anything that got in the way of taking their medications? No  (not readmission, transfer from [facility])   In our efforts to provide the best possible care to you and others like you, can you think of anything that we could have done to help you after you left the hospital the first time, so that you might not have needed to return so soon? Other (Comment)  (not readmission, transfer from [facility])

## 2025-03-24 NOTE — PROGRESS NOTES
Renal Progress Note    Patient :  Sultana Noyola; 69 y.o. MRN# [id number]  Location:  3018/3018-01  Attending:  Grayson Galaviz MD  Admit Date:  3/23/2025   Hospital Day: 1      Subjective:     Oral intake good.  Urine output good.  Hemodynamically stable, heart rate in the 60-70 range.  Valentino catheter in place, putting out close to 100 mL an hour.  Potassium down to 5.5 today.  Did receive an insulin and dextrose does at about 6:00 this morning for a potassium of 5.9.  Did receive 1 session of dialysis yesterday.  Completed treatment at about 6:40 PM.  Blood sugars running in the 200-300 range, is receiving oral steroid therapy for COPD.  Continues on azithromycin and Rocephin for presumed pneumonia.  Labs today showed a sodium 135 potassium 5.5 chloride 101 bicarb 21 BUN 25 creatinine 1.6 magnesium 2.2 calcium 8.3 phosphorus 3.9    History reviewed  Known history of type 2 diabetes, hypertension, obesity, COPD/emphysema on home oxygen, chronic kidney diseaseStage IIIa baseline creatinine 1.3-1.4.  She has followed up with Lynn Lewis from [facility] last seen by them in November 2024 for CKD care.  She also has a known history of chronic diarrhea from chronic pancreatitis and peripheral vascular disease having had revascularization to her left lower extremity.  Patient tells me that in the last couple of weeks her diarrhea has become much worse she is been going consistently and it has been loose and watery.  She also been getting progressively weak.  Also getting short of breath on exertion.  Came into the ER for evaluation at [facility] was found to have a creatinine of 3.1 and a potassium of 7.5.  She was mildly acidotic with a bicarb of 18 but anion gap was normal.  She was taking lisinopril 40 mg a day at home.  She was found to be bradycardic with a heart rate in the 40s then developed tachyarrhythmia requiring cardioversion.  She was transferred to [facility]  received emergent dialysis last evening.  Nephrology consulted for acute kidney injury and hyperkalemia.  Urine sediment was benign.  Previous workup for immune complex and paraprotein disease negative.      Outpatient Medications:     Medications Prior to Admission: tiZANidine (ZANAFLEX) 4 MG tablet, Take 1 tablet by mouth every 6 hours as needed  lisinopril (PRINIVIL;ZESTRIL) 10 MG tablet, Take 1 tablet by mouth in the morning and at bedtime  gabapentin (NEURONTIN) 300 MG capsule, Take 1 capsule by mouth 3 times daily.  carvedilol (COREG) 12.5 MG tablet, Take 1 tablet by mouth 2 times daily (with meals)  metFORMIN (GLUCOPHAGE) 500 MG tablet, Take 1 tablet by mouth 2 times daily (with meals)  Cyanocobalamin (NASCOBAL) 500 MCG/0.1ML SOLN nasal spray, 1 spray by Nasal route once a week  albuterol (ACCUNEB) 1.25 MG/3ML nebulizer solution, Inhale 3 mLs into the lungs every 6 hours as needed for Wheezing  budesonide-formoterol (SYMBICORT) 80-4.5 MCG/ACT AERO, Inhale 2 puffs into the lungs 2 times daily  OXcarbazepine (TRILEPTAL) 150 MG tablet, Take 1 tablet by mouth 2 times daily  DULoxetine (CYMBALTA) 20 MG extended release capsule, Take 1 capsule by mouth daily  busPIRone (BUSPAR) 5 MG tablet, Take 1 tablet by mouth 3 times daily  cilostazol (PLETAL) 50 MG tablet, Take 1 tablet by mouth 2 times daily  magnesium oxide (MAG-OX) 400 MG tablet, Take 2 tablets by mouth 2 times daily  pantoprazole (PROTONIX) 40 MG tablet, Take 1 tablet by mouth daily  senna-docusate (PERICOLACE) 8.6-50 MG per tablet, Take 2 tablets by mouth nightly  clopidogrel (PLAVIX) 75 MG tablet, Take 1 tablet by mouth daily  mirtazapine (REMERON) 30 MG tablet, Take 1 tablet by mouth nightly  ondansetron (ZOFRAN-ODT) 4 MG disintegrating tablet, Take 1 tablet by mouth 3 times daily as needed for Nausea or Vomiting  naproxen (NAPROSYN) 500 MG tablet, Take 1 tablet by mouth 2 times daily (with meals) for 30 doses  lipase-protease-amylase (CREON)     No results found for: \"SENA\"  SPEP:  Lab Results   Component Value Date/Time    ALBCAL PENDING 03/23/2025 12:46 PM    ALBPCT PENDING 03/23/2025 12:46 PM    A1PCT PENDING 03/23/2025 12:46 PM    A2PCT PENDING 03/23/2025 12:46 PM    BETAPCT PENDING 03/23/2025 12:46 PM    GAMGLOB PENDING 03/23/2025 12:46 PM    GGPCT PENDING 03/23/2025 12:46 PM    PATH PENDING 03/23/2025 12:46 PM     UPEP:     Lab Results   Component Value Date/Time    LABPE  03/23/2025 12:08 PM     Unable to perform testing: Specimen quantity not sufficient.     C3:     Lab Results   Component Value Date/Time    C3 172 03/23/2025 12:46 PM     C4:     Lab Results   Component Value Date/Time    C4 22 03/23/2025 12:46 PM     MPO ANCA:   No results found for: \"MPO\"  PR3 ANCA:   No results found for: \"PR3\"  Anti-GBM:   No results found for: \"GBMABIGG\"  Hep BsAg:         Lab Results   Component Value Date/Time    HEPBSAG NONREACTIVE 03/23/2025 12:46 PM     Hep C AB:          Lab Results   Component Value Date/Time    HEPCAB NONREACTIVE 03/23/2025 12:46 PM       Urinalysis/Chemistries:      Lab Results   Component Value Date/Time    NITRU NEGATIVE 03/23/2025 12:08 PM    COLORU Yellow 03/23/2025 12:08 PM    PHUR 5.5 03/23/2025 12:08 PM    PHUR 7.0 10/09/2023 07:15 PM    WBCUA 0 TO 2 03/23/2025 12:08 PM    WBCUA 0-2 08/05/2019 08:24 AM    RBCUA None 03/23/2025 12:08 PM    RBCUA 6-10 08/05/2019 08:24 AM    MUCUS 1+ 05/05/2023 05:49 PM    TRICHOMONAS NOT REPORTED 05/31/2015 01:19 PM    YEAST NOT REPORTED 05/31/2015 01:19 PM    BACTERIA None 03/23/2025 12:08 PM    CLARITYU clear 05/31/2015 12:56 PM    SPECGRAV 1.020 05/31/2015 12:56 PM    LEUKOCYTESUR NEGATIVE 03/23/2025 12:08 PM    UROBILINOGEN Normal 03/23/2025 12:08 PM    BILIRUBINUR NEGATIVE 03/23/2025 12:08 PM    BLOODU moderate 05/31/2015 12:56 PM    GLUCOSEU 2+ 03/23/2025 12:08 PM    KETUA NEGATIVE 03/23/2025 12:08 PM    AMORPHOUS NOT REPORTED 05/31/2015 01:19 PM     Urine Sodium:   No components found

## 2025-03-24 NOTE — TRANSITION OF CARE
[facility]   Department of Internal Medicine & Critical Care - Staff Internal Medicine Teaching Service    Critical care team - Resident sign-out to medicine service      Date and time: 3/24/2025 1:05 PM  Patient's name:  Sultana Noyola  Medical Record Number: [id number]  Patient's account/billing number: [id number]  Patient's YOB: 1955  Age: 69 y.o.  Date of Admission: 3/23/2025 11:32 AM  Length of stay during current admission: 1    Primary Care Physician: Azael Friedman MD    Code Status: Full Code    Mode of physician to physician communication:        [x] Via telephone   [] In person     Date and time of sign-out: 3/24/2025 1:05 PM    Accepting Internal Medicine resident: Dr. May     Accepting Medicine team: IM Team 1    Accepting team's attending: Dr. Blunt     Patient's current ICU Bed:  3018     Patient's assigned bed on floor:  2010        [] Med-Surg Monitored [x] Step-down       [] Psychiatry ICU       [] Psych floor     Reason for ICU admission:     [] Intubated (date ) and Extubated (date )                                   []  Protect airway      [] ARF              [] Other     [x] Monitoring more than 1 hour   [] Agressive IVF resuscitation       [x] Titrating drips to maintain hemodynamic stability                   []  Levo                [] Vaso               [x] Epinephrine                [] other   [] Post arrest   [] CRRT/ CVVH    Others:-    ICU course summary:     History was obtained from chart review and the patient.       Sultana Noyola is a 69 y.o. with PMH of HTN (lisinopril 10 BID, Coreg 12.5), HLD, type II DM on metformin, bipolar 1 and Anxiety (oxcarbazepine 150 TID BuSpar 10 TID, mirtazapine 15, Cymbalta 20, trazodone 100), COPD on Symbicort (2-3L NC at baseline), chronic pancreatitis with prior Hx of with Hx of bowel resection and laparotomy with adhesion lysis 8/22, PAD (Plavix 75, cilostazol 60) s/p Left  MD Dinah    Department of Internal Medicine/Critical Care 30  [facility], [place]         3/24/2025, 1:05 PM

## 2025-03-24 NOTE — PROGRESS NOTES
[facility] ED from nursing facility with SOB and CP, for last few days, history of recent pneumonia, EKG showed A-fib with HR of 57, no acute ST-T changes, patient went into V. tach 150s 2 separate times s/p successful cardioversion, found to have severe hyperkalemia 7.4 with ARF creatinine 3.2 BUN 68, treated with insulin dextrose, Lokelma and calcium gluconate as well as albuterol, started on amiodarone, transferred to [place] for possible urgent dialysis after discussion with cardiology and nephrology.     CXR showing pulmonary congestion with borderline cardiomegaly, peribronchial cuffing potentially to reactive disease, bronchitis or pulmonary congestion    On initial exam, patient is AOx4, saturating well on 4 L NC, maintaining MAP above 65 with HR 40s on epinephrine drip with increasing requirement, cardiology consulted, continue to have ectopies on monitor, had a run of NSVT while in the room. She is complaining of CP after receiving the Cvx2, she is very anxious    Patient was admitted to MICU for closer monitoring given arrhythmias, need for emergent hemodialysis, right IJ temporary HD catheter placed and underwent hemodialysis 1 session with initial improvement of potassium however subsequently potassium went up again 5.6 then 5.9 required medical treatment with insulin dextrose, Lokelma and calcium gluconate.    Daily Updates:        PAST MEDICAL HISTORY:         Diagnosis Date    ADHD (attention deficit hyperactivity disorder)     Anxiety     BiPAP (biphasic positive airway pressure) dependence     Bipolar 1 disorder (HCC)     Chronic back pain     denies pain mgmnt    Depression     Valentino catheter in place     Hyperlipidemia     Hypertension     Nursing home resident     Coni / cristina salas    Pancreatitis     PICC (peripherally inserted central catheter) in place     Pleural effusion     bilat.    Respiratory failure (HCC)     acute hypoxemic    Stenosis of intestine (HCC)          PAST SURGICAL  MAKING:    NEUROLOGIC:  Bipolar 1 , Anxiety  Neuropathy - (gabapentin 400 twice daily)  - Mental status: AOx4, currently at baseline  - Monitor neurochecks per protocol  At home on (oxcarbazepine 150 TID BuSpar 10 TID, mirtazapine 15, Cymbalta 20, trazodone 100 nightly)  Resume home meds as appropriate and tolerated    CARDIOVASCULAR:  BP Range: Systolic (24hrs), Av , Min:88 , Max:206     Diastolic (24hrs), Av, Min:33, Max:177    Pulse Range: Pulse  Av.1  Min: 37  Max: 158  V. tach s/p synchronized cardioversion x 2  Bradycardia -improved  Hx of A-fib not on AC due to low [id number].  Hypertension, hyperlipidemia (lisinopril 10 BID, Coreg 12.5, rosuvastatin 20)  - EKG: A-fib with SVR 57 no acute ST-T changes  - Echo: TTE  LVEF 60-65%, diastolic dysfunction  - Goal SBP > 100 or MAP > 65, Goal HR > 60  Received amiodarone 150 x 2, synchronized cardioversion x 2 at [place] ED.  Currently bradycardic, started on epi drip with goal HR above 60.  Arrhythmias are likely in setting of severe hyperkalemia  Obtain TTE, monitor troponin  Cardiology consulted, follow-up on recommendations.  Current [id number] scores elevated around 6, will need AC/Heparin/DOAC  Hx of PAD s/p left iliac stenting 10/22  On Plavix and cilostazol    PULMONARY:  Respiration Range: Resp  Av.2  Min: 11  Max: 33  Current Pulse Ox: SpO2: 96 %  24HR Pulse Ox Range: SpO2  Av.5 %  Min: 81 %  Max: 100 %  Hx COPD with chronic hypoxia on 3 L NC at baseline.  concern for bronchitis with COPD exacerbation  Continue supplemental O2 and wean down as tolerated.  Resume Symbicort, DuoNeb, steroids  - ABG: As needed  - CXR pulmonary vascular congestion with peribronchial cuffing potentially could be related to reactive airway disease, bronchitis or pulmonary congestion.  Respiratory panel, pneumonia workup has been negative  Continue azithromycin and ceftriaxone, consider de-escalating ceftriaxone given no signs of  pneumonia    RENAL/FLUID/ELECTROLYTE:  - Dx: ARF with life-threatening hyperkalemia (causing bradycardia, VT)-improving  Based on CKD stage IIIa with creatinine 1.1-1.3  - Creatinine 3.2, potassium 7.4 on presentation  Monitor strict I's and O's, Valentino in place.  Avoid nephrotoxins  - IVF: Per nephrology  Nephrology on board, appreciate recommendations.  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  Monitor bowel movements and serial abdominal exam    ID:  Tmax: Temp (24hrs), Av.5 °F (36.9 °C), Min:97.8 °F (36.6 °C), Max:99.3 °F (37.4 °C)    Temperature Range: Temp: 99 °F (37.2 °C) Temp  Av.5 °F (36.9 °C)  Min: 97.8 °F (36.6 °C)  Max: 99.3 °F (37.4 °C)  - WBC   Lab Results   Component Value Date    WBC 14.0 (H) 2025     - Dx: Leukocytosis with concern for bronchitis/CAP and COPD exacerbation  - Antimicrobials: Ceftriaxone azithromycin  Blood cultures NGTD.  Pneumonia workup negative so far  UA not consistent with UTI although patient described dysuria and burning sensation prior to arrival    HEME:   Recent Labs     25  0935   HGB 10.7*    stable  Monitor daily CBC  - Dx: Chronic anemia    ENDOCRINE:  - Dx: : Type II DM on metformin  A1c was 7.2 in   - Continue to monitor blood glucose, goal <180  - Most recent BGL is   Recent Labs     25  1904 25  2304

## 2025-03-24 NOTE — PROGRESS NOTES
1445h- Called for a report, RN on Lunch, will call back later.     1520h Report given to KESHA Marshall.     1600h- Transferred patient to CAR 2- Room 2010, on O2 support 3LPM, vitally stable, patient is oriented X4, GCS 15/15. Spo2- 100%.   Belongings and File given to RN.     Updated the family regarding the transfer.

## 2025-03-24 NOTE — PROGRESS NOTES
[facility]     Department of Internal Medicine - Staff Internal Medicine Service   ICU PATIENT TRANSFER NOTE        Patient:  Sultana Noyola  YOB: 1955  MRN: [id number]     Acct: [id number]     Admit date: 3/23/2025    Code Status:-  Full code     Reason for ICU Admission:-Hyperkalemia    SUPPORT DEVICES: [] Ventilator [] BIPAP  [x] Nasal Cannula [] Room Air    Consultations:- [] Cardiology [x] Nephrology  [] Hemo onco  [] GI                               [] ID [] ENT  [] Rheum [] Endo   []Physiotherapy                                 Others:-     NUTRITION:  [] NPO [] Tube Feeding (Specify: ) [] TPN  [x] PO    Central Lines:- [x] No   [] Yes           If yes - Days/Date of Insertion.    Pt seen,examined and Chart reviewed.    ICU COURSE:    69 y.o. with PMH of     HTN (lisinopril 10, Coreg 12.5)  HLD  Type II DM on metformin  Bipolar 1  Anxiety (oxcarbazepine 150 TID BuSpar 10 TID, mirtazapine 15, Cymbalta 20), COPD on Symbicort (2-3L NC at baseline)  Chronic pancreatitis prior most recent adhesions with Hx of bowel resection and laparotomy with adhesion lysis 8/22  PAD (Plavix 75, cilostazol 60)  CKD stage 3A (1.1-1.3)  Hx of Afib (not on AC low [id number])     Presented to [place] ED from nursing facility with SOB and CP on 3-23-35.  Patient went into V. tach in the 150s on 2 separate occasions and underwent cardioversion.  Was also found to have potassium level of 7.4 with creatinine of 3.2 and BUN of 68.  Patient was initially treated with insulin, dextrose Lokelma and calcium gluconate as well as albuterol.  Patient was started on amiodarone and transferred to [place] and placed in the ICU.    While in the ICU, hemodialysis was initiated. She underwent CT abdomen which showed without acute inflammatory or obstructive process, chronic pancreatitis, trace bilateral pleural effusion with mild pulmonary edema. Fat-containing midline and umbilical hernia. Valentino catheter  mag Q8  -Lokelma  -F/u nephrology recommendations.     History of SBO  Chronic constipation  -Monitor BM    Type 2 diabetes  -LDSS and 5U Lantus daily.   -Pt did receive Insulin and Dextrose for hyperkalemia   -POCT glucose    Anxiety, bipolar 1  Neuropathy  -Continue Buspar, Cymbalta, Trazadone, Gabapentin, Remeron, Oxcarbzepine    Heather Talley MD             Department of Internal Medicine, PGY-1  [facility], [place]           3/24/2025, 12:57 PM

## 2025-03-25 ENCOUNTER — APPOINTMENT (OUTPATIENT)
Dept: NUCLEAR MEDICINE | Age: 70
End: 2025-03-25
Payer: MEDICARE

## 2025-03-25 PROBLEM — J44.9 CHRONIC OBSTRUCTIVE PULMONARY DISEASE (HCC): Status: ACTIVE | Noted: 2025-03-25

## 2025-03-25 PROBLEM — G47.33 OSA (OBSTRUCTIVE SLEEP APNEA): Status: ACTIVE | Noted: 2025-03-25

## 2025-03-25 LAB
ALBUMIN PERCENT: 62 % (ref 56–66)
ALBUMIN SERPL-MCNC: 3.5 G/DL (ref 3.2–5.2)
ALPHA 2 PERCENT: 12 % (ref 7–12)
ALPHA1 GLOB SERPL ELPH-MCNC: 0.3 G/DL (ref 0.1–0.4)
ALPHA1 GLOB SERPL ELPH-MCNC: 6 % (ref 3–5)
ALPHA2 GLOB SERPL ELPH-MCNC: 0.7 G/DL (ref 0.5–0.9)
ANION GAP SERPL CALCULATED.3IONS-SCNC: 10 MMOL/L (ref 9–16)
ANION GAP SERPL CALCULATED.3IONS-SCNC: 11 MMOL/L (ref 9–16)
ANION GAP SERPL CALCULATED.3IONS-SCNC: 11 MMOL/L (ref 9–16)
ANTI-XA UNFRAC HEPARIN: 0.25 IU/L
ANTI-XA UNFRAC HEPARIN: 0.35 IU/L
ANTI-XA UNFRAC HEPARIN: 0.37 IU/L
B-GLOBULIN SERPL ELPH-MCNC: 0.6 G/DL (ref 0.7–1.4)
B-GLOBULIN SERPL ELPH-MCNC: 12 % (ref 8–13)
BASOPHILS # BLD: 0.05 K/UL (ref 0–0.2)
BASOPHILS NFR BLD: 1 % (ref 0–2)
BUN SERPL-MCNC: 22 MG/DL (ref 8–23)
BUN SERPL-MCNC: 24 MG/DL (ref 8–23)
BUN SERPL-MCNC: 27 MG/DL (ref 8–23)
CALCIUM SERPL-MCNC: 8.4 MG/DL (ref 8.6–10.4)
CALCIUM SERPL-MCNC: 8.7 MG/DL (ref 8.6–10.4)
CALCIUM SERPL-MCNC: 9.2 MG/DL (ref 8.6–10.4)
CHLORIDE SERPL-SCNC: 101 MMOL/L (ref 98–107)
CHLORIDE SERPL-SCNC: 99 MMOL/L (ref 98–107)
CHLORIDE SERPL-SCNC: 99 MMOL/L (ref 98–107)
CO2 SERPL-SCNC: 28 MMOL/L (ref 20–31)
CO2 SERPL-SCNC: 29 MMOL/L (ref 20–31)
CO2 SERPL-SCNC: 31 MMOL/L (ref 20–31)
CREAT SERPL-MCNC: 1.2 MG/DL (ref 0.6–0.9)
CREAT SERPL-MCNC: 1.4 MG/DL (ref 0.6–0.9)
CREAT SERPL-MCNC: 1.4 MG/DL (ref 0.6–0.9)
EKG ATRIAL RATE: 65 BPM
EKG P AXIS: 46 DEGREES
EKG P-R INTERVAL: 146 MS
EKG Q-T INTERVAL: 422 MS
EKG QRS DURATION: 82 MS
EKG QTC CALCULATION (BAZETT): 438 MS
EKG R AXIS: -2 DEGREES
EKG T AXIS: 74 DEGREES
EKG VENTRICULAR RATE: 65 BPM
EOSINOPHIL # BLD: 0.1 K/UL (ref 0–0.44)
EOSINOPHILS RELATIVE PERCENT: 1 % (ref 1–4)
ERYTHROCYTE [DISTWIDTH] IN BLOOD BY AUTOMATED COUNT: 13.6 % (ref 11.8–14.4)
GAMMA GLOB SERPL ELPH-MCNC: 0.5 G/DL (ref 0.5–1.5)
GAMMA GLOBULIN %: 9 % (ref 11–19)
GFR, ESTIMATED: 41 ML/MIN/1.73M2
GFR, ESTIMATED: 41 ML/MIN/1.73M2
GFR, ESTIMATED: 49 ML/MIN/1.73M2
GLUCOSE BLD-MCNC: 128 MG/DL (ref 65–105)
GLUCOSE BLD-MCNC: 153 MG/DL (ref 65–105)
GLUCOSE BLD-MCNC: 198 MG/DL (ref 65–105)
GLUCOSE BLD-MCNC: 202 MG/DL (ref 65–105)
GLUCOSE SERPL-MCNC: 136 MG/DL (ref 74–99)
GLUCOSE SERPL-MCNC: 183 MG/DL (ref 74–99)
GLUCOSE SERPL-MCNC: 208 MG/DL (ref 74–99)
HCT VFR BLD AUTO: 27.6 % (ref 36.3–47.1)
HGB BLD-MCNC: 9 G/DL (ref 11.9–15.1)
IMM GRANULOCYTES # BLD AUTO: 0.06 K/UL (ref 0–0.3)
IMM GRANULOCYTES NFR BLD: 1 %
ITYP INTERPRETATION: NORMAL
LYMPHOCYTES NFR BLD: 1.91 K/UL (ref 1.1–3.7)
LYMPHOCYTES RELATIVE PERCENT: 17 % (ref 24–43)
MAGNESIUM SERPL-MCNC: 2 MG/DL (ref 1.6–2.4)
MCH RBC QN AUTO: 29.9 PG (ref 25.2–33.5)
MCHC RBC AUTO-ENTMCNC: 32.6 G/DL (ref 28.4–34.8)
MCV RBC AUTO: 91.7 FL (ref 82.6–102.9)
MONOCYTES NFR BLD: 0.8 K/UL (ref 0.1–1.2)
MONOCYTES NFR BLD: 7 % (ref 3–12)
NEUTROPHILS NFR BLD: 74 % (ref 36–65)
NEUTS SEG NFR BLD: 8.14 K/UL (ref 1.5–8.1)
NRBC BLD-RTO: 0 PER 100 WBC
P E INTERPRETATION, U: NORMAL
PATH REV: NORMAL
PATHOLOGIST: ABNORMAL
PATHOLOGIST: NORMAL
PLATELET # BLD AUTO: ABNORMAL K/UL (ref 138–453)
PLATELET, FLUORESCENCE: ABNORMAL K/UL (ref 138–453)
POTASSIUM SERPL-SCNC: 3.8 MMOL/L (ref 3.7–5.3)
POTASSIUM SERPL-SCNC: 4.2 MMOL/L (ref 3.7–5.3)
POTASSIUM SERPL-SCNC: 4.8 MMOL/L (ref 3.7–5.3)
PROT PATTERN SERPL ELPH-IMP: ABNORMAL
PROT SERPL-MCNC: 5.6 G/DL (ref 6.6–8.7)
RBC # BLD AUTO: 3.01 M/UL (ref 3.95–5.11)
SODIUM SERPL-SCNC: 139 MMOL/L (ref 136–145)
SODIUM SERPL-SCNC: 139 MMOL/L (ref 136–145)
SODIUM SERPL-SCNC: 141 MMOL/L (ref 136–145)
SPECIMEN TYPE: NORMAL
TOTAL PROT. SUM,%: 101 % (ref 98–102)
TOTAL PROT. SUM: 5.6 G/DL (ref 6.3–8.2)
URINE TOTAL PROTEIN: 12 MG/DL
WBC OTHER # BLD: 11.1 K/UL (ref 3.5–11.3)

## 2025-03-25 PROCEDURE — 99233 SBSQ HOSP IP/OBS HIGH 50: CPT | Performed by: INTERNAL MEDICINE

## 2025-03-25 PROCEDURE — 85025 COMPLETE CBC W/AUTO DIFF WBC: CPT

## 2025-03-25 PROCEDURE — 80048 BASIC METABOLIC PNL TOTAL CA: CPT

## 2025-03-25 PROCEDURE — 94761 N-INVAS EAR/PLS OXIMETRY MLT: CPT

## 2025-03-25 PROCEDURE — 3430000000 HC RX DIAGNOSTIC RADIOPHARMACEUTICAL: Performed by: NURSE PRACTITIONER

## 2025-03-25 PROCEDURE — A9500 TC99M SESTAMIBI: HCPCS | Performed by: NURSE PRACTITIONER

## 2025-03-25 PROCEDURE — 2500000003 HC RX 250 WO HCPCS: Performed by: INTERNAL MEDICINE

## 2025-03-25 PROCEDURE — 6370000000 HC RX 637 (ALT 250 FOR IP)

## 2025-03-25 PROCEDURE — 2500000003 HC RX 250 WO HCPCS: Performed by: NURSE PRACTITIONER

## 2025-03-25 PROCEDURE — 2500000003 HC RX 250 WO HCPCS

## 2025-03-25 PROCEDURE — 2060000000 HC ICU INTERMEDIATE R&B

## 2025-03-25 PROCEDURE — 99233 SBSQ HOSP IP/OBS HIGH 50: CPT | Performed by: HOSPITALIST

## 2025-03-25 PROCEDURE — 93005 ELECTROCARDIOGRAM TRACING: CPT | Performed by: NURSE PRACTITIONER

## 2025-03-25 PROCEDURE — 36415 COLL VENOUS BLD VENIPUNCTURE: CPT

## 2025-03-25 PROCEDURE — 83735 ASSAY OF MAGNESIUM: CPT

## 2025-03-25 PROCEDURE — 6360000002 HC RX W HCPCS: Performed by: NURSE PRACTITIONER

## 2025-03-25 PROCEDURE — 93010 ELECTROCARDIOGRAM REPORT: CPT | Performed by: INTERNAL MEDICINE

## 2025-03-25 PROCEDURE — 2700000000 HC OXYGEN THERAPY PER DAY

## 2025-03-25 PROCEDURE — 99233 SBSQ HOSP IP/OBS HIGH 50: CPT | Performed by: NURSE PRACTITIONER

## 2025-03-25 PROCEDURE — 99232 SBSQ HOSP IP/OBS MODERATE 35: CPT | Performed by: INTERNAL MEDICINE

## 2025-03-25 PROCEDURE — 85520 HEPARIN ASSAY: CPT

## 2025-03-25 PROCEDURE — 6370000000 HC RX 637 (ALT 250 FOR IP): Performed by: NURSE PRACTITIONER

## 2025-03-25 PROCEDURE — 6360000002 HC RX W HCPCS

## 2025-03-25 PROCEDURE — 82947 ASSAY GLUCOSE BLOOD QUANT: CPT

## 2025-03-25 PROCEDURE — 85055 RETICULATED PLATELET ASSAY: CPT

## 2025-03-25 RX ORDER — HYDROCODONE BITARTRATE AND ACETAMINOPHEN 5; 325 MG/1; MG/1
1 TABLET ORAL ONCE
Status: COMPLETED | OUTPATIENT
Start: 2025-03-25 | End: 2025-03-25

## 2025-03-25 RX ORDER — MORPHINE SULFATE 2 MG/ML
2 INJECTION, SOLUTION INTRAMUSCULAR; INTRAVENOUS EVERY 4 HOURS PRN
Refills: 0 | Status: DISCONTINUED | OUTPATIENT
Start: 2025-03-25 | End: 2025-03-27

## 2025-03-25 RX ORDER — CARVEDILOL 6.25 MG/1
6.25 TABLET ORAL 2 TIMES DAILY WITH MEALS
Status: DISCONTINUED | OUTPATIENT
Start: 2025-03-25 | End: 2025-03-25

## 2025-03-25 RX ORDER — BUTALBITAL, ACETAMINOPHEN AND CAFFEINE 50; 325; 40 MG/1; MG/1; MG/1
1 TABLET ORAL ONCE
Status: CANCELLED | OUTPATIENT
Start: 2025-03-25 | End: 2025-03-25

## 2025-03-25 RX ORDER — AMLODIPINE BESYLATE 5 MG/1
5 TABLET ORAL DAILY
Status: DISCONTINUED | OUTPATIENT
Start: 2025-03-25 | End: 2025-03-25

## 2025-03-25 RX ORDER — HYDRALAZINE HYDROCHLORIDE 20 MG/ML
10 INJECTION INTRAMUSCULAR; INTRAVENOUS EVERY 4 HOURS PRN
Status: DISCONTINUED | OUTPATIENT
Start: 2025-03-25 | End: 2025-03-28 | Stop reason: HOSPADM

## 2025-03-25 RX ORDER — AMLODIPINE BESYLATE 10 MG/1
10 TABLET ORAL DAILY
Status: DISCONTINUED | OUTPATIENT
Start: 2025-03-26 | End: 2025-03-28 | Stop reason: HOSPADM

## 2025-03-25 RX ORDER — LISINOPRIL 10 MG/1
10 TABLET ORAL DAILY
Status: DISCONTINUED | OUTPATIENT
Start: 2025-03-25 | End: 2025-03-25

## 2025-03-25 RX ORDER — AMLODIPINE BESYLATE 5 MG/1
5 TABLET ORAL ONCE
Status: COMPLETED | OUTPATIENT
Start: 2025-03-25 | End: 2025-03-25

## 2025-03-25 RX ORDER — CARVEDILOL 12.5 MG/1
12.5 TABLET ORAL 2 TIMES DAILY WITH MEALS
Status: DISCONTINUED | OUTPATIENT
Start: 2025-03-25 | End: 2025-03-26

## 2025-03-25 RX ADMIN — GABAPENTIN 400 MG: 400 CAPSULE ORAL at 08:33

## 2025-03-25 RX ADMIN — BUSPIRONE HYDROCHLORIDE 5 MG: 5 TABLET ORAL at 12:43

## 2025-03-25 RX ADMIN — INSULIN LISPRO 1 UNITS: 100 INJECTION, SOLUTION INTRAVENOUS; SUBCUTANEOUS at 20:35

## 2025-03-25 RX ADMIN — OXCARBAZEPINE 150 MG: 150 TABLET, FILM COATED ORAL at 20:35

## 2025-03-25 RX ADMIN — MORPHINE SULFATE 2 MG: 2 INJECTION, SOLUTION INTRAMUSCULAR; INTRAVENOUS at 16:56

## 2025-03-25 RX ADMIN — SODIUM BICARBONATE: 84 INJECTION, SOLUTION INTRAVENOUS at 01:50

## 2025-03-25 RX ADMIN — DULOXETINE HYDROCHLORIDE 20 MG: 20 CAPSULE, DELAYED RELEASE ORAL at 08:52

## 2025-03-25 RX ADMIN — TETRAKIS(2-METHOXYISOBUTYLISOCYANIDE)COPPER(I) TETRAFLUOROBORATE 41.9 MILLICURIE: 1 INJECTION, POWDER, LYOPHILIZED, FOR SOLUTION INTRAVENOUS at 12:29

## 2025-03-25 RX ADMIN — ACETAMINOPHEN 650 MG: 325 TABLET ORAL at 19:43

## 2025-03-25 RX ADMIN — HYDROCODONE BITARTRATE AND ACETAMINOPHEN 1 TABLET: 5; 325 TABLET ORAL at 01:18

## 2025-03-25 RX ADMIN — AMLODIPINE BESYLATE 5 MG: 5 TABLET ORAL at 08:33

## 2025-03-25 RX ADMIN — ROSUVASTATIN CALCIUM 20 MG: 20 TABLET, FILM COATED ORAL at 19:44

## 2025-03-25 RX ADMIN — BUSPIRONE HYDROCHLORIDE 5 MG: 5 TABLET ORAL at 19:43

## 2025-03-25 RX ADMIN — HEPARIN SODIUM 12 UNITS/KG/HR: 10000 INJECTION, SOLUTION INTRAVENOUS at 14:44

## 2025-03-25 RX ADMIN — OXCARBAZEPINE 150 MG: 150 TABLET, FILM COATED ORAL at 08:51

## 2025-03-25 RX ADMIN — CILOSTAZOL 50 MG: 100 TABLET ORAL at 08:50

## 2025-03-25 RX ADMIN — MIRTAZAPINE 15 MG: 15 TABLET, FILM COATED ORAL at 19:44

## 2025-03-25 RX ADMIN — INSULIN LISPRO 1 UNITS: 100 INJECTION, SOLUTION INTRAVENOUS; SUBCUTANEOUS at 16:57

## 2025-03-25 RX ADMIN — HYDROCODONE BITARTRATE AND ACETAMINOPHEN 1 TABLET: 5; 325 TABLET ORAL at 08:55

## 2025-03-25 RX ADMIN — TRAZODONE HYDROCHLORIDE 100 MG: 100 TABLET ORAL at 19:43

## 2025-03-25 RX ADMIN — GABAPENTIN 400 MG: 400 CAPSULE ORAL at 19:44

## 2025-03-25 RX ADMIN — PANTOPRAZOLE SODIUM 40 MG: 40 TABLET, DELAYED RELEASE ORAL at 08:33

## 2025-03-25 RX ADMIN — CARVEDILOL 12.5 MG: 12.5 TABLET, FILM COATED ORAL at 16:57

## 2025-03-25 RX ADMIN — PREDNISONE 40 MG: 20 TABLET ORAL at 08:34

## 2025-03-25 RX ADMIN — SODIUM CHLORIDE, PRESERVATIVE FREE 10 ML: 5 INJECTION INTRAVENOUS at 19:44

## 2025-03-25 RX ADMIN — INSULIN GLARGINE 5 UNITS: 100 INJECTION, SOLUTION SUBCUTANEOUS at 08:33

## 2025-03-25 RX ADMIN — BUSPIRONE HYDROCHLORIDE 5 MG: 5 TABLET ORAL at 08:34

## 2025-03-25 RX ADMIN — AMLODIPINE BESYLATE 5 MG: 5 TABLET ORAL at 12:43

## 2025-03-25 RX ADMIN — CARVEDILOL 6.25 MG: 6.25 TABLET, FILM COATED ORAL at 08:37

## 2025-03-25 RX ADMIN — CILOSTAZOL 50 MG: 100 TABLET ORAL at 19:45

## 2025-03-25 RX ADMIN — HEPARIN SODIUM 2000 UNITS: 1000 INJECTION, SOLUTION INTRAVENOUS; SUBCUTANEOUS at 01:07

## 2025-03-25 RX ADMIN — WATER 2000 MG: 1 INJECTION INTRAMUSCULAR; INTRAVENOUS; SUBCUTANEOUS at 16:57

## 2025-03-25 RX ADMIN — HYDROCODONE BITARTRATE AND ACETAMINOPHEN 1 TABLET: 5; 325 TABLET ORAL at 20:50

## 2025-03-25 RX ADMIN — SODIUM CHLORIDE, PRESERVATIVE FREE 10 ML: 5 INJECTION INTRAVENOUS at 12:29

## 2025-03-25 RX ADMIN — AZITHROMYCIN DIHYDRATE 500 MG: 250 TABLET ORAL at 08:51

## 2025-03-25 RX ADMIN — MORPHINE SULFATE 2 MG: 2 INJECTION, SOLUTION INTRAMUSCULAR; INTRAVENOUS at 12:44

## 2025-03-25 RX ADMIN — SODIUM CHLORIDE, PRESERVATIVE FREE 5 ML: 5 INJECTION INTRAVENOUS at 08:35

## 2025-03-25 RX ADMIN — HYDRALAZINE HYDROCHLORIDE 10 MG: 20 INJECTION INTRAMUSCULAR; INTRAVENOUS at 18:44

## 2025-03-25 ASSESSMENT — PAIN SCALES - GENERAL
PAINLEVEL_OUTOF10: 8
PAINLEVEL_OUTOF10: 0
PAINLEVEL_OUTOF10: 8
PAINLEVEL_OUTOF10: 6
PAINLEVEL_OUTOF10: 9
PAINLEVEL_OUTOF10: 6
PAINLEVEL_OUTOF10: 8
PAINLEVEL_OUTOF10: 0
PAINLEVEL_OUTOF10: 8

## 2025-03-25 ASSESSMENT — ENCOUNTER SYMPTOMS
GASTROINTESTINAL NEGATIVE: 1
SHORTNESS OF BREATH: 1
ALLERGIC/IMMUNOLOGIC NEGATIVE: 1
COUGH: 1
EYES NEGATIVE: 1

## 2025-03-25 ASSESSMENT — PAIN DESCRIPTION - LOCATION
LOCATION: CHEST
LOCATION: HEAD

## 2025-03-25 ASSESSMENT — PAIN DESCRIPTION - DESCRIPTORS
DESCRIPTORS: SQUEEZING
DESCRIPTORS: ACHING;PRESSURE
DESCRIPTORS: ACHING
DESCRIPTORS: SQUEEZING

## 2025-03-25 ASSESSMENT — PAIN DESCRIPTION - ORIENTATION: ORIENTATION: RIGHT;LEFT

## 2025-03-25 ASSESSMENT — PAIN SCALES - WONG BAKER: WONGBAKER_NUMERICALRESPONSE: NO HURT

## 2025-03-25 NOTE — PLAN OF CARE
Problem: Safety - Adult  Goal: Free from fall injury  Outcome: Progressing     Problem: Discharge Planning  Goal: Discharge to home or other facility with appropriate resources  Outcome: Progressing     Problem: Pain  Goal: Verbalizes/displays adequate comfort level or baseline comfort level  Outcome: Progressing     Problem: Skin/Tissue Integrity  Goal: Skin integrity remains intact  Description: 1.  Monitor for areas of redness and/or skin breakdown  2.  Assess vascular access sites hourly  3.  Every 4-6 hours minimum:  Change oxygen saturation probe site  4.  Every 4-6 hours:  If on nasal continuous positive airway pressure, respiratory therapy assess nares and determine need for appliance change or resting period  Outcome: Progressing     Problem: ABCDS Injury Assessment  Goal: Absence of physical injury  Outcome: Progressing     Problem: Respiratory - Adult  Goal: Achieves optimal ventilation and oxygenation  Outcome: Progressing     Problem: Cardiovascular - Adult  Goal: Maintains optimal cardiac output and hemodynamic stability  Outcome: Progressing     Problem: Skin/Tissue Integrity - Adult  Goal: Skin integrity remains intact  Description: 1.  Monitor for areas of redness and/or skin breakdown  2.  Assess vascular access sites hourly  3.  Every 4-6 hours minimum:  Change oxygen saturation probe site  4.  Every 4-6 hours:  If on nasal continuous positive airway pressure, respiratory therapy assess nares and determine need for appliance change or resting period  Outcome: Progressing     Problem: Genitourinary - Adult  Goal: Absence of urinary retention  Outcome: Progressing     Problem: Metabolic/Fluid and Electrolytes - Adult  Goal: Electrolytes maintained within normal limits  Outcome: Progressing  Goal: Hemodynamic stability and optimal renal function maintained  Outcome: Progressing  Goal: Glucose maintained within prescribed range  Outcome: Progressing

## 2025-03-25 NOTE — PROGRESS NOTES
PULMONARY & CRITICAL CARE MEDICINE PROGRESS  NOTE     Patient:  Sultana Noyola  MRN: [id number]  Admit date: 3/23/2025  Primary Care Physician: Azael Friedman MD  Consulting Physician: Royr Alexander*  CODE Status: Full Code  LOS: 2    SUBJECTIVE     I personally interviewed/examined the patient, reviewed interval history and interpreted all available radiographic, laboratory data at the time of service.      Chief Compliant/Reason for Initial Consult:     Dyspnea with chronic respiratory failure with COPD and hyperkalemia    Brief Hospital Course:  The patient is a 69 y.o. female with PMH of HTN (lisinopril 10 BID, Coreg 12.5), HLD, type II DM on metformin, bipolar 1 and Anxiety (oxcarbazepine 150 TID BuSpar 10 TID, mirtazapine 15, Cymbalta 20, trazodone 100), COPD on Symbicort (2-3L NC at baseline), chronic pancreatitis with prior Hx of with Hx of bowel resection and laparotomy with adhesion lysis 8/22, PAD (Plavix 75, cilostazol 60) s/p Left iliac stenting 10/22, CKD stage 3A (1.1-1.3), Hx of Afib (not on AC low [id number])     Presented to [place] ED from assisted living facility with SOB and CP, for last few days, EKG showed A-fib with HR of 57, no acute ST-T changes, patient went into V. tach 150s-160s 2 separate times s/p successful [place] cardioversion, found to have severe hyperkalemia 7.4 with ARF creatinine 3.2 BUN 68, treated with insulin dextrose, Lokelma and calcium gluconate as well as albuterol, started on amiodarone, transferred to [place] for possible urgent dialysis after discussion with cardiology and nephrology.     CXR showing pulmonary congestion with borderline cardiomegaly, peribronchial cuffing potentially to reactive disease, bronchitis or pulmonary congestion     Repeat potassium was still 7.5, Patient was admitted to MICU for closer monitoring given arrhythmias, need for emergent hemodialysis and    Pulmonary and Critical Care Medicine           3/25/2025, 1:44 PM    This note is created with the assistance of a speech recognition program.  While intent was to generate a document that actually reflects the content of the visit, the document can still have some errors including those of syntax and sound-alike substitutions which may escape proof reading.  It such instances, actual meaning can be extrapolated by contextual diversion.

## 2025-03-25 NOTE — PROGRESS NOTES
[facility]   Progress Note                   Date:   3/25/2025  Patient name: Sultana Noyola  Date of admission:  3/23/2025 11:32 AM  MRN:   [id number]  YOB: 1955  PCP: Azael Friedman MD    Reason for Admission: Ventricular tachyarrhythmia (HCC) [I47.20]  Hyperkalemia [E87.5]  Bradycardia [R00.1]    Subjective:       Clinical Changes / Abnormalities: Pt seen and examined in the room.  Patient resting in bed. Pt reports a HA along with tenderness over left chest where she was shocked, also with slight shortness of breath.  Labs, vitals and tele reviewed- SR 64  Heparin drip infusing.    Medications:   Scheduled Meds:   carvedilol  6.25 mg Oral BID WC    amLODIPine  5 mg Oral Daily    rosuvastatin  20 mg Oral Nightly    mirtazapine  15 mg Oral Nightly    pantoprazole  40 mg Oral QAM AC    predniSONE  40 mg Oral Daily    cilostazol  50 mg Oral BID    insulin glargine  5 Units SubCUTAneous Daily    sodium chloride flush  5-40 mL IntraVENous 2 times per day    busPIRone  5 mg Oral TID    DULoxetine  20 mg Oral Daily    OXcarbazepine  150 mg Oral BID    ipratropium 0.5 mg-albuterol 2.5 mg  1 Dose Inhalation BID    budesonide-formoterol  2 puff Inhalation BID RT    cefTRIAXone (ROCEPHIN) IV  2,000 mg IntraVENous Q24H    insulin lispro  0-4 Units SubCUTAneous 4x Daily AC & HS    gabapentin  400 mg Oral BID    traZODone  100 mg Oral Nightly     Continuous Infusions:   dextrose      sodium bicarbonate 150 mEq in sterile water infusion 75 mL/hr at 03/25/25 0150    heparin (PORCINE) Infusion 12 Units/kg/hr (03/25/25 0923)    sodium chloride       CBC:   Recent Labs     03/23/25  0935 03/24/25  0809 03/25/25  0801   WBC 14.0* 8.5 11.1   HGB 10.7* 9.2* 9.0*    198 See Reflexed IPF Result     BMP:    Recent Labs     03/24/25  1501 03/24/25  2336 03/25/25  0801   * 139 141   K 5.1 3.8 4.2    101 99   CO2 22 28 31   BUN 25* 27* 24*   CREATININE 1.4* 1.4* 1.4*   GLUCOSE 164*  183* 136*     Hepatic:   Recent Labs     03/23/25  0935   AST 25   ALT 29   BILITOT 0.3   ALKPHOS 107*     Troponin:   Recent Labs     03/23/25  1147 03/23/25  2304 03/24/25  0418   TROPHS 26* 27* 24*     BNP: No results for input(s): \"BNP\" in the last 72 hours.  Lipids: No results for input(s): \"CHOL\", \"HDL\" in the last 72 hours.    Invalid input(s): \"LDLCALCU\"  INR:   Recent Labs     03/24/25  1501   INR 1.2       Objective:   Vitals: BP (!) 180/65   Pulse 70   Temp 98.2 °F (36.8 °C) (Oral)   Resp 14   Ht 1.626 m (5' 4\")   Wt 95.1 kg (209 lb 11.2 oz)   SpO2 93%   BMI 35.99 kg/m²   General appearance: alert and cooperative with exam  HEENT: Head: Normocephalic, no lesions, without obvious abnormality.  Neck: no JVD, trachea midline, no adenopathy  Lungs: Clear to auscultation  Heart: Regular rate and rhythm, s1/s2 auscultated, no murmurs  Abdomen: soft, non-tender, bowel sounds active  Extremities: no edema  Neurologic: not done    EKG:         Results for orders placed or performed during the hospital encounter of 03/23/25   EKG 12 Lead   Result Value Ref Range     Ventricular Rate 61 BPM     Atrial Rate 61 BPM     P-R Interval 154 ms     QRS Duration 92 ms     Q-T Interval 452 ms     QTc Calculation (Bazett) 455 ms     P Axis 53 degrees     R Axis -7 degrees     T Axis 76 degrees     Narrative     Normal sinus rhythm with sinus arrhythmia  Possible Left atrial enlargement  Low voltage QRS  Septal infarct , age undetermined  Abnormal ECG  When compared with ECG of 23-MAR-2025 10:07,  Sinus rhythm has replaced Wide QRS tachycardia  Vent. rate has decreased BY  67 BPM      ECHO 3/24/25:    Normal left ventricular systolic function. EF by 2D Simpsons Biplane is 61%. Normal diastolic function.    Right ventricle size is normal. Normal systolic function.    Mild mitral regurgitation.    Mild tricuspid regurgitation. Elevated RVSP 40 mmHg, consistent with mild pulmonary hypertension.    Echo: 11/23    Left

## 2025-03-25 NOTE — PLAN OF CARE
Problem: Safety - Adult  Goal: Free from fall injury  Outcome: Progressing     Problem: Discharge Planning  Goal: Discharge to home or other facility with appropriate resources  Outcome: Progressing  Flowsheets (Taken 3/24/2025 1610 by Latoya Porter, RN)  Discharge to home or other facility with appropriate resources: Identify barriers to discharge with patient and caregiver     Problem: Pain  Goal: Verbalizes/displays adequate comfort level or baseline comfort level  Outcome: Progressing     Problem: Skin/Tissue Integrity  Goal: Skin integrity remains intact  Description: 1.  Monitor for areas of redness and/or skin breakdown  2.  Assess vascular access sites hourly  3.  Every 4-6 hours minimum:  Change oxygen saturation probe site  4.  Every 4-6 hours:  If on nasal continuous positive airway pressure, respiratory therapy assess nares and determine need for appliance change or resting period  Outcome: Progressing     Problem: ABCDS Injury Assessment  Goal: Absence of physical injury  Outcome: Progressing     Problem: Respiratory - Adult  Goal: Achieves optimal ventilation and oxygenation  Outcome: Progressing     Problem: Cardiovascular - Adult  Goal: Maintains optimal cardiac output and hemodynamic stability  Outcome: Progressing     Problem: Skin/Tissue Integrity - Adult  Goal: Skin integrity remains intact  Description: 1.  Monitor for areas of redness and/or skin breakdown  2.  Assess vascular access sites hourly  3.  Every 4-6 hours minimum:  Change oxygen saturation probe site  4.  Every 4-6 hours:  If on nasal continuous positive airway pressure, respiratory therapy assess nares and determine need for appliance change or resting period  Outcome: Progressing     Problem: Genitourinary - Adult  Goal: Absence of urinary retention  Outcome: Progressing     Problem: Metabolic/Fluid and Electrolytes - Adult  Goal: Electrolytes maintained within normal limits  Outcome: Progressing  Flowsheets (Taken 3/24/2025

## 2025-03-25 NOTE — PROGRESS NOTES
Renal Progress Note    Patient :  Sultana Noyola; 69 y.o. MRN# [id number]  Location:  2010/2010-01  Attending:  Rory Alexander*  Admit Date:  3/23/2025   Hospital Day: 2      Subjective:     Oral intake good.  Urine output good.  Valentino catheter in place.  Blood pressures running high in the 160-180 range.  Off lisinopril at present.  Norvasc and Coreg started by primary service in lieu of ACE inhibitors.  Right IJ vein temporary dialysis catheter in place.  Bicarb infusion continues.  Potassium is down to 4.2 creatinine down to 1.4.  Mild metabolic alkalosis likely iatrogenic noted.  Last and only dialysis 3/23/2025.  Continues on azithromycin and Rocephin for presumed pneumonia.  Also on tapering doses of steroids.  Plan for stress test today by cardiology.  Ejection fraction preserved.  Troponins not elevated.  Did have an episode of V. tach on initial admission.  Has multiple risk factors.  Labs today showed a sodium 141 potassium 4.2 chloride 99 bicarb 31 BUN 24 creatinine 1.3 glucose 136 calcium 8.7    History reviewed  Known history of type 2 diabetes, hypertension, obesity, COPD/emphysema on home oxygen, chronic kidney diseaseStage IIIa baseline creatinine 1.3-1.4.  She has followed up with Lynn Lewis from [facility] last seen by them in November 2024 for CKD care.  She also has a known history of chronic diarrhea from chronic pancreatitis and peripheral vascular disease having had revascularization to her left lower extremity.  Patient tells me that in the last couple of weeks her diarrhea has become much worse she is been going consistently and it has been loose and watery.  She also been getting progressively weak.  Also getting short of breath on exertion.  Came into the ER for evaluation at [facility] was found to have a creatinine of 3.1 and a potassium of 7.5.  She was mildly acidotic with a bicarb of 18 but anion gap was normal.  She was taking lisinopril 40  as needed for Nausea or Vomiting  naproxen (NAPROSYN) 500 MG tablet, Take 1 tablet by mouth 2 times daily (with meals) for 30 doses  lipase-protease-amylase (CREON) 87589-09799 units delayed release capsule, Take 1 capsule by mouth 3 times daily (with meals) (Patient not taking: Reported on 8/8/2023)  LORazepam (ATIVAN) 1 MG tablet, Take 1 tablet by mouth every 8 hours as needed for Anxiety.  amLODIPine (NORVASC) 5 MG tablet, Take 1 tablet by mouth daily  atorvastatin (LIPITOR) 10 MG tablet, Take 1 tablet by mouth daily  traZODone (DESYREL) 100 MG tablet, Take 2 tablets by mouth nightly  ibuprofen (ADVIL;MOTRIN) 800 MG tablet, Take 1 tablet by mouth every 8 hours as needed for Pain (Patient not taking: Reported on 8/8/2023)    Current Medications:     Scheduled Meds:    carvedilol  12.5 mg Oral BID WC    [START ON 3/26/2025] amLODIPine  10 mg Oral Daily    rosuvastatin  20 mg Oral Nightly    mirtazapine  15 mg Oral Nightly    pantoprazole  40 mg Oral QAM AC    predniSONE  40 mg Oral Daily    cilostazol  50 mg Oral BID    insulin glargine  5 Units SubCUTAneous Daily    sodium chloride flush  5-40 mL IntraVENous 2 times per day    busPIRone  5 mg Oral TID    DULoxetine  20 mg Oral Daily    OXcarbazepine  150 mg Oral BID    ipratropium 0.5 mg-albuterol 2.5 mg  1 Dose Inhalation BID    budesonide-formoterol  2 puff Inhalation BID RT    cefTRIAXone (ROCEPHIN) IV  2,000 mg IntraVENous Q24H    insulin lispro  0-4 Units SubCUTAneous 4x Daily AC & HS    gabapentin  400 mg Oral BID    traZODone  100 mg Oral Nightly     Continuous Infusions:    dextrose      heparin (PORCINE) Infusion 12 Units/kg/hr (03/25/25 0923)    sodium chloride       PRN Meds:  morphine, glucose, dextrose bolus **OR** dextrose bolus, glucagon (rDNA), dextrose, heparin (porcine), heparin (porcine), sodium chloride flush, sodium chloride, ondansetron **OR** ondansetron, polyethylene glycol, acetaminophen **OR** acetaminophen, albuterol, ipratropium,      Magnesium:    Recent Labs     03/24/25  1501 03/24/25  2336 03/25/25  0801   MG 2.2 2.0 2.0     Albumin:  No results for input(s): \"LABALBU\" in the last 72 hours.  BNP:    No results found for: \"BNP\"  SENA:    No results found for: \"SENA\"  SPEP:  Lab Results   Component Value Date/Time    ALBCAL 3.5 03/23/2025 12:46 PM    ALBPCT 62 03/23/2025 12:46 PM    A1PCT 6 03/23/2025 12:46 PM    A2PCT 12 03/23/2025 12:46 PM    BETAPCT 12 03/23/2025 12:46 PM    GAMGLOB 0.5 03/23/2025 12:46 PM    GGPCT 9 03/23/2025 12:46 PM    PATH PENDING 03/24/2025 10:48 AM     UPEP:     Lab Results   Component Value Date/Time    LABPE PENDING 03/24/2025 10:48 AM     C3:     Lab Results   Component Value Date/Time    C3 172 03/23/2025 12:46 PM     C4:     Lab Results   Component Value Date/Time    C4 22 03/23/2025 12:46 PM     MPO ANCA:   No results found for: \"MPO\"  PR3 ANCA:   No results found for: \"PR3\"  Anti-GBM:   No results found for: \"GBMABIGG\"  Hep BsAg:         Lab Results   Component Value Date/Time    HEPBSAG NONREACTIVE 03/23/2025 12:46 PM     Hep C AB:          Lab Results   Component Value Date/Time    HEPCAB NONREACTIVE 03/23/2025 12:46 PM       Urinalysis/Chemistries:      Lab Results   Component Value Date/Time    NITRU NEGATIVE 03/23/2025 12:08 PM    COLORU Yellow 03/23/2025 12:08 PM    PHUR 5.5 03/23/2025 12:08 PM    PHUR 7.0 10/09/2023 07:15 PM    WBCUA 0 TO 2 03/23/2025 12:08 PM    WBCUA 0-2 08/05/2019 08:24 AM    RBCUA None 03/23/2025 12:08 PM    RBCUA 6-10 08/05/2019 08:24 AM    MUCUS 1+ 05/05/2023 05:49 PM    TRICHOMONAS NOT REPORTED 05/31/2015 01:19 PM    YEAST NOT REPORTED 05/31/2015 01:19 PM    BACTERIA None 03/23/2025 12:08 PM    CLARITYU clear 05/31/2015 12:56 PM    SPECGRAV 1.020 05/31/2015 12:56 PM    LEUKOCYTESUR NEGATIVE 03/23/2025 12:08 PM    UROBILINOGEN Normal 03/23/2025 12:08 PM    BILIRUBINUR NEGATIVE 03/23/2025 12:08 PM    BLOODU moderate 05/31/2015 12:56 PM    GLUCOSEU 2+ 03/23/2025 12:08 PM    KETUA

## 2025-03-25 NOTE — PROGRESS NOTES
[facility]  Internal Medicine Teaching Residency Program  Inpatient Daily Progress Note  ______________________________________________________________________________    Patient: Sultana Noyola  YOB: 1955   MRN: [id number]    Acct: [id number]     Room: 2010/2010-01  Admit date: 3/23/2025  Today's date: 03/25/25  Number of days in the hospital: 2  Current Code Status: Full Code   SUBJECTIVE   Admitting Diagnosis: Hyperkalemia  CC: SOB    - Patient seen and examined at bedside. Chart and results reviewed.  Pt is resting comfortably with no complaints.   Afebrile, VS stable  Morning labs are pending.     BRIEF HISTORY     69 y.o. with PMH of      HTN (lisinopril 10, Coreg 12.5)  HLD  Type II DM on metformin  Bipolar 1  Anxiety (oxcarbazepine 150 TID BuSpar 10 TID, mirtazapine 15, Cymbalta 20), COPD on Symbicort (2-3L NC at baseline)  Chronic pancreatitis prior most recent adhesions with Hx of bowel resection and laparotomy with adhesion lysis 8/22  PAD (Plavix 75, cilostazol 60)  CKD stage 3A (1.1-1.3)  Hx of Afib (not on AC low [id number])     Presented to [place] ED from nursing facility with SOB and CP on 3-23-35.  Patient went into V. tach in the 150s on 2 separate occasions and underwent cardioversion.  Was also found to have potassium level of 7.4 with creatinine of 3.2 and BUN of 68.  Patient was initially treated with insulin, dextrose Lokelma and calcium gluconate as well as albuterol.  Patient was started on amiodarone and transferred to [place] and placed in the ICU.     While in the ICU, hemodialysis was initiated. She underwent CT abdomen which showed without acute inflammatory or obstructive process, chronic pancreatitis, trace bilateral pleural effusion with mild pulmonary edema. Fat-containing midline and umbilical hernia. Valentino catheter was placed. Cardiology was consulted due to episode of v tach and echo ordered and pending. Pt had      03/24/25  1501   PROTIME 15.4*   INR 1.2     APTT:   Recent Labs     03/24/25  1501   APTT 112.5*     CARDIAC ENZYMES: No results for input(s): \"CKMB\", \"CKMBINDEX\", \"TROPONINI\" in the last 72 hours.    Invalid input(s): \"CKTOTAL;3\"  FASTING LIPID PANEL:No results found for: \"CHOL\", \"HDL\", \"TRIG\"  LIVER PROFILE:   Recent Labs     03/23/25  0935   AST 25   ALT 29   BILITOT 0.3   ALKPHOS 107*      MICROBIOLOGY:   Lab Results   Component Value Date/Time    CULTURE NO GROWTH 1 DAY 03/23/2025 10:30 AM       Imaging:    XR CHEST PORTABLE  Result Date: 3/24/2025  No acute findings with no radiographic evidence of CHF.     US RENAL COMPLETE  Result Date: 3/23/2025  1. No hydronephrosis. 2. Bilateral renal cysts. 3. Incidentally visualized echogenic liver parenchyma, nonspecific but can be seen in the setting of hepatic steatosis.     CT ABDOMEN PELVIS WO CONTRAST Additional Contrast? None  Result Date: 3/23/2025  1. No acute inflammatory or obstructive process seen in the abdomen or pelvis. 2. Chronic pancreatitis. 3. Trace bilateral pleural effusions with mild pulmonary edema.     XR CHEST PORTABLE  Result Date: 3/23/2025  Right internal jugular central venous catheter tip overlies the right atrium. No visualized pneumothorax.     XR CHEST PORTABLE  Result Date: 3/23/2025  1. Pulmonary vascular congestion and at least borderline cardiomegaly. 2. Peribronchial cuffing potentially due to reactive airways disease, bronchitis, or pulmonary vascular congestion.       ASSESSMENT & PLAN     ASSESSMENT / PLAN:   Principal Problem:    Hyperkalemia  Active Problems:    Essential hypertension    Acute kidney injury    Stage 3a chronic kidney disease (HCC)    Bradycardia    Dyslipidemia    Ventricular tachyarrhythmia (HCC)  Resolved Problems:    * No resolved hospital problems. *     V tach s/p synchronized cardioversion x 2  History of A-fib not on anticoagulation due to low [id number] score  -[id number] score now  6.  -Cardiology consulted recommend echocardiogram which is ordered and pending  -Okay with starting patient on IV heparin aspirin and statin.  -F/u cardiology recommendations.      Hypertension  Hyperlipidemia  -Recommend to hold lisinopril per nephrology recommendations  -Continue Crestor     COPD on 3 L  -Continue Symbicort, Duoneb  -Rocephin and Azithromycin     Acute renal failure  Hyperkalemia  -Patient currently on bicarb drip.  -Nephrology on board recommended holding dialysis for now and to keep Valentino catheter in place.  -BMP and mag Q8  -[facility]  -F/u nephrology recommendations.      History of SBO  Chronic constipation  -Monitor BM     Type 2 diabetes  -LDSS and 5U Lantus daily.   -Pt did receive Insulin and Dextrose for hyperkalemia   -POCT glucose     Anxiety, bipolar 1  Neuropathy  -Continue Buspar, Cymbalta, Trazadone, Gabapentin, Remeron, Oxcarbzepine    Diet: ADULT DIET; Regular; 5 carb choices (75 gm/meal); Low Potassium (Less than 3000 mg/day); Low Phosphorus (Less than 1000 mg)  DVT PPx: heparin      PT/OT: Consulted  Discharge Planning / SW: CM on board, appreciate assistance with discharge planning       Heather Talley MD  Internal Medicine Resident, PGY-1  [facility]; [place], [place]  3/25/2025, 9:34 AM

## 2025-03-26 ENCOUNTER — HOSPITAL ENCOUNTER (INPATIENT)
Age: 70
Discharge: HOME | End: 2025-03-28
Payer: MEDICARE

## 2025-03-26 ENCOUNTER — RESULTS FOLLOW-UP (OUTPATIENT)
Dept: CRITICAL CARE MEDICINE | Age: 70
End: 2025-03-26

## 2025-03-26 ENCOUNTER — APPOINTMENT (OUTPATIENT)
Dept: NUCLEAR MEDICINE | Age: 70
End: 2025-03-26
Payer: MEDICARE

## 2025-03-26 ENCOUNTER — APPOINTMENT (OUTPATIENT)
Age: 70
End: 2025-03-26
Payer: MEDICARE

## 2025-03-26 VITALS — DIASTOLIC BLOOD PRESSURE: 68 MMHG | HEART RATE: 54 BPM | SYSTOLIC BLOOD PRESSURE: 147 MMHG | RESPIRATION RATE: 16 BRPM

## 2025-03-26 PROBLEM — R07.9 CHEST PAIN: Status: ACTIVE | Noted: 2025-03-26

## 2025-03-26 PROBLEM — R94.31 ABNORMAL ELECTROCARDIOGRAPHY: Status: ACTIVE | Noted: 2025-03-26

## 2025-03-26 LAB
ANA SER QL IA: NEGATIVE
ANION GAP SERPL CALCULATED.3IONS-SCNC: 10 MMOL/L (ref 9–16)
ANION GAP SERPL CALCULATED.3IONS-SCNC: 12 MMOL/L (ref 9–16)
ANION GAP SERPL CALCULATED.3IONS-SCNC: 9 MMOL/L (ref 9–16)
ANTI-XA UNFRAC HEPARIN: 0.34 IU/L
BASOPHILS # BLD: 0.04 K/UL (ref 0–0.2)
BASOPHILS NFR BLD: 1 % (ref 0–2)
BUN SERPL-MCNC: 20 MG/DL (ref 8–23)
BUN SERPL-MCNC: 22 MG/DL (ref 8–23)
BUN SERPL-MCNC: 23 MG/DL (ref 8–23)
CALCIUM SERPL-MCNC: 8.7 MG/DL (ref 8.6–10.4)
CALCIUM SERPL-MCNC: 8.8 MG/DL (ref 8.6–10.4)
CALCIUM SERPL-MCNC: 9.4 MG/DL (ref 8.6–10.4)
CHLORIDE SERPL-SCNC: 100 MMOL/L (ref 98–107)
CHLORIDE SERPL-SCNC: 102 MMOL/L (ref 98–107)
CHLORIDE SERPL-SCNC: 99 MMOL/L (ref 98–107)
CO2 SERPL-SCNC: 27 MMOL/L (ref 20–31)
CO2 SERPL-SCNC: 29 MMOL/L (ref 20–31)
CO2 SERPL-SCNC: 30 MMOL/L (ref 20–31)
CREAT SERPL-MCNC: 1.3 MG/DL (ref 0.6–0.9)
DSDNA IGG SER QL IA: <0.5 IU/ML
ECHO BSA: 2.07 M2
EOSINOPHIL # BLD: 0.07 K/UL (ref 0–0.44)
EOSINOPHILS RELATIVE PERCENT: 1 % (ref 1–4)
ERYTHROCYTE [DISTWIDTH] IN BLOOD BY AUTOMATED COUNT: 13.5 % (ref 11.8–14.4)
FERRITIN SERPL-MCNC: 628 NG/ML
FOLATE SERPL-MCNC: 7.6 NG/ML (ref 4.8–24.2)
GFR, ESTIMATED: 45 ML/MIN/1.73M2
GLUCOSE BLD-MCNC: 121 MG/DL (ref 65–105)
GLUCOSE BLD-MCNC: 209 MG/DL (ref 65–105)
GLUCOSE BLD-MCNC: 215 MG/DL (ref 65–105)
GLUCOSE BLD-MCNC: 239 MG/DL (ref 65–105)
GLUCOSE SERPL-MCNC: 121 MG/DL (ref 74–99)
GLUCOSE SERPL-MCNC: 208 MG/DL (ref 74–99)
GLUCOSE SERPL-MCNC: 222 MG/DL (ref 74–99)
HCT VFR BLD AUTO: 27.3 % (ref 36.3–47.1)
HGB BLD-MCNC: 8.5 G/DL (ref 11.9–15.1)
IMM GRANULOCYTES # BLD AUTO: 0.1 K/UL (ref 0–0.3)
IMM GRANULOCYTES NFR BLD: 1 %
IMM RETICS NFR: 11.5 % (ref 2.7–18.3)
IRON SATN MFR SERPL: 35 % (ref 20–55)
IRON SERPL-MCNC: 100 UG/DL (ref 37–145)
LYMPHOCYTES NFR BLD: 1.71 K/UL (ref 1.1–3.7)
LYMPHOCYTES RELATIVE PERCENT: 19 % (ref 24–43)
M PNEUMO IGM SER QL IA: 0.1
MAGNESIUM SERPL-MCNC: 1.9 MG/DL (ref 1.6–2.4)
MAGNESIUM SERPL-MCNC: 1.9 MG/DL (ref 1.6–2.4)
MAGNESIUM SERPL-MCNC: 2 MG/DL (ref 1.6–2.4)
MCH RBC QN AUTO: 29.8 PG (ref 25.2–33.5)
MCHC RBC AUTO-ENTMCNC: 31.1 G/DL (ref 28.4–34.8)
MCV RBC AUTO: 95.8 FL (ref 82.6–102.9)
MONOCYTES NFR BLD: 0.8 K/UL (ref 0.1–1.2)
MONOCYTES NFR BLD: 9 % (ref 3–12)
NEUTROPHILS NFR BLD: 69 % (ref 36–65)
NEUTS SEG NFR BLD: 6.15 K/UL (ref 1.5–8.1)
NRBC BLD-RTO: 0 PER 100 WBC
NUC STRESS EJECTION FRACTION: 87 %
NUC STRESS LV EDV: 58 ML (ref 56–104)
NUCLEAR IGG SER IA-RTO: <0.1 U/ML
PLATELET # BLD AUTO: 196 K/UL (ref 138–453)
PMV BLD AUTO: 11.4 FL (ref 8.1–13.5)
POTASSIUM SERPL-SCNC: 4.3 MMOL/L (ref 3.7–5.3)
POTASSIUM SERPL-SCNC: 4.5 MMOL/L (ref 3.7–5.3)
POTASSIUM SERPL-SCNC: 4.8 MMOL/L (ref 3.7–5.3)
RBC # BLD AUTO: 2.85 M/UL (ref 3.95–5.11)
RETIC HEMOGLOBIN: 31.8 PG (ref 28.2–35.7)
RETICS # AUTO: 0.07 M/UL (ref 0.03–0.08)
RETICS/RBC NFR AUTO: 2.4 % (ref 0.5–1.9)
SODIUM SERPL-SCNC: 138 MMOL/L (ref 136–145)
SODIUM SERPL-SCNC: 139 MMOL/L (ref 136–145)
SODIUM SERPL-SCNC: 141 MMOL/L (ref 136–145)
STRESS BASELINE DIAS BP: 68 MMHG
STRESS BASELINE HR: 50 BPM
STRESS BASELINE SYS BP: 147 MMHG
STRESS ESTIMATED WORKLOAD: 1 METS
STRESS PEAK DIAS BP: 68 MMHG
STRESS PEAK SYS BP: 147 MMHG
STRESS PERCENT HR ACHIEVED: 53 %
STRESS POST PEAK HR: 80 BPM
STRESS RATE PRESSURE PRODUCT: NORMAL BPM*MMHG
STRESS ST DEPRESSION: 0 MM
STRESS TARGET HR: 151 BPM
TIBC SERPL-MCNC: 286 UG/DL (ref 250–450)
TID: 1.29
UNSATURATED IRON BINDING CAPACITY: 186 UG/DL (ref 112–347)
VIT B12 SERPL-MCNC: 504 PG/ML (ref 232–1245)
WBC OTHER # BLD: 8.9 K/UL (ref 3.5–11.3)

## 2025-03-26 PROCEDURE — 36415 COLL VENOUS BLD VENIPUNCTURE: CPT

## 2025-03-26 PROCEDURE — 85025 COMPLETE CBC W/AUTO DIFF WBC: CPT

## 2025-03-26 PROCEDURE — 82947 ASSAY GLUCOSE BLOOD QUANT: CPT

## 2025-03-26 PROCEDURE — 6370000000 HC RX 637 (ALT 250 FOR IP)

## 2025-03-26 PROCEDURE — 2500000003 HC RX 250 WO HCPCS: Performed by: NURSE PRACTITIONER

## 2025-03-26 PROCEDURE — 70450 CT HEAD/BRAIN W/O DYE: CPT

## 2025-03-26 PROCEDURE — 2060000000 HC ICU INTERMEDIATE R&B

## 2025-03-26 PROCEDURE — 78452 HT MUSCLE IMAGE SPECT MULT: CPT

## 2025-03-26 PROCEDURE — A9500 TC99M SESTAMIBI: HCPCS | Performed by: NURSE PRACTITIONER

## 2025-03-26 PROCEDURE — 99232 SBSQ HOSP IP/OBS MODERATE 35: CPT | Performed by: INTERNAL MEDICINE

## 2025-03-26 PROCEDURE — 82607 VITAMIN B-12: CPT

## 2025-03-26 PROCEDURE — 82728 ASSAY OF FERRITIN: CPT

## 2025-03-26 PROCEDURE — 93017 CV STRESS TEST TRACING ONLY: CPT

## 2025-03-26 PROCEDURE — 6370000000 HC RX 637 (ALT 250 FOR IP): Performed by: NURSE PRACTITIONER

## 2025-03-26 PROCEDURE — 6360000002 HC RX W HCPCS: Performed by: NURSE PRACTITIONER

## 2025-03-26 PROCEDURE — 83735 ASSAY OF MAGNESIUM: CPT

## 2025-03-26 PROCEDURE — 83540 ASSAY OF IRON: CPT

## 2025-03-26 PROCEDURE — 85520 HEPARIN ASSAY: CPT

## 2025-03-26 PROCEDURE — 85045 AUTOMATED RETICULOCYTE COUNT: CPT

## 2025-03-26 PROCEDURE — 6360000002 HC RX W HCPCS

## 2025-03-26 PROCEDURE — 80048 BASIC METABOLIC PNL TOTAL CA: CPT

## 2025-03-26 PROCEDURE — 3430000000 HC RX DIAGNOSTIC RADIOPHARMACEUTICAL: Performed by: NURSE PRACTITIONER

## 2025-03-26 PROCEDURE — 99233 SBSQ HOSP IP/OBS HIGH 50: CPT | Performed by: NURSE PRACTITIONER

## 2025-03-26 PROCEDURE — 82746 ASSAY OF FOLIC ACID SERUM: CPT

## 2025-03-26 PROCEDURE — 2500000003 HC RX 250 WO HCPCS

## 2025-03-26 PROCEDURE — 6360000002 HC RX W HCPCS: Performed by: HOSPITALIST

## 2025-03-26 PROCEDURE — 99232 SBSQ HOSP IP/OBS MODERATE 35: CPT | Performed by: HOSPITALIST

## 2025-03-26 PROCEDURE — 83550 IRON BINDING TEST: CPT

## 2025-03-26 RX ORDER — HYDRALAZINE HYDROCHLORIDE 50 MG/1
50 TABLET, FILM COATED ORAL EVERY 8 HOURS SCHEDULED
Status: DISCONTINUED | OUTPATIENT
Start: 2025-03-26 | End: 2025-03-28

## 2025-03-26 RX ORDER — REGADENOSON 0.08 MG/ML
0.4 INJECTION, SOLUTION INTRAVENOUS
Status: COMPLETED | OUTPATIENT
Start: 2025-03-26 | End: 2025-03-26

## 2025-03-26 RX ORDER — ALBUTEROL SULFATE 90 UG/1
2 INHALANT RESPIRATORY (INHALATION) PRN
Status: DISCONTINUED | OUTPATIENT
Start: 2025-03-26 | End: 2025-03-26

## 2025-03-26 RX ORDER — SODIUM CHLORIDE 9 MG/ML
500 INJECTION, SOLUTION INTRAVENOUS CONTINUOUS PRN
Status: DISCONTINUED | OUTPATIENT
Start: 2025-03-26 | End: 2025-03-26

## 2025-03-26 RX ORDER — HYDROCODONE BITARTRATE AND ACETAMINOPHEN 5; 325 MG/1; MG/1
1 TABLET ORAL ONCE
Status: COMPLETED | OUTPATIENT
Start: 2025-03-26 | End: 2025-03-26

## 2025-03-26 RX ORDER — LABETALOL HYDROCHLORIDE 5 MG/ML
10 INJECTION, SOLUTION INTRAVENOUS EVERY 4 HOURS PRN
Status: DISCONTINUED | OUTPATIENT
Start: 2025-03-26 | End: 2025-03-28 | Stop reason: HOSPADM

## 2025-03-26 RX ORDER — TETRAKIS(2-METHOXYISOBUTYLISOCYANIDE)COPPER(I) TETRAFLUOROBORATE 1 MG/ML
36.9 INJECTION, POWDER, LYOPHILIZED, FOR SOLUTION INTRAVENOUS
Status: COMPLETED | OUTPATIENT
Start: 2025-03-26 | End: 2025-03-26

## 2025-03-26 RX ORDER — HEPARIN SODIUM 5000 [USP'U]/ML
5000 INJECTION, SOLUTION INTRAVENOUS; SUBCUTANEOUS EVERY 8 HOURS SCHEDULED
Status: DISCONTINUED | OUTPATIENT
Start: 2025-03-26 | End: 2025-03-28

## 2025-03-26 RX ORDER — AMINOPHYLLINE 25 MG/ML
50 INJECTION, SOLUTION INTRAVENOUS PRN
Status: DISCONTINUED | OUTPATIENT
Start: 2025-03-26 | End: 2025-03-26

## 2025-03-26 RX ORDER — METOPROLOL TARTRATE 1 MG/ML
5 INJECTION, SOLUTION INTRAVENOUS EVERY 5 MIN PRN
Status: DISCONTINUED | OUTPATIENT
Start: 2025-03-26 | End: 2025-03-26

## 2025-03-26 RX ORDER — CARVEDILOL 25 MG/1
25 TABLET ORAL 2 TIMES DAILY WITH MEALS
Status: DISCONTINUED | OUTPATIENT
Start: 2025-03-27 | End: 2025-03-28 | Stop reason: HOSPADM

## 2025-03-26 RX ORDER — SODIUM CHLORIDE 0.9 % (FLUSH) 0.9 %
10 SYRINGE (ML) INJECTION PRN
Status: DISCONTINUED | OUTPATIENT
Start: 2025-03-26 | End: 2025-03-28 | Stop reason: HOSPADM

## 2025-03-26 RX ORDER — SODIUM CHLORIDE 0.9 % (FLUSH) 0.9 %
5-40 SYRINGE (ML) INJECTION PRN
Status: DISCONTINUED | OUTPATIENT
Start: 2025-03-26 | End: 2025-03-26

## 2025-03-26 RX ORDER — TETRAKIS(2-METHOXYISOBUTYLISOCYANIDE)COPPER(I) TETRAFLUOROBORATE 1 MG/ML
41.9 INJECTION, POWDER, LYOPHILIZED, FOR SOLUTION INTRAVENOUS
Status: COMPLETED | OUTPATIENT
Start: 2025-03-26 | End: 2025-03-25

## 2025-03-26 RX ORDER — AZITHROMYCIN 250 MG/1
500 TABLET, FILM COATED ORAL DAILY
Status: DISCONTINUED | OUTPATIENT
Start: 2025-03-26 | End: 2025-03-27

## 2025-03-26 RX ORDER — NITROGLYCERIN 0.4 MG/1
0.4 TABLET SUBLINGUAL EVERY 5 MIN PRN
Status: DISCONTINUED | OUTPATIENT
Start: 2025-03-26 | End: 2025-03-26

## 2025-03-26 RX ORDER — ATROPINE SULFATE 0.1 MG/ML
0.5 INJECTION INTRAVENOUS EVERY 5 MIN PRN
Status: DISCONTINUED | OUTPATIENT
Start: 2025-03-26 | End: 2025-03-26

## 2025-03-26 RX ADMIN — TRAZODONE HYDROCHLORIDE 100 MG: 100 TABLET ORAL at 21:48

## 2025-03-26 RX ADMIN — CILOSTAZOL 50 MG: 100 TABLET ORAL at 21:50

## 2025-03-26 RX ADMIN — ROSUVASTATIN CALCIUM 20 MG: 20 TABLET, FILM COATED ORAL at 21:48

## 2025-03-26 RX ADMIN — SODIUM CHLORIDE, PRESERVATIVE FREE 10 ML: 5 INJECTION INTRAVENOUS at 21:49

## 2025-03-26 RX ADMIN — INSULIN LISPRO 1 UNITS: 100 INJECTION, SOLUTION INTRAVENOUS; SUBCUTANEOUS at 12:13

## 2025-03-26 RX ADMIN — ACETAMINOPHEN 650 MG: 325 TABLET ORAL at 21:48

## 2025-03-26 RX ADMIN — BUSPIRONE HYDROCHLORIDE 5 MG: 5 TABLET ORAL at 16:29

## 2025-03-26 RX ADMIN — INSULIN LISPRO 1 UNITS: 100 INJECTION, SOLUTION INTRAVENOUS; SUBCUTANEOUS at 17:41

## 2025-03-26 RX ADMIN — MORPHINE SULFATE 2 MG: 2 INJECTION, SOLUTION INTRAMUSCULAR; INTRAVENOUS at 16:29

## 2025-03-26 RX ADMIN — HYDRALAZINE HYDROCHLORIDE 50 MG: 50 TABLET ORAL at 21:49

## 2025-03-26 RX ADMIN — CILOSTAZOL 50 MG: 100 TABLET ORAL at 09:18

## 2025-03-26 RX ADMIN — MIRTAZAPINE 15 MG: 15 TABLET, FILM COATED ORAL at 21:48

## 2025-03-26 RX ADMIN — HEPARIN SODIUM 12 UNITS/KG/HR: 10000 INJECTION, SOLUTION INTRAVENOUS at 14:15

## 2025-03-26 RX ADMIN — BUSPIRONE HYDROCHLORIDE 5 MG: 5 TABLET ORAL at 09:16

## 2025-03-26 RX ADMIN — MORPHINE SULFATE 2 MG: 2 INJECTION, SOLUTION INTRAMUSCULAR; INTRAVENOUS at 11:52

## 2025-03-26 RX ADMIN — OXCARBAZEPINE 150 MG: 150 TABLET, FILM COATED ORAL at 21:50

## 2025-03-26 RX ADMIN — GABAPENTIN 400 MG: 400 CAPSULE ORAL at 21:48

## 2025-03-26 RX ADMIN — AZITHROMYCIN DIHYDRATE 500 MG: 250 TABLET ORAL at 12:04

## 2025-03-26 RX ADMIN — CARVEDILOL 12.5 MG: 12.5 TABLET, FILM COATED ORAL at 16:29

## 2025-03-26 RX ADMIN — CARVEDILOL 12.5 MG: 12.5 TABLET, FILM COATED ORAL at 09:16

## 2025-03-26 RX ADMIN — REGADENOSON 0.4 MG: 0.08 INJECTION, SOLUTION INTRAVENOUS at 10:52

## 2025-03-26 RX ADMIN — SODIUM CHLORIDE, PRESERVATIVE FREE 5 ML: 5 INJECTION INTRAVENOUS at 09:17

## 2025-03-26 RX ADMIN — TETRAKIS(2-METHOXYISOBUTYLISOCYANIDE)COPPER(I) TETRAFLUOROBORATE 36.9 MILLICURIE: 1 INJECTION, POWDER, LYOPHILIZED, FOR SOLUTION INTRAVENOUS at 10:50

## 2025-03-26 RX ADMIN — HYDROCODONE BITARTRATE AND ACETAMINOPHEN 1 TABLET: 5; 325 TABLET ORAL at 09:15

## 2025-03-26 RX ADMIN — SODIUM CHLORIDE, PRESERVATIVE FREE 10 ML: 5 INJECTION INTRAVENOUS at 10:50

## 2025-03-26 RX ADMIN — AMLODIPINE BESYLATE 10 MG: 10 TABLET ORAL at 09:16

## 2025-03-26 RX ADMIN — WATER 2000 MG: 1 INJECTION INTRAMUSCULAR; INTRAVENOUS; SUBCUTANEOUS at 16:29

## 2025-03-26 RX ADMIN — BUSPIRONE HYDROCHLORIDE 5 MG: 5 TABLET ORAL at 21:49

## 2025-03-26 RX ADMIN — MORPHINE SULFATE 2 MG: 2 INJECTION, SOLUTION INTRAMUSCULAR; INTRAVENOUS at 22:50

## 2025-03-26 RX ADMIN — INSULIN GLARGINE 5 UNITS: 100 INJECTION, SOLUTION SUBCUTANEOUS at 09:19

## 2025-03-26 RX ADMIN — GABAPENTIN 400 MG: 400 CAPSULE ORAL at 09:16

## 2025-03-26 RX ADMIN — HYDRALAZINE HYDROCHLORIDE 50 MG: 50 TABLET ORAL at 16:29

## 2025-03-26 RX ADMIN — HYDRALAZINE HYDROCHLORIDE 10 MG: 20 INJECTION INTRAMUSCULAR; INTRAVENOUS at 12:05

## 2025-03-26 RX ADMIN — OXCARBAZEPINE 150 MG: 150 TABLET, FILM COATED ORAL at 09:18

## 2025-03-26 RX ADMIN — PREDNISONE 40 MG: 20 TABLET ORAL at 09:16

## 2025-03-26 RX ADMIN — HEPARIN SODIUM 5000 UNITS: 5000 INJECTION INTRAVENOUS; SUBCUTANEOUS at 21:48

## 2025-03-26 RX ADMIN — SODIUM CHLORIDE, PRESERVATIVE FREE 10 ML: 5 INJECTION INTRAVENOUS at 10:52

## 2025-03-26 RX ADMIN — MORPHINE SULFATE 2 MG: 2 INJECTION, SOLUTION INTRAMUSCULAR; INTRAVENOUS at 04:41

## 2025-03-26 RX ADMIN — DULOXETINE HYDROCHLORIDE 20 MG: 20 CAPSULE, DELAYED RELEASE ORAL at 09:18

## 2025-03-26 RX ADMIN — INSULIN LISPRO 1 UNITS: 100 INJECTION, SOLUTION INTRAVENOUS; SUBCUTANEOUS at 21:50

## 2025-03-26 ASSESSMENT — PAIN SCALES - GENERAL
PAINLEVEL_OUTOF10: 3
PAINLEVEL_OUTOF10: 0
PAINLEVEL_OUTOF10: 0
PAINLEVEL_OUTOF10: 10
PAINLEVEL_OUTOF10: 2
PAINLEVEL_OUTOF10: 3
PAINLEVEL_OUTOF10: 9
PAINLEVEL_OUTOF10: 8
PAINLEVEL_OUTOF10: 10
PAINLEVEL_OUTOF10: 0
PAINLEVEL_OUTOF10: 0
PAINLEVEL_OUTOF10: 8

## 2025-03-26 ASSESSMENT — PAIN DESCRIPTION - DESCRIPTORS
DESCRIPTORS: SHARP;THROBBING
DESCRIPTORS: ACHING

## 2025-03-26 ASSESSMENT — PAIN DESCRIPTION - LOCATION
LOCATION: HEAD

## 2025-03-26 ASSESSMENT — PAIN SCALES - WONG BAKER
WONGBAKER_NUMERICALRESPONSE: NO HURT
WONGBAKER_NUMERICALRESPONSE: NO HURT

## 2025-03-26 ASSESSMENT — ENCOUNTER SYMPTOMS
COUGH: 1
GASTROINTESTINAL NEGATIVE: 1
SHORTNESS OF BREATH: 1
ALLERGIC/IMMUNOLOGIC NEGATIVE: 1
EYES NEGATIVE: 1

## 2025-03-26 ASSESSMENT — PAIN DESCRIPTION - ORIENTATION
ORIENTATION: MID
ORIENTATION: MID

## 2025-03-26 NOTE — PROGRESS NOTES
PULMONARY & CRITICAL CARE MEDICINE PROGRESS  NOTE     Patient:  Sultana Noyola  MRN: [id number]  Admit date: 3/23/2025  Primary Care Physician: Azael Friedman MD  Consulting Physician: Rory Alexander*  CODE Status: Full Code  LOS: 3    SUBJECTIVE     I personally interviewed/examined the patient, reviewed interval history and interpreted all available radiographic, laboratory data at the time of service.      Chief Compliant/Reason for Initial Consult:     Dyspnea with chronic respiratory failure with COPD and hyperkalemia    Brief Hospital Course:  The patient is a 69 y.o. female with PMH of HTN (lisinopril 10 BID, Coreg 12.5), HLD, type II DM on metformin, bipolar 1 and Anxiety (oxcarbazepine 150 TID BuSpar 10 TID, mirtazapine 15, Cymbalta 20, trazodone 100), COPD on Symbicort (2-3L NC at baseline), chronic pancreatitis with prior Hx of with Hx of bowel resection and laparotomy with adhesion lysis 8/22, PAD (Plavix 75, cilostazol 60) s/p Left iliac stenting 10/22, CKD stage 3A (1.1-1.3), Hx of Afib (not on AC low [id number])     Presented to [facility] ED from assisted living facility with SOB and CP, for last few days, EKG showed A-fib with HR of 57, no acute ST-T changes, patient went into V. tach 150s-160s 2 separate times s/p successful [facility] cardioversion, found to have severe hyperkalemia 7.4 with ARF creatinine 3.2 BUN 68, treated with insulin dextrose, Lokelma and calcium gluconate as well as albuterol, started on amiodarone, transferred to [facility] for possible urgent dialysis after discussion with cardiology and nephrology.     CXR showing pulmonary congestion with borderline cardiomegaly, peribronchial cuffing potentially to reactive disease, bronchitis or pulmonary congestion     Repeat potassium was still 7.5, Patient was admitted to MICU for closer monitoring given arrhythmias, need for emergent hemodialysis and  epinephrine for HR support, right IJ temporary HD catheter placed and underwent hemodialysis 1 session with initial improvement of potassium 4.6 (during HD) however subsequently potassium went up again 5.6 then 5.9 required medical treatment with insulin dextrose, Lokelma and calcium gluconate. Improved to 5.5,      Patient was weaned off epinephrine around midnight, maintaining heart rate 60s-70s with NSR, and BP with MAP of 70s-80s, she was started on steroids, ceftriaxone and azithromycin due to concern of COPD exacerbation/bronchitis, viral and infectious pneumonia workup negative so far, wheezing improved and O2 supplement back down to baseline of 3 L 3/24 morning    Interval History: 03/26/25    I have reviewed the chart, overnight events noted labs reviewed.    I reviewed the chart, overnight events noted, ICU events seen, medication and labs reviewed last imaging study seen.  Patient is hemodynamically stable Tmax is 98.6.  She is on 3 L nasal cannula saturating well patient is on 3 L intermittently it was increased to 4 L.  Patient has mild cough does not complain of shortness of breath intermittently complain of chest tightness.  Patient has history of chronic cough hypertension.  Denies sputum production denies purulent sputum.  Patient is getting the second part of the nuclear stress test today.    Chest x-ray on 03/20/2025 shows mild pulmonary venous congestion cardiomegaly no consolidation or infiltrate.  Echocardiogram shows normal LV systolic function EF of 61% estimated RVSP 40.      Review of Systems:  Review of Systems   Constitutional:  Positive for activity change and fatigue.   HENT: Negative.     Eyes: Negative.    Respiratory:  Positive for cough and shortness of breath.    Cardiovascular:  Positive for chest pain and palpitations. Negative for leg swelling.   Gastrointestinal: Negative.    Endocrine: Negative.    Genitourinary: Negative.    Musculoskeletal: Negative.    Allergic/Immunologic:  document that actually reflects the content of the visit, the document can still have some errors including those of syntax and sound-alike substitutions which may escape proof reading.  It such instances, actual meaning can be extrapolated by contextual diversion.

## 2025-03-26 NOTE — CARE COORDINATION
Case Management   Daily Progress Note       Patient Name: Sultana Noyola                   YOB: 1955  Diagnosis: Ventricular tachyarrhythmia (HCC) [I47.20]  Hyperkalemia [E87.5]  Bradycardia [R00.1]                       GMLOS: 3.6 days  Length of Stay: 3  days    Anticipated Discharge Date: One day until discharge    Readmission Risk (Low < 19, Mod (19-27), High > 27): Readmission Risk Score: 17.3      Patient Transitional Goal: Home    Current Transitional Plan    [] Home Independently    [x] Home with HC    [] Skilled Nursing Facility    [] Acute Rehabilitation    [] Long Term Acute Care (LTAC)    [] Other:     Plan for the Stay (Medical Management) : stress test today, continued headache      Workflow Continuation (Additional Notes) : patient returning home with [facility]. She has oxygen at home. She denies needs        Mona Meraz RN  March 26, 2025

## 2025-03-26 NOTE — PROGRESS NOTES
[facility]  Internal Medicine Teaching Residency Program  Inpatient Daily Progress Note  ______________________________________________________________________________    Patient: Sultana Noyola  YOB: 1955   MRN: [id number]    Acct: [id number]     Room: 2010/2010-01  Admit date: 3/23/2025  Today's date: 03/26/25  Number of days in the hospital: 3  Current Code Status: Full Code   SUBJECTIVE   Admitting Diagnosis: Hyperkalemia  CC: SOB    - Patient seen and examined at bedside. Chart and results reviewed.  -BP noted to be elevated overnight  -Planning for stress test this am  -Echo showing EF 51% otherwise unremarkable  -Pt complains of persistent headache this am, does improve with Norco  -No other symptoms associated with headahce.     BRIEF HISTORY     69 y.o. with PMH of      HTN (lisinopril 10, Coreg 12.5)  HLD  Type II DM on metformin  Bipolar 1  Anxiety (oxcarbazepine 150 TID BuSpar 10 TID, mirtazapine 15, Cymbalta 20), COPD on Symbicort (2-3L NC at baseline)  Chronic pancreatitis prior most recent adhesions with Hx of bowel resection and laparotomy with adhesion lysis 8/22  PAD (Plavix 75, cilostazol 60)  CKD stage 3A (1.1-1.3)  Hx of Afib (not on AC low [id number])     Presented to [facility] ED from nursing facility with SOB and CP on 3-23-35.  Patient went into V. tach in the 150s on 2 separate occasions and underwent cardioversion.  Was also found to have potassium level of 7.4 with creatinine of 3.2 and BUN of 68.  Patient was initially treated with insulin, dextrose Lokelma and calcium gluconate as well as albuterol.  Patient was started on amiodarone and transferred to [place] and placed in the ICU.     While in the ICU, hemodialysis was initiated. She underwent CT abdomen which showed without acute inflammatory or obstructive process, chronic pancreatitis, trace bilateral pleural effusion with mild pulmonary edema. Fat-containing midline  and umbilical hernia. Valentino catheter was placed. Cardiology was consulted due to episode of v tach and echo ordered and pending. Pt had improvement in potassium down to 5.1 and BUN/Cr improvement, decided to transfer out of ICU given improvement. At this time, pt has no complaints.     OBJECTIVE     Vital Signs:  BP (!) 150/54   Pulse 62   Temp 98.1 °F (36.7 °C) (Oral)   Resp 14   Ht 1.626 m (5' 4\")   Wt 94.8 kg (209 lb)   SpO2 98%   BMI 35.87 kg/m²     Temp (24hrs), Av.3 °F (36.8 °C), Min:98.1 °F (36.7 °C), Max:98.6 °F (37 °C)    In: 10   Out: 1550 [Urine:1550]    Physical Exam:  Constitutional: This is a well developed, Well Nourished, 69 y.o. female who is A&Ox3, cooperative and in no apparent distress.    Head: normocephalic and atraumatic.  Forehead is TTP  EENT:  PERRLA.  No conjunctival injections.   Septum was midline, mucosa was without erythema, exudates or cobblestoning.  No thrush was noted.   Neck: Supple without thyromegaly. No elevated JVP. Trachea was midline.  Respiratory: Chest was symmetrical without dullness to percussion.  Breath sounds bilaterally were clear to auscultation. There were no wheezes, rhonchi or rales. There is no intercostal retraction or use of accessory muscles.   Cardiovascular: Regular without murmur, clicks, gallops or rubs.   Abdomen: Slightly rounded and soft without organomegaly. No rebound, rigidity or guarding was appreciated.    Musculoskeletal: Normal curvature of the spine.  No gross muscle weakness.    Extremities:  No lower extremity edema, ulcerations, tenderness, varicosities or erythema.  Muscle size, tone and strength are normal.  No involuntary movements are noted.    Skin:  Warm and dry.  Good color, turgor and pigmentation. No lesions or scars.  No cyanosis or clubbing  Neurological/Psychiatric: The patient's general behavior, level of consciousness, thought content and emotional status is normal. No FND. Strength/sensation is 5/5. Sensation is  intact.     Medications:  Scheduled Medications:    carvedilol  12.5 mg Oral BID WC    amLODIPine  10 mg Oral Daily    rosuvastatin  20 mg Oral Nightly    mirtazapine  15 mg Oral Nightly    pantoprazole  40 mg Oral QAM AC    predniSONE  40 mg Oral Daily    cilostazol  50 mg Oral BID    insulin glargine  5 Units SubCUTAneous Daily    sodium chloride flush  5-40 mL IntraVENous 2 times per day    busPIRone  5 mg Oral TID    DULoxetine  20 mg Oral Daily    OXcarbazepine  150 mg Oral BID    ipratropium 0.5 mg-albuterol 2.5 mg  1 Dose Inhalation BID    budesonide-formoterol  2 puff Inhalation BID RT    cefTRIAXone (ROCEPHIN) IV  2,000 mg IntraVENous Q24H    insulin lispro  0-4 Units SubCUTAneous 4x Daily AC & HS    gabapentin  400 mg Oral BID    traZODone  100 mg Oral Nightly     Continuous Infusions:    dextrose      heparin (PORCINE) Infusion 12 Units/kg/hr (03/25/25 1625)    sodium chloride       PRN Medicationsmorphine, 2 mg, Q4H PRN  hydrALAZINE, 10 mg, Q4H PRN  glucose, 4 tablet, PRN  dextrose bolus, 125 mL, PRN   Or  dextrose bolus, 250 mL, PRN  glucagon (rDNA), 1 mg, PRN  dextrose, , Continuous PRN  heparin (porcine), 4,000 Units, PRN  heparin (porcine), 2,000 Units, PRN  sodium chloride flush, 5-40 mL, PRN  sodium chloride, , PRN  ondansetron, 4 mg, Q8H PRN   Or  ondansetron, 4 mg, Q6H PRN  polyethylene glycol, 17 g, Daily PRN  acetaminophen, 650 mg, Q6H PRN   Or  acetaminophen, 650 mg, Q6H PRN  albuterol, 5 mg, Q1H PRN  ipratropium, 0.5 mg, Q2H PRN  fentanNYL, 25 mcg, Once PRN  heparin (porcine), 1,500 Units, PRN  heparin (porcine), 1,700 Units, PRN        Diagnostic Labs:  CBC:   Recent Labs     03/24/25  0809 03/25/25  0801 03/26/25  0757   WBC 8.5 11.1 8.9   RBC 3.12* 3.01* 2.85*   HGB 9.2* 9.0* 8.5*   HCT 29.7* 27.6* 27.3*   MCV 95.2 91.7 95.8   RDW 13.5 13.6 13.5    See Reflexed IPF Result 196     BMP:   Recent Labs     03/24/25  0809 03/24/25  1128 03/25/25  0801 03/25/25  1555 03/25/25  2323   NA

## 2025-03-26 NOTE — PLAN OF CARE
Problem: Safety - Adult  Goal: Free from fall injury  3/25/2025 2024 by Kylee Gleason RN  Outcome: Progressing  3/25/2025 1841 by Cande Arredondo RN  Outcome: Progressing     Problem: Discharge Planning  Goal: Discharge to home or other facility with appropriate resources  3/25/2025 2024 by Kylee Gleason RN  Outcome: Progressing  3/25/2025 1841 by Cande Arredondo RN  Outcome: Progressing     Problem: Pain  Goal: Verbalizes/displays adequate comfort level or baseline comfort level  3/25/2025 2024 by Kylee Gleason RN  Outcome: Progressing  3/25/2025 1841 by Cande Arredondo RN  Outcome: Progressing     Problem: Skin/Tissue Integrity  Goal: Skin integrity remains intact  Description: 1.  Monitor for areas of redness and/or skin breakdown  2.  Assess vascular access sites hourly  3.  Every 4-6 hours minimum:  Change oxygen saturation probe site  4.  Every 4-6 hours:  If on nasal continuous positive airway pressure, respiratory therapy assess nares and determine need for appliance change or resting period  3/25/2025 2024 by Kylee Gleason RN  Outcome: Progressing  3/25/2025 1841 by Cande Arredondo RN  Outcome: Progressing     Problem: ABCDS Injury Assessment  Goal: Absence of physical injury  3/25/2025 2024 by Kylee Gleason RN  Outcome: Progressing  3/25/2025 1841 by Cande Arredondo RN  Outcome: Progressing     Problem: Respiratory - Adult  Goal: Achieves optimal ventilation and oxygenation  3/25/2025 2024 by yKlee Gleason RN  Outcome: Progressing  3/25/2025 1841 by Cande Arredondo RN  Outcome: Progressing     Problem: Cardiovascular - Adult  Goal: Maintains optimal cardiac output and hemodynamic stability  3/25/2025 2024 by Kylee Gleason RN  Outcome: Progressing  3/25/2025 1841 by Cande Arredondo RN  Outcome: Progressing     Problem: Skin/Tissue Integrity - Adult  Goal: Skin integrity remains intact  Description: 1.   Monitor for areas of redness and/or skin breakdown  2.  Assess vascular access sites hourly  3.  Every 4-6 hours minimum:  Change oxygen saturation probe site  4.  Every 4-6 hours:  If on nasal continuous positive airway pressure, respiratory therapy assess nares and determine need for appliance change or resting period  3/25/2025 2024 by Kylee Gleason RN  Outcome: Progressing  3/25/2025 1841 by Cande Arredondo RN  Outcome: Progressing     Problem: Genitourinary - Adult  Goal: Absence of urinary retention  3/25/2025 2024 by Kylee Gleason RN  Outcome: Progressing  3/25/2025 1841 by Cande Arredondo RN  Outcome: Progressing     Problem: Metabolic/Fluid and Electrolytes - Adult  Goal: Electrolytes maintained within normal limits  3/25/2025 2024 by Kylee Gleason RN  Outcome: Progressing  3/25/2025 1841 by Cande Arredondo RN  Outcome: Progressing  Goal: Hemodynamic stability and optimal renal function maintained  3/25/2025 1841 by Cande Arredondo RN  Outcome: Progressing  Goal: Glucose maintained within prescribed range  3/25/2025 1841 by Cande Arredondo RN  Outcome: Progressing

## 2025-03-26 NOTE — PROGRESS NOTES
Occupational Therapy    [facility]  Occupational Therapy Not Seen Note    DATE: 3/26/2025    NAME: Sultana Noyola  MRN: [id number]   : 1955      Patient not seen this date for Occupational Therapy due to:    Testing: Pt currently off floor for stress test.     Next Scheduled Treatment: PM as time allows or 3/27/25    Electronically signed by DANNY Alvarado on 3/26/2025 at 10:50 AM

## 2025-03-26 NOTE — PROGRESS NOTES
[facility]   Progress Note                   Date:   3/26/2025  Patient name: Sultana Noyola  Date of admission:  3/23/2025 11:32 AM  MRN:   [id number]  YOB: 1955  PCP: Azael Friedman MD    Reason for Admission: Ventricular tachyarrhythmia (HCC) [I47.20]  Hyperkalemia [E87.5]  Bradycardia [R00.1]    Subjective:       Clinical Changes / Abnormalities: Pt seen and examined in the room.  Patient resting in bed. Pt reports a HA along with tenderness over left chest where she was shocked, BP remains elevated.   Labs, vitals and tele reviewed- SR 64  Heparin drip infusing.  Pt had stress test this am. Results pending     Medications:   Scheduled Meds:   azithromycin  500 mg Oral Daily    aspirin-acetaminophen-caffeine  2 tablet Oral Q6H    carvedilol  12.5 mg Oral BID WC    amLODIPine  10 mg Oral Daily    rosuvastatin  20 mg Oral Nightly    mirtazapine  15 mg Oral Nightly    pantoprazole  40 mg Oral QAM AC    predniSONE  40 mg Oral Daily    cilostazol  50 mg Oral BID    insulin glargine  5 Units SubCUTAneous Daily    sodium chloride flush  5-40 mL IntraVENous 2 times per day    busPIRone  5 mg Oral TID    DULoxetine  20 mg Oral Daily    OXcarbazepine  150 mg Oral BID    ipratropium 0.5 mg-albuterol 2.5 mg  1 Dose Inhalation BID    budesonide-formoterol  2 puff Inhalation BID RT    cefTRIAXone (ROCEPHIN) IV  2,000 mg IntraVENous Q24H    insulin lispro  0-4 Units SubCUTAneous 4x Daily AC & HS    gabapentin  400 mg Oral BID    traZODone  100 mg Oral Nightly     Continuous Infusions:   dextrose      heparin (PORCINE) Infusion 12 Units/kg/hr (03/26/25 0800)    sodium chloride       CBC:   Recent Labs     03/24/25  0809 03/25/25  0801 03/26/25  0757   WBC 8.5 11.1 8.9   HGB 9.2* 9.0* 8.5*    See Reflexed IPF Result 196     BMP:    Recent Labs     03/25/25  1555 03/25/25  2323 03/26/25  0757    139 141   K 4.8 4.5 4.3   CL 99 100 102   CO2 29 29 30   BUN 22 23 20   CREATININE

## 2025-03-27 PROBLEM — G43.909 MIGRAINE: Status: ACTIVE | Noted: 2025-03-27

## 2025-03-27 LAB
ANION GAP SERPL CALCULATED.3IONS-SCNC: 12 MMOL/L (ref 9–16)
BASOPHILS # BLD: 0.04 K/UL (ref 0–0.2)
BASOPHILS NFR BLD: 0 % (ref 0–2)
BUN SERPL-MCNC: 23 MG/DL (ref 8–23)
CALCIUM SERPL-MCNC: 9 MG/DL (ref 8.6–10.4)
CHLORIDE SERPL-SCNC: 101 MMOL/L (ref 98–107)
CO2 SERPL-SCNC: 25 MMOL/L (ref 20–31)
CREAT SERPL-MCNC: 1.3 MG/DL (ref 0.6–0.9)
EOSINOPHIL # BLD: 0.07 K/UL (ref 0–0.44)
EOSINOPHILS RELATIVE PERCENT: 1 % (ref 1–4)
ERYTHROCYTE [DISTWIDTH] IN BLOOD BY AUTOMATED COUNT: 13.9 % (ref 11.8–14.4)
GFR, ESTIMATED: 45 ML/MIN/1.73M2
GLUCOSE BLD-MCNC: 114 MG/DL (ref 65–105)
GLUCOSE BLD-MCNC: 162 MG/DL (ref 65–105)
GLUCOSE BLD-MCNC: 195 MG/DL (ref 65–105)
GLUCOSE BLD-MCNC: 228 MG/DL (ref 65–105)
GLUCOSE SERPL-MCNC: 200 MG/DL (ref 74–99)
HCT VFR BLD AUTO: 29.1 % (ref 36.3–47.1)
HGB BLD-MCNC: 8.9 G/DL (ref 11.9–15.1)
IMM GRANULOCYTES # BLD AUTO: 0.2 K/UL (ref 0–0.3)
IMM GRANULOCYTES NFR BLD: 2 %
LYMPHOCYTES NFR BLD: 1.9 K/UL (ref 1.1–3.7)
LYMPHOCYTES RELATIVE PERCENT: 21 % (ref 24–43)
MAGNESIUM SERPL-MCNC: 2 MG/DL (ref 1.6–2.4)
MCH RBC QN AUTO: 29.3 PG (ref 25.2–33.5)
MCHC RBC AUTO-ENTMCNC: 30.6 G/DL (ref 28.4–34.8)
MCV RBC AUTO: 95.7 FL (ref 82.6–102.9)
MONOCYTES NFR BLD: 0.9 K/UL (ref 0.1–1.2)
MONOCYTES NFR BLD: 10 % (ref 3–12)
NEUTROPHILS NFR BLD: 66 % (ref 36–65)
NEUTS SEG NFR BLD: 5.83 K/UL (ref 1.5–8.1)
NRBC BLD-RTO: 0 PER 100 WBC
PLATELET # BLD AUTO: 233 K/UL (ref 138–453)
PMV BLD AUTO: 11.1 FL (ref 8.1–13.5)
POTASSIUM SERPL-SCNC: 4.4 MMOL/L (ref 3.7–5.3)
RBC # BLD AUTO: 3.04 M/UL (ref 3.95–5.11)
SODIUM SERPL-SCNC: 138 MMOL/L (ref 136–145)
WBC OTHER # BLD: 8.9 K/UL (ref 3.5–11.3)

## 2025-03-27 PROCEDURE — 2500000003 HC RX 250 WO HCPCS

## 2025-03-27 PROCEDURE — 6370000000 HC RX 637 (ALT 250 FOR IP)

## 2025-03-27 PROCEDURE — 83735 ASSAY OF MAGNESIUM: CPT

## 2025-03-27 PROCEDURE — 94761 N-INVAS EAR/PLS OXIMETRY MLT: CPT

## 2025-03-27 PROCEDURE — 2580000003 HC RX 258: Performed by: PSYCHIATRY & NEUROLOGY

## 2025-03-27 PROCEDURE — 99232 SBSQ HOSP IP/OBS MODERATE 35: CPT | Performed by: HOSPITALIST

## 2025-03-27 PROCEDURE — 82947 ASSAY GLUCOSE BLOOD QUANT: CPT

## 2025-03-27 PROCEDURE — 2500000003 HC RX 250 WO HCPCS: Performed by: NURSE PRACTITIONER

## 2025-03-27 PROCEDURE — 99232 SBSQ HOSP IP/OBS MODERATE 35: CPT | Performed by: INTERNAL MEDICINE

## 2025-03-27 PROCEDURE — 6370000000 HC RX 637 (ALT 250 FOR IP): Performed by: NURSE PRACTITIONER

## 2025-03-27 PROCEDURE — 85025 COMPLETE CBC W/AUTO DIFF WBC: CPT

## 2025-03-27 PROCEDURE — 80048 BASIC METABOLIC PNL TOTAL CA: CPT

## 2025-03-27 PROCEDURE — 2700000000 HC OXYGEN THERAPY PER DAY

## 2025-03-27 PROCEDURE — 6360000002 HC RX W HCPCS: Performed by: NURSE PRACTITIONER

## 2025-03-27 PROCEDURE — 99233 SBSQ HOSP IP/OBS HIGH 50: CPT | Performed by: NURSE PRACTITIONER

## 2025-03-27 PROCEDURE — 6370000000 HC RX 637 (ALT 250 FOR IP): Performed by: PSYCHIATRY & NEUROLOGY

## 2025-03-27 PROCEDURE — 6360000002 HC RX W HCPCS: Performed by: PSYCHIATRY & NEUROLOGY

## 2025-03-27 PROCEDURE — 36415 COLL VENOUS BLD VENIPUNCTURE: CPT

## 2025-03-27 PROCEDURE — 97535 SELF CARE MNGMENT TRAINING: CPT

## 2025-03-27 PROCEDURE — 6360000002 HC RX W HCPCS: Performed by: HOSPITALIST

## 2025-03-27 PROCEDURE — 2500000003 HC RX 250 WO HCPCS: Performed by: PSYCHIATRY & NEUROLOGY

## 2025-03-27 PROCEDURE — 97165 OT EVAL LOW COMPLEX 30 MIN: CPT

## 2025-03-27 PROCEDURE — 2060000000 HC ICU INTERMEDIATE R&B

## 2025-03-27 PROCEDURE — 99222 1ST HOSP IP/OBS MODERATE 55: CPT | Performed by: PSYCHIATRY & NEUROLOGY

## 2025-03-27 RX ORDER — MAGNESIUM SULFATE IN WATER 40 MG/ML
2000 INJECTION, SOLUTION INTRAVENOUS ONCE
Status: COMPLETED | OUTPATIENT
Start: 2025-03-27 | End: 2025-03-27

## 2025-03-27 RX ORDER — VALPROIC ACID 250 MG/5ML
500 SOLUTION ORAL EVERY 12 HOURS SCHEDULED
Status: DISCONTINUED | OUTPATIENT
Start: 2025-03-27 | End: 2025-03-27

## 2025-03-27 RX ORDER — DIPHENHYDRAMINE HYDROCHLORIDE 50 MG/ML
25 INJECTION, SOLUTION INTRAMUSCULAR; INTRAVENOUS EVERY 6 HOURS
Status: DISCONTINUED | OUTPATIENT
Start: 2025-03-27 | End: 2025-03-28 | Stop reason: HOSPADM

## 2025-03-27 RX ORDER — BUTALBITAL, ACETAMINOPHEN AND CAFFEINE 50; 325; 40 MG/1; MG/1; MG/1
1 TABLET ORAL EVERY 4 HOURS PRN
Status: DISCONTINUED | OUTPATIENT
Start: 2025-03-27 | End: 2025-03-28 | Stop reason: HOSPADM

## 2025-03-27 RX ORDER — TOPIRAMATE 25 MG/1
25 TABLET, FILM COATED ORAL 2 TIMES DAILY
Status: DISCONTINUED | OUTPATIENT
Start: 2025-03-27 | End: 2025-03-27

## 2025-03-27 RX ADMIN — BUSPIRONE HYDROCHLORIDE 5 MG: 5 TABLET ORAL at 09:15

## 2025-03-27 RX ADMIN — METHYLPREDNISOLONE SODIUM SUCCINATE 125 MG: 125 INJECTION, POWDER, LYOPHILIZED, FOR SOLUTION INTRAMUSCULAR; INTRAVENOUS at 20:53

## 2025-03-27 RX ADMIN — ACETAMINOPHEN 650 MG: 325 TABLET ORAL at 09:15

## 2025-03-27 RX ADMIN — BUSPIRONE HYDROCHLORIDE 5 MG: 5 TABLET ORAL at 12:51

## 2025-03-27 RX ADMIN — OXCARBAZEPINE 150 MG: 150 TABLET, FILM COATED ORAL at 09:18

## 2025-03-27 RX ADMIN — MORPHINE SULFATE 2 MG: 2 INJECTION, SOLUTION INTRAMUSCULAR; INTRAVENOUS at 03:55

## 2025-03-27 RX ADMIN — AZITHROMYCIN DIHYDRATE 500 MG: 250 TABLET ORAL at 09:17

## 2025-03-27 RX ADMIN — TOPIRAMATE 25 MG: 25 TABLET, FILM COATED ORAL at 09:16

## 2025-03-27 RX ADMIN — HEPARIN SODIUM 5000 UNITS: 5000 INJECTION INTRAVENOUS; SUBCUTANEOUS at 12:52

## 2025-03-27 RX ADMIN — BUTALBITAL, ACETAMINOPHEN, AND CAFFEINE 1 TABLET: 50; 325; 40 TABLET ORAL at 19:41

## 2025-03-27 RX ADMIN — HYDRALAZINE HYDROCHLORIDE 50 MG: 50 TABLET ORAL at 20:53

## 2025-03-27 RX ADMIN — HYDRALAZINE HYDROCHLORIDE 50 MG: 50 TABLET ORAL at 06:21

## 2025-03-27 RX ADMIN — OXCARBAZEPINE 150 MG: 150 TABLET, FILM COATED ORAL at 20:54

## 2025-03-27 RX ADMIN — GABAPENTIN 400 MG: 400 CAPSULE ORAL at 20:53

## 2025-03-27 RX ADMIN — PREDNISONE 40 MG: 20 TABLET ORAL at 09:15

## 2025-03-27 RX ADMIN — DULOXETINE HYDROCHLORIDE 20 MG: 20 CAPSULE, DELAYED RELEASE ORAL at 09:18

## 2025-03-27 RX ADMIN — PANTOPRAZOLE SODIUM 40 MG: 40 TABLET, DELAYED RELEASE ORAL at 06:21

## 2025-03-27 RX ADMIN — CILOSTAZOL 50 MG: 100 TABLET ORAL at 20:54

## 2025-03-27 RX ADMIN — MAGNESIUM SULFATE HEPTAHYDRATE 2000 MG: 40 INJECTION, SOLUTION INTRAVENOUS at 13:13

## 2025-03-27 RX ADMIN — BUSPIRONE HYDROCHLORIDE 5 MG: 5 TABLET ORAL at 20:57

## 2025-03-27 RX ADMIN — CARVEDILOL 25 MG: 25 TABLET, FILM COATED ORAL at 16:47

## 2025-03-27 RX ADMIN — AMLODIPINE BESYLATE 10 MG: 10 TABLET ORAL at 09:15

## 2025-03-27 RX ADMIN — SODIUM CHLORIDE, PRESERVATIVE FREE 10 ML: 5 INJECTION INTRAVENOUS at 20:53

## 2025-03-27 RX ADMIN — DIPHENHYDRAMINE HYDROCHLORIDE 25 MG: 50 INJECTION INTRAMUSCULAR; INTRAVENOUS at 12:53

## 2025-03-27 RX ADMIN — INSULIN LISPRO 1 UNITS: 100 INJECTION, SOLUTION INTRAVENOUS; SUBCUTANEOUS at 16:48

## 2025-03-27 RX ADMIN — SODIUM CHLORIDE, PRESERVATIVE FREE 10 ML: 5 INJECTION INTRAVENOUS at 09:16

## 2025-03-27 RX ADMIN — TRAZODONE HYDROCHLORIDE 100 MG: 100 TABLET ORAL at 20:57

## 2025-03-27 RX ADMIN — HEPARIN SODIUM 5000 UNITS: 5000 INJECTION INTRAVENOUS; SUBCUTANEOUS at 22:30

## 2025-03-27 RX ADMIN — HEPARIN SODIUM 5000 UNITS: 5000 INJECTION INTRAVENOUS; SUBCUTANEOUS at 06:22

## 2025-03-27 RX ADMIN — SODIUM CHLORIDE, PRESERVATIVE FREE 10 ML: 5 INJECTION INTRAVENOUS at 03:55

## 2025-03-27 RX ADMIN — HYDRALAZINE HYDROCHLORIDE 50 MG: 50 TABLET ORAL at 12:51

## 2025-03-27 RX ADMIN — GABAPENTIN 400 MG: 400 CAPSULE ORAL at 09:15

## 2025-03-27 RX ADMIN — ROSUVASTATIN CALCIUM 20 MG: 20 TABLET, FILM COATED ORAL at 20:53

## 2025-03-27 RX ADMIN — BUTALBITAL, ACETAMINOPHEN, AND CAFFEINE 1 TABLET: 50; 325; 40 TABLET ORAL at 15:12

## 2025-03-27 RX ADMIN — MIRTAZAPINE 15 MG: 15 TABLET, FILM COATED ORAL at 20:57

## 2025-03-27 RX ADMIN — INSULIN GLARGINE 5 UNITS: 100 INJECTION, SOLUTION SUBCUTANEOUS at 09:16

## 2025-03-27 RX ADMIN — CILOSTAZOL 50 MG: 100 TABLET ORAL at 09:17

## 2025-03-27 RX ADMIN — CARVEDILOL 25 MG: 25 TABLET, FILM COATED ORAL at 09:15

## 2025-03-27 RX ADMIN — METHYLPREDNISOLONE SODIUM SUCCINATE 125 MG: 125 INJECTION, POWDER, LYOPHILIZED, FOR SOLUTION INTRAMUSCULAR; INTRAVENOUS at 12:54

## 2025-03-27 RX ADMIN — VALPROATE SODIUM 500 MG: 100 INJECTION, SOLUTION INTRAVENOUS at 13:09

## 2025-03-27 RX ADMIN — DIPHENHYDRAMINE HYDROCHLORIDE 25 MG: 50 INJECTION INTRAMUSCULAR; INTRAVENOUS at 19:42

## 2025-03-27 ASSESSMENT — ENCOUNTER SYMPTOMS
ALLERGIC/IMMUNOLOGIC NEGATIVE: 1
GASTROINTESTINAL NEGATIVE: 1
EYES NEGATIVE: 1

## 2025-03-27 ASSESSMENT — PAIN - FUNCTIONAL ASSESSMENT: PAIN_FUNCTIONAL_ASSESSMENT: ACTIVITIES ARE NOT PREVENTED

## 2025-03-27 ASSESSMENT — PAIN SCALES - GENERAL
PAINLEVEL_OUTOF10: 0
PAINLEVEL_OUTOF10: 6
PAINLEVEL_OUTOF10: 7
PAINLEVEL_OUTOF10: 7
PAINLEVEL_OUTOF10: 9

## 2025-03-27 ASSESSMENT — PAIN DESCRIPTION - FREQUENCY: FREQUENCY: CONTINUOUS

## 2025-03-27 ASSESSMENT — PAIN DESCRIPTION - DESCRIPTORS: DESCRIPTORS: ACHING;BURNING

## 2025-03-27 ASSESSMENT — PAIN DESCRIPTION - ORIENTATION: ORIENTATION: ANTERIOR

## 2025-03-27 ASSESSMENT — PAIN DESCRIPTION - PAIN TYPE: TYPE: ACUTE PAIN

## 2025-03-27 ASSESSMENT — PAIN DESCRIPTION - ONSET: ONSET: ON-GOING

## 2025-03-27 ASSESSMENT — PAIN DESCRIPTION - LOCATION
LOCATION: HEAD
LOCATION: HEAD

## 2025-03-27 ASSESSMENT — PAIN SCALES - WONG BAKER: WONGBAKER_NUMERICALRESPONSE: NO HURT

## 2025-03-27 NOTE — CONSULTS
[facility]   IN-PATIENT SERVICE      NEUROLOGY CONSULT  NOTE            Date:   3/27/2025  Patient name:  Sultana Noyola  Date of admission:  3/23/2025  YOB: 1955      Chief Complaint:     Chief Complaint   Patient presents with    Chest Pain    Shortness of Breath       Reason for Consult:      Migraine    History of Present Illness:     69-year-old female with past medical history of migraines, bipolar disorder, anxiety, depression, hypertension, hyperlipidemia, CKD, neuropathy, A-fib, for whom neurology is consulted for migraine.  History obtained from patient and chart review.    Patient has been admitted since 3/23.  She was found to have acute renal failure, hyperkalemia.  Subsequently had ventricular tachyarrhythmias, required cardioversion.  She states that after the cardioversion, she started having pain in the left side of her chest and head.  She remembers clamping her jaw down during cardioversion.    She describes the headache as throbbing and sharp sensation, localized to the left side of the head.  There is associated photophobia and nausea.  No visual changes.  She has been tried on Tylenol, Fioricet along with intermittent opioids without significant relief.  Currently rates the headache as 6/10.  Has been as high as 10/10. No neck pain, focal weakness, numbness, dysarthria, aphasia.    She has a history of migraines but these stopped about 30 years ago.  States that current headache has similar phenotype to her prior migraines.  She is currently not on any headache medications.  Generally does not have any significant headaches.  She is a former smoker, denies alcohol use.  Drinks 1 can of Pepsi daily.  No other caffeine use.  No personal or family history of aneurysms.  No history of blood clots.    Past Medical History:     Past Medical History:   Diagnosis Date    ADHD (attention deficit hyperactivity disorder)     Anxiety     BiPAP (biphasic positive airway pressure)  mouth every 8 hours as needed for Pain  Patient not taking: Reported on 2023 5/31/15   Elan Reddy MD        Allergies:     Compazine [prochlorperazine maleate], Lithium, Other, Sulfa antibiotics, and Tetracyclines & related    Social History:     Tobacco:    reports that she has quit smoking. Her smoking use included cigarettes. She has never used smokeless tobacco.  Alcohol:      reports no history of alcohol use.  Drug Use:  reports no history of drug use.    Family History:     Family History   Problem Relation Age of Onset    Lung Cancer Mother     Lung Cancer Father     Diabetes Maternal Grandfather     Diabetes Paternal Grandfather        Review of Systems:       Constitutional Negative for fever and chills   HEENT Negative for ear discharge, ear pain, nosebleed. + photophobia, headache.    Musculoskeletal +chronic back pain   Respiratory Negative for cough, dyspnea.   Cardiovascular Negative for palpitations, +chest pain.   Gastrointestinal +nausea, no vomiting.    Genitourinary  Negative for dysuria, hematuria.   Skin Negative for rash or itching   Hematology Negative for ecchymosis, +anemia   Psychiatric +hx of anxiety, depression.         Physical Exam:   BP (!) 150/55   Pulse 57   Temp 98 °F (36.7 °C) (Oral)   Resp 17   Ht 1.626 m (5' 4\")   Wt 95 kg (209 lb 7 oz)   SpO2 98%   BMI 35.95 kg/m²   Temp (24hrs), Av.1 °F (36.7 °C), Min:98 °F (36.7 °C), Max:98.2 °F (36.8 °C)        General examination:      General Appearance:  Alert and in no acute distress  HEENT: Normocephalic, atraumatic, moist mucous membranes  Neck: supple, (-) nuchal rigidity  Lungs:  Respirations unlabored, chest wall no deformity  Cardiovascular: normal rate, regular rhythm  Abdomen: Soft, nontender  Skin: No gross lesions, rashes  Extremities:  peripheral pulses palpable, no cyanosis, status post left toe amputations  Psych: normal affect      Neurological examination:    Mental status   Alert and oriented x 3;

## 2025-03-27 NOTE — PROGRESS NOTES
Occupational Therapy  Occupational Therapy Initial Evaluation  Facility/Department: [facility]- STEPDOWN   Patient Name: Sultana Noyola        MRN: [id number]    : 1955    Date of Service: 3/27/2025    Chief Complaint   Patient presents with    Chest Pain    Shortness of Breath     Past Medical History:  has a past medical history of ADHD (attention deficit hyperactivity disorder), Anxiety, BiPAP (biphasic positive airway pressure) dependence, Bipolar 1 disorder (HCC), Chronic back pain, Depression, Valentino catheter in place, Hyperlipidemia, Hypertension, Nursing home resident, Pancreatitis, PICC (peripherally inserted central catheter) in place, Pleural effusion, Respiratory failure (HCC), and Stenosis of intestine (HCC).  Past Surgical History:  has a past surgical history that includes Appendectomy; Hysterectomy; Cholecystectomy; tumor removal; back surgery; Endoscopy, colon, diagnostic; Foot surgery (Left, 2022); tumor removal; thoracentesis (Left, 2022); and hc temporary dialysis catheter (3/23/2025).    Discharge Recommendations   No therapy recommended at discharge.     OT Equipment Recommendations  Equipment Needed: No    Assessment  Assessment: No acute OT deficits noted this date. Pt in agreeable to discharge from acute OT services. Please reorder OT if changes arise.  Prognosis: Good  Decision Making: Low Complexity  No Skilled OT: Independent with functional mobility;Independent with ADL's;No OT goals identified  REQUIRES OT FOLLOW-UP: Yes  Activity Tolerance  Activity Tolerance: Patient Tolerated treatment well;Patient limited by fatigue  Safety Devices  Type of Devices: Call light within reach;Left in bed;Nurse notified  Restraints  Restraints Initially in Place: No    AM-PAC  AM-PAC Daily Activity - Inpatient   How much help is needed for putting on and taking off regular lower body clothing?: None  How much help is needed for bathing (which includes washing, rinsing, drying)?: None  How      Patient Education  Patient Education  Education Given To: Patient  Education Provided: Role of Therapy;Plan of Care  Education Provided Comments: pursed lip breathing with good return  Education Method: Verbal  Barriers to Learning: None  Education Outcome: Verbalized understanding;Demonstrated understanding    Goals  Short Term Goals  Time Frame for Short Term Goals: Pt currently independent no acute OT goals identified.    Plan  Occupational Therapy Plan  Times Per Week: Pt currently independent with no acute OT needs identified.    Minutes  OT Individual Minutes  Time In: 1343  Time Out: 1408  Minutes: 25  Time Code Minutes   Timed Code Treatment Minutes: 23 Minutes    Electronically signed by DANNY Alvarado on 3/27/25 at 2:26 PM EDT

## 2025-03-27 NOTE — PLAN OF CARE
Problem: Safety - Adult  Goal: Free from fall injury  Outcome: Progressing     Problem: Discharge Planning  Goal: Discharge to home or other facility with appropriate resources  Outcome: Progressing  Flowsheets (Taken 3/27/2025 0800)  Discharge to home or other facility with appropriate resources:   Identify barriers to discharge with patient and caregiver   Arrange for needed discharge resources and transportation as appropriate   Identify discharge learning needs (meds, wound care, etc)   Refer to discharge planning if patient needs post-hospital services based on physician order or complex needs related to functional status, cognitive ability or social support system     Problem: Pain  Goal: Verbalizes/displays adequate comfort level or baseline comfort level  Outcome: Progressing     Problem: Skin/Tissue Integrity  Goal: Skin integrity remains intact  Description: 1.  Monitor for areas of redness and/or skin breakdown  2.  Assess vascular access sites hourly  3.  Every 4-6 hours minimum:  Change oxygen saturation probe site  4.  Every 4-6 hours:  If on nasal continuous positive airway pressure, respiratory therapy assess nares and determine need for appliance change or resting period  Outcome: Progressing     Problem: ABCDS Injury Assessment  Goal: Absence of physical injury  Outcome: Progressing     Problem: Respiratory - Adult  Goal: Achieves optimal ventilation and oxygenation  Outcome: Progressing     Problem: Cardiovascular - Adult  Goal: Maintains optimal cardiac output and hemodynamic stability  Outcome: Progressing     Problem: Skin/Tissue Integrity - Adult  Goal: Skin integrity remains intact  Description: 1.  Monitor for areas of redness and/or skin breakdown  2.  Assess vascular access sites hourly  3.  Every 4-6 hours minimum:  Change oxygen saturation probe site  4.  Every 4-6 hours:  If on nasal continuous positive airway pressure, respiratory therapy assess nares and determine need for appliance  change or resting period  Outcome: Progressing     Problem: Genitourinary - Adult  Goal: Absence of urinary retention  Outcome: Progressing     Problem: Metabolic/Fluid and Electrolytes - Adult  Goal: Electrolytes maintained within normal limits  Outcome: Progressing  Goal: Hemodynamic stability and optimal renal function maintained  Outcome: Progressing  Goal: Glucose maintained within prescribed range  Outcome: Progressing

## 2025-03-27 NOTE — PROGRESS NOTES
[facility]  Internal Medicine Teaching Residency Program  Inpatient Daily Progress Note  ______________________________________________________________________________    Patient: Sultana Noyola  YOB: 1955   MRN: [id number]    Acct: [id number]     Room: 2010/2010-01  Admit date: 3/23/2025  Today's date: 03/27/25  Number of days in the hospital: 4  Current Code Status: Full Code   SUBJECTIVE   Admitting Diagnosis: Hyperkalemia  CC: SOB    - Patient seen and examined at bedside. Chart and results reviewed.  BP remains significantly elevated  May be secondary to headache  Stress test negative for ischemia.   Pt continues to complain of headache this morning.     BRIEF HISTORY     69 y.o. with PMH of      HTN (lisinopril 10, Coreg 12.5)  HLD  Type II DM on metformin  Bipolar 1  Anxiety (oxcarbazepine 150 TID BuSpar 10 TID, mirtazapine 15, Cymbalta 20), COPD on Symbicort (2-3L NC at baseline)  Chronic pancreatitis prior most recent adhesions with Hx of bowel resection and laparotomy with adhesion lysis 8/22  PAD (Plavix 75, cilostazol 60)  CKD stage 3A (1.1-1.3)  Hx of Afib (not on AC low [id number])     Presented to [facility] ED from nursing facility with SOB and CP on 3-23-35.  Patient went into V. tach in the 150s on 2 separate occasions and underwent cardioversion.  Was also found to have potassium level of 7.4 with creatinine of 3.2 and BUN of 68.  Patient was initially treated with insulin, dextrose Lokelma and calcium gluconate as well as albuterol.  Patient was started on amiodarone and transferred to [facility] and placed in the ICU.     While in the ICU, hemodialysis was initiated. She underwent CT abdomen which showed without acute inflammatory or obstructive process, chronic pancreatitis, trace bilateral pleural effusion with mild pulmonary edema. Fat-containing midline and umbilical hernia. Valentino catheter was placed. Cardiology was consulted due

## 2025-03-27 NOTE — CARE COORDINATION
Case Management   Daily Progress Note       Patient Name: Sultana Noyola                   YOB: 1955  Diagnosis: Ventricular tachyarrhythmia (HCC) [I47.20]  Hyperkalemia [E87.5]  Bradycardia [R00.1]                       GMLOS: 3.6 days  Length of Stay: 4  days    Anticipated Discharge Date: Two or more days before discharge    Readmission Risk (Low < 19, Mod (19-27), High > 27): Readmission Risk Score: 16.3      Patient Transitional Goal: Home    Current Transitional Plan    [] Home Independently    [x] Home with HC    [] Skilled Nursing Facility    [] Acute Rehabilitation    [] Long Term Acute Care (LTAC)    [] Other:     Plan for the Stay (Medical Management) :          Workflow Continuation (Additional Notes) :  Spoke with Heather with Med [facility]. She confirmed the patient is current with med 1. Informed the patient may discharge on the 29th.      MATHEW OTT  March 27, 2025

## 2025-03-27 NOTE — DISCHARGE INSTR - COC
Continuity of Care Form    Patient Name: Sultana Noyola   :  1955  MRN:  [id number]    Admit date:  3/23/2025  Discharge date:  3/28/2025    Code Status Order: Full Code   Advance Directives:     Admitting Physician:  Rory Alexander MD  PCP: Azael Friedman MD    Discharging Nurse: Joanne REBOLLEDO  Discharging Hospital Unit/Room#:   Discharging Unit Phone Number: [phone]    Emergency Contact:   Extended Emergency Contact Information  Primary Emergency Contact: Lynn Johnson  Address: [address] [place] [address]  Home Phone: [phone]  Relation: Child  Secondary Emergency Contact: La Nena Avelar           [place], [place]  Home Phone: [phone]  Relation: Child  Past Surgical History:  Past Surgical History:   Procedure Laterality Date    APPENDECTOMY      BACK SURGERY      CHOLECYSTECTOMY      ENDOSCOPY, COLON, DIAGNOSTIC      egd    FOOT SURGERY Left 2022    2 toes amputated    HC TEMPORARY DIALYSIS CATHETER  3/23/2025    HYSTERECTOMY (CERVIX STATUS UNKNOWN)      THORACENTESIS Left 2022    US THORACENTESIS    TUMOR REMOVAL      benign tumor removed from rt leg as child    TUMOR REMOVAL         Immunization History:   There is no immunization history for the selected administration types on file for this patient.    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    Hypoxia R09.02    Multiple compression fractures of thoracic vertebra ([facility]) S22.000A    Hyperkalemia E87.5    Acute kidney injury N17.9    Stage 3a chronic kidney disease (HCC) N18.31    Bradycardia R00.1    Dyslipidemia E78.5    Ventricular tachyarrhythmia ([facility]) I47.20     Chronic obstructive pulmonary disease (HCC) J44.9    ANTHONY (obstructive sleep apnea) G47.33    Abnormal electrocardiography R94.31    Chest pain R07.9    Migraine G43.909       Isolation/Infection:   Isolation            Contact          Patient Infection Status        Infection Onset Added Last Indicated Last Indicated By Review Planned Expiration    ESBL (Extended Spectrum Beta Lactamase) 22 Culture, Urine                 Resolved       Infection Onset Added Last Indicated Last Indicated By Resolved Resolved By    COVID-19 22 COVID-19 22 Infection                          Nurse Assessment:  Last Vital Signs: BP (!) 139/48   Pulse 68   Temp 97.9 °F (36.6 °C) (Oral)   Resp 17   Ht 1.626 m (5' 4\")   Wt 95 kg (209 lb 7 oz)   SpO2 95%   BMI 35.95 kg/m²     Last documented pain score (0-10 scale): Pain Level: 2  Last Weight:   Wt Readings from Last 1 Encounters:   25 95 kg (209 lb 7 oz)     Mental Status:  oriented, alert, coherent, logical, thought processes intact, and able to concentrate and follow conversation    IV Access:  - None    Nursing Mobility/ADLs:  Walking   Independent  Transfer  Independent  Bathing  Independent  Dressing  Independent  Toileting  Independent  Feeding  Independent  Med Admin  Independent  Med Delivery   whole    Wound Care Documentation and Therapy:        Elimination:  Continence:   Bowel: Yes  Bladder: Yes  Urinary Catheter: None   Colostomy/Ileostomy/Ileal Conduit: No       Date of Last BM:   No intake or output data in the 24 hours ending 25 1246    I/O last 3 completed shifts:  In: 960 [P.O.:960]  Out: -     Safety Concerns:     At Risk for Falls    Impairments/Disabilities:      None and Vision    Nutrition Therapy:  Current Nutrition Therapy:   - Oral Diet:  General    Routes of Feeding: Oral  Liquids: Thin Liquids  Daily Fluid Restriction: no  Last Modified Barium Swallow with Video (Video Swallowing  Test): not done    Treatments at the Time of Hospital Discharge:   Respiratory Treatments: n/a  Oxygen Therapy:  is on oxygen at 3 L/min per nasal cannula.  Ventilator:    - No ventilator support    Rehab Therapies: Physical Therapy and Occupational Therapy  Weight Bearing Status/Restrictions: No weight bearing restrictions  Other Medical Equipment (for information only, NOT a DME order):  n/a  Other Treatments: n/a    Patient's personal belongings (please select all that are sent with patient):  None    RN SIGNATURE:  Electronically signed by Joanne Aiken RN on 3/28/25 at 12:08 PM EDT    CASE MANAGEMENT/SOCIAL WORK SECTION    Inpatient Status Date: 03/23/2025    Readmission Risk Assessment Score:  [facility] RISK OF UNPLANNED READMISSION 2.0             18.5 Total Score        Discharging to Facility/ Agency   Name: [facility]  Address:  Phone:  Fax:    Dialysis Facility (if applicable)   Name:  Address:  Dialysis Schedule:  Phone:  Fax:    / signature: Electronically signed by MATHEW OTT on 3/28/25 at 12:46 PM EDT    PHYSICIAN SECTION    Prognosis: Good    Condition at Discharge: Stable    Rehab Potential (if transferring to Rehab): Good    Recommended Labs or Other Treatments After Discharge: PT/OT. Medication mgmt    Physician Certification: I certify the above information and transfer of Sultana Noyola  is necessary for the continuing treatment of the diagnosis listed and that she requires Home Care for less 30 days.     Update Admission H&P: No change in H&P    PHYSICIAN SIGNATURE:  Electronically signed by YOSEF CRISTOBAL MD on 3/27/25 at 1:47 PM EDT

## 2025-03-27 NOTE — DISCHARGE INSTRUCTIONS
You presented to the hospital due to shortness of breath.   When you came into the hospital you had a severely elevated heart rate and required cardioversion.   You were evaluated by cardiology and underwent a stress test which was negative.     You were found to have significantly elevated potassium and kidney function and required one round of hemodialysis.   Please follow up with nephrology in 2-3 weeks.   Hold off taking your Lisinopril at home until you are seen by them.   Go to the lab in one week to have your kidney function rechecked.   Continue taking Metformin at home    There were changes to your medications as follows   Norvasc changed to 10 mg   Amlodipine changed to 25 mg.    You were also started on Several different medications please take  Eliquis-take 10 mg for 7 days followed by 5 mg BID after this  Take Fiorcet for 7 days as needed and Medrol dose belén for migraines-F/u with neurology as an outpt   You were started on Hydralazine 50 mg for BP control    If you begin to experience any symptoms such as chest pain shortness of breath nausea vomiting dizziness drowsiness abdominal pain loss of consciousness or any other symptoms you find concerning please come to the ED for follow-up evaluation.    If you have been given medication please take them as prescribed. Do not take more medication than recommended at any given time.     Please follow-up with your primary care provider within 5 to 7 days for continued care.     Please feel free return to the hospital if your symptoms worsen or any new concerning symptoms develop.  Follow-up with your primary care physician as needed for all other the concerns.

## 2025-03-27 NOTE — PROGRESS NOTES
[facility]   Progress Note                   Date:   3/27/2025  Patient name: Sultana Noyola  Date of admission:  3/23/2025 11:32 AM  MRN:   [id number]  YOB: 1955  PCP: Azael Friedman MD    Reason for Admission: Ventricular tachyarrhythmia (HCC) [I47.20]  Hyperkalemia [E87.5]  Bradycardia [R00.1]    Subjective:       Clinical Changes / Abnormalities: Pt seen and examined in the room.  Patient resting in bed. Pt denies any CP or sob. Has continued tenderness over left chest where she was shocked.  Reports a continued HA. Labs, vitals and tele reviewed- SR 63     Medications:   Scheduled Meds:   topiramate  25 mg Oral BID    azithromycin  500 mg Oral Daily    hydrALAZINE  50 mg Oral 3 times per day    heparin (porcine)  5,000 Units SubCUTAneous 3 times per day    carvedilol  25 mg Oral BID WC    amLODIPine  10 mg Oral Daily    rosuvastatin  20 mg Oral Nightly    mirtazapine  15 mg Oral Nightly    pantoprazole  40 mg Oral QAM AC    cilostazol  50 mg Oral BID    insulin glargine  5 Units SubCUTAneous Daily    sodium chloride flush  5-40 mL IntraVENous 2 times per day    busPIRone  5 mg Oral TID    DULoxetine  20 mg Oral Daily    OXcarbazepine  150 mg Oral BID    ipratropium 0.5 mg-albuterol 2.5 mg  1 Dose Inhalation BID    budesonide-formoterol  2 puff Inhalation BID RT    cefTRIAXone (ROCEPHIN) IV  2,000 mg IntraVENous Q24H    insulin lispro  0-4 Units SubCUTAneous 4x Daily AC & HS    gabapentin  400 mg Oral BID    traZODone  100 mg Oral Nightly     Continuous Infusions:   dextrose      sodium chloride       CBC:   Recent Labs     03/25/25  0801 03/26/25  0757 03/27/25  0609   WBC 11.1 8.9 8.9   HGB 9.0* 8.5* 8.9*   PLT See Reflexed IPF Result 196 233     BMP:    Recent Labs     03/26/25  0757 03/26/25  1636 03/27/25  0034    138 138   K 4.3 4.8 4.4    99 101   CO2 30 27 25   BUN 20 22 23   CREATININE 1.3* 1.3* 1.3*   GLUCOSE 121* 222* 200*     Hepatic:   No results for  Ventricular tachyarrhythmia (HCC)      Plan of Treatment:   BP noted.  On Norvasc, hydralazine and coreg.     ECHO reviewed. EF 61%. Normal diastolic function.   Stress reviewed. Low risk.   Continue ASA, statin and BB.   Afib post CV, on BB. Was on heparin drip.  Will need NOAC on discharge.   VT likely secondary to hyperkalemia 7.5 on admission if stress negative for ischemia.   Ok for patient to be discharged per CV standpoint and for her to follow up outpatient in 2-4 weeks.     Electronically signed by SARAHI Hill CNP on 3/27/2025 at 11:05 AM  [facility]  [phone]

## 2025-03-27 NOTE — PLAN OF CARE
Problem: Safety - Adult  Goal: Free from fall injury  Outcome: Progressing     Problem: Discharge Planning  Goal: Discharge to home or other facility with appropriate resources  Outcome: Progressing     Problem: Pain  Goal: Verbalizes/displays adequate comfort level or baseline comfort level  Outcome: Progressing     Problem: Skin/Tissue Integrity  Goal: Skin integrity remains intact  Description: 1.  Monitor for areas of redness and/or skin breakdown  2.  Assess vascular access sites hourly  3.  Every 4-6 hours minimum:  Change oxygen saturation probe site  4.  Every 4-6 hours:  If on nasal continuous positive airway pressure, respiratory therapy assess nares and determine need for appliance change or resting period  Outcome: Progressing     Problem: ABCDS Injury Assessment  Goal: Absence of physical injury  Outcome: Progressing     Problem: Respiratory - Adult  Goal: Achieves optimal ventilation and oxygenation  3/27/2025 0011 by Hannah Tabares RN  Outcome: Progressing  3/26/2025 2059 by Jose D Alcantara RCP  Outcome: Progressing     Problem: Cardiovascular - Adult  Goal: Maintains optimal cardiac output and hemodynamic stability  Outcome: Progressing     Problem: Skin/Tissue Integrity - Adult  Goal: Skin integrity remains intact  Description: 1.  Monitor for areas of redness and/or skin breakdown  2.  Assess vascular access sites hourly  3.  Every 4-6 hours minimum:  Change oxygen saturation probe site  4.  Every 4-6 hours:  If on nasal continuous positive airway pressure, respiratory therapy assess nares and determine need for appliance change or resting period  Outcome: Progressing     Problem: Genitourinary - Adult  Goal: Absence of urinary retention  Outcome: Progressing     Problem: Metabolic/Fluid and Electrolytes - Adult  Goal: Electrolytes maintained within normal limits  Outcome: Progressing  Goal: Hemodynamic stability and optimal renal function maintained  Outcome: Progressing  Goal: Glucose  maintained within prescribed range  Outcome: Progressing

## 2025-03-27 NOTE — PLAN OF CARE
Sultana Noyola will require the following home care treatments or therapies: PT, OT, skilled nursing.  Home care will be necessary because of complexity of medical care.  The patient is in agreement to receiving home care.     Florida Cain MD  Internal Medicine Resident, PGY-2  [place], [place]  3/27/2025,3:55 PM

## 2025-03-27 NOTE — PROGRESS NOTES
PULMONARY & CRITICAL CARE MEDICINE PROGRESS  NOTE     Patient:  Sultana Noyola  MRN: [id number]  Admit date: 3/23/2025  Primary Care Physician: Azael Friedman MD  Consulting Physician: Rory Alexander*  CODE Status: Full Code  LOS: 4    SUBJECTIVE     I personally interviewed/examined the patient, reviewed interval history and interpreted all available radiographic, laboratory data at the time of service.      Chief Compliant/Reason for Initial Consult:     Dyspnea with chronic respiratory failure with COPD and hyperkalemia    Brief Hospital Course:  The patient is a 69 y.o. female with PMH of HTN (lisinopril 10 BID, Coreg 12.5), HLD, type II DM on metformin, bipolar 1 and Anxiety (oxcarbazepine 150 TID BuSpar 10 TID, mirtazapine 15, Cymbalta 20, trazodone 100), COPD on Symbicort (2-3L NC at baseline), chronic pancreatitis with prior Hx of with Hx of bowel resection and laparotomy with adhesion lysis 8/22, PAD (Plavix 75, cilostazol 60) s/p Left iliac stenting 10/22, CKD stage 3A (1.1-1.3), Hx of Afib (not on AC low [id number])     Presented to [place] ED from assisted living facility with SOB and CP, for last few days, EKG showed A-fib with HR of 57, no acute ST-T changes, patient went into V. tach 150s-160s 2 separate times s/p successful [place] cardioversion, found to have severe hyperkalemia 7.4 with ARF creatinine 3.2 BUN 68, treated with insulin dextrose, Lokelma and calcium gluconate as well as albuterol, started on amiodarone, transferred to [place] for possible urgent dialysis after discussion with cardiology and nephrology.     CXR showing pulmonary congestion with borderline cardiomegaly, peribronchial cuffing potentially to reactive disease, bronchitis or pulmonary congestion     Repeat potassium was still 7.5, Patient was admitted to MICU for closer monitoring given arrhythmias, need for emergent hemodialysis and  epinephrine for HR support, right IJ temporary HD catheter placed and underwent hemodialysis 1 session with initial improvement of potassium 4.6 (during HD) however subsequently potassium went up again 5.6 then 5.9 required medical treatment with insulin dextrose, Lokelma and calcium gluconate. Improved to 5.5,      Patient was weaned off epinephrine around midnight, maintaining heart rate 60s-70s with NSR, and BP with MAP of 70s-80s, she was started on steroids, ceftriaxone and azithromycin due to concern of COPD exacerbation/bronchitis, viral and infectious pneumonia workup negative so far, wheezing improved and O2 supplement back down to baseline of 3 L 3/24 morning    Interval History: 03/27/25    I have reviewed the chart, overnight events noted labs reviewed.  Patient is hemodynamically stable occasionally systolic blood pressure 150 and 170 she is afebrile.  She is on nasal cannula maintaining saturation between 95 and mostly 98%.  Heart rate is in 55-60.  She has mild intermittent chronic cough without much change she denies chronic dyspnea on activities without much change.    Patient had nuclear Lexiscan stress test done shows normal pharmacological myocardial perfusion study suggest a low risk of cardiac event    Chest x-ray on 03/24/2025 shows mild pulmonary venous congestion cardiomegaly no consolidation or infiltrate.  Echocardiogram shows normal LV systolic function EF of 61% estimated RVSP 40.      Review of Systems:  Review of Systems   Constitutional:  Positive for activity change and fatigue.   HENT: Negative.     Eyes: Negative.    Respiratory:          Minimal chronic cough and chronic dyspnea.   Cardiovascular:  Positive for chest pain and palpitations. Negative for leg swelling.   Gastrointestinal: Negative.    Endocrine: Negative.    Genitourinary: Negative.    Musculoskeletal: Negative.    Allergic/Immunologic: Negative.    Neurological: Negative.    Hematological: Negative.     Psychiatric/Behavioral:  The patient is nervous/anxious.        OBJECTIVE     VITAL SIGNS:   LAST-  BP (!) 150/55   Pulse 57   Temp 98 °F (36.7 °C) (Oral)   Resp 17   Ht 1.626 m (5' 4\")   Wt 95 kg (209 lb 7 oz)   SpO2 98%   BMI 35.95 kg/m²   8-24 HR RANGE-  TEMP Temp  Av.1 °F (36.7 °C)  Min: 98 °F (36.7 °C)  Max: 98.2 °F (36.8 °C)   BP Systolic (24hrs), Avg:160 , Min:123 , Max:197      Diastolic (24hrs), Av, Min:35, Max:73     PULSE Pulse  Av.9  Min: 54  Max: 79   RR Resp  Av.7  Min: 16  Max: 20   O2 SAT SpO2  Av %  Min: 95 %  Max: 98 %   OXYGEN DELIVERY O2 Flow Rate (L/min)  Avg: 3 L/min  Min: 3 L/min  Max: 3 L/min     Systemic Examination:   Physical Exam  General appearance - looks comfortable and in no acute distress  Mental status - alert, oriented to person, place, and time  Eyes - pupils equal and reactive, extraocular eye movements intact  Mouth - mucous membranes moist, pharynx normal without lesions  Neck - supple, no significant adenopathy.  Short thick neck  Chest - Chest was symmetrical without dullness to percussion.  Breath sounds bilaterally were reduced and distant at bases.  There were no wheezes, rhonchi mild crackles present.  There is no intercostal recession or use of accessory muscles  Heart - normal rate, regular rhythm, normal S1, S2, no murmurs, rubs, clicks or gallops  Abdomen - soft, nontender, nondistended, no masses or organomegaly  Neurological - alert, oriented, normal speech, no focal findings or movement disorder noted  Extremities - peripheral pulses normal, positive mild pedal edema, no clubbing or cyanosis  Skin - normal coloration and turgor, no rashes, no suspicious skin lesions noted     DATA REVIEW     Medications:  Scheduled Meds:   topiramate  25 mg Oral BID    azithromycin  500 mg Oral Daily    hydrALAZINE  50 mg Oral 3 times per day    heparin (porcine)  5,000 Units SubCUTAneous 3 times per day    carvedilol  25 mg Oral BID

## 2025-03-28 VITALS
HEIGHT: 64 IN | HEART RATE: 68 BPM | RESPIRATION RATE: 17 BRPM | OXYGEN SATURATION: 95 % | WEIGHT: 209.44 LBS | TEMPERATURE: 97.9 F | SYSTOLIC BLOOD PRESSURE: 139 MMHG | DIASTOLIC BLOOD PRESSURE: 48 MMHG | BODY MASS INDEX: 35.76 KG/M2

## 2025-03-28 LAB
ANION GAP SERPL CALCULATED.3IONS-SCNC: 14 MMOL/L (ref 9–16)
BASOPHILS # BLD: 0.03 K/UL (ref 0–0.2)
BASOPHILS NFR BLD: 0 % (ref 0–2)
BUN SERPL-MCNC: 30 MG/DL (ref 8–23)
CALCIUM SERPL-MCNC: 9.7 MG/DL (ref 8.6–10.4)
CHLORIDE SERPL-SCNC: 104 MMOL/L (ref 98–107)
CO2 SERPL-SCNC: 23 MMOL/L (ref 20–31)
CREAT SERPL-MCNC: 1.4 MG/DL (ref 0.6–0.9)
EOSINOPHIL # BLD: <0.03 K/UL (ref 0–0.44)
EOSINOPHILS RELATIVE PERCENT: 0 % (ref 1–4)
ERYTHROCYTE [DISTWIDTH] IN BLOOD BY AUTOMATED COUNT: 13.7 % (ref 11.8–14.4)
GFR, ESTIMATED: 41 ML/MIN/1.73M2
GLUCOSE BLD-MCNC: 166 MG/DL (ref 65–105)
GLUCOSE BLD-MCNC: 218 MG/DL (ref 65–105)
GLUCOSE SERPL-MCNC: 179 MG/DL (ref 74–99)
HCT VFR BLD AUTO: 29.3 % (ref 36.3–47.1)
HGB BLD-MCNC: 9 G/DL (ref 11.9–15.1)
IMM GRANULOCYTES # BLD AUTO: 0.24 K/UL (ref 0–0.3)
IMM GRANULOCYTES NFR BLD: 3 %
LYMPHOCYTES NFR BLD: 1.09 K/UL (ref 1.1–3.7)
LYMPHOCYTES RELATIVE PERCENT: 12 % (ref 24–43)
MCH RBC QN AUTO: 28.9 PG (ref 25.2–33.5)
MCHC RBC AUTO-ENTMCNC: 30.7 G/DL (ref 28.4–34.8)
MCV RBC AUTO: 94.2 FL (ref 82.6–102.9)
MICROORGANISM SPEC CULT: NORMAL
MICROORGANISM SPEC CULT: NORMAL
MONOCYTES NFR BLD: 0.33 K/UL (ref 0.1–1.2)
MONOCYTES NFR BLD: 4 % (ref 3–12)
NEUTROPHILS NFR BLD: 81 % (ref 36–65)
NEUTS SEG NFR BLD: 7.11 K/UL (ref 1.5–8.1)
NRBC BLD-RTO: 0 PER 100 WBC
PLATELET # BLD AUTO: 259 K/UL (ref 138–453)
PMV BLD AUTO: 11.4 FL (ref 8.1–13.5)
POTASSIUM SERPL-SCNC: 4.8 MMOL/L (ref 3.7–5.3)
RBC # BLD AUTO: 3.11 M/UL (ref 3.95–5.11)
SERVICE CMNT-IMP: NORMAL
SERVICE CMNT-IMP: NORMAL
SODIUM SERPL-SCNC: 141 MMOL/L (ref 136–145)
SPECIMEN DESCRIPTION: NORMAL
SPECIMEN DESCRIPTION: NORMAL
WBC OTHER # BLD: 8.8 K/UL (ref 3.5–11.3)

## 2025-03-28 PROCEDURE — 82947 ASSAY GLUCOSE BLOOD QUANT: CPT

## 2025-03-28 PROCEDURE — 6370000000 HC RX 637 (ALT 250 FOR IP): Performed by: PSYCHIATRY & NEUROLOGY

## 2025-03-28 PROCEDURE — 6360000002 HC RX W HCPCS: Performed by: HOSPITALIST

## 2025-03-28 PROCEDURE — 6360000002 HC RX W HCPCS: Performed by: PSYCHIATRY & NEUROLOGY

## 2025-03-28 PROCEDURE — 2580000003 HC RX 258: Performed by: PSYCHIATRY & NEUROLOGY

## 2025-03-28 PROCEDURE — 6370000000 HC RX 637 (ALT 250 FOR IP): Performed by: NURSE PRACTITIONER

## 2025-03-28 PROCEDURE — 6370000000 HC RX 637 (ALT 250 FOR IP)

## 2025-03-28 PROCEDURE — 2500000003 HC RX 250 WO HCPCS: Performed by: PSYCHIATRY & NEUROLOGY

## 2025-03-28 PROCEDURE — 85025 COMPLETE CBC W/AUTO DIFF WBC: CPT

## 2025-03-28 PROCEDURE — 99232 SBSQ HOSP IP/OBS MODERATE 35: CPT | Performed by: INTERNAL MEDICINE

## 2025-03-28 PROCEDURE — 2500000003 HC RX 250 WO HCPCS

## 2025-03-28 PROCEDURE — 36415 COLL VENOUS BLD VENIPUNCTURE: CPT

## 2025-03-28 PROCEDURE — 99232 SBSQ HOSP IP/OBS MODERATE 35: CPT | Performed by: PSYCHIATRY & NEUROLOGY

## 2025-03-28 PROCEDURE — 99239 HOSP IP/OBS DSCHRG MGMT >30: CPT | Performed by: HOSPITALIST

## 2025-03-28 PROCEDURE — 80048 BASIC METABOLIC PNL TOTAL CA: CPT

## 2025-03-28 RX ORDER — HYDRALAZINE HYDROCHLORIDE 50 MG/1
50 TABLET, FILM COATED ORAL EVERY 8 HOURS SCHEDULED
Status: DISCONTINUED | OUTPATIENT
Start: 2025-03-28 | End: 2025-03-28 | Stop reason: HOSPADM

## 2025-03-28 RX ORDER — BUTALBITAL, ACETAMINOPHEN AND CAFFEINE 50; 325; 40 MG/1; MG/1; MG/1
1 TABLET ORAL EVERY 6 HOURS PRN
Qty: 28 TABLET | Refills: 0 | Status: SHIPPED | OUTPATIENT
Start: 2025-03-28 | End: 2025-04-04

## 2025-03-28 RX ORDER — CARVEDILOL 25 MG/1
25 TABLET ORAL 2 TIMES DAILY WITH MEALS
Qty: 60 TABLET | Refills: 3 | Status: SHIPPED | OUTPATIENT
Start: 2025-03-28

## 2025-03-28 RX ORDER — HYDRALAZINE HYDROCHLORIDE 50 MG/1
50 TABLET, FILM COATED ORAL EVERY 8 HOURS SCHEDULED
Qty: 90 TABLET | Refills: 3 | Status: SHIPPED | OUTPATIENT
Start: 2025-03-28 | End: 2025-03-28

## 2025-03-28 RX ORDER — CARVEDILOL 25 MG/1
25 TABLET ORAL 2 TIMES DAILY WITH MEALS
Qty: 60 TABLET | Refills: 3 | Status: SHIPPED | OUTPATIENT
Start: 2025-03-28 | End: 2025-03-28

## 2025-03-28 RX ORDER — AMLODIPINE BESYLATE 10 MG/1
10 TABLET ORAL DAILY
Qty: 30 TABLET | Refills: 3 | Status: SHIPPED | OUTPATIENT
Start: 2025-03-29

## 2025-03-28 RX ORDER — ROSUVASTATIN CALCIUM 20 MG/1
20 TABLET, COATED ORAL NIGHTLY
Qty: 30 TABLET | Refills: 3 | Status: SHIPPED | OUTPATIENT
Start: 2025-03-28 | End: 2025-03-28

## 2025-03-28 RX ORDER — ROSUVASTATIN CALCIUM 20 MG/1
20 TABLET, COATED ORAL NIGHTLY
Qty: 30 TABLET | Refills: 3 | Status: SHIPPED | OUTPATIENT
Start: 2025-03-28

## 2025-03-28 RX ORDER — HYDRALAZINE HYDROCHLORIDE 50 MG/1
100 TABLET, FILM COATED ORAL EVERY 8 HOURS SCHEDULED
Status: DISCONTINUED | OUTPATIENT
Start: 2025-03-28 | End: 2025-03-28

## 2025-03-28 RX ORDER — AMLODIPINE BESYLATE 10 MG/1
10 TABLET ORAL DAILY
Qty: 30 TABLET | Refills: 3 | Status: SHIPPED | OUTPATIENT
Start: 2025-03-29 | End: 2025-03-28

## 2025-03-28 RX ORDER — BUTALBITAL, ACETAMINOPHEN AND CAFFEINE 50; 325; 40 MG/1; MG/1; MG/1
1 TABLET ORAL EVERY 6 HOURS PRN
Qty: 28 TABLET | Refills: 0 | Status: SHIPPED | OUTPATIENT
Start: 2025-03-28 | End: 2025-03-28

## 2025-03-28 RX ORDER — HYDRALAZINE HYDROCHLORIDE 50 MG/1
50 TABLET, FILM COATED ORAL EVERY 8 HOURS SCHEDULED
Qty: 90 TABLET | Refills: 3 | Status: SHIPPED | OUTPATIENT
Start: 2025-03-28

## 2025-03-28 RX ORDER — METHYLPREDNISOLONE 4 MG/1
TABLET ORAL
Qty: 1 KIT | Refills: 0 | Status: SHIPPED | OUTPATIENT
Start: 2025-03-28 | End: 2025-04-03

## 2025-03-28 RX ADMIN — PANTOPRAZOLE SODIUM 40 MG: 40 TABLET, DELAYED RELEASE ORAL at 06:28

## 2025-03-28 RX ADMIN — APIXABAN 10 MG: 5 TABLET, FILM COATED ORAL at 08:06

## 2025-03-28 RX ADMIN — CARVEDILOL 25 MG: 25 TABLET, FILM COATED ORAL at 08:06

## 2025-03-28 RX ADMIN — AMLODIPINE BESYLATE 10 MG: 10 TABLET ORAL at 08:05

## 2025-03-28 RX ADMIN — DIPHENHYDRAMINE HYDROCHLORIDE 25 MG: 50 INJECTION INTRAMUSCULAR; INTRAVENOUS at 00:31

## 2025-03-28 RX ADMIN — DIPHENHYDRAMINE HYDROCHLORIDE 25 MG: 50 INJECTION INTRAMUSCULAR; INTRAVENOUS at 06:28

## 2025-03-28 RX ADMIN — BUSPIRONE HYDROCHLORIDE 5 MG: 5 TABLET ORAL at 08:06

## 2025-03-28 RX ADMIN — INSULIN LISPRO 1 UNITS: 100 INJECTION, SOLUTION INTRAVENOUS; SUBCUTANEOUS at 12:58

## 2025-03-28 RX ADMIN — BUTALBITAL, ACETAMINOPHEN, AND CAFFEINE 1 TABLET: 50; 325; 40 TABLET ORAL at 00:31

## 2025-03-28 RX ADMIN — DULOXETINE HYDROCHLORIDE 20 MG: 20 CAPSULE, DELAYED RELEASE ORAL at 08:08

## 2025-03-28 RX ADMIN — CILOSTAZOL 50 MG: 100 TABLET ORAL at 08:07

## 2025-03-28 RX ADMIN — GABAPENTIN 400 MG: 400 CAPSULE ORAL at 08:06

## 2025-03-28 RX ADMIN — OXCARBAZEPINE 150 MG: 150 TABLET, FILM COATED ORAL at 08:07

## 2025-03-28 RX ADMIN — VALPROATE SODIUM 500 MG: 100 INJECTION, SOLUTION INTRAVENOUS at 00:34

## 2025-03-28 RX ADMIN — HEPARIN SODIUM 5000 UNITS: 5000 INJECTION INTRAVENOUS; SUBCUTANEOUS at 06:28

## 2025-03-28 RX ADMIN — INSULIN GLARGINE 5 UNITS: 100 INJECTION, SOLUTION SUBCUTANEOUS at 09:20

## 2025-03-28 RX ADMIN — BUTALBITAL, ACETAMINOPHEN, AND CAFFEINE 1 TABLET: 50; 325; 40 TABLET ORAL at 09:20

## 2025-03-28 RX ADMIN — BUTALBITAL, ACETAMINOPHEN, AND CAFFEINE 1 TABLET: 50; 325; 40 TABLET ORAL at 04:41

## 2025-03-28 RX ADMIN — HYDRALAZINE HYDROCHLORIDE 50 MG: 50 TABLET ORAL at 06:28

## 2025-03-28 RX ADMIN — METHYLPREDNISOLONE SODIUM SUCCINATE 125 MG: 125 INJECTION, POWDER, LYOPHILIZED, FOR SOLUTION INTRAMUSCULAR; INTRAVENOUS at 04:41

## 2025-03-28 RX ADMIN — SODIUM CHLORIDE, PRESERVATIVE FREE 10 ML: 5 INJECTION INTRAVENOUS at 08:08

## 2025-03-28 ASSESSMENT — PAIN DESCRIPTION - ORIENTATION
ORIENTATION: ANTERIOR
ORIENTATION: MID;UPPER

## 2025-03-28 ASSESSMENT — ENCOUNTER SYMPTOMS
GASTROINTESTINAL NEGATIVE: 1
ALLERGIC/IMMUNOLOGIC NEGATIVE: 1
EYES NEGATIVE: 1

## 2025-03-28 ASSESSMENT — PAIN - FUNCTIONAL ASSESSMENT
PAIN_FUNCTIONAL_ASSESSMENT: ACTIVITIES ARE NOT PREVENTED
PAIN_FUNCTIONAL_ASSESSMENT: ACTIVITIES ARE NOT PREVENTED

## 2025-03-28 ASSESSMENT — PAIN SCALES - GENERAL
PAINLEVEL_OUTOF10: 7
PAINLEVEL_OUTOF10: 6
PAINLEVEL_OUTOF10: 6
PAINLEVEL_OUTOF10: 5
PAINLEVEL_OUTOF10: 7
PAINLEVEL_OUTOF10: 0
PAINLEVEL_OUTOF10: 2

## 2025-03-28 ASSESSMENT — PAIN DESCRIPTION - DESCRIPTORS
DESCRIPTORS: ACHING
DESCRIPTORS: ACHING;BURNING
DESCRIPTORS: ACHING

## 2025-03-28 ASSESSMENT — PAIN DESCRIPTION - ONSET
ONSET: ON-GOING
ONSET: ON-GOING

## 2025-03-28 ASSESSMENT — PAIN DESCRIPTION - PAIN TYPE
TYPE: ACUTE PAIN
TYPE: ACUTE PAIN

## 2025-03-28 ASSESSMENT — PAIN DESCRIPTION - LOCATION
LOCATION: HEAD

## 2025-03-28 ASSESSMENT — PAIN DESCRIPTION - FREQUENCY
FREQUENCY: CONTINUOUS
FREQUENCY: CONTINUOUS

## 2025-03-28 NOTE — PROGRESS NOTES
[facility]  Internal Medicine Teaching Residency Program  Inpatient Daily Progress Note  ______________________________________________________________________________    Patient: Sultana Noyola  YOB: 1955   MRN: [id number]    Acct: [id number]     Room: 2010/2010-01  Admit date: 3/23/2025  Today's date: 03/28/25  Number of days in the hospital: 5  Current Code Status: Full Code   SUBJECTIVE   Admitting Diagnosis: Hyperkalemia  CC: SOB    - Patient seen and examined at bedside. Chart and results reviewed.  Headache has improved but BP remains slightly elevated.   Morning labs are currently pending.     BRIEF HISTORY     69 y.o. with PMH of      HTN (lisinopril 10, Coreg 12.5)  HLD  Type II DM on metformin  Bipolar 1  Anxiety (oxcarbazepine 150 TID BuSpar 10 TID, mirtazapine 15, Cymbalta 20), COPD on Symbicort (2-3L NC at baseline)  Chronic pancreatitis prior most recent adhesions with Hx of bowel resection and laparotomy with adhesion lysis 8/22  PAD (Plavix 75, cilostazol 60)  CKD stage 3A (1.1-1.3)  Hx of Afib (not on AC low [id number])     Presented to [place] ED from nursing facility with SOB and CP on 3-23-35.  Patient went into V. tach in the 150s on 2 separate occasions and underwent cardioversion.  Was also found to have potassium level of 7.4 with creatinine of 3.2 and BUN of 68.  Patient was initially treated with insulin, dextrose Lokelma and calcium gluconate as well as albuterol.  Patient was started on amiodarone and transferred to [facility] and placed in the ICU.     While in the ICU, hemodialysis was initiated. She underwent CT abdomen which showed without acute inflammatory or obstructive process, chronic pancreatitis, trace bilateral pleural effusion with mild pulmonary edema. Fat-containing midline and umbilical hernia. Valentino catheter was placed. Cardiology was consulted due to episode of v tach and echo ordered and pending. Pt had  improvement in potassium down to 5.1 and BUN/Cr improvement, decided to transfer out of ICU given improvement. At this time, pt has no complaints.     OBJECTIVE     Vital Signs:  BP (!) 164/52   Pulse 70   Temp 98.1 °F (36.7 °C) (Oral)   Resp 20   Ht 1.626 m (5' 4\")   Wt 95 kg (209 lb 7 oz)   SpO2 96%   BMI 35.95 kg/m²     Temp (24hrs), Av.7 °F (36.5 °C), Min:97.3 °F (36.3 °C), Max:98.1 °F (36.7 °C)    In: 600   Out: -     Physical Exam:  Constitutional: This is a well developed, Well Nourished, 69 y.o. female who is A&Ox3, cooperative and in no apparent distress.    Head: normocephalic and atraumatic.  Forehead is TTP  EENT:  PERRLA.  No conjunctival injections.   Septum was midline, mucosa was without erythema, exudates or cobblestoning.  No thrush was noted.   Neck: Supple without thyromegaly. No elevated JVP. Trachea was midline.  Respiratory: Chest was symmetrical without dullness to percussion.  Breath sounds bilaterally were clear to auscultation. There were no wheezes, rhonchi or rales. There is no intercostal retraction or use of accessory muscles.   Cardiovascular: Regular without murmur, clicks, gallops or rubs.   Abdomen: Slightly rounded and soft without organomegaly. No rebound, rigidity or guarding was appreciated.    Musculoskeletal: Normal curvature of the spine.  No gross muscle weakness.    Extremities:  No lower extremity edema, ulcerations, tenderness, varicosities or erythema.  Muscle size, tone and strength are normal.  No involuntary movements are noted.    Skin:  Warm and dry.  Good color, turgor and pigmentation. No lesions or scars.  No cyanosis or clubbing  Neurological/Psychiatric: The patient's general behavior, level of consciousness, thought content and emotional status is normal. No FND. Strength/sensation is 5/5. Sensation is intact.     Medications:  Scheduled Medications:    apixaban  10 mg Oral BID    Followed by    [START ON 2025] apixaban  5 mg Oral BID     (HCC)    Chronic obstructive pulmonary disease (HCC)    ANTHONY (obstructive sleep apnea)    Abnormal electrocardiography    Chest pain    Migraine  Resolved Problems:    * No resolved hospital problems. *     V tach s/p synchronized cardioversion x 2  History of A-fib not on anticoagulation due to low [id number] score  -[id number] score now 6.  -Will change to Eliquis with initial loading dose  -Cardio ok with d/c from their side.     Hypertension  Hyperlipidemia  -Recommend to hold lisinopril per nephrology recommendations  -Continue Crestor  -Pt currently on Amlodipine 10 mg, Coreg 25 mg and Hydralazine 50 mg.     Intractable migraines  -CT head unremarkable.  -Neurology consulted started pt on  Benadryl 25 mg Q6hr  IV Solu-medrol 125 mg 3x daily  Depakote 500 mg BID  -Did consider Sumatriptan but wanted to hold off given cardiac history  -Attempted Excedrin and Fioricet with no improvement.   -Recommend to avoid Morphine given RUSTAM.    COPD on 3 L  -Continue Symbicort, Duoneb  -On prednisone 40 mg daily   -Will d/c abx for COPD exacerbation as it is day 5.     Acute renal failure  Hyperkalemia, resolved  -Nephrology noted ok to d/c bazzi, bicarb and HD cath  -Recommending to hold Lisinopril evne on d/c and for pt to f.u OP in 2-3 weeks  -Avoid nephrotoxic medications  -Monitor BMP  -Low potassium diet    History of SBO  Chronic constipation  -Monitor BM     Type 2 diabetes  -LDSS and 5U Lantus daily.   -POCT glucose  -Carb controlled diet.      Anxiety, bipolar 1  Neuropathy  -Continue Buspar, Cymbalta, Trazadone, Gabapentin, Remeron, Oxcarbzepine    Diet: ADULT DIET; Regular; 4 carb choices (60 gm/meal); Low Fat/Low Chol/High Fiber/KURTIS; Low Potassium (Less than 3000 mg/day)  DVT PPx: Eliquis    PT/OT: Consulted  Discharge Planning / SW:Pending mgmt of BP and headache.       Heather Talley MD  Internal Medicine Resident, PGY-1  [facility]; [place], [place]  3/28/2025, 7:46 AM

## 2025-03-28 NOTE — PROGRESS NOTES
1324  Patient discharging home with home care. Writer removed IV and disconnected telemetry. Patient changed into personal clothing. Patient prefers to pickup medications from preferred [facility]. Education given with all questions answered. Patient left via wheelchair with aide.

## 2025-03-28 NOTE — PROGRESS NOTES
Physical Therapy        Physical Therapy Cancel Note      DATE: 3/28/2025    NAME: Sulatna Noyola  MRN: [id number]   : 1955      Patient not seen this date for Physical Therapy due to:    Patient independent with functional mobility. Will defer PT evaluation at this time. Please reorder PT if future needs arise. Spoke with pt. Pt denies acute PT concerns, agreeable to deferral.      Electronically signed by Patricia Cain PT on 3/28/2025 at 11:22 AM

## 2025-03-28 NOTE — CARE COORDINATION
Faxed AVS to [facility] per patient request to confirm discontinued medications.    [phone] Voice message left for Heather with [facility]. Informed that the patient will transition home today. Requested a call back if she needs the home care order and AVS faxed.    Discharge Report    [facility]  Clinical Case Management Department  Written by: MATHEW OTT    Patient Name: Sultana Noyola  Attending Provider: Rory Alexander*  Admit Date: 3/23/2025 11:32 AM  MRN: [id number]  Account: [id number]                     : 1955  Discharge Date: 2025      Disposition: home    MATHEW OTT

## 2025-03-28 NOTE — PROGRESS NOTES
PULMONARY & CRITICAL CARE MEDICINE PROGRESS  NOTE     Patient:  Sultana Noyola  MRN: [id number]  Admit date: 3/23/2025  Primary Care Physician: Azael Friedman MD  Consulting Physician: Rory Alexander*  CODE Status: Full Code  LOS: 5    SUBJECTIVE     I personally interviewed/examined the patient, reviewed interval history and interpreted all available radiographic, laboratory data at the time of service.      Chief Compliant/Reason for Initial Consult:     Dyspnea with chronic respiratory failure with COPD and hyperkalemia    Brief Hospital Course:  The patient is a 69 y.o. female with PMH of HTN (lisinopril 10 BID, Coreg 12.5), HLD, type II DM on metformin, bipolar 1 and Anxiety (oxcarbazepine 150 TID BuSpar 10 TID, mirtazapine 15, Cymbalta 20, trazodone 100), COPD on Symbicort (2-3L NC at baseline), chronic pancreatitis with prior Hx of with Hx of bowel resection and laparotomy with adhesion lysis 8/22, PAD (Plavix 75, cilostazol 60) s/p Left iliac stenting 10/22, CKD stage 3A (1.1-1.3), Hx of Afib (not on AC low [id number])     Presented to [place] ED from assisted living facility with SOB and CP, for last few days, EKG showed A-fib with HR of 57, no acute ST-T changes, patient went into V. tach 150s-160s 2 separate times s/p successful Saint cardioversion, found to have severe hyperkalemia 7.4 with ARF creatinine 3.2 BUN 68, treated with insulin dextrose, Lokelma and calcium gluconate as well as albuterol, started on amiodarone, transferred to [facility] for possible urgent dialysis after discussion with cardiology and nephrology.     CXR showing pulmonary congestion with borderline cardiomegaly, peribronchial cuffing potentially to reactive disease, bronchitis or pulmonary congestion     Repeat potassium was still 7.5, Patient was admitted to MICU for closer monitoring given arrhythmias, need for emergent hemodialysis and      BNP:   No results found for: \"BNP\"  D-Dimer:  Lab Results   Component Value Date    DDIMER 0.73 05/05/2023     Others:   Lab Results   Component Value Date    TSH 1.16 03/23/2025    FT3 1.97 (L) 05/14/2022     Lab Results   Component Value Date    SENA NEGATIVE 03/23/2025    CRP 20.9 (H) 07/22/2022     No results found for: \"LABURIC\", \"URICACID\"  Lab Results   Component Value Date    IRON 100 03/26/2025    TIBC 286 03/26/2025    FERRITIN 628 03/26/2025     No results found for: \"SPEP\", \"UPEP\"  No results found for: \"PSA\", \"CEA\", \"\", \"LB2786\", \"\"    Input/Output:    Intake/Output Summary (Last 24 hours) at 3/28/2025 1041  Last data filed at 3/27/2025 1230  Gross per 24 hour   Intake 360 ml   Output --   Net 360 ml       Microbiology:  No results for input(s): \"SPECDESC\", \"SPECIAL\", \"CULTURE\", \"STATUS\", \"ORG\", \"CDIFFTOXPCR\", \"CAMPYLOBPCR\", \"SALMONELLAPC\", \"SHIGAPCR\", \"SHIGELLAPCR\", \"MPNEUG\", \"MPNEUM\", \"LACTOQL\" in the last 72 hours.      Pathology:    Radiology reports:  CT HEAD WO CONTRAST   Final Result   No acute intracranial abnormality.         XR CHEST PORTABLE   Final Result   No acute findings with no radiographic evidence of CHF.         US RENAL COMPLETE   Final Result   1. No hydronephrosis.   2. Bilateral renal cysts.   3. Incidentally visualized echogenic liver parenchyma, nonspecific but can be   seen in the setting of hepatic steatosis.         CT ABDOMEN PELVIS WO CONTRAST Additional Contrast? None   Final Result   1. No acute inflammatory or obstructive process seen in the abdomen or pelvis.   2. Chronic pancreatitis.   3. Trace bilateral pleural effusions with mild pulmonary edema.         XR CHEST PORTABLE   Final Result   Right internal jugular central venous catheter tip overlies the right atrium.   No visualized pneumothorax.             Echocardiogram:   No results found for this or any previous visit.      Cardiac Catheterization:   No results found for this or any previous  visit.      ASSESSMENT AND PLAN     Assessment:    Acute renal failure.  Hyperkalemia improved requiring hemodialysis.  COPD/emphysema by history.  Doubt COPD exacerbation  Chronic hypoxic respiratory failure on 3 L of home O2.  Obstructive sleep apnea by history intolerant of CPAP.  Ventricular tach arrhythmia status post cardioversion  Troponin elevation.  Morbid obesity.  Hypertension.  Diabetes mellitus.  Anxiety disorder.    Plan:    Patient remains hemodynamically stable and is currently saturating well on nasal cannula 3 L   Continue supplemental oxygen and wean to keep oxygen saturation greater than 92%.  Patient is on home oxygen 3 L  Has history of obstructive sleep apnea intolerant of CPAP/BiPAP does not want to use CPAP.  Continue bronchodilators with DuoNeb aerosol and Symbicort  Courage incentive spirometry, pulmonary toilet, aspiration precautions  Continue to monitor I/O with a goal of even/negative fluid balance  Antimicrobials reviewed; patient finished Rocephin 5 days and finished Zithromax.   Chart course of prednisone finished 03/27/2025  On aspirin statin, Norvasc, Coreg, hydralazine  Follow-up with cardiology  DVT prophylaxis on heparin subcutaneous  Physical/occupational/speech therapy; increase activity as tolerated    After discharge patient will need to follow-up with her pulmonologist in [facility].  She is okay to discharge from [facility]    I updated the patient regarding the current clinical condition, provisional diagnosis and management plan. I addressed concerns and answered all questions to the best of my abilities.    It was my pleasure to evaluate Sultana Noyola today. We will continue to follow. I would like to thank you for allowing me to participate in the care of this patient.  Please feel free to call with any further questions or concerns.    Stevan Vu MD, M.D.   Pulmonary and Critical Care Medicine           3/28/2025, 10:41 AM    This note is created with the

## 2025-03-28 NOTE — PLAN OF CARE
Problem: Safety - Adult  Goal: Free from fall injury  3/27/2025 2257 by Edward Goetz RN  Outcome: Progressing     Problem: Discharge Planning  Goal: Discharge to home or other facility with appropriate resources  3/27/2025 2257 by Edward Goetz RN  Outcome: Progressing     Problem: Pain  Goal: Verbalizes/displays adequate comfort level or baseline comfort level  3/27/2025 2257 by Edward Goetz RN  Outcome: Progressing     Problem: Skin/Tissue Integrity  Goal: Skin integrity remains intact  Description: 1.  Monitor for areas of redness and/or skin breakdown  2.  Assess vascular access sites hourly  3.  Every 4-6 hours minimum:  Change oxygen saturation probe site  4.  Every 4-6 hours:  If on nasal continuous positive airway pressure, respiratory therapy assess nares and determine need for appliance change or resting period  3/27/2025 2257 by Edward Goetz RN  Outcome: Progressing     Problem: ABCDS Injury Assessment  Goal: Absence of physical injury  3/27/2025 2257 by Edward Goetz RN  Outcome: Progressing     Problem: Respiratory - Adult  Goal: Achieves optimal ventilation and oxygenation  3/27/2025 2257 by Edward Goetz RN  Outcome: Progressing     Problem: Cardiovascular - Adult  Goal: Maintains optimal cardiac output and hemodynamic stability  3/27/2025 2257 by Edward Goetz RN  Outcome: Progressing     Problem: Skin/Tissue Integrity - Adult  Goal: Skin integrity remains intact  Description: 1.  Monitor for areas of redness and/or skin breakdown  2.  Assess vascular access sites hourly  3.  Every 4-6 hours minimum:  Change oxygen saturation probe site  4.  Every 4-6 hours:  If on nasal continuous positive airway pressure, respiratory therapy assess nares and determine need for appliance change or resting period  3/27/2025 2257 by Edward Goetz RN  Outcome: Progressing

## 2025-03-28 NOTE — PLAN OF CARE
Problem: Safety - Adult  Goal: Free from fall injury  Outcome: Progressing     Problem: Discharge Planning  Goal: Discharge to home or other facility with appropriate resources  Outcome: Progressing     Problem: Pain  Goal: Verbalizes/displays adequate comfort level or baseline comfort level  Outcome: Progressing     Problem: Skin/Tissue Integrity  Goal: Skin integrity remains intact  Description: 1.  Monitor for areas of redness and/or skin breakdown  2.  Assess vascular access sites hourly  3.  Every 4-6 hours minimum:  Change oxygen saturation probe site  4.  Every 4-6 hours:  If on nasal continuous positive airway pressure, respiratory therapy assess nares and determine need for appliance change or resting period  Outcome: Progressing     Problem: ABCDS Injury Assessment  Goal: Absence of physical injury  Outcome: Progressing     Problem: Respiratory - Adult  Goal: Achieves optimal ventilation and oxygenation  Outcome: Progressing     Problem: Cardiovascular - Adult  Goal: Maintains optimal cardiac output and hemodynamic stability  Outcome: Progressing     Problem: Skin/Tissue Integrity - Adult  Goal: Skin integrity remains intact  Description: 1.  Monitor for areas of redness and/or skin breakdown  2.  Assess vascular access sites hourly  3.  Every 4-6 hours minimum:  Change oxygen saturation probe site  4.  Every 4-6 hours:  If on nasal continuous positive airway pressure, respiratory therapy assess nares and determine need for appliance change or resting period  Outcome: Progressing

## 2025-03-28 NOTE — PROGRESS NOTES
[facility] Neurology   IN-PATIENT SERVICE      NEUROLOGY PROGRESS  NOTE            Date:   3/28/2025  Patient name:  Sultana Noyola  Date of admission:  3/23/2025  YOB: 1955      Interval History:     No acute events.  Reports headache has improved.  She is asking if she can be discharged.  No new neurological symptoms.    History of Present Illness:     69-year-old female with past medical history of migraines, bipolar disorder, anxiety, depression, hypertension, hyperlipidemia, CKD, neuropathy, A-fib, for whom neurology is consulted for migraine.  History obtained from patient and chart review.     Patient has been admitted since 3/23.  She was found to have acute renal failure, hyperkalemia.  Subsequently had ventricular tachyarrhythmias, required cardioversion.  She states that after the cardioversion, she started having pain in the left side of her chest and head.  She remembers clamping her jaw down during cardioversion.     She describes the headache as throbbing and sharp sensation, localized to the left side of the head.  There is associated photophobia and nausea.  No visual changes.  She has been tried on Tylenol, Fioricet along with intermittent opioids without significant relief.  Currently rates the headache as 6/10.  Has been as high as 10/10. No neck pain, focal weakness, numbness, dysarthria, aphasia.     She has a history of migraines but these stopped about 30 years ago.  States that current headache has similar phenotype to her prior migraines.  She is currently not on any headache medications.  Generally does not have any significant headaches.  She is a former smoker, denies alcohol use.  Drinks 1 can of Pepsi daily.  No other caffeine use.  No personal or family history of aneurysms.  No history of blood clots.    Past Medical History:     Past Medical History:   Diagnosis Date    ADHD (attention deficit hyperactivity disorder)     Anxiety     BiPAP (biphasic positive airway  PHOS 3.9 03/24/2025           Imaging/Diagnostics:    Results for orders placed during the hospital encounter of 03/23/25    CT HEAD WO CONTRAST    Narrative  EXAMINATION:  CT OF THE HEAD WITHOUT CONTRAST  3/26/2025 10:03 am    TECHNIQUE:  CT of the head was performed without the administration of intravenous  contrast. Automated exposure control, iterative reconstruction, and/or weight  based adjustment of the mA/kV was utilized to reduce the radiation dose to as  low as reasonably achievable.    COMPARISON:  10/09/2023 CT    HISTORY:  ORDERING SYSTEM PROVIDED HISTORY: Rule out hemorrhage  TECHNOLOGIST PROVIDED HISTORY:    Rule out hemorrhage    FINDINGS:  BRAIN/VENTRICLES: The ventricles and sulci are prominent. Pattern is  consistent with age-related atrophy. No extra-axial fluid collections, and no  sign of recent intracranial hemorrhage. Decreased attenuation is noted within  the periventricular white matter. Pattern is consistent with chronic small  vessel ischemic change. No acute edema or mass effect. No mass lesions are  detected on this noncontrast study.    ORBITS: The visualized portion of the orbits demonstrate no acute abnormality.    SINUSES: The visualized paranasal sinuses and mastoid air cells demonstrate  no acute abnormality.    SOFT TISSUES/SKULL:  No acute abnormality of the visualized skull or soft  tissues.    Impression  No acute intracranial abnormality.            I personally reviewed all of the above medications, clinical laboratory, imaging and other diagnostic tests.           Impression:       Migraine headache, resolving with migraine cocktail, provoked by cardioversion, in a patient with prior history of migraines although stopped about 30 years ago.  Current headache phenotype is similar to her prior migraines.  CT brain with no acute findings.  Exam nonfocal.     Plan:      Somewhat limited with medications for acute treatment.  Avoid NSAIDs/Toradol due to her renal dysfunction.   Will avoid antiemetics due to recent arrhythmias. Avoid triptans with cardiac and peripheral artery disease hx.  Can DC on Medrol Dosepak and Fioricet as needed for 1 week.   Will defer daily headache medication for now as her migraines were previously essentially resolved.  Can be considered as an outpatient if she continues to have breakthrough headaches.  Follow-up with neurology outpatient in 3 to 4 weeks.     There are plans for DC today.  Please call if any further questions.  Discussed with primary team resident.    Electronically signed by Emily Vaca DO on 3/28/2025 at 1:00 PM      Emily Vaca DO  [facility] [place]  Neurology

## 2025-03-31 NOTE — DISCHARGE SUMMARY
[facility]     Department of Internal Medicine - Staff Internal Medicine Teaching Service    INPATIENT DISCHARGE SUMMARY      Patient Identification:  Sultana Noyola is a 69 y.o. female.  :  1955  MRN: [id number]     Acct: [id number]   PCP: Azael Friedman MD  Admit Date:  3/23/2025  Discharge date and time: 3/28/2025  1:27 PM   Attending Provider: No att. providers found                                     ACTIVE DISCHARGE DIAGNOSES     Hospital Problem Lists:  Principal Problem:    Hyperkalemia  Active Problems:    Essential hypertension    Acute kidney injury    Stage 3a chronic kidney disease (HCC)    Bradycardia    Dyslipidemia    Ventricular tachyarrhythmia (HCC)    Chronic obstructive pulmonary disease (HCC)    ANTHONY (obstructive sleep apnea)    Abnormal electrocardiography    Chest pain    Migraine  Resolved Problems:    * No resolved hospital problems. *      HOSPITAL STAY     Brief Inpatient course:   Sultana Noyola is a 69 y.o. female who was admitted for the management of Hyperkalemia, presented to the emergency department with shortness of breath.  Which she originally came manage she was noted to be in V. tach and required cardioversion.  Due to this, she was evaluated by cardiology and underwent a cardiac stress test which was negative.  She was also found to have severely elevated potassium and abnormal kidney function for which she was started on hemodialysis.  She underwent 1 hemodialysis treatment and had significant improvement in her electrolytes.  Therefore, hemodialysis catheter was removed.  Recommended that patient follow-up with nephrology in 2 to 3 weeks and discontinue taking lisinopril at home.  Patient was also found to have significantly elevated blood pressure for which cardiology started the patient on Norvasc.  Patient was originally not taking Eliquis at home for her A-fib due to low [id number]-CDNa score.  However, increased [id number] score

## 2025-04-03 ENCOUNTER — HOSPITAL ENCOUNTER (OUTPATIENT)
Age: 70
Setting detail: SPECIMEN
Discharge: HOME | End: 2025-04-03
Payer: MEDICARE

## 2025-04-03 LAB
ALBUMIN SERPL-MCNC: 4.1 G/DL (ref 3.5–5.2)
ALBUMIN/GLOB SERPL: 1.8 {RATIO} (ref 1–2.5)
ALP SERPL-CCNC: 80 U/L (ref 35–104)
ALT SERPL-CCNC: 17 U/L (ref 10–35)
ANION GAP SERPL CALCULATED.3IONS-SCNC: 14 MMOL/L (ref 9–16)
AST SERPL-CCNC: 23 U/L (ref 10–35)
BILIRUB DIRECT SERPL-MCNC: <0.1 MG/DL (ref 0–0.2)
BILIRUB INDIRECT SERPL-MCNC: ABNORMAL MG/DL
BILIRUB SERPL-MCNC: 0.2 MG/DL (ref 0–1.2)
BUN SERPL-MCNC: 15 MG/DL (ref 8–23)
CALCIUM SERPL-MCNC: 9.3 MG/DL (ref 8.8–10.2)
CHLORIDE SERPL-SCNC: 100 MMOL/L (ref 98–107)
CO2 SERPL-SCNC: 24 MMOL/L (ref 20–31)
CREAT SERPL-MCNC: 1.4 MG/DL (ref 0.5–0.9)
ERYTHROCYTE [DISTWIDTH] IN BLOOD BY AUTOMATED COUNT: 14.7 % (ref 11.8–14.4)
GFR, ESTIMATED: 40 ML/MIN/1.73M2
GLUCOSE SERPL-MCNC: 153 MG/DL (ref 82–115)
HCT VFR BLD AUTO: 35.5 % (ref 36.3–47.1)
HGB BLD-MCNC: 11.1 G/DL (ref 11.9–15.1)
MCH RBC QN AUTO: 31.1 PG (ref 25.2–33.5)
MCHC RBC AUTO-ENTMCNC: 31.3 G/DL (ref 28.4–34.8)
MCV RBC AUTO: 99.4 FL (ref 82.6–102.9)
PLATELET # BLD AUTO: 259 K/UL (ref 138–453)
PMV BLD AUTO: 11.4 FL (ref 8.1–13.5)
POTASSIUM SERPL-SCNC: 4.8 MMOL/L (ref 3.7–5.3)
PROT SERPL-MCNC: 6.4 G/DL (ref 6.6–8.7)
RBC # BLD AUTO: 3.57 M/UL (ref 3.95–5.11)
SODIUM SERPL-SCNC: 138 MMOL/L (ref 136–145)
WBC OTHER # BLD: 16.1 K/UL (ref 3.5–11.3)

## 2025-04-03 PROCEDURE — 80076 HEPATIC FUNCTION PANEL: CPT

## 2025-04-03 PROCEDURE — 80048 BASIC METABOLIC PNL TOTAL CA: CPT

## 2025-04-03 PROCEDURE — 36415 COLL VENOUS BLD VENIPUNCTURE: CPT

## 2025-04-03 PROCEDURE — 85027 COMPLETE CBC AUTOMATED: CPT

## 2025-04-04 NOTE — PROGRESS NOTES
Physician Progress Note      PATIENT:               BERHANE WHEELER  CSN #:                  [id number]  :                       1955  ADMIT DATE:       3/23/2025 11:32 AM  DISCH DATE:        3/28/2025 1:27 PM  RESPONDING  PROVIDER #:        Carlos Manuel Pablo MD          QUERY TEXT:    Acute tubular necrosis is documented in the medical record - Nephrology PN -   3/24-3/25. Please provide additional clinical indicators supportive of your   documentation. Or please document if the diagnosis of Acute tubular necrosis   has been ruled out after study.    The clinical indicators include:  - 3/24 and 3/25 - Nephrology PN - Dr. Pablo -   Acute Kidney Injury: Secondary   to ischemic ATN from intravascular depletion from diarrhea losses, further   aggravated by ACE inhibitor use baseline 1.2-1.4 peaked up to 3.2 now   resolving.  Did receive 1 dialysis session 3/23/2025.  Nonoliguric Valentino   catheter in place.  Has a temporary right IJ vein dialysis catheter in place.  - creatinine  -3/23 - 3.2, 3.0, 2.3, 1.8; 3/24 - 1.7, 1.6, 1.4.  The   Nephrology note -3/24- states, baseline 1.2-1.4.    -MAR- - IVF-NS @ 100 -3/23-3/24  - HD session x1 on 3/23/2025  - Lisinopril on home meds but held on admission.  Options provided:  -- Acute tubular necrosis was ruled out and only RUSTAM  -- Acute tubular necrosis present as evidenced by, Please document evidence.  -- Other - I will add my own diagnosis  -- Disagree - Not applicable / Not valid  -- Disagree - Clinically unable to determine / Unknown  -- Refer to Clinical Documentation Reviewer    PROVIDER RESPONSE TEXT:    Acute tubular necrosis is present as evidenced by Acute increase in creatinine   triggered by diarrhea, ACE inhibitors    Query created by: Noel Iglesias on 2025 7:09 AM      Electronically signed by:  Carlos Manuel Pablo MD 2025 10:14 AM

## 2025-04-04 NOTE — PROGRESS NOTES
Physician Progress Note      PATIENT:               BERHANE WHEELER  CSN #:                  [id number]  :                       1955  ADMIT DATE:       3/23/2025 11:32 AM  DISCH DATE:        3/28/2025 1:27 PM  RESPONDING  PROVIDER #:        MELISSA CARBALLO          QUERY TEXT:    hypovolemic shock is documented in the H&P - 3/23/17203 per Dr. Carballo and Dr. Galaviz. Please provide additional clinical indicators supportive of your   documentation. Or please document if the diagnosis of hypovolemic shock has   been ruled out after study.    The clinical indicators include:  - transferred due to diarrhea, cough, SOB, hyperkalemia.  -  Patient was not hypotensive at transferring facility or [facility].    VITAL SIGNS- per flowsheets  PB ED -  3/23 @ 0931 - BP- 149/71, HR - 65, MAP- 94  3/23 @ 1045 -BP- 181/57, HR - 45,MAP- 88    STV - CAR 2  3/23 @ 1215- BP- 96/80, HR- 45, MAP- 87  3/23 @ 1526- BP- 115/90, HR - 45,MAP- 98    - MAR  - IV- Epinephrine gtt. - 3/23 @ 1337- 3/24 @ 0031  - IVF- NS @ 100- 3/23 @ 25438 - 3/24 @1617  Options provided:  -- Hypovolemic shock present as evidenced by, Please document evidence.  -- Hypovolemic shock was ruled out  -- Other - I will add my own diagnosis  -- Disagree - Not applicable / Not valid  -- Disagree - Clinically unable to determine / Unknown  -- Refer to Clinical Documentation Reviewer    PROVIDER RESPONSE TEXT:    Hypovolemic shock was ruled out after study.    Query created by: Noel Iglesias on 2025 7:24 AM      Electronically signed by:  MELISSA CARBALLO 2025 5:06 PM

## 2025-04-07 ENCOUNTER — HOSPITAL ENCOUNTER (OUTPATIENT)
Age: 70
Setting detail: SPECIMEN
Discharge: HOME | End: 2025-04-07
Payer: MEDICARE

## 2025-04-07 PROCEDURE — 87086 URINE CULTURE/COLONY COUNT: CPT

## 2025-04-07 NOTE — PROGRESS NOTES
Physician Progress Note      PATIENT:               BERHANE WHEELER  CSN #:                  [id number]  :                       1955  ADMIT DATE:       3/23/2025 11:32 AM  DISCH DATE:        3/28/2025 1:27 PM  RESPONDING  PROVIDER #:        Rory Alexander MD          QUERY TEXT:    Pneumonia is documented in the medical record of Nephrology PN  3/24,,   Samy, 3/25 Dr. Alexander.. Please provide additional clinical indicators   supportive of your documentation. Or please document if the diagnosis of   Pneumonia has been ruled out after study.    The clinical indicators include:  -3/25 Per Pulmonology, Dr. Vu, Patient was weaned off epinephrine around   midnight, maintaining heart rate 60s-70s with NSR, and BP with MAP of 70s-80s,   she was started on steroids, ceftriaxone and azithromycin due to concern of   COPD exacerbation/bronchitis, viral and infectious pneumonia workup negative   so far, wheezing improved and O2 supplement back down to baseline of 3 L 3/24   morning.  - CXR- 3/23 -CXR showing pulmonary congestion with borderline cardiomegaly,   peribronchial cuffing potentially to reactive disease, bronchitis or pulmonary   congestion  MAR-  - IVF-NS @ 100 -3/23-3/24  - IV - Zithromax - 3/23 then PO - 3/24-3/27  - IV- Rocpehin - 3/23-3/26  - IV- epinephrine gtt - 3/23  - IV- Heparin gtt- 3/23-3/26  - IV Solu medrol - 3/23-3/28  - Oxygen therapy - 4L-6L/NC - 3/23-3/24      - admitted with chest pain and SOB and has a recent admission for pneumonia.  Options provided:  -- Pneumoina present as evidenced by, Please document evidence.  -- Pneumonia was ruled out  -- Other - I will add my own diagnosis  -- Disagree - Not applicable / Not valid  -- Disagree - Clinically unable to determine / Unknown  -- Refer to Clinical Documentation Reviewer    PROVIDER RESPONSE TEXT:    Provider disagreed with this query.  I have not documented pneumonia. Kindly recheck the notes.    Query created by: Kelsie

## 2025-04-08 ENCOUNTER — HOSPITAL ENCOUNTER (OUTPATIENT)
Age: 70
Setting detail: SPECIMEN
Discharge: HOME | End: 2025-04-08
Payer: MEDICARE

## 2025-04-08 LAB
ANION GAP SERPL CALCULATED.3IONS-SCNC: 17 MMOL/L (ref 9–16)
BUN SERPL-MCNC: 33 MG/DL (ref 8–23)
CALCIUM SERPL-MCNC: 9.7 MG/DL (ref 8.8–10.2)
CHLORIDE SERPL-SCNC: 102 MMOL/L (ref 98–107)
CO2 SERPL-SCNC: 20 MMOL/L (ref 20–31)
CREAT SERPL-MCNC: 3.6 MG/DL (ref 0.5–0.9)
ERYTHROCYTE [DISTWIDTH] IN BLOOD BY AUTOMATED COUNT: 14.6 % (ref 11.8–14.4)
GFR, ESTIMATED: 13 ML/MIN/1.73M2
GLUCOSE SERPL-MCNC: 163 MG/DL (ref 82–115)
HCT VFR BLD AUTO: 35.8 % (ref 36.3–47.1)
HGB BLD-MCNC: 11.5 G/DL (ref 11.9–15.1)
MCH RBC QN AUTO: 31.1 PG (ref 25.2–33.5)
MCHC RBC AUTO-ENTMCNC: 32.1 G/DL (ref 28.4–34.8)
MCV RBC AUTO: 96.8 FL (ref 82.6–102.9)
NRBC BLD-RTO: 0 PER 100 WBC
PLATELET # BLD AUTO: 272 K/UL (ref 138–453)
PMV BLD AUTO: 12.1 FL (ref 8.1–13.5)
POTASSIUM SERPL-SCNC: 4.3 MMOL/L (ref 3.7–5.3)
RBC # BLD AUTO: 3.7 M/UL (ref 3.95–5.11)
SODIUM SERPL-SCNC: 139 MMOL/L (ref 136–145)
WBC OTHER # BLD: 15 K/UL (ref 3.5–11.3)

## 2025-04-08 PROCEDURE — 85027 COMPLETE CBC AUTOMATED: CPT

## 2025-04-08 PROCEDURE — 80048 BASIC METABOLIC PNL TOTAL CA: CPT

## 2025-04-08 PROCEDURE — 36415 COLL VENOUS BLD VENIPUNCTURE: CPT

## 2025-04-09 ENCOUNTER — APPOINTMENT (OUTPATIENT)
Dept: GENERAL RADIOLOGY | Age: 70
End: 2025-04-09
Payer: MEDICARE

## 2025-04-09 ENCOUNTER — HOSPITAL ENCOUNTER (INPATIENT)
Age: 70
LOS: 2 days | Discharge: HOME HEALTH | End: 2025-04-11
Attending: EMERGENCY MEDICINE | Admitting: FAMILY MEDICINE
Payer: MEDICARE

## 2025-04-09 DIAGNOSIS — N17.9 ACUTE KIDNEY INJURY SUPERIMPOSED ON CHRONIC KIDNEY DISEASE: ICD-10-CM

## 2025-04-09 DIAGNOSIS — N17.9 ACUTE KIDNEY INJURY SUPERIMPOSED ON CKD: Primary | ICD-10-CM

## 2025-04-09 DIAGNOSIS — N18.9 ACUTE KIDNEY INJURY SUPERIMPOSED ON CKD: Primary | ICD-10-CM

## 2025-04-09 DIAGNOSIS — N18.9 ACUTE KIDNEY INJURY SUPERIMPOSED ON CHRONIC KIDNEY DISEASE: ICD-10-CM

## 2025-04-09 PROBLEM — D64.9 ANEMIA: Status: ACTIVE | Noted: 2025-04-09

## 2025-04-09 LAB
ANION GAP SERPL CALCULATED.3IONS-SCNC: 15 MMOL/L (ref 9–16)
BACTERIA URNS QL MICRO: NORMAL
BACTERIA URNS QL MICRO: NORMAL
BASOPHILS # BLD: 0.06 K/UL (ref 0–0.2)
BASOPHILS NFR BLD: 1 % (ref 0–2)
BILIRUB UR QL STRIP: NEGATIVE
BILIRUB UR QL STRIP: NEGATIVE
BUN SERPL-MCNC: 45 MG/DL (ref 8–23)
CALCIUM SERPL-MCNC: 9.5 MG/DL (ref 8.6–10.4)
CASTS #/AREA URNS LPF: NORMAL /LPF (ref 0–8)
CASTS #/AREA URNS LPF: NORMAL /LPF (ref 0–8)
CHLORIDE SERPL-SCNC: 106 MMOL/L (ref 98–107)
CLARITY UR: ABNORMAL
CLARITY UR: CLEAR
CO2 SERPL-SCNC: 19 MMOL/L (ref 20–31)
COLOR UR: YELLOW
COLOR UR: YELLOW
COMMENT: NORMAL
CREAT SERPL-MCNC: 2.9 MG/DL (ref 0.6–0.9)
CREAT UR-MCNC: 140 MG/DL (ref 28–217)
EOSINOPHIL # BLD: 0.14 K/UL (ref 0–0.44)
EOSINOPHILS RELATIVE PERCENT: 1 % (ref 1–4)
EPI CELLS #/AREA URNS HPF: NORMAL /HPF (ref 0–5)
EPI CELLS #/AREA URNS HPF: NORMAL /HPF (ref 0–5)
ERYTHROCYTE [DISTWIDTH] IN BLOOD BY AUTOMATED COUNT: 14.5 % (ref 11.8–14.4)
GFR, ESTIMATED: 17 ML/MIN/1.73M2
GLUCOSE SERPL-MCNC: 150 MG/DL (ref 74–99)
GLUCOSE UR STRIP-MCNC: NEGATIVE MG/DL
GLUCOSE UR STRIP-MCNC: NEGATIVE MG/DL
HCT VFR BLD AUTO: 31.7 % (ref 36.3–47.1)
HGB BLD-MCNC: 10.1 G/DL (ref 11.9–15.1)
HGB UR QL STRIP.AUTO: NEGATIVE
HGB UR QL STRIP.AUTO: NEGATIVE
IMM GRANULOCYTES # BLD AUTO: 0.04 K/UL (ref 0–0.3)
IMM GRANULOCYTES NFR BLD: 0 %
KETONES UR STRIP-MCNC: NEGATIVE MG/DL
KETONES UR STRIP-MCNC: NEGATIVE MG/DL
LEUKOCYTE ESTERASE UR QL STRIP: ABNORMAL
LEUKOCYTE ESTERASE UR QL STRIP: NEGATIVE
LYMPHOCYTES NFR BLD: 2.08 K/UL (ref 1.1–3.7)
LYMPHOCYTES RELATIVE PERCENT: 19 % (ref 24–43)
MCH RBC QN AUTO: 29.7 PG (ref 25.2–33.5)
MCHC RBC AUTO-ENTMCNC: 31.9 G/DL (ref 28.4–34.8)
MCV RBC AUTO: 93.2 FL (ref 82.6–102.9)
MICROORGANISM SPEC CULT: NORMAL
MONOCYTES NFR BLD: 1.27 K/UL (ref 0.1–1.2)
MONOCYTES NFR BLD: 11 % (ref 3–12)
NEUTROPHILS NFR BLD: 68 % (ref 36–65)
NEUTS SEG NFR BLD: 7.6 K/UL (ref 1.5–8.1)
NITRITE UR QL STRIP: NEGATIVE
NITRITE UR QL STRIP: NEGATIVE
NRBC BLD-RTO: 0 PER 100 WBC
PH UR STRIP: 5 [PH] (ref 5–8)
PH UR STRIP: 5.5 [PH] (ref 5–8)
PLATELET # BLD AUTO: ABNORMAL K/UL (ref 138–453)
PLATELET, FLUORESCENCE: 232 K/UL (ref 138–453)
PLATELETS.RETICULATED NFR BLD AUTO: 6.2 % (ref 1.1–10.3)
POTASSIUM SERPL-SCNC: 4.4 MMOL/L (ref 3.7–5.3)
PROT UR STRIP-MCNC: NEGATIVE MG/DL
PROT UR STRIP-MCNC: NEGATIVE MG/DL
RBC # BLD AUTO: 3.4 M/UL (ref 3.95–5.11)
RBC # BLD: ABNORMAL 10*6/UL
RBC #/AREA URNS HPF: NORMAL /HPF (ref 0–4)
RBC #/AREA URNS HPF: NORMAL /HPF (ref 0–4)
SERVICE CMNT-IMP: NORMAL
SODIUM SERPL-SCNC: 140 MMOL/L (ref 136–145)
SODIUM UR-SCNC: 77 MMOL/L
SP GR UR STRIP: 1.01 (ref 1–1.03)
SP GR UR STRIP: 1.01 (ref 1–1.03)
SPECIMEN DESCRIPTION: NORMAL
TOTAL PROTEIN, URINE: 10 MG/DL
UROBILINOGEN UR STRIP-ACNC: NORMAL EU/DL (ref 0–1)
UROBILINOGEN UR STRIP-ACNC: NORMAL EU/DL (ref 0–1)
WBC #/AREA URNS HPF: NORMAL /HPF (ref 0–5)
WBC #/AREA URNS HPF: NORMAL /HPF (ref 0–5)
WBC OTHER # BLD: 11.2 K/UL (ref 3.5–11.3)

## 2025-04-09 PROCEDURE — 99285 EMERGENCY DEPT VISIT HI MDM: CPT

## 2025-04-09 PROCEDURE — 82570 ASSAY OF URINE CREATININE: CPT

## 2025-04-09 PROCEDURE — 84156 ASSAY OF PROTEIN URINE: CPT

## 2025-04-09 PROCEDURE — 93005 ELECTROCARDIOGRAM TRACING: CPT | Performed by: FAMILY MEDICINE

## 2025-04-09 PROCEDURE — 84300 ASSAY OF URINE SODIUM: CPT

## 2025-04-09 PROCEDURE — 99223 1ST HOSP IP/OBS HIGH 75: CPT | Performed by: STUDENT IN AN ORGANIZED HEALTH CARE EDUCATION/TRAINING PROGRAM

## 2025-04-09 PROCEDURE — 6370000000 HC RX 637 (ALT 250 FOR IP): Performed by: FAMILY MEDICINE

## 2025-04-09 PROCEDURE — 81001 URINALYSIS AUTO W/SCOPE: CPT

## 2025-04-09 PROCEDURE — 83935 ASSAY OF URINE OSMOLALITY: CPT

## 2025-04-09 PROCEDURE — 80048 BASIC METABOLIC PNL TOTAL CA: CPT

## 2025-04-09 PROCEDURE — 2580000003 HC RX 258

## 2025-04-09 PROCEDURE — 6370000000 HC RX 637 (ALT 250 FOR IP): Performed by: NURSE PRACTITIONER

## 2025-04-09 PROCEDURE — 71045 X-RAY EXAM CHEST 1 VIEW: CPT

## 2025-04-09 PROCEDURE — 2060000000 HC ICU INTERMEDIATE R&B

## 2025-04-09 PROCEDURE — 85025 COMPLETE CBC W/AUTO DIFF WBC: CPT

## 2025-04-09 PROCEDURE — 85055 RETICULATED PLATELET ASSAY: CPT

## 2025-04-09 PROCEDURE — 2580000003 HC RX 258: Performed by: FAMILY MEDICINE

## 2025-04-09 RX ORDER — CILOSTAZOL 100 MG/1
50 TABLET ORAL 2 TIMES DAILY
Status: DISCONTINUED | OUTPATIENT
Start: 2025-04-09 | End: 2025-04-11 | Stop reason: HOSPADM

## 2025-04-09 RX ORDER — CARVEDILOL 25 MG/1
25 TABLET ORAL 2 TIMES DAILY WITH MEALS
Status: DISCONTINUED | OUTPATIENT
Start: 2025-04-09 | End: 2025-04-11 | Stop reason: HOSPADM

## 2025-04-09 RX ORDER — BUSPIRONE HYDROCHLORIDE 5 MG/1
5 TABLET ORAL 3 TIMES DAILY
Status: DISCONTINUED | OUTPATIENT
Start: 2025-04-09 | End: 2025-04-11 | Stop reason: HOSPADM

## 2025-04-09 RX ORDER — LIDOCAINE 4 G/G
1 PATCH TOPICAL DAILY
Status: DISCONTINUED | OUTPATIENT
Start: 2025-04-09 | End: 2025-04-11 | Stop reason: HOSPADM

## 2025-04-09 RX ORDER — LORAZEPAM 1 MG/1
1 TABLET ORAL EVERY 8 HOURS PRN
Status: DISCONTINUED | OUTPATIENT
Start: 2025-04-09 | End: 2025-04-11 | Stop reason: HOSPADM

## 2025-04-09 RX ORDER — ONDANSETRON 4 MG/1
4 TABLET, ORALLY DISINTEGRATING ORAL 3 TIMES DAILY PRN
Status: DISCONTINUED | OUTPATIENT
Start: 2025-04-09 | End: 2025-04-11 | Stop reason: HOSPADM

## 2025-04-09 RX ORDER — ACETAMINOPHEN 650 MG/1
650 SUPPOSITORY RECTAL EVERY 6 HOURS PRN
Status: DISCONTINUED | OUTPATIENT
Start: 2025-04-09 | End: 2025-04-11 | Stop reason: HOSPADM

## 2025-04-09 RX ORDER — TAMSULOSIN HYDROCHLORIDE 0.4 MG/1
0.4 CAPSULE ORAL DAILY
Status: DISCONTINUED | OUTPATIENT
Start: 2025-04-09 | End: 2025-04-11 | Stop reason: HOSPADM

## 2025-04-09 RX ORDER — ACETAMINOPHEN 325 MG/1
650 TABLET ORAL EVERY 6 HOURS PRN
Status: DISCONTINUED | OUTPATIENT
Start: 2025-04-09 | End: 2025-04-11 | Stop reason: HOSPADM

## 2025-04-09 RX ORDER — AMLODIPINE BESYLATE 10 MG/1
10 TABLET ORAL DAILY
Status: DISCONTINUED | OUTPATIENT
Start: 2025-04-09 | End: 2025-04-11 | Stop reason: HOSPADM

## 2025-04-09 RX ORDER — CYANOCOBALAMIN 500 UG/1
1 SPRAY, METERED NASAL WEEKLY
Status: DISCONTINUED | OUTPATIENT
Start: 2025-04-09 | End: 2025-04-09 | Stop reason: RX

## 2025-04-09 RX ORDER — SODIUM CHLORIDE 0.9 % (FLUSH) 0.9 %
5-40 SYRINGE (ML) INJECTION PRN
Status: DISCONTINUED | OUTPATIENT
Start: 2025-04-09 | End: 2025-04-11 | Stop reason: HOSPADM

## 2025-04-09 RX ORDER — SENNA AND DOCUSATE SODIUM 50; 8.6 MG/1; MG/1
2 TABLET, FILM COATED ORAL NIGHTLY
Status: DISCONTINUED | OUTPATIENT
Start: 2025-04-09 | End: 2025-04-11 | Stop reason: HOSPADM

## 2025-04-09 RX ORDER — TRAZODONE HYDROCHLORIDE 100 MG/1
200 TABLET ORAL NIGHTLY
Status: DISCONTINUED | OUTPATIENT
Start: 2025-04-09 | End: 2025-04-11 | Stop reason: HOSPADM

## 2025-04-09 RX ORDER — BUTALBITAL, ACETAMINOPHEN AND CAFFEINE 50; 325; 40 MG/1; MG/1; MG/1
1 TABLET ORAL EVERY 6 HOURS PRN
Status: DISCONTINUED | OUTPATIENT
Start: 2025-04-09 | End: 2025-04-11 | Stop reason: HOSPADM

## 2025-04-09 RX ORDER — MAGNESIUM OXIDE 400 MG/1
800 TABLET ORAL 2 TIMES DAILY
Status: DISCONTINUED | OUTPATIENT
Start: 2025-04-09 | End: 2025-04-09

## 2025-04-09 RX ORDER — SODIUM CHLORIDE 9 MG/ML
INJECTION, SOLUTION INTRAVENOUS PRN
Status: DISCONTINUED | OUTPATIENT
Start: 2025-04-09 | End: 2025-04-11 | Stop reason: HOSPADM

## 2025-04-09 RX ORDER — PANTOPRAZOLE SODIUM 40 MG/1
40 TABLET, DELAYED RELEASE ORAL DAILY
Status: DISCONTINUED | OUTPATIENT
Start: 2025-04-09 | End: 2025-04-11 | Stop reason: HOSPADM

## 2025-04-09 RX ORDER — CLOPIDOGREL BISULFATE 75 MG/1
75 TABLET ORAL DAILY
Status: DISCONTINUED | OUTPATIENT
Start: 2025-04-09 | End: 2025-04-11 | Stop reason: HOSPADM

## 2025-04-09 RX ORDER — ONDANSETRON 2 MG/ML
4 INJECTION INTRAMUSCULAR; INTRAVENOUS EVERY 6 HOURS PRN
Status: DISCONTINUED | OUTPATIENT
Start: 2025-04-09 | End: 2025-04-11 | Stop reason: HOSPADM

## 2025-04-09 RX ORDER — SODIUM CHLORIDE 9 MG/ML
INJECTION, SOLUTION INTRAVENOUS CONTINUOUS
Status: DISCONTINUED | OUTPATIENT
Start: 2025-04-09 | End: 2025-04-11 | Stop reason: HOSPADM

## 2025-04-09 RX ORDER — HYDRALAZINE HYDROCHLORIDE 50 MG/1
50 TABLET, FILM COATED ORAL EVERY 8 HOURS SCHEDULED
Status: DISCONTINUED | OUTPATIENT
Start: 2025-04-09 | End: 2025-04-11 | Stop reason: HOSPADM

## 2025-04-09 RX ORDER — BUDESONIDE AND FORMOTEROL FUMARATE DIHYDRATE 80; 4.5 UG/1; UG/1
2 AEROSOL RESPIRATORY (INHALATION) 2 TIMES DAILY
Status: DISCONTINUED | OUTPATIENT
Start: 2025-04-09 | End: 2025-04-11 | Stop reason: HOSPADM

## 2025-04-09 RX ORDER — DULOXETIN HYDROCHLORIDE 20 MG/1
20 CAPSULE, DELAYED RELEASE ORAL DAILY
Status: DISCONTINUED | OUTPATIENT
Start: 2025-04-09 | End: 2025-04-11 | Stop reason: HOSPADM

## 2025-04-09 RX ORDER — ALBUTEROL SULFATE 0.83 MG/ML
2.5 SOLUTION RESPIRATORY (INHALATION) EVERY 6 HOURS PRN
Status: DISCONTINUED | OUTPATIENT
Start: 2025-04-09 | End: 2025-04-11 | Stop reason: HOSPADM

## 2025-04-09 RX ORDER — INSULIN LISPRO 100 [IU]/ML
0-4 INJECTION, SOLUTION INTRAVENOUS; SUBCUTANEOUS
Status: DISCONTINUED | OUTPATIENT
Start: 2025-04-09 | End: 2025-04-11 | Stop reason: HOSPADM

## 2025-04-09 RX ORDER — SODIUM CHLORIDE 0.9 % (FLUSH) 0.9 %
5-40 SYRINGE (ML) INJECTION EVERY 12 HOURS SCHEDULED
Status: DISCONTINUED | OUTPATIENT
Start: 2025-04-09 | End: 2025-04-11 | Stop reason: HOSPADM

## 2025-04-09 RX ORDER — ROSUVASTATIN CALCIUM 20 MG/1
20 TABLET, COATED ORAL NIGHTLY
Status: DISCONTINUED | OUTPATIENT
Start: 2025-04-09 | End: 2025-04-11 | Stop reason: HOSPADM

## 2025-04-09 RX ORDER — OXCARBAZEPINE 150 MG/1
150 TABLET, FILM COATED ORAL 2 TIMES DAILY
Status: DISCONTINUED | OUTPATIENT
Start: 2025-04-09 | End: 2025-04-11 | Stop reason: HOSPADM

## 2025-04-09 RX ORDER — GABAPENTIN 300 MG/1
300 CAPSULE ORAL 3 TIMES DAILY
Status: DISCONTINUED | OUTPATIENT
Start: 2025-04-09 | End: 2025-04-11 | Stop reason: HOSPADM

## 2025-04-09 RX ORDER — ONDANSETRON 4 MG/1
4 TABLET, ORALLY DISINTEGRATING ORAL EVERY 8 HOURS PRN
Status: DISCONTINUED | OUTPATIENT
Start: 2025-04-09 | End: 2025-04-11 | Stop reason: HOSPADM

## 2025-04-09 RX ORDER — MIRTAZAPINE 15 MG/1
30 TABLET, FILM COATED ORAL NIGHTLY
Status: DISCONTINUED | OUTPATIENT
Start: 2025-04-09 | End: 2025-04-11 | Stop reason: HOSPADM

## 2025-04-09 RX ADMIN — SODIUM CHLORIDE: 9 INJECTION, SOLUTION INTRAVENOUS at 21:18

## 2025-04-09 RX ADMIN — TAMSULOSIN HYDROCHLORIDE 0.4 MG: 0.4 CAPSULE ORAL at 21:19

## 2025-04-09 RX ADMIN — BUSPIRONE HYDROCHLORIDE 5 MG: 5 TABLET ORAL at 21:33

## 2025-04-09 RX ADMIN — SODIUM CHLORIDE: 0.9 INJECTION, SOLUTION INTRAVENOUS at 19:21

## 2025-04-09 RX ADMIN — CLOPIDOGREL BISULFATE 75 MG: 75 TABLET, FILM COATED ORAL at 21:19

## 2025-04-09 RX ADMIN — AMLODIPINE BESYLATE 10 MG: 10 TABLET ORAL at 21:19

## 2025-04-09 RX ADMIN — PANTOPRAZOLE SODIUM 40 MG: 40 TABLET, DELAYED RELEASE ORAL at 21:19

## 2025-04-09 RX ADMIN — SENNOSIDES AND DOCUSATE SODIUM 2 TABLET: 50; 8.6 TABLET ORAL at 21:19

## 2025-04-09 RX ADMIN — APIXABAN 5 MG: 5 TABLET, FILM COATED ORAL at 21:19

## 2025-04-09 RX ADMIN — MIRTAZAPINE 30 MG: 30 TABLET, FILM COATED ORAL at 21:34

## 2025-04-09 RX ADMIN — HYDRALAZINE HYDROCHLORIDE 50 MG: 50 TABLET ORAL at 21:19

## 2025-04-09 RX ADMIN — GABAPENTIN 300 MG: 300 CAPSULE ORAL at 21:19

## 2025-04-09 RX ADMIN — CILOSTAZOL 50 MG: 100 TABLET ORAL at 21:33

## 2025-04-09 RX ADMIN — DULOXETINE HYDROCHLORIDE 20 MG: 20 CAPSULE, DELAYED RELEASE ORAL at 21:33

## 2025-04-09 RX ADMIN — ROSUVASTATIN CALCIUM 20 MG: 20 TABLET, FILM COATED ORAL at 21:33

## 2025-04-09 RX ADMIN — CARVEDILOL 25 MG: 25 TABLET, FILM COATED ORAL at 21:19

## 2025-04-09 RX ADMIN — TRAZODONE HYDROCHLORIDE 200 MG: 100 TABLET ORAL at 21:33

## 2025-04-09 RX ADMIN — OXCARBAZEPINE 150 MG: 150 TABLET, FILM COATED ORAL at 21:34

## 2025-04-09 ASSESSMENT — ENCOUNTER SYMPTOMS
EYES NEGATIVE: 1
RESPIRATORY NEGATIVE: 1
GASTROINTESTINAL NEGATIVE: 1
ALLERGIC/IMMUNOLOGIC NEGATIVE: 1

## 2025-04-09 ASSESSMENT — PAIN DESCRIPTION - DESCRIPTORS: DESCRIPTORS: ACHING;CRAMPING

## 2025-04-09 ASSESSMENT — PAIN SCALES - GENERAL: PAINLEVEL_OUTOF10: 8

## 2025-04-09 ASSESSMENT — PAIN DESCRIPTION - LOCATION: LOCATION: BACK;FLANK

## 2025-04-09 NOTE — ED NOTES
Patient resting on stretcher, NAD  RR even and non-labored  Bed locked and in lowest position  Call light within reach  Family at bedside no needs expressed at this time

## 2025-04-09 NOTE — ED PROVIDER NOTES
[facility] EMERGENCY DEPARTMENT     Emergency Department     Faculty Attestation        I performed a history and physical examination of the patient and discussed management with the resident. I reviewed the resident’s note and agree with the documented findings and plan of care. Any areas of disagreement are noted on the chart. I was personally present for the key portions of any procedures. I have documented in the chart those procedures where I was not present during the key portions. I have reviewed the emergency nurses triage note. I agree with the chief complaint, past medical history, past surgical history, allergies, medications, social and family history as documented unless otherwise noted below.    For mid-level providers such as nurse practitioners as well as physicians assistants:    I have personally seen and evaluated the patient.    I find the patient's history and physical exam are consistent with NP/PA documentation.  I agree with the care provided, treatment rendered, disposition, & follow-up plan.     Additional findings are as noted.    Vital Signs: BP (!) 180/62   Pulse 64   Temp 98.1 °F (36.7 °C)   Resp 17   SpO2 94%   PCP:  Azael Friedman MD    Pertinent Comments:     Patient presents with abnormal labs she had labs drawn yesterday which showed a creatinine elevated 3.6.  Baseline is 1.4 she was admitted last month for hyperkalemia and needed emergent dialysis.  She has no complaints otherwise we will repeat Horace laboratory studies, dissipate admission.      Critical Care  None          Chema Garcia MD    Attending Emergency Medicine Physician            Booker Garcia MD  04/09/25 1650

## 2025-04-09 NOTE — ED NOTES
The following labs were labeled with appropriate pt sticker and tubed to lab:     [x] Blue     [x] Lavender   [] on ice  [x] Green/yellow  [x] Green/black [] on ice  [] Torres  [] on ice  [x] Yellow  [] Red  [] Pink  [] Type/ Screen  [] ABG  [] VBG    [] COVID-19 swab    [] Rapid  [] PCR  [] Flu swab  [] Peds Viral Panel     [] Urine Sample  [] Fecal Sample  [] Pelvic Cultures  [] Blood Cultures  [] X 2  [] STREP Cultures  [] Wound Cultures

## 2025-04-09 NOTE — PROGRESS NOTES
Physician Progress Note      PATIENT:               BERHANE WHEELER  CSN #:                  [id number]  :                       1955  ADMIT DATE:       3/23/2025 11:32 AM  DISCH DATE:        3/28/2025 1:27 PM  RESPONDING  PROVIDER #:        MELISSA CARBALLO          QUERY TEXT:    Pneumonia is documented in the medical record of H&P- Dr. Galaviz and Dr. Carballo,  Nephrology PN  3/24,, Samy. Please provide additional clinical   indicators supportive of your documentation. Or please document if the   diagnosis of Pneumonia has been ruled out after study.    The clinical indicators include:  - admitted with chest pain and SOB and has a recent admission for pneumonia.    -3/23- H&P  per Dr. Galaviz and Dr. Carballo noted as pneumonia  -3/24 - pulmonology PN per Dr. Galaviz and Dr. Carballo noted Respiratory   panel, pneumonia workup has been negative  -3/25 Per Pulmonology, Dr. Vu, Patient was weaned off epinephrine around   midnight, maintaining heart rate 60s-70s with NSR, and BP with MAP of 70s-80s,   she was started on steroids, ceftriaxone and azithromycin due to concern of   COPD exacerbation/bronchitis, viral and infectious pneumonia workup negative   so far, wheezing improved and O2 supplement back down to baseline of 3 L 3/24   morning.  - CXR- 3/23 -CXR showing pulmonary congestion with borderline cardiomegaly,   peribronchial cuffing potentially to reactive disease, bronchitis or pulmonary   congestion    MAR-  - IVF-NS @ 100 -3/23-3/24  - IV - Zithromax - 3/23 then PO - 3/24-3/27  - IV- Rocpehin - 3/23-3/26  - IV- epinephrine gtt - 3/23  - IV- Heparin gtt- 3/23-3/26  - IV Solu medrol - 3/23-3/28  - Oxygen therapy - 4L-6L/NC - 3/23-3/24  Options provided:  -- Pneumonia was ruled out  -- Pneumoina present as evidenced by, Please document evidence.  -- Other - I will add my own diagnosis  -- Disagree - Not applicable / Not valid  -- Disagree - Clinically unable to determine / Unknown  -- Refer to

## 2025-04-09 NOTE — ED PROVIDER NOTES
[facility] EMERGENCY DEPARTMENT  eMERGENCY dEPARTMENT eNCOUnter  Resident    Pt Name: Sultana Noyola  MRN: [id number]  Birthdate 1955  Date of evaluation: 4/9/2025  PCP:  Azael Friedman MD    CHIEF COMPLAINT       Chief Complaint   Patient presents with    Abnormal Lab         HISTORY OF PRESENT ILLNESS    Sultana Noyola is a 69 y.o. female with past medical history significant for CKD 1.2 - 1.4 recently late March admitted for RUSTAM hyperK 7.5 complicated by vtach required emergent dialysis (1 session) and cardioversion. Discharged with outpatient bmp which showed Cr 3.6 yesterday/8/2025 and was told to report to ED.   Patient's almost asymptomatic except for chronic back pain mild suprapubic pain.  Has past medical history of diabetes mellitus on metformin at her dose this morning COPD on oxygen at home.  And on Eliquis for the recent V. tach/a-fib.  Patient reported longstanding diarrhea that has been worked up with prior gastroenterologist.  However, reported improvement of her diarrhea after discharge as he was taking loperamide.  Denies shortness of breath, chest pain, palpitation, headache, or blurriness of vision.  Blood pressure reported to be fluctuating at home and within the recent hospitalization.    REVIEW OF SYSTEMS       Review of Systems   Constitutional: Negative.    HENT: Negative.     Eyes: Negative.    Respiratory: Negative.     Cardiovascular:  Positive for leg swelling.   Gastrointestinal: Negative.    Endocrine: Negative.    Genitourinary:  Positive for difficulty urinating and pelvic pain.   Musculoskeletal: Negative.    Skin: Negative.    Allergic/Immunologic: Negative.    Neurological: Negative.    Hematological: Negative.    Psychiatric/Behavioral: Negative.           PAST MEDICAL HISTORY    has a past medical history of ADHD (attention deficit hyperactivity disorder), Anxiety, BiPAP (biphasic positive airway pressure) dependence, Bipolar 1 disorder (HCC), Chronic back pain,  light.   Cardiovascular:      Rate and Rhythm: Normal rate.   Pulmonary:      Effort: Pulmonary effort is normal.   Abdominal:      Palpations: Abdomen is soft.   Musculoskeletal:         General: Normal range of motion.      Cervical back: Normal range of motion.      Comments: Back pain   Skin:     General: Skin is warm.   Neurological:      General: No focal deficit present.      Mental Status: She is alert.   Psychiatric:         Mood and Affect: Mood normal.          DIFFERENTIAL DIAGNOSIS/MDM:   RUSTAM on CKD with urine retention     DIAGNOSTIC RESULTS     EKG: All EKG's are interpreted by the Emergency Department Physician who either signs or Co-signs this chart in the absence of a cardiologist.        RADIOLOGY:   I directly visualized the following  images and reviewed the radiologist interpretations:  XR CHEST PORTABLE   Final Result   Low lung volumes. No evidence of acute cardiopulmonary disease.               LABS:  Labs Reviewed   BASIC METABOLIC PANEL - Abnormal; Notable for the following components:       Result Value    CO2 19 (*)     Glucose 150 (*)     BUN 45 (*)     Creatinine 2.9 (*)     Est, Glom Filt Rate 17 (*)     All other components within normal limits   CBC WITH AUTO DIFFERENTIAL - Abnormal; Notable for the following components:    RBC 3.40 (*)     Hemoglobin 10.1 (*)     Hematocrit 31.7 (*)     RDW 14.5 (*)     Neutrophils % 68 (*)     Lymphocytes % 19 (*)     Monocytes Absolute 1.27 (*)     All other components within normal limits   URINALYSIS WITH REFLEX TO CULTURE             EMERGENCY DEPARTMENT COURSE:   Vitals:    Vitals:    04/09/25 1652 04/09/25 1700 04/09/25 1715 04/09/25 1730   BP: (!) 136/46 (!) 154/58 (!) 122/42 (!) 153/56   Pulse:  64 59 59   Resp:  17 14 12   Temp:       SpO2:  95% 95% 95%           CONSULTS:  IP CONSULT TO NEPHROLOGY  IP CONSULT TO HOSPITALIST  IP CONSULT TO UROLOGY          FINAL IMPRESSION      1. Acute kidney injury superimposed on CKD      68 yo with

## 2025-04-09 NOTE — ED NOTES
ED to inpatient nurses report      Chief Complaint:  Chief Complaint   Patient presents with    Abnormal Lab     Present to ED from: Pt reports to the ED with complaints of an abnormal lab. Pt and family state pt had labs drawn yesterday and per epic, pt's creatinine was 3.6. Pt admits to some R flank tenderness when writer asks. Pt and family state pt was in the hospital last week for acute renal failure and did receive dialysis. Pt states she was placed on ATB upon discharge for concern for UTI. Per epic, pt's K at that time was 7 and pt went into [facility] and needed cardioverted. Pt denies any cardiac symptoms at this time and pt's K yesterday was 4.3. Pt states she feels like she has to urinate but is only dribbling out a little bit at a time. Pt and family also state that the physicians were concerned that the lisinopril pt was on in combination with the dehydration could have been causing all this so pt was changed from lisinopril to hydralazine upon discharge. Pt states she was also placed on eliquis and has been compliant with all her meds. Pt does not have any other complaints at this time. Pt is A&O x4 and speaking in complete sentences. Pt is resting in bed comfortably, NAD noted. EKG obtained. Pt placed on full cardiac monitor. Care ongoing     MOA:     LOC: alert and orientated to name, place, date  Mobility: Requires assistance * 1  Oxygen Baseline: ra    Current needs required: none   Pending ED orders: none  Present condition: stable aox4    Why did the patient come to the ED? Abnormal labs  What is the plan? Further eval/ nephro to follow  Any procedures or intervention occur? EKG/ iv/ labs  Any safety concerns??    Mental Status:  Level of Consciousness: Alert (0)    Psych Assessment:   Psychosocial  Psychosocial (WDL): Within Defined Limits  Vital signs   Vitals:    04/09/25 1652 04/09/25 1700 04/09/25 1715 04/09/25 1730   BP: (!) 136/46 (!) 154/58 (!) 122/42 (!) 153/56   Pulse:  64 59 59   Resp:  17

## 2025-04-09 NOTE — ED NOTES
Pt reports to the ED with complaints of an abnormal lab. Pt and family state pt had labs drawn yesterday and per epic, pt's creatinine was 3.6. Pt admits to some R flank tenderness when writer asks. Pt and family state pt was in the hospital last week for acute renal failure and did receive dialysis. Pt states she was placed on ATB upon discharge for concern for UTI. Per epic, pt's K at that time was 7 and pt went into vtach and needed cardioverted. Pt denies any cardiac symptoms at this time and pt's K yesterday was 4.3. Pt states she feels like she has to urinate but is only dribbling out a little bit at a time. Pt and family also state that the physicians were concerned that the lisinopril pt was on in combination with the dehydration could have been causing all this so pt was changed from lisinopril to hydralazine upon discharge. Pt states she was also placed on eliquis and has been compliant with all her meds. Pt does not have any other complaints at this time. Pt is A&O x4 and speaking in complete sentences. Pt is resting in bed comfortably, NAD noted. EKG obtained. Pt placed on full cardiac monitor. Care ongoing

## 2025-04-10 ENCOUNTER — APPOINTMENT (OUTPATIENT)
Dept: ULTRASOUND IMAGING | Age: 70
End: 2025-04-10
Payer: MEDICARE

## 2025-04-10 LAB
ALBUMIN SERPL-MCNC: 3.4 G/DL (ref 3.5–5.2)
ALBUMIN/GLOB SERPL: 1.6 {RATIO} (ref 1–2.5)
ALP SERPL-CCNC: 75 U/L (ref 35–104)
ALT SERPL-CCNC: 13 U/L (ref 10–35)
ANION GAP SERPL CALCULATED.3IONS-SCNC: 11 MMOL/L (ref 9–16)
ANION GAP SERPL CALCULATED.3IONS-SCNC: 12 MMOL/L (ref 9–16)
AST SERPL-CCNC: 12 U/L (ref 10–35)
BILIRUB SERPL-MCNC: 0.2 MG/DL (ref 0–1.2)
BUN SERPL-MCNC: 39 MG/DL (ref 8–23)
BUN SERPL-MCNC: 43 MG/DL (ref 8–23)
CALCIUM SERPL-MCNC: 8.9 MG/DL (ref 8.6–10.4)
CALCIUM SERPL-MCNC: 9.4 MG/DL (ref 8.6–10.4)
CHLORIDE SERPL-SCNC: 107 MMOL/L (ref 98–107)
CHLORIDE SERPL-SCNC: 108 MMOL/L (ref 98–107)
CO2 SERPL-SCNC: 21 MMOL/L (ref 20–31)
CO2 SERPL-SCNC: 22 MMOL/L (ref 20–31)
CREAT SERPL-MCNC: 2 MG/DL (ref 0.6–0.9)
CREAT SERPL-MCNC: 2.2 MG/DL (ref 0.6–0.9)
GFR, ESTIMATED: 24 ML/MIN/1.73M2
GFR, ESTIMATED: 27 ML/MIN/1.73M2
GLUCOSE BLD-MCNC: 109 MG/DL (ref 65–105)
GLUCOSE BLD-MCNC: 115 MG/DL (ref 65–105)
GLUCOSE BLD-MCNC: 115 MG/DL (ref 65–105)
GLUCOSE BLD-MCNC: 126 MG/DL (ref 65–105)
GLUCOSE BLD-MCNC: 126 MG/DL (ref 65–105)
GLUCOSE SERPL-MCNC: 115 MG/DL (ref 74–99)
GLUCOSE SERPL-MCNC: 125 MG/DL (ref 74–99)
INR PPP: 1.4
MAGNESIUM SERPL-MCNC: 2.1 MG/DL (ref 1.6–2.4)
POTASSIUM SERPL-SCNC: 3.9 MMOL/L (ref 3.7–5.3)
POTASSIUM SERPL-SCNC: 3.9 MMOL/L (ref 3.7–5.3)
PROT SERPL-MCNC: 5.5 G/DL (ref 6.6–8.7)
PROTHROMBIN TIME: 18 SEC (ref 11.7–14.9)
SODIUM SERPL-SCNC: 140 MMOL/L (ref 136–145)
SODIUM SERPL-SCNC: 141 MMOL/L (ref 136–145)

## 2025-04-10 PROCEDURE — 83735 ASSAY OF MAGNESIUM: CPT

## 2025-04-10 PROCEDURE — 2580000003 HC RX 258: Performed by: NURSE PRACTITIONER

## 2025-04-10 PROCEDURE — 6370000000 HC RX 637 (ALT 250 FOR IP)

## 2025-04-10 PROCEDURE — 76775 US EXAM ABDO BACK WALL LIM: CPT | Performed by: PHYSICIAN ASSISTANT

## 2025-04-10 PROCEDURE — 82947 ASSAY GLUCOSE BLOOD QUANT: CPT

## 2025-04-10 PROCEDURE — 76775 US EXAM ABDO BACK WALL LIM: CPT

## 2025-04-10 PROCEDURE — 2060000000 HC ICU INTERMEDIATE R&B

## 2025-04-10 PROCEDURE — 99232 SBSQ HOSP IP/OBS MODERATE 35: CPT | Performed by: FAMILY MEDICINE

## 2025-04-10 PROCEDURE — 80048 BASIC METABOLIC PNL TOTAL CA: CPT

## 2025-04-10 PROCEDURE — 2500000003 HC RX 250 WO HCPCS: Performed by: FAMILY MEDICINE

## 2025-04-10 PROCEDURE — 80053 COMPREHEN METABOLIC PANEL: CPT

## 2025-04-10 PROCEDURE — 6370000000 HC RX 637 (ALT 250 FOR IP): Performed by: FAMILY MEDICINE

## 2025-04-10 PROCEDURE — 94640 AIRWAY INHALATION TREATMENT: CPT

## 2025-04-10 PROCEDURE — 85610 PROTHROMBIN TIME: CPT

## 2025-04-10 RX ORDER — HYOSCYAMINE SULFATE 0.12 MG/1
0.12 TABLET SUBLINGUAL EVERY 4 HOURS PRN
Status: DISCONTINUED | OUTPATIENT
Start: 2025-04-10 | End: 2025-04-11 | Stop reason: HOSPADM

## 2025-04-10 RX ORDER — 0.9 % SODIUM CHLORIDE 0.9 %
500 INTRAVENOUS SOLUTION INTRAVENOUS ONCE
Status: COMPLETED | OUTPATIENT
Start: 2025-04-10 | End: 2025-04-10

## 2025-04-10 RX ORDER — MIDODRINE HYDROCHLORIDE 5 MG/1
5 TABLET ORAL ONCE
Status: DISCONTINUED | OUTPATIENT
Start: 2025-04-10 | End: 2025-04-10

## 2025-04-10 RX ADMIN — BUDESONIDE AND FORMOTEROL FUMARATE DIHYDRATE 2 PUFF: 80; 4.5 AEROSOL RESPIRATORY (INHALATION) at 20:30

## 2025-04-10 RX ADMIN — CLOPIDOGREL BISULFATE 75 MG: 75 TABLET, FILM COATED ORAL at 09:27

## 2025-04-10 RX ADMIN — MIRTAZAPINE 30 MG: 30 TABLET, FILM COATED ORAL at 20:35

## 2025-04-10 RX ADMIN — CILOSTAZOL 50 MG: 100 TABLET ORAL at 21:15

## 2025-04-10 RX ADMIN — OXCARBAZEPINE 150 MG: 150 TABLET, FILM COATED ORAL at 21:15

## 2025-04-10 RX ADMIN — SODIUM CHLORIDE, PRESERVATIVE FREE 10 ML: 5 INJECTION INTRAVENOUS at 20:35

## 2025-04-10 RX ADMIN — BUSPIRONE HYDROCHLORIDE 5 MG: 5 TABLET ORAL at 20:35

## 2025-04-10 RX ADMIN — CILOSTAZOL 50 MG: 100 TABLET ORAL at 09:28

## 2025-04-10 RX ADMIN — DULOXETINE HYDROCHLORIDE 20 MG: 20 CAPSULE, DELAYED RELEASE ORAL at 09:29

## 2025-04-10 RX ADMIN — BUSPIRONE HYDROCHLORIDE 5 MG: 5 TABLET ORAL at 14:44

## 2025-04-10 RX ADMIN — BUSPIRONE HYDROCHLORIDE 5 MG: 5 TABLET ORAL at 09:28

## 2025-04-10 RX ADMIN — APIXABAN 5 MG: 5 TABLET, FILM COATED ORAL at 20:35

## 2025-04-10 RX ADMIN — SODIUM CHLORIDE 500 ML: 0.9 INJECTION, SOLUTION INTRAVENOUS at 00:34

## 2025-04-10 RX ADMIN — APIXABAN 5 MG: 5 TABLET, FILM COATED ORAL at 09:27

## 2025-04-10 RX ADMIN — GABAPENTIN 300 MG: 300 CAPSULE ORAL at 14:43

## 2025-04-10 RX ADMIN — TAMSULOSIN HYDROCHLORIDE 0.4 MG: 0.4 CAPSULE ORAL at 09:27

## 2025-04-10 RX ADMIN — HYDRALAZINE HYDROCHLORIDE 50 MG: 50 TABLET ORAL at 14:44

## 2025-04-10 RX ADMIN — CARVEDILOL 25 MG: 25 TABLET, FILM COATED ORAL at 09:27

## 2025-04-10 RX ADMIN — GABAPENTIN 300 MG: 300 CAPSULE ORAL at 09:27

## 2025-04-10 RX ADMIN — GABAPENTIN 300 MG: 300 CAPSULE ORAL at 20:35

## 2025-04-10 RX ADMIN — CARVEDILOL 25 MG: 25 TABLET, FILM COATED ORAL at 18:12

## 2025-04-10 RX ADMIN — HYOSCYAMINE SULFATE 0.12 MG: 0.12 TABLET SUBLINGUAL at 12:58

## 2025-04-10 RX ADMIN — AMLODIPINE BESYLATE 10 MG: 10 TABLET ORAL at 09:27

## 2025-04-10 RX ADMIN — ROSUVASTATIN CALCIUM 20 MG: 20 TABLET, FILM COATED ORAL at 20:35

## 2025-04-10 RX ADMIN — OXCARBAZEPINE 150 MG: 150 TABLET, FILM COATED ORAL at 09:29

## 2025-04-10 RX ADMIN — PANTOPRAZOLE SODIUM 40 MG: 40 TABLET, DELAYED RELEASE ORAL at 09:27

## 2025-04-10 RX ADMIN — TRAZODONE HYDROCHLORIDE 200 MG: 100 TABLET ORAL at 20:35

## 2025-04-10 ASSESSMENT — ENCOUNTER SYMPTOMS
CHEST TIGHTNESS: 0
ABDOMINAL PAIN: 0
WHEEZING: 0
DIARRHEA: 0
RHINORRHEA: 0
VOMITING: 0
NAUSEA: 0
CONSTIPATION: 0
COUGH: 0
BLOOD IN STOOL: 0
SHORTNESS OF BREATH: 0

## 2025-04-10 NOTE — CARE COORDINATION
Case Management Assessment  Initial Evaluation    Date/Time of Evaluation: 4/10/2025 10:13 AM  Assessment Completed by: JOHN RICHARDSON RN    If patient is discharged prior to next notation, then this note serves as note for discharge by case management.    Patient Name: Sultana Noyola                   YOB: 1955  Diagnosis: Acute kidney injury superimposed on chronic kidney disease [N17.9, N18.9]                   Date / Time: 4/9/2025  4:03 PM    Patient Admission Status: Inpatient   Readmission Risk (Low < 19, Mod (19-27), High > 27): Readmission Risk Score: 24    Current PCP: Azael Friedman MD  PCP verified by CM? Yes    Chart Reviewed: Yes      History Provided by: Patient  Patient Orientation: Alert and Oriented    Patient Cognition: Alert    Hospitalization in the last 30 days (Readmission):  Yes    If yes, Readmission Assessment in  Navigator will be completed.    Advance Directives:      Code Status: Full Code   Patient's Primary Decision Maker is:        Discharge Planning:    Patient lives with: Alone Type of Home: Apartment  Primary Care Giver: Self  Patient Support Systems include: Home Care Staff   Current Financial resources:    Current community resources:    Current services prior to admission: Home Care, Durable Medical Equipment            Current DME: [facility]            Type of Home Care services:  OT, PT, Nursing Services    ADLS  Prior functional level: Independent in ADLs/IADLs  Current functional level: Independent in ADLs/IADLs    PT AM-PAC:   /24  OT AM-PAC:   /24    Family can provide assistance at DC: No  Would you like Case Management to discuss the discharge plan with any other family members/significant others, and if so, who? No  Plans to Return to Present Housing: Yes  Other Identified Issues/Barriers to RETURNING to current housing: none  Potential Assistance needed at discharge: Transportation            Potential DME:    Patient expects to discharge to:  Independent living facility  Plan for transportation at discharge:      Financial    Payor: MEDICARE / Plan: MEDICARE PART A AND B / Product Type: *No Product type* /     Does insurance require precert for SNF: No    Potential assistance Purchasing Medications: No  Meds-to-Beds request:        [facility] [address] - P [phone] - F [phone]  [address]  [address]  [facility] [address]  Phone: [phone] Fax: [phone]      Notes:    Factors facilitating achievement of predicted outcomes: Caregiver support    Barriers to discharge: Medical complications    Additional Case Management Notes: home, current with [facility]    The Plan for Transition of Care is related to the following treatment goals of Acute kidney injury superimposed on chronic kidney disease [N17.9, N18.9]    IF APPLICABLE: The Patient and/or patient representative Sultana and her family were provided with a choice of provider and agrees with the discharge plan. Freedom of choice list with basic dialogue that supports the patient's individualized plan of care/goals and shares the quality data associated with the providers was provided to:     Patient Representative Name:       The Patient and/or Patient Representative Agree with the Discharge Plan?      JOHN RICHARDSON RN  Case Management Department  Ph:  Fax:

## 2025-04-10 NOTE — CARE COORDINATION
04/10/25 1014   Readmission Assessment   Number of Days since last admission? 8-30 days   Previous Disposition Home with Home Health   Who is being Interviewed Patient   What was the patient's/caregiver's perception as to why they think they needed to return back to the hospital? Other (Comment)  (symptoms)   Did you visit your Primary Care Physician after you left the hospital, before you returned this time? No   Why weren't you able to visit your PCP? Did not have an appointment   Did you see a specialist, such as Cardiac, Pulmonary, Orthopedic Physician, etc. after you left the hospital? No   Who advised the patient to return to the hospital? Self-referral   Does the patient report anything that got in the way of taking their medications? No   In our efforts to provide the best possible care to you and others like you, can you think of anything that we could have done to help you after you left the hospital the first time, so that you might not have needed to return so soon? Identify patient's health literacy needs

## 2025-04-10 NOTE — CONSULTS
0-4 Units, 0-4 Units, SubCUTAneous, 4x Daily AC & HS, Meg Mckinney MD    Current Outpatient Medications:     butalbital-acetaminophen-caffeine (FIORICET, ESGIC) -40 MG per tablet, Take 1 tablet by mouth every 6 hours as needed for Headaches Max Daily Amount: 4 tablets, Disp: 28 tablet, Rfl: 0    apixaban (ELIQUIS) 5 MG TABS tablet, Take 2 tablets by mouth 2 times daily for 7 days, THEN 1 tablet 2 times daily., Disp: 88 tablet, Rfl: 1    rosuvastatin (CRESTOR) 20 MG tablet, Take 1 tablet by mouth nightly, Disp: 30 tablet, Rfl: 3    hydrALAZINE (APRESOLINE) 50 MG tablet, Take 1 tablet by mouth every 8 hours, Disp: 90 tablet, Rfl: 3    carvedilol (COREG) 25 MG tablet, Take 1 tablet by mouth 2 times daily (with meals), Disp: 60 tablet, Rfl: 3    amLODIPine (NORVASC) 10 MG tablet, Take 1 tablet by mouth daily, Disp: 30 tablet, Rfl: 3    gabapentin (NEURONTIN) 300 MG capsule, Take 1 capsule by mouth 3 times daily., Disp: , Rfl:     metFORMIN (GLUCOPHAGE) 500 MG tablet, Take 1 tablet by mouth 2 times daily (with meals), Disp: , Rfl:     Cyanocobalamin (NASCOBAL) 500 MCG/0.1ML SOLN nasal spray, 1 spray by Nasal route once a week, Disp: , Rfl:     albuterol (ACCUNEB) 1.25 MG/3ML nebulizer solution, Inhale 3 mLs into the lungs every 6 hours as needed for Wheezing, Disp: , Rfl:     budesonide-formoterol (SYMBICORT) 80-4.5 MCG/ACT AERO, Inhale 2 puffs into the lungs 2 times daily, Disp: , Rfl:     OXcarbazepine (TRILEPTAL) 150 MG tablet, Take 1 tablet by mouth 2 times daily, Disp: , Rfl:     DULoxetine (CYMBALTA) 20 MG extended release capsule, Take 1 capsule by mouth daily, Disp: , Rfl:     ondansetron (ZOFRAN-ODT) 4 MG disintegrating tablet, Take 1 tablet by mouth 3 times daily as needed for Nausea or Vomiting, Disp: 21 tablet, Rfl: 0    busPIRone (BUSPAR) 5 MG tablet, Take 1 tablet by mouth 3 times daily, Disp: , Rfl:     cilostazol (PLETAL) 50 MG tablet, Take 1 tablet by mouth 2 times daily, Disp: , Rfl:          Lack of Transportation (Medical): No     Lack of Transportation (Non-Medical): No   Physical Activity: Not on file   Stress: Not on file   Social Connections: Not on file   Intimate Partner Violence: Unknown (2/22/2024)    Received from [facility]     Fear of Current or Ex-Partner: Not on file     Emotionally Abused: Not on file     Physically Abused: Not on file     Sexually Abused: Not on file     Physically or Sexually Abused: Not on file   Housing Stability: Low Risk  (8/29/2024)    Received from [employer]    Housing Instability     Are you worried or concerned that in the next two months you may not have stable housing that you own, rent or stay in as a part of a household?: No       Family History:    Family History   Problem Relation Age of Onset    Lung Cancer Mother     Lung Cancer Father     Diabetes Maternal Grandfather     Diabetes Paternal Grandfather        REVIEW OF SYSTEMS:  A comprehensive 14 point review of systems was obtained.  Constitutional: No fatigue  Eyes: No blurry vision  Ears, nose, mouth, throat, face: No ringing in the ears; no facial droop  Respiratory: No cough or cold  Cardiovascular: No palpitations  Gastrointestinal: No diarrhea or constipation  Genitourinary: See HPI  Integument/Skin: No rashes  Hematologic/Lymphatic: No easy bruising  Musculoskeletal: No muscle pains  Neurologic: No weakness in the extremities  Psychiatric: No depression or suicidal thoughts  Endocrine: No heat or cold intolerances  Allergic/Immunologic: No current seasonal allergies; no skin hives    Physical Exam:      This a 69 y.o. female   Vitals:    04/10/25 0400   BP:    Pulse:    Resp: 16   Temp:    SpO2:        Constitutional: Patient in no acute distress  Neuro: Alert and oriented to person place and time   Psych: Mood and affect normal  Head: Atraumatic and normocephalic  Neck: Trachea midline  Lungs: Respiratory effort normal  Cardiovascular:

## 2025-04-10 NOTE — PROGRESS NOTES
Patient hypotensive, asymptomatic. 86/34. Waterhouse NP notified via perfect serve. Awaiting orders.

## 2025-04-10 NOTE — H&P
[facility]  Office: [phone]  Ace Hoskins DO, Jose Mays DO, Harris Bray DO, Stephane Lyn DO, Kirk Ledesma MD, Giselle Rich MD, Kati Martinez MD, Genoveva Marcus MD,  Eliseo Longo MD, Radha Bay MD, Miguelangel Guo DO, Carlin Aggarwal MD,  Savannah Lynn DO, Raul Cardona MD, Min Marks MD, Noel Hoskins DO, Rupal Gamez MD, Angel Small MD, Ruben Araujo DO, Adry Coleman MD, Diann Duffy MD, Dara Ruffin MD, Sarita Olmstead MD,  Fredis Dennis DO, Jared Bishop MD,  Shirley Waterhouse, CNP,  Kathryn Montalvo, CNP, Andreina Hui, CNP, Piotr Grande, CNP,  Patito Gallo, LISHA, Lizbeth Stewart, CNP, Melida Funes, CNP, Lisa Wayne, CNP, Andria Freeman, CNP, Stefani Murcia, CNP, Eligio Johnson PA-C, Kirstin Jimenez, CNS, Callie Krause, CNP, Prudence Laird, CNP         [facility]   IN-PATIENT SERVICE   [facility]    HISTORY AND PHYSICAL EXAMINATION            Date:   4/9/2025  Patient name:  Sultana Noyola  Date of admission:  4/9/2025  4:03 PM  MRN:   [id number]  Account:  [id number]  YOB: 1955  PCP:    Azael Friedman MD  Room:   40/  Code Status:    Full Code    Chief Complaint:     Chief Complaint   Patient presents with    Abnormal Lab       History Obtained From:     patient    History of Present Illness:     Sultana Noyola is a 69 y.o. Non- / non  female who presents with Abnormal Lab   and is admitted to the hospital for the management of Acute kidney injury superimposed on chronic kidney disease.    69-year-old female with past medical history of hypertension, hyperlipidemia, depression, chronic back pain, anxiety/bipolar 1 disorder, history of A-fib recently admitted 3/23-2/28 for chest pain/shortness of breath.  Patient went into V. tach required cardioversion, stress test was negative.  Patient was hyperkalemic potassium 7 and elevated kidney function started on dialysis.  Required 1  Holding all nephrotoxic medication.  Electrolytes are stable.  Resume tamsulosin.  Ordering renal ultrasound.  Hypertension: Resuming amlodipine 10 mg daily and hydralazine 50 mg every 8 hour and Coreg 25 mg daily.  ANTHONY: On CPAP  Migraine: Fioricet as needed  Bipolar/anxiety: Trazodone Trileptal, Remeron, duloxetine BuSpar and gabapentin  A-fib/history of V. tach: On Eliquis and Coreg.  Peripheral arterial disease: Resuming Plavix, cholesterol  COPD: Symbicort and DuoNebs.  Diabetes mellitus type 2: Placing on insulin sliding scale.    Consultations:   IP CONSULT TO NEPHROLOGY  IP CONSULT TO HOSPITALIST  IP CONSULT TO UROLOGY     Patient is admitted as inpatient status because of co-morbidities listed above, severity of signs and symptoms as outlined, requirement for current medical therapies and most importantly because of direct risk to patient if care not provided in a hospital setting.  Expected length of stay > 48 hours.    Meg Mckinney MD  4/9/2025  11:23 PM    Copy sent to Azael Landaverde MD

## 2025-04-10 NOTE — CONSULTS
Renal Consult Note    Patient :  Sultana Noyola; 69 y.o. MRN# [id number]  Location:  40/40  Attending:  Kati Martinez MD  Admit Date:  4/9/2025   Hospital Day: 1    Reason for Consult:     Asked by Kati Newberry MD to see for RUSTAM/Elevated Creatinine on CKD stage IIIb.    History Obtained From:     patient, electronic medical record    History of Present Illness:     Sultana Noyola; 69 y.o. female with past medical history of CKD stage IIIb with baseline creatinine around 1.4, established with nephrology consultants of [facility], atrial fibrillation, chronic pancreatitis, hypertension, hyperlipidemia, bipolar 1 disorder, COPD, PVD, GERD, and diastolic heart failure who presented to the hospital due to abnormal labs.    Patient presented to our ED on 4/9/2025 after having outpatient labs drawn the day prior and being notified of an elevated creatinine of 3.6.  Patient's baseline creatinine is 1.4.  On admission, labs were repeated and significant for bicarb 19, BUN 45, creatinine 2.9, estimated GFR 17, albumin 3.4, hemoglobin 10.1.  Postvoid residual was performed in ED which demonstrated 400 cc of retained urine, Valentino catheter was placed and patient was started on gentle IV hydration.  Nephrology has been consulted for management of RUSTAM on CKD stage IIIb.    It is noted that patient was recently discharged from our facility after a 5-day stay for chest pain and shortness of breath, during which time she went into V. tach and required cardioversion.  Additionally, during that stay she was found to be hyperkalemic with a potassium of 7 and required 1 session of dialysis.  Creatinine on discharge was down to 1.4.  Patient does state that she had noticed a change in her urinary habits over the past week, stating it was difficult to empty her bladder fully and she noticed dribbling of urine.  Bladder scan was repeated after placement of Valentino and still demonstrated greater than 200 mL of urine retained in the  Results   Component Value Date/Time    HEPCAB NONREACTIVE 03/23/2025 12:46 PM       Urinalysis/Chemistries:      Lab Results   Component Value Date/Time    NITRU NEGATIVE 04/09/2025 10:43 PM    COLORU Yellow 04/09/2025 10:43 PM    PHUR 5.5 04/09/2025 10:43 PM    PHUR 7.0 10/09/2023 07:15 PM    WBCUA None 04/09/2025 10:43 PM    WBCUA 0-2 08/05/2019 08:24 AM    RBCUA 0 TO 2 04/09/2025 10:43 PM    RBCUA 6-10 08/05/2019 08:24 AM    MUCUS 1+ 05/05/2023 05:49 PM    TRICHOMONAS NOT REPORTED 05/31/2015 01:19 PM    YEAST NOT REPORTED 05/31/2015 01:19 PM    BACTERIA None 04/09/2025 10:43 PM    CLARITYU clear 05/31/2015 12:56 PM    SPECGRAV 1.020 05/31/2015 12:56 PM    LEUKOCYTESUR NEGATIVE 04/09/2025 10:43 PM    UROBILINOGEN Normal 04/09/2025 10:43 PM    BILIRUBINUR NEGATIVE 04/09/2025 10:43 PM    BLOODU moderate 05/31/2015 12:56 PM    GLUCOSEU NEGATIVE 04/09/2025 10:43 PM    KETUA NEGATIVE 04/09/2025 10:43 PM    AMORPHOUS NOT REPORTED 05/31/2015 01:19 PM       Urine Chloride:    Lab Results   Component Value Date/Time    CLUR 44 03/23/2025 12:08 PM     Urine Osmolarity:   Lab Results   Component Value Date/Time    OSMOU 458 03/23/2025 12:08 PM     Urine Protein:     Lab Results   Component Value Date/Time    TPU 12 03/24/2025 10:48 AM         Radiology:     Reviewed.      Assessment:     Acute Kidney Injury on CKD stage IIIb, likely secondary to urinary retention.  CKD stage IIIb, likely secondary to nephrosclerosis, with baseline creatinine around 1.4, follows with nephrology consultants of [facility].  Urinary retention and spite of Valentino catheter, urology on board.  Hypertension.  A-fib -on Eliquis.  COPD.  Type 2 diabetes.  PAD.  Bipolar 1 disorder.     Plan:     Valentino management as per urology.  Continue gentle IV hydration.  BMP in AM.  Keep lisinopril and metformin on hold.  Strict intake and output.  Daily weights.  Recheck immunofixation.  Recheck renal ultrasound in setting of urinary retention.  Will

## 2025-04-10 NOTE — PROGRESS NOTES
[facility]  Office: [phone]  Ace Hoskins DO, Jose Mays DO, Harris Bray DO, Stephane Lyn DO, Kirk Ledesma MD, Giselle Rich MD, Kati Martinez MD, Genoveva Marcus MD,  Eliseo Longo MD, Radha Bay MD, Carlin Aggarwal MD,  Savannah Lynn DO, Raul Cardona MD, Min Marks MD, Noel Hoskins DO, Rupal Gamez MD,  Ruben Araujo DO, Diann Duffy MD, Dara Ruffin MD, Sarita Olmstead MD,  Rory Alexander MD, Meg Mckinney MD, Sigrid Allan MD, Noam Agrawal MD, Patricio Morton MD, Demar Soto MD, Piotr Sims DO, Angel Small MD, Savannah Sewell MD, Mohsin Reza, MD, Janessa German MD, Shirley Waterhouse, CNP,  Kathryn Montalvo CNP, Piotr Grande, CNP,  Patito Gallo, [facility], Lizbeth Stewart, CNP, Meilda Funes, CNP, Andria Freeman, CNP, Stefani Murcia, CNP, Linnette Adame, PA-C, Adrienne Crandall, CNP, Avery Kent, CNP,  Kia Fritz, CNP, Clotilde Ching, CNP, Vinod Smith, PA-C, Andreina Hui, CNP,  Kirstin Jimenez, CNS, Lisa Wayne, CNP, Prudence Larid, CNP,   Nubia Lopes, CNP       [facility]      Daily Progress Note     Admit Date: 4/9/2025  Bed/Room No.  40/40  Admitting Physician : Kati Martinez MD  Code Status :Full Code  Hospital Day:  LOS: 1 day   Chief Complaint:     Chief Complaint   Patient presents with    Abnormal Lab     Principal Problem:    Acute kidney injury superimposed on chronic kidney disease  Active Problems:    Peripheral arterial disease    Anxiety    Essential hypertension    Bipolar 1 disorder (HCC)    Stage 3a chronic kidney disease (HCC)    Dyslipidemia    Chronic obstructive pulmonary disease (HCC)    ANTHONY (obstructive sleep apnea)    Migraine    Anemia  Resolved Problems:    * No resolved hospital problems. *    Subjective :        Interval History/Significant events :  04/10/25    Patient is on IV fluids.  She has Valentino catheter in place.  She is feeling better.  She denies any palpitations, nausea,  reading. In such instances, actual meaning can be extrapolated by contextual diversion.)      Kati Martinez MD  4/10/2025

## 2025-04-11 LAB
ANION GAP SERPL CALCULATED.3IONS-SCNC: 10 MMOL/L (ref 9–16)
BASOPHILS # BLD: 0.07 K/UL (ref 0–0.2)
BASOPHILS NFR BLD: 1 % (ref 0–2)
BUN SERPL-MCNC: 22 MG/DL (ref 8–23)
CALCIUM SERPL-MCNC: 8.8 MG/DL (ref 8.6–10.4)
CHLORIDE SERPL-SCNC: 112 MMOL/L (ref 98–107)
CO2 SERPL-SCNC: 19 MMOL/L (ref 20–31)
CREAT SERPL-MCNC: 1.1 MG/DL (ref 0.6–0.9)
EKG ATRIAL RATE: 54 BPM
EKG P AXIS: 58 DEGREES
EKG P-R INTERVAL: 154 MS
EKG Q-T INTERVAL: 424 MS
EKG QRS DURATION: 84 MS
EKG QTC CALCULATION (BAZETT): 402 MS
EKG R AXIS: -3 DEGREES
EKG T AXIS: 76 DEGREES
EKG VENTRICULAR RATE: 54 BPM
EOSINOPHIL # BLD: 0.11 K/UL (ref 0–0.44)
EOSINOPHILS RELATIVE PERCENT: 1 % (ref 1–4)
ERYTHROCYTE [DISTWIDTH] IN BLOOD BY AUTOMATED COUNT: 14.1 % (ref 11.8–14.4)
GFR, ESTIMATED: 54 ML/MIN/1.73M2
GLUCOSE BLD-MCNC: 126 MG/DL (ref 65–105)
GLUCOSE BLD-MCNC: 130 MG/DL (ref 65–105)
GLUCOSE SERPL-MCNC: 148 MG/DL (ref 74–99)
HCT VFR BLD AUTO: 30 % (ref 36.3–47.1)
HGB BLD-MCNC: 8.9 G/DL (ref 11.9–15.1)
IMM GRANULOCYTES # BLD AUTO: 0.03 K/UL (ref 0–0.3)
IMM GRANULOCYTES NFR BLD: 0 %
LYMPHOCYTES NFR BLD: 1.32 K/UL (ref 1.1–3.7)
LYMPHOCYTES RELATIVE PERCENT: 16 % (ref 24–43)
MCH RBC QN AUTO: 29.6 PG (ref 25.2–33.5)
MCHC RBC AUTO-ENTMCNC: 29.7 G/DL (ref 28.4–34.8)
MCV RBC AUTO: 99.7 FL (ref 82.6–102.9)
MONOCYTES NFR BLD: 0.74 K/UL (ref 0.1–1.2)
MONOCYTES NFR BLD: 9 % (ref 3–12)
NEUTROPHILS NFR BLD: 72 % (ref 36–65)
NEUTS SEG NFR BLD: 5.88 K/UL (ref 1.5–8.1)
NRBC BLD-RTO: 0 PER 100 WBC
PLATELET # BLD AUTO: 204 K/UL (ref 138–453)
PMV BLD AUTO: 11.8 FL (ref 8.1–13.5)
POTASSIUM SERPL-SCNC: 4.5 MMOL/L (ref 3.7–5.3)
RBC # BLD AUTO: 3.01 M/UL (ref 3.95–5.11)
SODIUM SERPL-SCNC: 141 MMOL/L (ref 136–145)
WBC OTHER # BLD: 8.2 K/UL (ref 3.5–11.3)

## 2025-04-11 PROCEDURE — 6370000000 HC RX 637 (ALT 250 FOR IP): Performed by: FAMILY MEDICINE

## 2025-04-11 PROCEDURE — 93010 ELECTROCARDIOGRAM REPORT: CPT | Performed by: STUDENT IN AN ORGANIZED HEALTH CARE EDUCATION/TRAINING PROGRAM

## 2025-04-11 PROCEDURE — 2500000003 HC RX 250 WO HCPCS: Performed by: FAMILY MEDICINE

## 2025-04-11 PROCEDURE — 2580000003 HC RX 258: Performed by: NURSE PRACTITIONER

## 2025-04-11 PROCEDURE — 51798 US URINE CAPACITY MEASURE: CPT

## 2025-04-11 PROCEDURE — 36415 COLL VENOUS BLD VENIPUNCTURE: CPT

## 2025-04-11 PROCEDURE — 94761 N-INVAS EAR/PLS OXIMETRY MLT: CPT

## 2025-04-11 PROCEDURE — 85025 COMPLETE CBC W/AUTO DIFF WBC: CPT

## 2025-04-11 PROCEDURE — 82947 ASSAY GLUCOSE BLOOD QUANT: CPT

## 2025-04-11 PROCEDURE — 99239 HOSP IP/OBS DSCHRG MGMT >30: CPT | Performed by: FAMILY MEDICINE

## 2025-04-11 PROCEDURE — 94640 AIRWAY INHALATION TREATMENT: CPT

## 2025-04-11 PROCEDURE — 80048 BASIC METABOLIC PNL TOTAL CA: CPT

## 2025-04-11 PROCEDURE — 99232 SBSQ HOSP IP/OBS MODERATE 35: CPT | Performed by: STUDENT IN AN ORGANIZED HEALTH CARE EDUCATION/TRAINING PROGRAM

## 2025-04-11 PROCEDURE — 6370000000 HC RX 637 (ALT 250 FOR IP)

## 2025-04-11 RX ORDER — GLIPIZIDE 5 MG/1
5 TABLET, FILM COATED, EXTENDED RELEASE ORAL DAILY
Qty: 30 TABLET | Refills: 3 | Status: SHIPPED | OUTPATIENT
Start: 2025-04-11 | End: 2025-04-11 | Stop reason: HOSPADM

## 2025-04-11 RX ADMIN — AMLODIPINE BESYLATE 10 MG: 10 TABLET ORAL at 08:51

## 2025-04-11 RX ADMIN — BUSPIRONE HYDROCHLORIDE 5 MG: 5 TABLET ORAL at 08:53

## 2025-04-11 RX ADMIN — BUDESONIDE AND FORMOTEROL FUMARATE DIHYDRATE 2 PUFF: 80; 4.5 AEROSOL RESPIRATORY (INHALATION) at 08:54

## 2025-04-11 RX ADMIN — APIXABAN 5 MG: 5 TABLET, FILM COATED ORAL at 08:53

## 2025-04-11 RX ADMIN — GABAPENTIN 300 MG: 300 CAPSULE ORAL at 13:43

## 2025-04-11 RX ADMIN — DULOXETINE HYDROCHLORIDE 20 MG: 20 CAPSULE, DELAYED RELEASE ORAL at 08:56

## 2025-04-11 RX ADMIN — HYOSCYAMINE SULFATE 0.12 MG: 0.12 TABLET SUBLINGUAL at 06:22

## 2025-04-11 RX ADMIN — BUSPIRONE HYDROCHLORIDE 5 MG: 5 TABLET ORAL at 13:44

## 2025-04-11 RX ADMIN — CLOPIDOGREL BISULFATE 75 MG: 75 TABLET, FILM COATED ORAL at 08:52

## 2025-04-11 RX ADMIN — CILOSTAZOL 50 MG: 100 TABLET ORAL at 08:56

## 2025-04-11 RX ADMIN — HYDRALAZINE HYDROCHLORIDE 50 MG: 50 TABLET ORAL at 06:10

## 2025-04-11 RX ADMIN — OXCARBAZEPINE 150 MG: 150 TABLET, FILM COATED ORAL at 08:55

## 2025-04-11 RX ADMIN — HYDRALAZINE HYDROCHLORIDE 50 MG: 50 TABLET ORAL at 13:43

## 2025-04-11 RX ADMIN — CARVEDILOL 25 MG: 25 TABLET, FILM COATED ORAL at 08:51

## 2025-04-11 RX ADMIN — GABAPENTIN 300 MG: 300 CAPSULE ORAL at 08:52

## 2025-04-11 RX ADMIN — SODIUM CHLORIDE, PRESERVATIVE FREE 10 ML: 5 INJECTION INTRAVENOUS at 08:53

## 2025-04-11 RX ADMIN — SODIUM CHLORIDE: 9 INJECTION, SOLUTION INTRAVENOUS at 00:15

## 2025-04-11 RX ADMIN — TAMSULOSIN HYDROCHLORIDE 0.4 MG: 0.4 CAPSULE ORAL at 08:51

## 2025-04-11 RX ADMIN — PANTOPRAZOLE SODIUM 40 MG: 40 TABLET, DELAYED RELEASE ORAL at 08:52

## 2025-04-11 ASSESSMENT — ENCOUNTER SYMPTOMS
ABDOMINAL PAIN: 0
RHINORRHEA: 0
CONSTIPATION: 0
COUGH: 0
DIARRHEA: 0
NAUSEA: 0
WHEEZING: 0
SHORTNESS OF BREATH: 0
BLOOD IN STOOL: 0
VOMITING: 0
CHEST TIGHTNESS: 0

## 2025-04-11 ASSESSMENT — PAIN DESCRIPTION - LOCATION: LOCATION: OTHER (COMMENT)

## 2025-04-11 ASSESSMENT — PAIN SCALES - GENERAL: PAINLEVEL_OUTOF10: 5

## 2025-04-11 NOTE — CARE COORDINATION
Case Management   Daily Progress Note       Patient Name: Sultana Noyola                   YOB: 1955  Diagnosis: Acute kidney injury superimposed on CKD [N17.9, N18.9]  Acute kidney injury superimposed on chronic kidney disease [N17.9, N18.9]                       GMLOS: 3 days  Length of Stay: 2  days    Anticipated Discharge Date: Ready for discharge    Readmission Risk (Low < 19, Mod (19-27), High > 27): Readmission Risk Score: 23.9        Current Transitional Plan    [] Home Independently    [x] Home with HC    [] Skilled Nursing Facility    [] Acute Rehabilitation    [] Long Term Acute Care (LTAC)    [] Other:     Plan for the Stay (Medical Management) : n/a      Workflow Continuation (Additional Notes) : patient returning home with [facility]. She denies other needs. Notified Heather from [facility] of discharge today        Mona Meraz RN  April 11, 2025

## 2025-04-11 NOTE — DISCHARGE INSTR - COC
Continuity of Care Form    Patient Name: Sultana Noyola   :  1955  MRN:  [id number]    Admit date:  2025  Discharge date:  25    Code Status Order: Full Code   Advance Directives:     Admitting Physician:  Kati Martinez MD  PCP: Azael Friedman MD    Discharging Nurse: KESHA Almeida  Discharging Hospital Unit/Room#:   Discharging Unit Phone Number: [phone]    Emergency Contact:   Extended Emergency Contact Information  Primary Emergency Contact: Lynn Johnson  Address: [address] [place] [address]  Home Phone: [phone]  Relation: Child  Secondary Emergency Contact: RosioLa Nena           [place], [place]  Home Phone: [phone]  Relation: Child    Past Surgical History:  Past Surgical History:   Procedure Laterality Date    APPENDECTOMY      BACK SURGERY      CHOLECYSTECTOMY      ENDOSCOPY, COLON, DIAGNOSTIC      egd    FOOT SURGERY Left 2022    2 toes amputated    HC TEMPORARY DIALYSIS CATHETER  3/23/2025    HYSTERECTOMY (CERVIX STATUS UNKNOWN)      THORACENTESIS Left 2022     THORACENTESIS    TUMOR REMOVAL      benign tumor removed from rt leg as child    TUMOR REMOVAL         Immunization History:   There is no immunization history for the selected administration types on file for this patient.    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    Hypoxia R09.02    Multiple compression fractures of thoracic vertebra ([facility]) S22.000A    Hyperkalemia E87.5    Acute kidney injury N17.9    Stage 3a chronic kidney disease (HCC) N18.31    Bradycardia R00.1    Dyslipidemia E78.5    Ventricular tachyarrhythmia ([facility]) I47.20    Chronic obstructive  (1800kcals/day), Low Sodium (2gm), and Low potassium    Routes of Feeding: Oral  Liquids: No Restrictions  Daily Fluid Restriction: yes - amount 1800 mL  Last Modified Barium Swallow with Video (Video Swallowing Test): not done    Treatments at the Time of Hospital Discharge:   Respiratory Treatments: None  Oxygen Therapy:  is on oxygen at 3 L/min per nasal cannula. Pt was on room air at the hospital.  Ventilator:    - No ventilator support    Rehab Therapies: None  Weight Bearing Status/Restrictions: No weight bearing restrictions  Other Medical Equipment (for information only, NOT a DME order):  walker  Other Treatments: skilled nursing    Patient's personal belongings (please select all that are sent with patient):  Glasses, Dentures upper and lower    RN SIGNATURE:  Electronically signed by Elle Murray RN on 4/11/25 at 1:22 PM EDT    CASE MANAGEMENT/SOCIAL WORK SECTION    Inpatient Status Date: ***    Readmission Risk Assessment Score:  [facility] RISK OF UNPLANNED READMISSION 2.0             23.1 Total Score        Discharging to Facility/ Agency   Name: [facility]    / signature: Electronically signed by Mona Meraz RN on 4/11/25 at 12:02 PM EDT    PHYSICIAN SECTION    Prognosis: Fair    Condition at Discharge: Stable    Rehab Potential (if transferring to Rehab): Fair    Recommended Labs or Other Treatments After Discharge: [facility] 4/11/25 and 4/18/25 report results to Dr Pradeep Caruso [phone], fax [phone]  Follow-up with urology office.  Continue urinary catheter until then.  Catheter care by home care RN.  Physician Certification: I certify the above information and transfer of Sultana Noyola  is necessary for the continuing treatment of the diagnosis listed and that she requires Home Care for less 30 days.     Update Admission H&P: No change in H&P    PHYSICIAN SIGNATURE:  Electronically signed by Kati Martinez MD on 4/11/25 at 2:16 PM EDT

## 2025-04-11 NOTE — DISCHARGE INSTR - DIET

## 2025-04-11 NOTE — PROGRESS NOTES
Renal Progress Note    Patient :  Sultana Noyola; 69 y.o. MRN# [id number]  Location:  2020/2020-01  Attending:  Kati Martinez MD  Admit Date:  4/9/2025   Hospital Day: 2    Subjective:     Patient seen and examined at bedside, vital signs stable, afebrile, no acute events overnight.    Urine output documented at 2450 mL over the past 24 hours.    Labs reviewed, today's BMP pending.    Urine chemistry reviewed, demonstrated urine sodium 77, UPC 0.07    Brief History reviewed.  69-year-old female past medical history of CKD stage IIIb with baseline creatinine around 1.4, established with nephrology consultants of [facility], A-fib, chronic pancreatitis, hypertension, hyperlipidemia, bipolar 1, COPD, PVD, diastolic heart failure presented to the ED due to abnormal labs.  Patient had labs drawn in the outpatient setting and creatinine had resulted at 2.9, patient was instructed to come to the ED.  On arrival, postvoid residual was performed which demonstrated 400 cc of retained urine, Valentino catheter was placed and bladder scan again revealed 200 cc of urine.  Urology is consulted for management of urinary retention, nephrology consulted for RUSTAM.  Outpatient Medications:     Medications Prior to Admission: apixaban (ELIQUIS) 5 MG TABS tablet, Take 2 tablets by mouth 2 times daily for 7 days, THEN 1 tablet 2 times daily.  rosuvastatin (CRESTOR) 20 MG tablet, Take 1 tablet by mouth nightly  hydrALAZINE (APRESOLINE) 50 MG tablet, Take 1 tablet by mouth every 8 hours  carvedilol (COREG) 25 MG tablet, Take 1 tablet by mouth 2 times daily (with meals)  amLODIPine (NORVASC) 10 MG tablet, Take 1 tablet by mouth daily  gabapentin (NEURONTIN) 300 MG capsule, Take 1 capsule by mouth 3 times daily.  metFORMIN (GLUCOPHAGE) 500 MG tablet, Take 1 tablet by mouth 2 times daily (with meals)  budesonide-formoterol (SYMBICORT) 80-4.5 MCG/ACT AERO, Inhale 2 puffs into the lungs 2 times daily  OXcarbazepine (TRILEPTAL) 150 MG tablet, Take  44 03/23/2025 12:08 PM     Urine Osmolarity:   Lab Results   Component Value Date/Time     03/23/2025 12:08 PM     Radiology:     Reviewed.     Assessment:       Acute Kidney Injury on CKD stage IIIb, likely secondary to urinary retention.  CKD stage IIIb, likely secondary to nephrosclerosis, with baseline creatinine around 1.4, follows with nephrology consultants of [facility].  Urinary retention, Valentino catheter in place and draining well, urology managing.  Hypertension.  A-fib -on Eliquis.  COPD.  Type 2 diabetes.  PAD.  Bipolar 1 disorder    Plan:   Await results of today's BMP.  Continue gentle IV hydration.  Valentino management as per urology.  BMP in AM.  Strict intake and output.  Daily weights.  Will follow.    Nutrition   Please ensure that patient is on a renal diet/TF. Avoid nephrotoxic drugs/contrast exposure.    SARAHI [facility]  [facility]     This note is created with the assistance of a speech-recognition program. While intending to generate a document that actually reflects the content of the visit, no guarantees can be provided that every mistake has been identified and corrected by editing.      Attending Physician Statement  I have discussed the care of Sultana Noyola, including pertinent history and exam findings with the NP. I have reviewed the key elements of all parts of the encounter with the resident/fellow. I have seen and examined the patient with the NP.  I agree with the assessment, plan and orders as documented by the NP.    My medical decision making is as follows:  Seen and examined at bedside. Urinary retention likely due to diabetes causing neurogenic bladder. She should continue to see her outpatient nephrologist. Urology should be managing her LUTS.   Discussed with primary.      Sarah Schafer MD

## 2025-04-11 NOTE — PROGRESS NOTES
[facility]  Office: [phone]  Ace Hoskins DO, Jose Mays DO, Harris Bray DO, Stephane Lyn DO, Kirk Ledesma MD, Giselle Rich MD, Kati Martinez MD, Genoveva Marcus MD,  Eliseo Longo MD, Radha Bay MD, Carlin Aggarwal MD,  Savannah Lynn DO, Raul Cardona MD, Min Marks MD, Noel Hoskins DO, Rupal Gamez MD,  Ruben Araujo DO, Diann Duffy MD, Dara Ruffin MD, Sarita Olmstead MD,  Rory Alexander MD, Meg Mckinney MD, Sigrid Allan MD, Noam Agrawal MD, Patricio Morton MD, Demar Soto MD, Piotr Sims DO, Angel Small MD, Savannah Sewell MD, Mohsin Reza, MD, Janessa German MD, Shirley Waterhouse, CNP,  Kathryn Montalvo CNP, Piotr Grande, CNP,  Patito Gallo, LISHA, Lizbeth Stewart, CNP, Melida Funes, CNP, Andria Freeman, CNP, Stefani Murcia, CNP, Linnette Adame, PA-C, Adrienne Crandall, CNP, Avery Kent, CNP,  Kia Fritz, CNP, Clotilde Ching, CNP, Vinod Smith, PA-C, Andreina Hui, CNP,  Kirstin Jimenez, CNS, Lisa Wayne, CNP, Prudence Laird, CNP,   Nubia Lopes, CNP       [facility]      Daily Progress Note     Admit Date: 4/9/2025  Bed/Room No.  2020/2020-01  Admitting Physician : Kati Martinez MD  Code Status :Full Code  Hospital Day:  LOS: 2 days   Chief Complaint:     Chief Complaint   Patient presents with    Abnormal Lab     Principal Problem:    Acute kidney injury superimposed on chronic kidney disease  Active Problems:    Peripheral arterial disease    Anxiety    Essential hypertension    Bipolar 1 disorder (HCC)    Stage 3a chronic kidney disease (HCC)    Dyslipidemia    Chronic obstructive pulmonary disease (HCC)    ANTHONY (obstructive sleep apnea)    Migraine    Anemia  Resolved Problems:    * No resolved hospital problems. *    Subjective :        Interval History/Significant events :  04/11/25    Patient is feeling better.  She still has urinary catheter in place.  Creatinine is improved 1.1.  Patient had about

## 2025-04-11 NOTE — PROGRESS NOTES
AVS reviewed with pt and all questions answered. Pt verbalizes understanding. IV and telemetry removed and pt wheeled out to private vehicle with all personal belongings.

## 2025-04-11 NOTE — PLAN OF CARE
Problem: Safety - Adult  Goal: Free from fall injury  4/11/2025 1322 by Elle Murray RN  Outcome: Completed  4/11/2025 0823 by Elle Murray RN  Outcome: Progressing     Problem: Pain  Goal: Verbalizes/displays adequate comfort level or baseline comfort level  4/11/2025 1322 by Elle Murray RN  Outcome: Completed  4/11/2025 0823 by Elle Murray RN  Outcome: Progressing     Problem: Discharge Planning  Goal: Discharge to home or other facility with appropriate resources  4/11/2025 1322 by Elle Murray RN  Outcome: Completed  4/11/2025 0823 by Elle Murray RN  Outcome: Progressing     Problem: Respiratory - Adult  Goal: Achieves optimal ventilation and oxygenation  4/11/2025 1322 by Elle Murray RN  Outcome: Completed  4/11/2025 0823 by Elle Murray RN  Outcome: Progressing

## 2025-04-11 NOTE — PROGRESS NOTES
1421: Writer reached out to urology: Hello, the pt has been having issues with retention. We removed her bazzi at 1115 and she voided 90mL after that. PVR was 310. The pt has voided two times since then, 20 mL and a large unmeasured amount per the pt. Do you want to discharge with a bazzi? Thanks.     1428: Brody VALLE replied: No that’s okay. If she’s voiding let’s keep the bazzi out. Thanks.

## 2025-04-11 NOTE — PLAN OF CARE
Problem: Safety - Adult  Goal: Free from fall injury  Outcome: Progressing     Problem: Pain  Goal: Verbalizes/displays adequate comfort level or baseline comfort level  Outcome: Progressing     Problem: Discharge Planning  Goal: Discharge to home or other facility with appropriate resources  Outcome: Progressing     Problem: Respiratory - Adult  Goal: Achieves optimal ventilation and oxygenation  4/11/2025 0823 by Elle Murray RN  Outcome: Progressing  4/10/2025 2106 by Ml Magallon RCP  Outcome: Progressing

## 2025-04-11 NOTE — PROGRESS NOTES
CLINICAL PHARMACY NOTE: MEDS TO BEDS    Total # of Prescriptions Filled: 1   The following medications were delivered to the patient:  Metformin      Additional Documentation: Rehana delivered to RN

## 2025-04-12 VITALS
HEART RATE: 72 BPM | DIASTOLIC BLOOD PRESSURE: 47 MMHG | HEIGHT: 64 IN | TEMPERATURE: 97.9 F | WEIGHT: 198.8 LBS | BODY MASS INDEX: 33.94 KG/M2 | RESPIRATION RATE: 22 BRPM | OXYGEN SATURATION: 95 % | SYSTOLIC BLOOD PRESSURE: 128 MMHG

## 2025-04-12 NOTE — DISCHARGE SUMMARY
[facility]  Office: [phone]  Ace Hoskins DO, Jose Mays DO, Harris Bray DO, Stephane Lyn DO, Kirk Ledesma MD, Giselle Rich MD, Kati Martinez MD, Genoveva Marcus MD,  Eliseo Longo MD, Radha Bay MD, Carlin Aggarwal MD,  Savannah Lynn DO, Raul Cardona MD, Min Marks MD, Noel Hoskins DO, Rupal Gamez MD,  Ruben Araujo DO, Diann Duffy MD, Dara Ruffin MD, Sarita Olmstead MD,  Rory Alexander MD, Meg Mckinney MD, Sigrid Allan MD, Noam Agrawal MD, Patricio Morton MD, Demar Soto MD, Piotr Sims DO, Mary Costa MD, Angel Small MD, Savannah Sewell MD, Mohsin Reza, MD, Janessa German MD, Shirley Waterhouse, CNP,  Kathryn Montalvo, CNP, Piotr Grande, CNP,  Patito Gallo, DNP, Lizbeth Stewart, CNP, Melida Funes, CNP, Andria Freeman, CNP, Stefani Murcia, CNP, Linnette Adame, PA-C, Adrienne Crandall, CNP, Avery Kent, CNP,  Kia Fritz, CNP, Clotilde Ching, CNP, Vinod Smith, PA-C, Andreina Hui, CNP,  Kirstin Jimenez, CNS, Lisa Wayne, CNP, Prudence Laird, CNP,   Nubia Lopes, CNP                [facility]      Discharge Summary     Patient ID: Sultana Noyola  :  1955   MRN: [id number]     ACCOUNT:  [id number]   Patient Location :   Patient's PCP: Azael Friedman MD  Admit Date: 2025   Discharge Date: 2025     Length of Stay: 2  Code Status:  Prior  Admitting Physician: Kati Martinez MD  Discharge Physician: Kati Martinez MD     Active Discharge Diagnosis :     Primary Problem  Acute kidney injury superimposed on chronic kidney disease      Hospital Problems  Active Hospital Problems    Diagnosis Date Noted    Peripheral arterial disease [I73.9] 2022     Priority: Medium    Acute kidney injury superimposed on chronic kidney disease [N17.9, N18.9] 2025    Anemia [D64.9] 2025    Migraine [G43.909] 2025    ANTHONY (obstructive sleep apnea) [G47.33] 2025     [id number]  [phone]    Schedule an appointment as soon as possible for a visit in 2 week(s)  Diabetic Bladder dysfunction    [place] [address]  [phone]           Requiring Further Evaluation/Follow Up POST HOSPITALIZATION/Incidental Findings: Follow up with Dr Morton in 2 weeks.   BMP check in 5 Days.     Diet: diabetic diet    Activity: As tolerated.    Instructions to Patient: follow up as advised.     Discharge Medications:      Medication List        CONTINUE taking these medications      albuterol 1.25 MG/3ML nebulizer solution  Commonly known as: ACCUNEB     amLODIPine 10 MG tablet  Commonly known as: NORVASC  Take 1 tablet by mouth daily     apixaban 5 MG Tabs tablet  Commonly known as: ELIQUIS  Take 2 tablets by mouth 2 times daily for 7 days, THEN 1 tablet 2 times daily.  Start taking on: March 28, 2025     busPIRone 5 MG tablet  Commonly known as: BUSPAR     butalbital-acetaminophen-caffeine -40 MG per tablet  Commonly known as: FIORICET, ESGIC  Take 1 tablet by mouth every 6 hours as needed for Headaches Max Daily Amount: 4 tablets     carvedilol 25 MG tablet  Commonly known as: COREG  Take 1 tablet by mouth 2 times daily (with meals)     cilostazol 50 MG tablet  Commonly known as: PLETAL     clopidogrel 75 MG tablet  Commonly known as: Plavix  Take 1 tablet by mouth daily     Cyanocobalamin 500 MCG/0.1ML Soln nasal spray  Commonly known as: NASCOBAL     DULoxetine 20 MG extended release capsule  Commonly known as: CYMBALTA     gabapentin 300 MG capsule  Commonly known as: NEURONTIN     hydrALAZINE 50 MG tablet  Commonly known as: APRESOLINE  Take 1 tablet by mouth every 8 hours     LORazepam 1 MG tablet  Commonly known as: ATIVAN     magnesium oxide 400 MG tablet  Commonly known as: MAG-OX     metFORMIN 500 MG tablet  Commonly known as: GLUCOPHAGE  Take 1 tablet by mouth 2 times daily (with meals)     mirtazapine 30 MG tablet  Commonly known as:

## 2025-04-13 LAB — OSMOLALITY UR: 494 MOSM/KG (ref 80–1300)

## 2025-04-18 ENCOUNTER — HOSPITAL ENCOUNTER (OUTPATIENT)
Age: 70
Setting detail: SPECIMEN
Discharge: HOME | End: 2025-04-18
Payer: MEDICARE

## 2025-04-18 LAB
ANION GAP SERPL CALCULATED.3IONS-SCNC: 13 MMOL/L (ref 9–16)
BUN SERPL-MCNC: 23 MG/DL (ref 8–23)
CALCIUM SERPL-MCNC: 9.7 MG/DL (ref 8.8–10.2)
CHLORIDE SERPL-SCNC: 106 MMOL/L (ref 98–107)
CO2 SERPL-SCNC: 23 MMOL/L (ref 20–31)
CREAT SERPL-MCNC: 1.2 MG/DL (ref 0.5–0.9)
GFR, ESTIMATED: 47 ML/MIN/1.73M2
GLUCOSE SERPL-MCNC: 144 MG/DL (ref 82–115)
POTASSIUM SERPL-SCNC: 5.3 MMOL/L (ref 3.7–5.3)
SODIUM SERPL-SCNC: 142 MMOL/L (ref 136–145)

## 2025-04-18 PROCEDURE — 80048 BASIC METABOLIC PNL TOTAL CA: CPT

## 2025-04-18 PROCEDURE — 36415 COLL VENOUS BLD VENIPUNCTURE: CPT

## 2025-06-22 ENCOUNTER — HOSPITAL ENCOUNTER (INPATIENT)
Age: 70
LOS: 5 days | Discharge: HOME HEALTH | DRG: 189 | End: 2025-06-27
Attending: INTERNAL MEDICINE | Admitting: INTERNAL MEDICINE
Payer: MEDICARE

## 2025-06-22 ENCOUNTER — APPOINTMENT (OUTPATIENT)
Dept: GENERAL RADIOLOGY | Age: 70
DRG: 189 | End: 2025-06-22
Payer: MEDICARE

## 2025-06-22 ENCOUNTER — HOSPITAL ENCOUNTER (EMERGENCY)
Age: 70
Discharge: ANOTHER HOSPITAL | DRG: 189 | End: 2025-06-22
Attending: EMERGENCY MEDICINE
Payer: MEDICARE

## 2025-06-22 ENCOUNTER — APPOINTMENT (OUTPATIENT)
Dept: ULTRASOUND IMAGING | Age: 70
DRG: 189 | End: 2025-06-22
Attending: INTERNAL MEDICINE
Payer: MEDICARE

## 2025-06-22 VITALS
OXYGEN SATURATION: 91 % | WEIGHT: 189 LBS | HEIGHT: 64 IN | RESPIRATION RATE: 20 BRPM | DIASTOLIC BLOOD PRESSURE: 42 MMHG | SYSTOLIC BLOOD PRESSURE: 92 MMHG | BODY MASS INDEX: 32.27 KG/M2 | HEART RATE: 69 BPM | TEMPERATURE: 98.3 F

## 2025-06-22 DIAGNOSIS — J96.21 ACUTE ON CHRONIC RESPIRATORY FAILURE WITH HYPOXIA (HCC): Primary | ICD-10-CM

## 2025-06-22 DIAGNOSIS — I50.9 CONGESTIVE HEART FAILURE, UNSPECIFIED HF CHRONICITY, UNSPECIFIED HEART FAILURE TYPE (HCC): Primary | ICD-10-CM

## 2025-06-22 DIAGNOSIS — I50.9 ACUTE ON CHRONIC CONGESTIVE HEART FAILURE, UNSPECIFIED HEART FAILURE TYPE (HCC): ICD-10-CM

## 2025-06-22 DIAGNOSIS — N18.9 ACUTE KIDNEY INJURY SUPERIMPOSED ON CKD: ICD-10-CM

## 2025-06-22 DIAGNOSIS — I95.9 HYPOTENSION, UNSPECIFIED HYPOTENSION TYPE: ICD-10-CM

## 2025-06-22 DIAGNOSIS — N17.9 ACUTE KIDNEY INJURY SUPERIMPOSED ON CKD: ICD-10-CM

## 2025-06-22 LAB
ALBUMIN SERPL-MCNC: 4 G/DL (ref 3.5–5.2)
ALBUMIN/GLOB SERPL: 1.6 {RATIO} (ref 1–2.5)
ALP SERPL-CCNC: 81 U/L (ref 35–104)
ALT SERPL-CCNC: 24 U/L (ref 10–35)
ANION GAP SERPL CALCULATED.3IONS-SCNC: 12 MMOL/L (ref 9–16)
ANION GAP SERPL CALCULATED.3IONS-SCNC: 13 MMOL/L (ref 9–16)
ANTI-XA UNFRAC HEPARIN: >2 IU/L
AST SERPL-CCNC: 21 U/L (ref 10–35)
B PARAP IS1001 DNA NPH QL NAA+NON-PROBE: NOT DETECTED
B PERT DNA SPEC QL NAA+PROBE: NOT DETECTED
BACTERIA URNS QL MICRO: ABNORMAL
BASOPHILS # BLD: 0 K/UL (ref 0–0.2)
BASOPHILS # BLD: 0.04 K/UL (ref 0–0.2)
BASOPHILS NFR BLD: 0 % (ref 0–2)
BASOPHILS NFR BLD: 0 % (ref 0–2)
BILIRUB SERPL-MCNC: 0.4 MG/DL (ref 0–1.2)
BILIRUB UR QL STRIP: NEGATIVE
BNP SERPL-MCNC: ABNORMAL PG/ML (ref 0–125)
BUN SERPL-MCNC: 48 MG/DL (ref 8–23)
BUN SERPL-MCNC: 52 MG/DL (ref 8–23)
C PNEUM DNA NPH QL NAA+NON-PROBE: NOT DETECTED
CALCIUM SERPL-MCNC: 9 MG/DL (ref 8.6–10.4)
CALCIUM SERPL-MCNC: 9.1 MG/DL (ref 8.6–10.4)
CASTS #/AREA URNS LPF: ABNORMAL /LPF (ref 0–8)
CHLORIDE SERPL-SCNC: 100 MMOL/L (ref 98–107)
CHLORIDE SERPL-SCNC: 99 MMOL/L (ref 98–107)
CLARITY UR: CLEAR
CO2 SERPL-SCNC: 22 MMOL/L (ref 20–31)
CO2 SERPL-SCNC: 22 MMOL/L (ref 20–31)
COLOR UR: ABNORMAL
CREAT SERPL-MCNC: 3.7 MG/DL (ref 0.6–0.9)
CREAT SERPL-MCNC: 3.7 MG/DL (ref 0.6–0.9)
CREAT UR-MCNC: 265 MG/DL (ref 28–217)
D DIMER PPP FEU-MCNC: 0.6 UG/ML FEU (ref 0–0.59)
EKG Q-T INTERVAL: 482 MS
EKG QRS DURATION: 104 MS
EKG QTC CALCULATION (BAZETT): 407 MS
EKG R AXIS: 9 DEGREES
EKG T AXIS: 104 DEGREES
EKG VENTRICULAR RATE: 43 BPM
EOSINOPHIL # BLD: 0 K/UL (ref 0–0.4)
EOSINOPHIL # BLD: 0.03 K/UL (ref 0–0.44)
EOSINOPHILS RELATIVE PERCENT: 0 % (ref 1–4)
EOSINOPHILS RELATIVE PERCENT: 0 % (ref 1–4)
EPI CELLS #/AREA URNS HPF: ABNORMAL /HPF (ref 0–5)
ERYTHROCYTE [DISTWIDTH] IN BLOOD BY AUTOMATED COUNT: 14.1 % (ref 11.8–14.4)
ERYTHROCYTE [DISTWIDTH] IN BLOOD BY AUTOMATED COUNT: 15 % (ref 12.5–15.4)
FLUAV RNA NPH QL NAA+NON-PROBE: NOT DETECTED
FLUBV RNA NPH QL NAA+NON-PROBE: NOT DETECTED
FREE KAPPA/LAMBDA RATIO: 1.03 (ref 0.22–1.74)
GFR, ESTIMATED: 13 ML/MIN/1.73M2
GFR, ESTIMATED: 13 ML/MIN/1.73M2
GLUCOSE SERPL-MCNC: 130 MG/DL (ref 74–99)
GLUCOSE SERPL-MCNC: 167 MG/DL (ref 74–99)
GLUCOSE UR STRIP-MCNC: NEGATIVE MG/DL
HADV DNA NPH QL NAA+NON-PROBE: NOT DETECTED
HCO3 VENOUS: 29.3 MMOL/L (ref 22–29)
HCOV 229E RNA NPH QL NAA+NON-PROBE: NOT DETECTED
HCOV HKU1 RNA NPH QL NAA+NON-PROBE: NOT DETECTED
HCOV NL63 RNA NPH QL NAA+NON-PROBE: NOT DETECTED
HCOV OC43 RNA NPH QL NAA+NON-PROBE: NOT DETECTED
HCT VFR BLD AUTO: 28.4 % (ref 36–46)
HCT VFR BLD AUTO: 28.5 % (ref 36.3–47.1)
HGB BLD-MCNC: 8.8 G/DL (ref 11.9–15.1)
HGB BLD-MCNC: 9.2 G/DL (ref 12–16)
HGB UR QL STRIP.AUTO: NEGATIVE
HMPV RNA NPH QL NAA+NON-PROBE: NOT DETECTED
HPIV1 RNA NPH QL NAA+NON-PROBE: NOT DETECTED
HPIV2 RNA NPH QL NAA+NON-PROBE: NOT DETECTED
HPIV3 RNA NPH QL NAA+NON-PROBE: NOT DETECTED
HPIV4 RNA NPH QL NAA+NON-PROBE: NOT DETECTED
IMM GRANULOCYTES # BLD AUTO: 0.19 K/UL (ref 0–0.3)
IMM GRANULOCYTES NFR BLD: 1 %
INR PPP: 1.5
KAPPA LC FREE SER-MCNC: 36.3 MG/L
KETONES UR STRIP-MCNC: ABNORMAL MG/DL
LACTATE BLDV-SCNC: 1.7 MMOL/L (ref 0.5–2.2)
LAMBDA LC FREE SERPL-MCNC: 35.1 MG/L (ref 4.2–27.7)
LEUKOCYTE ESTERASE UR QL STRIP: ABNORMAL
LYMPHOCYTES NFR BLD: 1.03 K/UL (ref 1.1–3.7)
LYMPHOCYTES NFR BLD: 1.69 K/UL (ref 1–4.8)
LYMPHOCYTES RELATIVE PERCENT: 10 % (ref 24–44)
LYMPHOCYTES RELATIVE PERCENT: 5 % (ref 24–43)
M PNEUMO DNA NPH QL NAA+NON-PROBE: NOT DETECTED
MAGNESIUM SERPL-MCNC: 2.4 MG/DL (ref 1.6–2.4)
MCH RBC QN AUTO: 29 PG (ref 25.2–33.5)
MCH RBC QN AUTO: 29 PG (ref 26–34)
MCHC RBC AUTO-ENTMCNC: 30.9 G/DL (ref 28.4–34.8)
MCHC RBC AUTO-ENTMCNC: 32.5 G/DL (ref 31–37)
MCV RBC AUTO: 89.3 FL (ref 80–100)
MCV RBC AUTO: 94.1 FL (ref 82.6–102.9)
MONOCYTES NFR BLD: 1.01 K/UL (ref 0.1–0.8)
MONOCYTES NFR BLD: 1.49 K/UL (ref 0.1–1.2)
MONOCYTES NFR BLD: 6 % (ref 1–7)
MONOCYTES NFR BLD: 7 % (ref 3–12)
MORPHOLOGY: NORMAL
NEUTROPHILS NFR BLD: 84 % (ref 36–66)
NEUTROPHILS NFR BLD: 87 % (ref 36–65)
NEUTS SEG NFR BLD: 14.2 K/UL (ref 1.8–7.7)
NEUTS SEG NFR BLD: 17.87 K/UL (ref 1.5–8.1)
NITRITE UR QL STRIP: NEGATIVE
NRBC BLD-RTO: 0 PER 100 WBC
O2 DELIVERY DEVICE: ABNORMAL
O2 SAT, VEN: 73.7 % (ref 60–85)
OSMOLALITY UR: 346 MOSM/KG (ref 80–1300)
PARTIAL THROMBOPLASTIN TIME: 40.7 SEC (ref 23–36.5)
PCO2 VENOUS: 41.5 MM HG (ref 41–51)
PH UR STRIP: 5 [PH] (ref 5–8)
PH VENOUS: 7.46 (ref 7.32–7.43)
PLATELET # BLD AUTO: 229 K/UL (ref 138–453)
PLATELET # BLD AUTO: 244 K/UL (ref 140–450)
PMV BLD AUTO: 10.2 FL (ref 6–12)
PMV BLD AUTO: 11.9 FL (ref 8.1–13.5)
PO2 VENOUS: 37.2 MM HG (ref 30–50)
POSITIVE BASE EXCESS, VEN: 4.9 MMOL/L (ref 0–3)
POTASSIUM SERPL-SCNC: 4.9 MMOL/L (ref 3.7–5.3)
POTASSIUM SERPL-SCNC: 5 MMOL/L (ref 3.7–5.3)
PROT SERPL-MCNC: 6.5 G/DL (ref 6.6–8.7)
PROT UR STRIP-MCNC: ABNORMAL MG/DL
PROTHROMBIN TIME: 18.4 SEC (ref 11.7–14.9)
RBC # BLD AUTO: 3.03 M/UL (ref 3.95–5.11)
RBC # BLD AUTO: 3.18 M/UL (ref 4–5.2)
RBC #/AREA URNS HPF: ABNORMAL /HPF (ref 0–4)
RSV RNA NPH QL NAA+NON-PROBE: NOT DETECTED
RV+EV RNA NPH QL NAA+NON-PROBE: NOT DETECTED
SARS-COV-2 RNA NPH QL NAA+NON-PROBE: NOT DETECTED
SODIUM SERPL-SCNC: 133 MMOL/L (ref 136–145)
SODIUM SERPL-SCNC: 135 MMOL/L (ref 136–145)
SODIUM UR-SCNC: <20 MMOL/L
SP GR UR STRIP: 1.02 (ref 1–1.03)
SPECIMEN DESCRIPTION: NORMAL
TOTAL PROTEIN, URINE: 29 MG/DL
TROPONIN I SERPL HS-MCNC: 29 NG/L (ref 0–14)
TROPONIN I SERPL HS-MCNC: 32 NG/L (ref 0–14)
TROPONIN I SERPL HS-MCNC: 35 NG/L (ref 0–14)
URINE TOTAL PROTEIN CREATININE RATIO: 0.11 (ref 0–0.2)
UROBILINOGEN UR STRIP-ACNC: NORMAL EU/DL (ref 0–1)
WBC #/AREA URNS HPF: ABNORMAL /HPF (ref 0–5)
WBC OTHER # BLD: 16.9 K/UL (ref 3.5–11)
WBC OTHER # BLD: 20.7 K/UL (ref 3.5–11.3)

## 2025-06-22 PROCEDURE — 76775 US EXAM ABDO BACK WALL LIM: CPT

## 2025-06-22 PROCEDURE — 2700000000 HC OXYGEN THERAPY PER DAY

## 2025-06-22 PROCEDURE — 81001 URINALYSIS AUTO W/SCOPE: CPT

## 2025-06-22 PROCEDURE — 36410 VNPNXR 3YR/> PHY/QHP DX/THER: CPT

## 2025-06-22 PROCEDURE — 82803 BLOOD GASES ANY COMBINATION: CPT

## 2025-06-22 PROCEDURE — 87641 MR-STAPH DNA AMP PROBE: CPT

## 2025-06-22 PROCEDURE — 2500000003 HC RX 250 WO HCPCS

## 2025-06-22 PROCEDURE — 76937 US GUIDE VASCULAR ACCESS: CPT

## 2025-06-22 PROCEDURE — 85025 COMPLETE CBC W/AUTO DIFF WBC: CPT

## 2025-06-22 PROCEDURE — 80053 COMPREHEN METABOLIC PANEL: CPT

## 2025-06-22 PROCEDURE — 83735 ASSAY OF MAGNESIUM: CPT

## 2025-06-22 PROCEDURE — 2580000003 HC RX 258

## 2025-06-22 PROCEDURE — 71045 X-RAY EXAM CHEST 1 VIEW: CPT

## 2025-06-22 PROCEDURE — 87086 URINE CULTURE/COLONY COUNT: CPT

## 2025-06-22 PROCEDURE — 5A0935A ASSISTANCE WITH RESPIRATORY VENTILATION, LESS THAN 24 CONSECUTIVE HOURS, HIGH NASAL FLOW/VELOCITY: ICD-10-PCS | Performed by: STUDENT IN AN ORGANIZED HEALTH CARE EDUCATION/TRAINING PROGRAM

## 2025-06-22 PROCEDURE — 84165 PROTEIN E-PHORESIS SERUM: CPT

## 2025-06-22 PROCEDURE — 96375 TX/PRO/DX INJ NEW DRUG ADDON: CPT

## 2025-06-22 PROCEDURE — 6370000000 HC RX 637 (ALT 250 FOR IP)

## 2025-06-22 PROCEDURE — 6360000002 HC RX W HCPCS

## 2025-06-22 PROCEDURE — 94660 CPAP INITIATION&MGMT: CPT

## 2025-06-22 PROCEDURE — 93010 ELECTROCARDIOGRAM REPORT: CPT | Performed by: INTERNAL MEDICINE

## 2025-06-22 PROCEDURE — 84155 ASSAY OF PROTEIN SERUM: CPT

## 2025-06-22 PROCEDURE — 94761 N-INVAS EAR/PLS OXIMETRY MLT: CPT

## 2025-06-22 PROCEDURE — 94640 AIRWAY INHALATION TREATMENT: CPT

## 2025-06-22 PROCEDURE — 83880 ASSAY OF NATRIURETIC PEPTIDE: CPT

## 2025-06-22 PROCEDURE — 93005 ELECTROCARDIOGRAM TRACING: CPT | Performed by: INTERNAL MEDICINE

## 2025-06-22 PROCEDURE — 84300 ASSAY OF URINE SODIUM: CPT

## 2025-06-22 PROCEDURE — 85520 HEPARIN ASSAY: CPT

## 2025-06-22 PROCEDURE — 6360000002 HC RX W HCPCS: Performed by: EMERGENCY MEDICINE

## 2025-06-22 PROCEDURE — 99285 EMERGENCY DEPT VISIT HI MDM: CPT

## 2025-06-22 PROCEDURE — 6370000000 HC RX 637 (ALT 250 FOR IP): Performed by: EMERGENCY MEDICINE

## 2025-06-22 PROCEDURE — 84156 ASSAY OF PROTEIN URINE: CPT

## 2025-06-22 PROCEDURE — 83935 ASSAY OF URINE OSMOLALITY: CPT

## 2025-06-22 PROCEDURE — 5A09357 ASSISTANCE WITH RESPIRATORY VENTILATION, LESS THAN 24 CONSECUTIVE HOURS, CONTINUOUS POSITIVE AIRWAY PRESSURE: ICD-10-PCS | Performed by: STUDENT IN AN ORGANIZED HEALTH CARE EDUCATION/TRAINING PROGRAM

## 2025-06-22 PROCEDURE — 0202U NFCT DS 22 TRGT SARS-COV-2: CPT

## 2025-06-22 PROCEDURE — 3E033XZ INTRODUCTION OF VASOPRESSOR INTO PERIPHERAL VEIN, PERCUTANEOUS APPROACH: ICD-10-PCS

## 2025-06-22 PROCEDURE — 93005 ELECTROCARDIOGRAM TRACING: CPT | Performed by: EMERGENCY MEDICINE

## 2025-06-22 PROCEDURE — 36415 COLL VENOUS BLD VENIPUNCTURE: CPT

## 2025-06-22 PROCEDURE — 2000000000 HC ICU R&B

## 2025-06-22 PROCEDURE — 85730 THROMBOPLASTIN TIME PARTIAL: CPT

## 2025-06-22 PROCEDURE — 82570 ASSAY OF URINE CREATININE: CPT

## 2025-06-22 PROCEDURE — 96365 THER/PROPH/DIAG IV INF INIT: CPT

## 2025-06-22 PROCEDURE — 84484 ASSAY OF TROPONIN QUANT: CPT

## 2025-06-22 PROCEDURE — 80048 BASIC METABOLIC PNL TOTAL CA: CPT

## 2025-06-22 PROCEDURE — C1751 CATH, INF, PER/CENT/MIDLINE: HCPCS

## 2025-06-22 PROCEDURE — 87040 BLOOD CULTURE FOR BACTERIA: CPT

## 2025-06-22 PROCEDURE — 83605 ASSAY OF LACTIC ACID: CPT

## 2025-06-22 PROCEDURE — 83521 IG LIGHT CHAINS FREE EACH: CPT

## 2025-06-22 PROCEDURE — 86334 IMMUNOFIX E-PHORESIS SERUM: CPT

## 2025-06-22 PROCEDURE — 85379 FIBRIN DEGRADATION QUANT: CPT

## 2025-06-22 PROCEDURE — 85610 PROTHROMBIN TIME: CPT

## 2025-06-22 PROCEDURE — 96366 THER/PROPH/DIAG IV INF ADDON: CPT

## 2025-06-22 RX ORDER — MIRTAZAPINE 15 MG/1
30 TABLET, FILM COATED ORAL NIGHTLY
Status: DISCONTINUED | OUTPATIENT
Start: 2025-06-22 | End: 2025-06-23

## 2025-06-22 RX ORDER — OXCARBAZEPINE 150 MG/1
150 TABLET, FILM COATED ORAL 2 TIMES DAILY
Status: DISCONTINUED | OUTPATIENT
Start: 2025-06-22 | End: 2025-06-23

## 2025-06-22 RX ORDER — SODIUM CHLORIDE 0.9 % (FLUSH) 0.9 %
5-40 SYRINGE (ML) INJECTION PRN
Status: DISCONTINUED | OUTPATIENT
Start: 2025-06-22 | End: 2025-06-22 | Stop reason: HOSPADM

## 2025-06-22 RX ORDER — ONDANSETRON 2 MG/ML
4 INJECTION INTRAMUSCULAR; INTRAVENOUS EVERY 6 HOURS PRN
Status: DISCONTINUED | OUTPATIENT
Start: 2025-06-22 | End: 2025-06-27 | Stop reason: HOSPADM

## 2025-06-22 RX ORDER — HEPARIN SODIUM 1000 [USP'U]/ML
4000 INJECTION, SOLUTION INTRAVENOUS; SUBCUTANEOUS PRN
Status: DISCONTINUED | OUTPATIENT
Start: 2025-06-22 | End: 2025-06-22

## 2025-06-22 RX ORDER — TRAZODONE HYDROCHLORIDE 50 MG/1
100 TABLET ORAL NIGHTLY
Status: DISCONTINUED | OUTPATIENT
Start: 2025-06-22 | End: 2025-06-27 | Stop reason: HOSPADM

## 2025-06-22 RX ORDER — HEPARIN SODIUM 10000 [USP'U]/100ML
5-30 INJECTION, SOLUTION INTRAVENOUS CONTINUOUS
Status: DISCONTINUED | OUTPATIENT
Start: 2025-06-22 | End: 2025-06-24

## 2025-06-22 RX ORDER — ACETAMINOPHEN 500 MG
1000 TABLET ORAL EVERY 6 HOURS PRN
Status: DISCONTINUED | OUTPATIENT
Start: 2025-06-22 | End: 2025-06-27 | Stop reason: HOSPADM

## 2025-06-22 RX ORDER — DEXTROSE MONOHYDRATE 100 MG/ML
INJECTION, SOLUTION INTRAVENOUS CONTINUOUS PRN
Status: DISCONTINUED | OUTPATIENT
Start: 2025-06-22 | End: 2025-06-27 | Stop reason: HOSPADM

## 2025-06-22 RX ORDER — SODIUM CHLORIDE 0.9 % (FLUSH) 0.9 %
5-40 SYRINGE (ML) INJECTION PRN
Status: DISCONTINUED | OUTPATIENT
Start: 2025-06-22 | End: 2025-06-27 | Stop reason: HOSPADM

## 2025-06-22 RX ORDER — SODIUM CHLORIDE 9 MG/ML
INJECTION, SOLUTION INTRAVENOUS CONTINUOUS
Status: DISCONTINUED | OUTPATIENT
Start: 2025-06-22 | End: 2025-06-23

## 2025-06-22 RX ORDER — HEPARIN SODIUM 1000 [USP'U]/ML
2000 INJECTION, SOLUTION INTRAVENOUS; SUBCUTANEOUS PRN
Status: DISCONTINUED | OUTPATIENT
Start: 2025-06-22 | End: 2025-06-24

## 2025-06-22 RX ORDER — DULOXETIN HYDROCHLORIDE 20 MG/1
20 CAPSULE, DELAYED RELEASE ORAL DAILY
Status: DISCONTINUED | OUTPATIENT
Start: 2025-06-22 | End: 2025-06-27 | Stop reason: HOSPADM

## 2025-06-22 RX ORDER — CARVEDILOL 12.5 MG/1
25 TABLET ORAL 2 TIMES DAILY WITH MEALS
Status: DISCONTINUED | OUTPATIENT
Start: 2025-06-22 | End: 2025-06-23

## 2025-06-22 RX ORDER — DOPAMINE HYDROCHLORIDE 160 MG/100ML
1-20 INJECTION, SOLUTION INTRAVENOUS CONTINUOUS
Status: DISCONTINUED | OUTPATIENT
Start: 2025-06-22 | End: 2025-06-23

## 2025-06-22 RX ORDER — SODIUM CHLORIDE 9 MG/ML
INJECTION, SOLUTION INTRAVENOUS PRN
Status: DISCONTINUED | OUTPATIENT
Start: 2025-06-22 | End: 2025-06-22 | Stop reason: HOSPADM

## 2025-06-22 RX ORDER — HEPARIN SODIUM 1000 [USP'U]/ML
2000 INJECTION, SOLUTION INTRAVENOUS; SUBCUTANEOUS PRN
Status: DISCONTINUED | OUTPATIENT
Start: 2025-06-22 | End: 2025-06-22

## 2025-06-22 RX ORDER — SODIUM CHLORIDE 0.9 % (FLUSH) 0.9 %
5-40 SYRINGE (ML) INJECTION EVERY 12 HOURS SCHEDULED
Status: DISCONTINUED | OUTPATIENT
Start: 2025-06-22 | End: 2025-06-27 | Stop reason: HOSPADM

## 2025-06-22 RX ORDER — POLYETHYLENE GLYCOL 3350 17 G/17G
17 POWDER, FOR SOLUTION ORAL DAILY PRN
Status: DISCONTINUED | OUTPATIENT
Start: 2025-06-22 | End: 2025-06-27 | Stop reason: HOSPADM

## 2025-06-22 RX ORDER — HEPARIN SODIUM 1000 [USP'U]/ML
4000 INJECTION, SOLUTION INTRAVENOUS; SUBCUTANEOUS ONCE
Status: COMPLETED | OUTPATIENT
Start: 2025-06-22 | End: 2025-06-22

## 2025-06-22 RX ORDER — PANTOPRAZOLE SODIUM 40 MG/1
40 TABLET, DELAYED RELEASE ORAL DAILY
Status: DISCONTINUED | OUTPATIENT
Start: 2025-06-22 | End: 2025-06-27 | Stop reason: HOSPADM

## 2025-06-22 RX ORDER — BUDESONIDE AND FORMOTEROL FUMARATE DIHYDRATE 80; 4.5 UG/1; UG/1
2 AEROSOL RESPIRATORY (INHALATION) 2 TIMES DAILY
Status: DISCONTINUED | OUTPATIENT
Start: 2025-06-22 | End: 2025-06-27 | Stop reason: HOSPADM

## 2025-06-22 RX ORDER — IPRATROPIUM BROMIDE AND ALBUTEROL SULFATE 2.5; .5 MG/3ML; MG/3ML
1 SOLUTION RESPIRATORY (INHALATION)
Status: COMPLETED | OUTPATIENT
Start: 2025-06-22 | End: 2025-06-22

## 2025-06-22 RX ORDER — LIDOCAINE HYDROCHLORIDE 10 MG/ML
50 INJECTION, SOLUTION EPIDURAL; INFILTRATION; INTRACAUDAL; PERINEURAL ONCE
Status: DISCONTINUED | OUTPATIENT
Start: 2025-06-22 | End: 2025-06-22 | Stop reason: HOSPADM

## 2025-06-22 RX ORDER — ROSUVASTATIN CALCIUM 10 MG/1
20 TABLET, COATED ORAL NIGHTLY
Status: DISCONTINUED | OUTPATIENT
Start: 2025-06-22 | End: 2025-06-22

## 2025-06-22 RX ORDER — HEPARIN SODIUM 1000 [USP'U]/ML
4000 INJECTION, SOLUTION INTRAVENOUS; SUBCUTANEOUS PRN
Status: DISCONTINUED | OUTPATIENT
Start: 2025-06-22 | End: 2025-06-24

## 2025-06-22 RX ORDER — DOPAMINE HYDROCHLORIDE 160 MG/100ML
1-20 INJECTION, SOLUTION INTRAVENOUS CONTINUOUS
Status: DISCONTINUED | OUTPATIENT
Start: 2025-06-22 | End: 2025-06-22

## 2025-06-22 RX ORDER — CLOPIDOGREL BISULFATE 75 MG/1
75 TABLET ORAL DAILY
Status: DISCONTINUED | OUTPATIENT
Start: 2025-06-22 | End: 2025-06-27 | Stop reason: HOSPADM

## 2025-06-22 RX ORDER — DOPAMINE HYDROCHLORIDE 160 MG/100ML
1-20 INJECTION, SOLUTION INTRAVENOUS CONTINUOUS
Status: DISCONTINUED | OUTPATIENT
Start: 2025-06-22 | End: 2025-06-22 | Stop reason: HOSPADM

## 2025-06-22 RX ORDER — ONDANSETRON 4 MG/1
4 TABLET, ORALLY DISINTEGRATING ORAL EVERY 8 HOURS PRN
Status: DISCONTINUED | OUTPATIENT
Start: 2025-06-22 | End: 2025-06-27 | Stop reason: HOSPADM

## 2025-06-22 RX ORDER — GABAPENTIN 300 MG/1
300 CAPSULE ORAL 3 TIMES DAILY
Status: DISCONTINUED | OUTPATIENT
Start: 2025-06-22 | End: 2025-06-27 | Stop reason: HOSPADM

## 2025-06-22 RX ORDER — FENTANYL CITRATE 50 UG/ML
25 INJECTION, SOLUTION INTRAMUSCULAR; INTRAVENOUS ONCE
Refills: 0 | Status: COMPLETED | OUTPATIENT
Start: 2025-06-22 | End: 2025-06-22

## 2025-06-22 RX ORDER — 0.9 % SODIUM CHLORIDE 0.9 %
500 INTRAVENOUS SOLUTION INTRAVENOUS ONCE
Status: COMPLETED | OUTPATIENT
Start: 2025-06-22 | End: 2025-06-22

## 2025-06-22 RX ORDER — HEPARIN SODIUM 1000 [USP'U]/ML
4000 INJECTION, SOLUTION INTRAVENOUS; SUBCUTANEOUS ONCE
Status: DISCONTINUED | OUTPATIENT
Start: 2025-06-22 | End: 2025-06-22

## 2025-06-22 RX ORDER — HEPARIN SODIUM 10000 [USP'U]/100ML
5-30 INJECTION, SOLUTION INTRAVENOUS CONTINUOUS
Status: DISCONTINUED | OUTPATIENT
Start: 2025-06-22 | End: 2025-06-22

## 2025-06-22 RX ORDER — CILOSTAZOL 100 MG/1
50 TABLET ORAL 2 TIMES DAILY
Status: DISCONTINUED | OUTPATIENT
Start: 2025-06-22 | End: 2025-06-27 | Stop reason: HOSPADM

## 2025-06-22 RX ORDER — SODIUM CHLORIDE 0.9 % (FLUSH) 0.9 %
5-40 SYRINGE (ML) INJECTION EVERY 12 HOURS SCHEDULED
Status: DISCONTINUED | OUTPATIENT
Start: 2025-06-22 | End: 2025-06-22 | Stop reason: HOSPADM

## 2025-06-22 RX ORDER — GLUCAGON 1 MG/ML
1 KIT INJECTION PRN
Status: DISCONTINUED | OUTPATIENT
Start: 2025-06-22 | End: 2025-06-27 | Stop reason: HOSPADM

## 2025-06-22 RX ORDER — INSULIN LISPRO 100 [IU]/ML
0-8 INJECTION, SOLUTION INTRAVENOUS; SUBCUTANEOUS
Status: DISCONTINUED | OUTPATIENT
Start: 2025-06-22 | End: 2025-06-27 | Stop reason: HOSPADM

## 2025-06-22 RX ORDER — ROSUVASTATIN CALCIUM 10 MG/1
10 TABLET, COATED ORAL NIGHTLY
Status: DISCONTINUED | OUTPATIENT
Start: 2025-06-22 | End: 2025-06-27 | Stop reason: HOSPADM

## 2025-06-22 RX ORDER — AMLODIPINE BESYLATE 10 MG/1
10 TABLET ORAL DAILY
Status: DISCONTINUED | OUTPATIENT
Start: 2025-06-22 | End: 2025-06-27 | Stop reason: HOSPADM

## 2025-06-22 RX ORDER — SODIUM CHLORIDE 9 MG/ML
INJECTION, SOLUTION INTRAVENOUS PRN
Status: DISCONTINUED | OUTPATIENT
Start: 2025-06-22 | End: 2025-06-27 | Stop reason: HOSPADM

## 2025-06-22 RX ORDER — ACETAMINOPHEN 650 MG/1
650 SUPPOSITORY RECTAL EVERY 6 HOURS PRN
Status: DISCONTINUED | OUTPATIENT
Start: 2025-06-22 | End: 2025-06-27 | Stop reason: HOSPADM

## 2025-06-22 RX ORDER — NOREPINEPHRINE BITARTRATE 0.06 MG/ML
1-100 INJECTION, SOLUTION INTRAVENOUS CONTINUOUS
Status: DISCONTINUED | OUTPATIENT
Start: 2025-06-22 | End: 2025-06-23

## 2025-06-22 RX ORDER — SODIUM CHLORIDE, SODIUM LACTATE, POTASSIUM CHLORIDE, CALCIUM CHLORIDE 600; 310; 30; 20 MG/100ML; MG/100ML; MG/100ML; MG/100ML
INJECTION, SOLUTION INTRAVENOUS CONTINUOUS
Status: DISCONTINUED | OUTPATIENT
Start: 2025-06-22 | End: 2025-06-22

## 2025-06-22 RX ORDER — HYDRALAZINE HYDROCHLORIDE 50 MG/1
50 TABLET, FILM COATED ORAL EVERY 8 HOURS SCHEDULED
Status: DISCONTINUED | OUTPATIENT
Start: 2025-06-22 | End: 2025-06-27 | Stop reason: HOSPADM

## 2025-06-22 RX ADMIN — ROSUVASTATIN CALCIUM 10 MG: 10 TABLET, FILM COATED ORAL at 20:02

## 2025-06-22 RX ADMIN — OXCARBAZEPINE 150 MG: 150 TABLET, FILM COATED ORAL at 20:31

## 2025-06-22 RX ADMIN — SODIUM CHLORIDE 500 ML: 0.9 INJECTION, SOLUTION INTRAVENOUS at 17:59

## 2025-06-22 RX ADMIN — DOPAMINE HYDROCHLORIDE 5 MCG/KG/MIN: 160 INJECTION, SOLUTION INTRAVENOUS at 15:11

## 2025-06-22 RX ADMIN — ACETAMINOPHEN 1000 MG: 500 TABLET ORAL at 22:24

## 2025-06-22 RX ADMIN — IPRATROPIUM BROMIDE AND ALBUTEROL SULFATE 1 DOSE: .5; 2.5 SOLUTION RESPIRATORY (INHALATION) at 11:07

## 2025-06-22 RX ADMIN — DOPAMINE HYDROCHLORIDE 8.5 MCG/KG/MIN: 160 INJECTION, SOLUTION INTRAVENOUS at 18:49

## 2025-06-22 RX ADMIN — TRAZODONE HYDROCHLORIDE 100 MG: 50 TABLET ORAL at 20:31

## 2025-06-22 RX ADMIN — BUDESONIDE AND FORMOTEROL FUMARATE DIHYDRATE 2 PUFF: 80; 4.5 AEROSOL RESPIRATORY (INHALATION) at 19:59

## 2025-06-22 RX ADMIN — FENTANYL CITRATE 25 MCG: 50 INJECTION INTRAMUSCULAR; INTRAVENOUS at 15:26

## 2025-06-22 RX ADMIN — MIRTAZAPINE 30 MG: 15 TABLET, FILM COATED ORAL at 20:02

## 2025-06-22 RX ADMIN — HEPARIN SODIUM 4000 UNITS: 1000 INJECTION INTRAVENOUS; SUBCUTANEOUS at 20:04

## 2025-06-22 RX ADMIN — Medication 5 MCG/MIN: at 20:18

## 2025-06-22 RX ADMIN — CEFEPIME 1000 MG: 1 INJECTION, POWDER, FOR SOLUTION INTRAMUSCULAR; INTRAVENOUS at 19:47

## 2025-06-22 RX ADMIN — SODIUM CHLORIDE, PRESERVATIVE FREE 10 ML: 5 INJECTION INTRAVENOUS at 22:28

## 2025-06-22 RX ADMIN — CLOPIDOGREL BISULFATE 75 MG: 75 TABLET, FILM COATED ORAL at 20:02

## 2025-06-22 RX ADMIN — HEPARIN SODIUM 11 UNITS/KG/HR: 10000 INJECTION, SOLUTION INTRAVENOUS at 20:07

## 2025-06-22 RX ADMIN — SODIUM CHLORIDE: 0.9 INJECTION, SOLUTION INTRAVENOUS at 19:33

## 2025-06-22 ASSESSMENT — PULMONARY FUNCTION TESTS
PIF_VALUE: 16
PIF_VALUE: 17

## 2025-06-22 ASSESSMENT — PAIN DESCRIPTION - ORIENTATION: ORIENTATION: MID

## 2025-06-22 ASSESSMENT — PAIN DESCRIPTION - LOCATION: LOCATION: CHEST

## 2025-06-22 ASSESSMENT — PAIN DESCRIPTION - PAIN TYPE: TYPE: ACUTE PAIN

## 2025-06-22 ASSESSMENT — PAIN SCALES - GENERAL
PAINLEVEL_OUTOF10: 7
PAINLEVEL_OUTOF10: 6

## 2025-06-22 ASSESSMENT — PAIN DESCRIPTION - DESCRIPTORS: DESCRIPTORS: SHARP

## 2025-06-22 ASSESSMENT — LIFESTYLE VARIABLES
HOW MANY STANDARD DRINKS CONTAINING ALCOHOL DO YOU HAVE ON A TYPICAL DAY: PATIENT DOES NOT DRINK
HOW OFTEN DO YOU HAVE A DRINK CONTAINING ALCOHOL: NEVER

## 2025-06-22 NOTE — ED PROVIDER NOTES
Hyperlipidemia, Hypertension, Nursing home resident, Pancreatitis, PICC (peripherally inserted central catheter) in place, Pleural effusion, Respiratory failure (HCC), and Stenosis of intestine (HCC).    SURGICAL HISTORY:      has a past surgical history that includes Appendectomy; Hysterectomy; Cholecystectomy; tumor removal; back surgery; Endoscopy, colon, diagnostic; Foot surgery (Left, 2022); tumor removal; thoracentesis (Left, 2022); and hc temporary dialysis catheter (3/23/2025).    FAMILY HISTORY:   She indicated that her mother is . She indicated that her father is . She indicated that her sister is . She indicated that the status of her maternal grandfather is unknown. She indicated that the status of her paternal grandfather is unknown.     family history includes Diabetes in her maternal grandfather and paternal grandfather; Liver Cancer in her father; Lung Cancer in her father and mother; Lung Disease in her mother; Pancreatic Cancer in her sister.    SOCIAL HISTORY:     reports that she quit smoking about 3 years ago. Her smoking use included cigarettes. She started smoking about 51 years ago. She has a 47.9 pack-year smoking history. She has never used smokeless tobacco. She reports that she does not drink alcohol and does not use drugs.    IMMUNIZATION HISTORY:    There is no immunization history for the selected administration types on file for this patient.    PHYSICAL EXAM:     Initial Vital Signs:  height is 1.626 m (5' 4\") and weight is 85.7 kg (189 lb). Her oral temperature is 98.3 °F (36.8 °C). Her blood pressure is 94/42 (abnormal) and her pulse is 54. Her respiration is 18 and oxygen saturation is 93%.   Physical Exam  Vitals reviewed.   Constitutional:       General: She is not in acute distress.     Appearance: She is well-developed. She is obese.      Comments: Mild respiratory distress   HENT:      Head: Normocephalic and atraumatic.   Eyes:       Neutrophils % 84 (H) 36 - 66 %    Lymphocytes % 10 (L) 24 - 44 %    Monocytes % 6 1 - 7 %    Eosinophils % 0 (L) 1 - 4 %    Basophils % 0 0 - 2 %    Neutrophils Absolute 14.20 (H) 1.8 - 7.7 k/uL    Lymphocytes Absolute 1.69 1.0 - 4.8 k/uL    Monocytes Absolute 1.01 (H) 0.1 - 0.8 k/uL    Eosinophils Absolute 0.00 0.0 - 0.4 k/uL    Basophils Absolute 0.00 0.0 - 0.2 k/uL    Morphology Normal    Comprehensive Metabolic Panel   Result Value Ref Range    Sodium 133 (L) 136 - 145 mmol/L    Potassium 5.0 3.7 - 5.3 mmol/L    Chloride 99 98 - 107 mmol/L    CO2 22 20 - 31 mmol/L    Anion Gap 12 9 - 16 mmol/L    Glucose 167 (H) 74 - 99 mg/dL    BUN 48 (H) 8 - 23 mg/dL    Creatinine 3.7 (H) 0.6 - 0.9 mg/dL    Est, Glom Filt Rate 13 (L) >60 mL/min/1.73m2    Calcium 9.0 8.6 - 10.4 mg/dL    Total Protein 6.5 (L) 6.6 - 8.7 g/dL    Albumin 4.0 3.5 - 5.2 g/dL    Albumin/Globulin Ratio 1.6 1.0 - 2.5    Total Bilirubin 0.4 0.0 - 1.2 mg/dL    Alkaline Phosphatase 81 35 - 104 U/L    ALT 24 10 - 35 U/L    AST 21 10 - 35 U/L   Magnesium   Result Value Ref Range    Magnesium 2.4 1.6 - 2.4 mg/dL   Brain Natriuretic Peptide   Result Value Ref Range    NT Pro-BNP 22,449 (H) 0 - 125 pg/mL   Troponin   Result Value Ref Range    Troponin, High Sensitivity 35 (H) 0 - 14 ng/L   Lactic Acid   Result Value Ref Range    Lactic Acid 1.7 0.5 - 2.2 mmol/L   D-Dimer, Quantitative   Result Value Ref Range    D-Dimer, Quant 0.60 (H) 0.00 - 0.59 ug/mL FEU   Venous Blood Gas, POC   Result Value Ref Range    pH, Billy 7.457 (H) 7.320 - 7.430    pCO2, Billy 41.5 41.0 - 51.0 mm Hg    PO2, Billy 37.2 30.0 - 50.0 mm Hg    HCO3, Venous 29.3 (H) 22.0 - 29.0 mmol/L    Positive Base Excess, Billy 4.9 (H) 0.0 - 3.0 mmol/L    O2 Sat, Billy 73.7 60.0 - 85.0 %    O2 Delivery Device Cannula    EKG 12 Lead   Result Value Ref Range    Ventricular Rate 43 BPM    QRS Duration 104 ms    Q-T Interval 482 ms    QTc Calculation (Bazett) 407 ms    R Axis 9 degrees    T Axis 104 degrees  TO:   No follow-up provider specified.    DISCHARGE MEDICATIONS:     New Prescriptions    No medications on file         Liam Chao MD   Emergency Physician Attending    (Please note that portions of this note were completed with a voice recognition program.  Efforts were made to edit the dictations but occasionally words are mis-transcribed.)       Liam Chao MD  06/22/25 1514       Liam Chao MD  06/22/25 151

## 2025-06-22 NOTE — PROGRESS NOTES
Pharmacy Note     Renal Dose Adjustment    Sultana Noyola is a 70 y.o. female. Pharmacist assessment of renally cleared medications.    Recent Labs     06/22/25  1117   BUN 48*       Recent Labs     06/22/25  1117   CREATININE 3.7*       Estimated Creatinine Clearance: 15 mL/min (A) (based on SCr of 3.7 mg/dL (H)).      Height:   Ht Readings from Last 1 Encounters:   06/22/25 1.626 m (5' 4\")     Weight:  Wt Readings from Last 1 Encounters:   06/22/25 88 kg (194 lb 1.6 oz)       The following medication dose has been adjusted based upon renal function per P&T Guidelines:             Rosuvastatin 20 mg nightly changed to 10 mg nightly       This is an Urgent Request.    Please inform pt the referral for Dr Lance Monique has been APPROVED from 3/20/21 - 3/20/22. Document attached.

## 2025-06-22 NOTE — H&P
Critical Care - History and Physical Examination    Patient's name:  Sultana Noyola  Medical Record Number: [id number]  Patient's account/billing number: [id number]  Patient's YOB: 1955  Age: 70 y.o.  Date of Admission: 6/22/2025  5:29 PM  Date of History and Physical Examination: 6/22/2025      Primary Care Physician: Azael Friedman MD  Attending Physician:    Code Status: Prior    Chief complaint: dizziness and shortness of breath    HISTORY OF PRESENT ILLNESS:   History was obtained from the patient.  Sultana Noyola is a 70 y.o.   With PMH     AFIB on Eliquis  Hypertension  HFpEF  Peripheral arterial disease on Plavix  CKD stage IIIa  Dyslipidemia  COPD on 3 L of oxygen via nasal cannula  ANTHONY not on CPAP  Anxiety and depression  Recent V. tach s/p cardioversion-April 2025        Presented today to [place] ER after EMS was called for shortness of breath.  The patient states that she went to her granddaughter's wedding to [place] came back on Tuesday, was doing okay until Wednesday when she started to notice shortness of breath, gradually worsening.  She denies any chest pain or productive cough associated with shortness of breath.  States that she uses 3 L of oxygen at home mostly at night previously, but started using 5 L of oxygen due to shortness of breath and was using it during the day as well.  Patient also said that she was feeling dizzy and had a fall 2 days ago states that she did not lost any consciousness but just stood up and felt dizzy and fell down denies any head injury.  Also stated that she found it difficult to pass urine, also states that she has not been able to eat and drink then for the past 4 to 5 days.  States that she thinks that she has last urine output was 2 days ago.  Patient states that she has been taking all her medications until last night but states might have missed morning doses today.  She denies any abd pain , nausea, vomiting or fever.  release capsule Take 1 capsule by mouth daily    Delia Kothari MD   ondansetron (ZOFRAN-ODT) 4 MG disintegrating tablet Take 1 tablet by mouth 3 times daily as needed for Nausea or Vomiting 5/4/23   Ravinder Dent APRN - CNP   busPIRone (BUSPAR) 5 MG tablet Take 1 tablet by mouth 3 times daily    Delia Kothari MD   cilostazol (PLETAL) 50 MG tablet Take 1 tablet by mouth 2 times daily    Delia Kothari MD   LORazepam (ATIVAN) 1 MG tablet Take 1 tablet by mouth every 8 hours as needed for Anxiety.    Delia Kothari MD   magnesium oxide (MAG-OX) 400 MG tablet Take 2 tablets by mouth 2 times daily    Delia Kothari MD   pantoprazole (PROTONIX) 40 MG tablet Take 1 tablet by mouth daily    Delia Kothari MD   clopidogrel (PLAVIX) 75 MG tablet Take 1 tablet by mouth daily 3/10/22   Joselito Limon DO   traZODone (DESYREL) 100 MG tablet Take 2 tablets by mouth nightly    Delia Kothari MD   mirtazapine (REMERON) 30 MG tablet Take 1 tablet by mouth nightly    ProviderDelia MD       Social History:   TOBACCO:   reports that she quit smoking about 3 years ago. Her smoking use included cigarettes. She started smoking about 51 years ago. She has a 47.9 pack-year smoking history. She has never used smokeless tobacco.  ETOH:   reports no history of alcohol use.  DRUGS:  reports no history of drug use.  OCCUPATION:      Family History:       Problem Relation Age of Onset    Lung Cancer Mother     Lung Disease Mother     Lung Cancer Father     Liver Cancer Father     Pancreatic Cancer Sister     Diabetes Maternal Grandfather     Diabetes Paternal Grandfather            REVIEW OF SYSTEMS (ROS):  Review of Systems -   Review of Systems   Constitutional:  Positive for appetite change and fatigue. Negative for fever.   Respiratory:  Positive for shortness of breath. Negative for cough.    Cardiovascular:  Negative for chest pain and leg swelling.   Gastrointestinal:  Negative for  CMP      Respiratory  History of COPD/emphysema on 3 L of oxygen at home  ANTHONY  Elevated D-dimer 0.6  Chest x-ray concerning for pulmonary vascular congestion  Will send respiratory cultures and panel  Will keep on BiPAP overnight  Continue on home inhaler Symbicort   Patient has elevated D-dimer 0.6 could be likely secondary to dehydration but patient has recent travel history  Started on IV heparin, withhold CT PE as patient has RUSTAM with elevated creatinine level  Will get bilateral lower limb Doppler ultrasound      ID  WBC elevated 16, can be sec to dehydration  Elevated D-dimer 0.6   Will start on broad spectrum abx - cefepime  Pending blood , urine and sputum culture        Hx of anxiety and depression, bipolar disorder  Will with hold home medication including duloxetine , Neurontin as pt had bradycardia      Dispo -to remains in ICU        Diane cheung MD  Internal Medicine Resident, PGY-1  Critical Care Service  [facility])             6/22/2025, 5:52 PM

## 2025-06-22 NOTE — PROCEDURES
PROCEDURE NOTE  Date: 6/22/2025   Name: Sultana Noyola  YOB: 1955    Procedures        Midline insertion note:     Prescribed therapy: Limited access  Product type: 4.5fr single lumen Antimicrobial/Antithrombogenic Arrow Midline  History/Labs/Allergies Reviewed  Placed By:   Bhaskar Murdock RN IV Team  Time out Performed using Two Identifiers  Insertion site:  Left  brachial vein   Total length: 15cm (blue flex tip intact).   External catheter length: 1cm  Extremity circumference at insertion site: 34cm  Number of attempts: 1  Estimated blood loss: 1 ml  Placement verified by: positive blood return & flushes easily  Special equipment used: ultrasound & micro-introducer technique   Catheter secured with adhesive securement device  Catheter adhesive (SecureportIV) utilized at insertion site  Dressing applied: Tegaderm CHG  Lidocaine administered intradermally conc.1%: approx 1 mL    Midline education:     [ X] Post care line insertion was discussed with patient/Family or POA prior to procedure.  Risks, benefits, alternatives, and reason for procedure were discussed and teaching was reinforced. An educational handout on post insertion line care and maintenance was left at bedside with patient or in chart. Patient (Family or POA) acknowledged understanding of information relayed.

## 2025-06-23 ENCOUNTER — APPOINTMENT (OUTPATIENT)
Age: 70
DRG: 189 | End: 2025-06-23
Attending: INTERNAL MEDICINE
Payer: MEDICARE

## 2025-06-23 ENCOUNTER — APPOINTMENT (OUTPATIENT)
Dept: GENERAL RADIOLOGY | Age: 70
DRG: 189 | End: 2025-06-23
Attending: INTERNAL MEDICINE
Payer: MEDICARE

## 2025-06-23 PROBLEM — J43.9: Status: ACTIVE | Noted: 2025-06-23

## 2025-06-23 PROBLEM — I50.9 CONGESTIVE HEART FAILURE (HCC): Status: ACTIVE | Noted: 2025-06-23

## 2025-06-23 PROBLEM — J96.11 CHRONIC HYPOXEMIC RESPIRATORY FAILURE (HCC): Status: ACTIVE | Noted: 2025-06-23

## 2025-06-23 PROBLEM — N18.9 ACUTE KIDNEY INJURY SUPERIMPOSED ON CKD: Status: ACTIVE | Noted: 2025-06-22

## 2025-06-23 LAB
ALBUMIN PERCENT: 53 % (ref 56–66)
ALBUMIN SERPL-MCNC: 3.4 G/DL (ref 3.2–5.2)
ALPHA 2 PERCENT: 14 % (ref 7–12)
ALPHA1 GLOB SERPL ELPH-MCNC: 0.4 G/DL (ref 0.1–0.4)
ALPHA1 GLOB SERPL ELPH-MCNC: 7 % (ref 3–5)
ALPHA2 GLOB SERPL ELPH-MCNC: 0.9 G/DL (ref 0.5–0.9)
ANION GAP SERPL CALCULATED.3IONS-SCNC: 12 MMOL/L (ref 9–16)
B-GLOBULIN SERPL ELPH-MCNC: 1.3 G/DL (ref 0.7–1.4)
B-GLOBULIN SERPL ELPH-MCNC: 20 % (ref 8–13)
BASOPHILS # BLD: 0 K/UL (ref 0–0.2)
BASOPHILS NFR BLD: 0 % (ref 0–2)
BUN SERPL-MCNC: 48 MG/DL (ref 8–23)
CALCIUM SERPL-MCNC: 8.8 MG/DL (ref 8.6–10.4)
CHLORIDE SERPL-SCNC: 105 MMOL/L (ref 98–107)
CO2 SERPL-SCNC: 21 MMOL/L (ref 20–31)
CREAT SERPL-MCNC: 3.1 MG/DL (ref 0.6–0.9)
ECHO AO ROOT DIAM: 2.7 CM
ECHO AO ROOT INDEX: 1.38 CM/M2
ECHO AV AREA PEAK VELOCITY: 2 CM2
ECHO AV AREA VTI: 2 CM2
ECHO AV AREA/BSA PEAK VELOCITY: 1 CM2/M2
ECHO AV AREA/BSA VTI: 1 CM2/M2
ECHO AV MEAN GRADIENT: 6 MMHG
ECHO AV MEAN VELOCITY: 1.2 M/S
ECHO AV PEAK GRADIENT: 12 MMHG
ECHO AV PEAK VELOCITY: 1.7 M/S
ECHO AV VELOCITY RATIO: 0.82
ECHO AV VTI: 40.2 CM
ECHO BSA: 2.01 M2
ECHO EST RA PRESSURE: 3 MMHG
ECHO LA AREA 2C: 17.7 CM2
ECHO LA AREA 4C: 16.3 CM2
ECHO LA DIAMETER INDEX: 2.1 CM/M2
ECHO LA DIAMETER: 4.1 CM
ECHO LA MAJOR AXIS: 4.6 CM
ECHO LA MINOR AXIS: 4.9 CM
ECHO LA TO AORTIC ROOT RATIO: 1.52
ECHO LA VOL BP: 50 ML (ref 22–52)
ECHO LA VOL MOD A2C: 50 ML (ref 22–52)
ECHO LA VOL MOD A4C: 48 ML (ref 22–52)
ECHO LA VOL/BSA BIPLANE: 26 ML/M2 (ref 16–34)
ECHO LA VOLUME INDEX MOD A2C: 26 ML/M2 (ref 16–34)
ECHO LA VOLUME INDEX MOD A4C: 25 ML/M2 (ref 16–34)
ECHO LV E' LATERAL VELOCITY: 11.9 CM/S
ECHO LV E' SEPTAL VELOCITY: 8.38 CM/S
ECHO LV EDV A2C: 85 ML
ECHO LV EDV A4C: 69 ML
ECHO LV EDV INDEX A4C: 35 ML/M2
ECHO LV EDV NDEX A2C: 44 ML/M2
ECHO LV EF PHYSICIAN: 68 %
ECHO LV EJECTION FRACTION A2C: 63 %
ECHO LV EJECTION FRACTION A4C: 71 %
ECHO LV EJECTION FRACTION BIPLANE: 68 % (ref 55–100)
ECHO LV ESV A2C: 32 ML
ECHO LV ESV A4C: 20 ML
ECHO LV ESV INDEX A2C: 16 ML/M2
ECHO LV ESV INDEX A4C: 10 ML/M2
ECHO LV FRACTIONAL SHORTENING: 30 % (ref 28–44)
ECHO LV INTERNAL DIMENSION DIASTOLE INDEX: 2.26 CM/M2
ECHO LV INTERNAL DIMENSION DIASTOLIC: 4.4 CM (ref 3.9–5.3)
ECHO LV INTERNAL DIMENSION SYSTOLIC INDEX: 1.59 CM/M2
ECHO LV INTERNAL DIMENSION SYSTOLIC: 3.1 CM
ECHO LV IVSD: 1.1 CM (ref 0.6–0.9)
ECHO LV MASS 2D: 158.2 G (ref 67–162)
ECHO LV MASS INDEX 2D: 81.1 G/M2 (ref 43–95)
ECHO LV POSTERIOR WALL DIASTOLIC: 1 CM (ref 0.6–0.9)
ECHO LV RELATIVE WALL THICKNESS RATIO: 0.45
ECHO LVOT AREA: 2.5 CM2
ECHO LVOT AV VTI INDEX: 0.77
ECHO LVOT DIAM: 1.8 CM
ECHO LVOT MEAN GRADIENT: 4 MMHG
ECHO LVOT PEAK GRADIENT: 8 MMHG
ECHO LVOT PEAK VELOCITY: 1.4 M/S
ECHO LVOT STROKE VOLUME INDEX: 40.6 ML/M2
ECHO LVOT SV: 79.1 ML
ECHO LVOT VTI: 31.1 CM
ECHO MV A VELOCITY: 0.96 M/S
ECHO MV AREA VTI: 2.2 CM2
ECHO MV E DECELERATION TIME (DT): 152 MS
ECHO MV E VELOCITY: 1.25 M/S
ECHO MV E/A RATIO: 1.3
ECHO MV E/E' LATERAL: 10.5
ECHO MV E/E' RATIO (AVERAGED): 12.71
ECHO MV E/E' SEPTAL: 14.92
ECHO MV LVOT VTI INDEX: 1.18
ECHO MV MAX VELOCITY: 1.5 M/S
ECHO MV MEAN GRADIENT: 3 MMHG
ECHO MV MEAN VELOCITY: 0.8 M/S
ECHO MV PEAK GRADIENT: 9 MMHG
ECHO MV VTI: 36.6 CM
ECHO PV MAX VELOCITY: 1 M/S
ECHO PV PEAK GRADIENT: 4 MMHG
ECHO RIGHT VENTRICULAR SYSTOLIC PRESSURE (RVSP): 49 MMHG
ECHO RV BASAL DIMENSION: 3.3 CM
ECHO RV FREE WALL PEAK S': 15.1 CM/S
ECHO RV TAPSE: 2.2 CM (ref 1.7–?)
ECHO TV REGURGITANT MAX VELOCITY: 3.38 M/S
ECHO TV REGURGITANT PEAK GRADIENT: 46 MMHG
EKG ATRIAL RATE: 68 BPM
EKG P AXIS: 71 DEGREES
EKG P-R INTERVAL: 158 MS
EKG Q-T INTERVAL: 434 MS
EKG QRS DURATION: 90 MS
EKG QTC CALCULATION (BAZETT): 461 MS
EKG R AXIS: 20 DEGREES
EKG T AXIS: 80 DEGREES
EKG VENTRICULAR RATE: 68 BPM
EOSINOPHIL # BLD: 0 K/UL (ref 0–0.44)
EOSINOPHILS RELATIVE PERCENT: 0 % (ref 1–4)
ERYTHROCYTE [DISTWIDTH] IN BLOOD BY AUTOMATED COUNT: 14.1 % (ref 11.8–14.4)
GAMMA GLOB SERPL ELPH-MCNC: 0.4 G/DL (ref 0.5–1.5)
GAMMA GLOBULIN %: 7 % (ref 11–19)
GFR, ESTIMATED: 16 ML/MIN/1.73M2
GLUCOSE BLD-MCNC: 114 MG/DL (ref 65–105)
GLUCOSE BLD-MCNC: 126 MG/DL (ref 65–105)
GLUCOSE BLD-MCNC: 139 MG/DL (ref 65–105)
GLUCOSE BLD-MCNC: 215 MG/DL (ref 65–105)
GLUCOSE SERPL-MCNC: 134 MG/DL (ref 74–99)
HCT VFR BLD AUTO: 26.4 % (ref 36.3–47.1)
HGB BLD-MCNC: 8.4 G/DL (ref 11.9–15.1)
IMM GRANULOCYTES # BLD AUTO: 0.16 K/UL (ref 0–0.3)
IMM GRANULOCYTES NFR BLD: 1 %
ITYP INTERPRETATION: NORMAL
LYMPHOCYTES NFR BLD: 1.87 K/UL (ref 1.1–3.7)
LYMPHOCYTES RELATIVE PERCENT: 12 % (ref 24–43)
MCH RBC QN AUTO: 29.4 PG (ref 25.2–33.5)
MCHC RBC AUTO-ENTMCNC: 31.8 G/DL (ref 28.4–34.8)
MCV RBC AUTO: 92.3 FL (ref 82.6–102.9)
MICROORGANISM SPEC CULT: NO GROWTH
MONOCYTES NFR BLD: 13 % (ref 3–12)
MONOCYTES NFR BLD: 2.03 K/UL (ref 0.1–1.2)
MORPHOLOGY: NORMAL
MRSA, DNA, NASAL: NEGATIVE
NEUTROPHILS NFR BLD: 74 % (ref 36–65)
NEUTS SEG NFR BLD: 11.54 K/UL (ref 1.5–8.1)
NRBC BLD-RTO: 0 PER 100 WBC
PARTIAL THROMBOPLASTIN TIME: 65.4 SEC (ref 23–36.5)
PARTIAL THROMBOPLASTIN TIME: 74.8 SEC (ref 23–36.5)
PATH REV: NORMAL
PATHOLOGIST: ABNORMAL
PLATELET # BLD AUTO: 246 K/UL (ref 138–453)
PMV BLD AUTO: 12.1 FL (ref 8.1–13.5)
POTASSIUM SERPL-SCNC: 4.8 MMOL/L (ref 3.7–5.3)
PROT PATTERN SERPL ELPH-IMP: ABNORMAL
PROT SERPL-MCNC: 6.4 G/DL (ref 6.6–8.7)
RBC # BLD AUTO: 2.86 M/UL (ref 3.95–5.11)
SERVICE CMNT-IMP: NORMAL
SODIUM SERPL-SCNC: 138 MMOL/L (ref 136–145)
SPECIMEN DESCRIPTION: NORMAL
SPECIMEN DESCRIPTION: NORMAL
T4 FREE SERPL-MCNC: 0.8 NG/DL (ref 0.92–1.68)
TOTAL PROT. SUM,%: 101 % (ref 98–102)
TOTAL PROT. SUM: 6.4 G/DL (ref 6.3–8.2)
TSH SERPL DL<=0.05 MIU/L-ACNC: 0.11 UIU/ML (ref 0.27–4.2)
WBC OTHER # BLD: 15.6 K/UL (ref 3.5–11.3)

## 2025-06-23 PROCEDURE — 6370000000 HC RX 637 (ALT 250 FOR IP)

## 2025-06-23 PROCEDURE — 2580000003 HC RX 258

## 2025-06-23 PROCEDURE — 6360000002 HC RX W HCPCS

## 2025-06-23 PROCEDURE — 71045 X-RAY EXAM CHEST 1 VIEW: CPT

## 2025-06-23 PROCEDURE — 94640 AIRWAY INHALATION TREATMENT: CPT

## 2025-06-23 PROCEDURE — 99222 1ST HOSP IP/OBS MODERATE 55: CPT | Performed by: INTERNAL MEDICINE

## 2025-06-23 PROCEDURE — 93306 TTE W/DOPPLER COMPLETE: CPT | Performed by: INTERNAL MEDICINE

## 2025-06-23 PROCEDURE — 2000000000 HC ICU R&B

## 2025-06-23 PROCEDURE — 2500000003 HC RX 250 WO HCPCS

## 2025-06-23 PROCEDURE — 84443 ASSAY THYROID STIM HORMONE: CPT

## 2025-06-23 PROCEDURE — 2700000000 HC OXYGEN THERAPY PER DAY

## 2025-06-23 PROCEDURE — 82947 ASSAY GLUCOSE BLOOD QUANT: CPT

## 2025-06-23 PROCEDURE — 2580000003 HC RX 258: Performed by: INTERNAL MEDICINE

## 2025-06-23 PROCEDURE — 85025 COMPLETE CBC W/AUTO DIFF WBC: CPT

## 2025-06-23 PROCEDURE — 84439 ASSAY OF FREE THYROXINE: CPT

## 2025-06-23 PROCEDURE — 80048 BASIC METABOLIC PNL TOTAL CA: CPT

## 2025-06-23 PROCEDURE — 93306 TTE W/DOPPLER COMPLETE: CPT

## 2025-06-23 PROCEDURE — 85730 THROMBOPLASTIN TIME PARTIAL: CPT

## 2025-06-23 PROCEDURE — 36415 COLL VENOUS BLD VENIPUNCTURE: CPT

## 2025-06-23 PROCEDURE — 94761 N-INVAS EAR/PLS OXIMETRY MLT: CPT

## 2025-06-23 PROCEDURE — 99291 CRITICAL CARE FIRST HOUR: CPT | Performed by: STUDENT IN AN ORGANIZED HEALTH CARE EDUCATION/TRAINING PROGRAM

## 2025-06-23 RX ORDER — AMOXICILLIN 250 MG
2 CAPSULE ORAL DAILY
COMMUNITY

## 2025-06-23 RX ORDER — LOPERAMIDE HYDROCHLORIDE 2 MG/1
2 CAPSULE ORAL 4 TIMES DAILY PRN
COMMUNITY

## 2025-06-23 RX ORDER — CHOLECALCIFEROL (VITAMIN D3) 1250 MCG
5000 CAPSULE ORAL DAILY
COMMUNITY

## 2025-06-23 RX ORDER — OXYMETAZOLINE HYDROCHLORIDE 0.05 G/100ML
2 SPRAY NASAL 2 TIMES DAILY PRN
Status: DISPENSED | OUTPATIENT
Start: 2025-06-23 | End: 2025-06-26

## 2025-06-23 RX ORDER — BUSPIRONE HYDROCHLORIDE 10 MG/1
10 TABLET ORAL 3 TIMES DAILY
Status: DISCONTINUED | OUTPATIENT
Start: 2025-06-23 | End: 2025-06-27 | Stop reason: HOSPADM

## 2025-06-23 RX ORDER — IPRATROPIUM BROMIDE AND ALBUTEROL SULFATE 2.5; .5 MG/3ML; MG/3ML
1 SOLUTION RESPIRATORY (INHALATION) 2 TIMES DAILY
Status: DISCONTINUED | OUTPATIENT
Start: 2025-06-24 | End: 2025-06-24

## 2025-06-23 RX ORDER — MULTIVITAMIN WITH IRON
500 TABLET ORAL DAILY
COMMUNITY

## 2025-06-23 RX ORDER — PREDNISONE 20 MG/1
40 TABLET ORAL DAILY
Status: DISCONTINUED | OUTPATIENT
Start: 2025-06-23 | End: 2025-06-26

## 2025-06-23 RX ORDER — TRAMADOL HYDROCHLORIDE 50 MG/1
50 TABLET ORAL 2 TIMES DAILY PRN
COMMUNITY

## 2025-06-23 RX ORDER — SODIUM CHLORIDE 9 MG/ML
INJECTION, SOLUTION INTRAVENOUS CONTINUOUS
Status: DISCONTINUED | OUTPATIENT
Start: 2025-06-23 | End: 2025-06-24

## 2025-06-23 RX ORDER — LISINOPRIL 10 MG/1
10 TABLET ORAL 2 TIMES DAILY
COMMUNITY

## 2025-06-23 RX ORDER — MIRTAZAPINE 15 MG/1
15 TABLET, FILM COATED ORAL NIGHTLY
Status: DISCONTINUED | OUTPATIENT
Start: 2025-06-23 | End: 2025-06-27 | Stop reason: HOSPADM

## 2025-06-23 RX ORDER — IPRATROPIUM BROMIDE AND ALBUTEROL SULFATE 2.5; .5 MG/3ML; MG/3ML
1 SOLUTION RESPIRATORY (INHALATION)
Status: DISCONTINUED | OUTPATIENT
Start: 2025-06-23 | End: 2025-06-23

## 2025-06-23 RX ORDER — BUTALBITAL, ACETAMINOPHEN AND CAFFEINE 50; 325; 40 MG/1; MG/1; MG/1
1 TABLET ORAL EVERY 6 HOURS PRN
COMMUNITY

## 2025-06-23 RX ADMIN — IPRATROPIUM BROMIDE AND ALBUTEROL SULFATE 1 DOSE: .5; 2.5 SOLUTION RESPIRATORY (INHALATION) at 20:40

## 2025-06-23 RX ADMIN — ROSUVASTATIN CALCIUM 10 MG: 10 TABLET, FILM COATED ORAL at 20:49

## 2025-06-23 RX ADMIN — SODIUM CHLORIDE, PRESERVATIVE FREE 10 ML: 5 INJECTION INTRAVENOUS at 09:41

## 2025-06-23 RX ADMIN — IPRATROPIUM BROMIDE AND ALBUTEROL SULFATE 1 DOSE: .5; 2.5 SOLUTION RESPIRATORY (INHALATION) at 15:21

## 2025-06-23 RX ADMIN — DOPAMINE HYDROCHLORIDE 2 MCG/KG/MIN: 160 INJECTION, SOLUTION INTRAVENOUS at 09:17

## 2025-06-23 RX ADMIN — TRAZODONE HYDROCHLORIDE 100 MG: 50 TABLET ORAL at 20:49

## 2025-06-23 RX ADMIN — SODIUM CHLORIDE: 0.9 INJECTION, SOLUTION INTRAVENOUS at 04:55

## 2025-06-23 RX ADMIN — MIRTAZAPINE 15 MG: 15 TABLET, FILM COATED ORAL at 20:49

## 2025-06-23 RX ADMIN — Medication 6 MG: at 23:13

## 2025-06-23 RX ADMIN — BUSPIRONE HYDROCHLORIDE 10 MG: 10 TABLET ORAL at 20:49

## 2025-06-23 RX ADMIN — OXCARBAZEPINE 150 MG: 150 TABLET, FILM COATED ORAL at 09:43

## 2025-06-23 RX ADMIN — CEFEPIME 1000 MG: 1 INJECTION, POWDER, FOR SOLUTION INTRAMUSCULAR; INTRAVENOUS at 18:45

## 2025-06-23 RX ADMIN — CEFEPIME 1000 MG: 1 INJECTION, POWDER, FOR SOLUTION INTRAMUSCULAR; INTRAVENOUS at 07:48

## 2025-06-23 RX ADMIN — INSULIN LISPRO 2 UNITS: 100 INJECTION, SOLUTION INTRAVENOUS; SUBCUTANEOUS at 20:49

## 2025-06-23 RX ADMIN — BUDESONIDE AND FORMOTEROL FUMARATE DIHYDRATE 2 PUFF: 80; 4.5 AEROSOL RESPIRATORY (INHALATION) at 07:59

## 2025-06-23 RX ADMIN — HEPARIN SODIUM 11 UNITS/KG/HR: 10000 INJECTION, SOLUTION INTRAVENOUS at 21:01

## 2025-06-23 RX ADMIN — Medication 2 SPRAY: at 02:03

## 2025-06-23 RX ADMIN — Medication 2 SPRAY: at 14:08

## 2025-06-23 RX ADMIN — SODIUM CHLORIDE: 0.9 INJECTION, SOLUTION INTRAVENOUS at 18:43

## 2025-06-23 RX ADMIN — PREDNISONE 40 MG: 20 TABLET ORAL at 13:03

## 2025-06-23 RX ADMIN — DULOXETINE 20 MG: 20 CAPSULE, DELAYED RELEASE ORAL at 18:51

## 2025-06-23 RX ADMIN — SODIUM CHLORIDE, PRESERVATIVE FREE 10 ML: 5 INJECTION INTRAVENOUS at 20:49

## 2025-06-23 RX ADMIN — PANTOPRAZOLE SODIUM 40 MG: 40 TABLET, DELAYED RELEASE ORAL at 09:43

## 2025-06-23 RX ADMIN — BUDESONIDE AND FORMOTEROL FUMARATE DIHYDRATE 2 PUFF: 80; 4.5 AEROSOL RESPIRATORY (INHALATION) at 20:40

## 2025-06-23 RX ADMIN — CLOPIDOGREL BISULFATE 75 MG: 75 TABLET, FILM COATED ORAL at 09:43

## 2025-06-23 ASSESSMENT — PAIN SCALES - GENERAL
PAINLEVEL_OUTOF10: 0

## 2025-06-23 NOTE — PLAN OF CARE
Problem: Discharge Planning  Goal: Discharge to home or other facility with appropriate resources  Outcome: Progressing     Problem: Pain  Goal: Verbalizes/displays adequate comfort level or baseline comfort level  Outcome: Progressing     Problem: Skin/Tissue Integrity  Goal: Skin integrity remains intact  Description: 1.  Monitor for areas of redness and/or skin breakdown  2.  Assess vascular access sites hourly  3.  Every 4-6 hours minimum:  Change oxygen saturation probe site  4.  Every 4-6 hours:  If on nasal continuous positive airway pressure, respiratory therapy assess nares and determine need for appliance change or resting period  Outcome: Progressing     Problem: Safety - Adult  Goal: Free from fall injury  Outcome: Progressing     Problem: ABCDS Injury Assessment  Goal: Absence of physical injury  Outcome: Progressing     Problem: Chronic Conditions and Co-morbidities  Goal: Patient's chronic conditions and co-morbidity symptoms are monitored and maintained or improved  Outcome: Progressing     Problem: Respiratory - Adult  Goal: Achieves optimal ventilation and oxygenation  Outcome: Progressing     Problem: Cardiovascular - Adult  Goal: Maintains optimal cardiac output and hemodynamic stability  Outcome: Progressing  Goal: Absence of cardiac dysrhythmias or at baseline  Outcome: Progressing     Problem: Infection - Adult  Goal: Absence of infection at discharge  Outcome: Progressing     Problem: Metabolic/Fluid and Electrolytes - Adult  Goal: Electrolytes maintained within normal limits  Outcome: Progressing  Goal: Hemodynamic stability and optimal renal function maintained  Outcome: Progressing  Goal: Glucose maintained within prescribed range  Outcome: Progressing

## 2025-06-23 NOTE — PROGRESS NOTES
INTENSIVE CARE UNIT  Resident Physician Progress Note    Patient - Sultana Noyola  Date of Admission -  6/22/2025  5:29 PM  Date of Evaluation -  6/23/2025  Room and Bed Number -  3009/3009-01   Hospital Day - 1    HPI:     History was obtained from the patient.  Sultana Noyola is a 70 y.o.   With PMH      AFIB on Eliquis  Hypertension  HFpEF  Peripheral arterial disease on Plavix  CKD stage IIIa  Dyslipidemia  COPD on 3 L of oxygen via nasal cannula  ANTHONY not on CPAP  Anxiety and depression  Recent V. tach s/p cardioversion-April 2025           Presented today to [facility] [facility] after EMS was called for shortness of breath.  The patient states that she went to her granddaughter's wedding to [place] came back on Tuesday, was doing okay until Wednesday when she started to notice shortness of breath, gradually worsening.  She denies any chest pain or productive cough associated with shortness of breath.  States that she uses 3 L of oxygen at home mostly at night previously, but started using 5 L of oxygen due to shortness of breath and was using it during the day as well.  Patient also said that she was feeling dizzy and had a fall 2 days ago states that she did not lost any consciousness but just stood up and felt dizzy and fell down denies any head injury.  Also stated that she found it difficult to pass urine, also states that she has not been able to eat and drink then for the past 4 to 5 days.  States that she thinks that she has last urine output was 2 days ago.  Patient states that she has been taking all her medications until last night but states might have missed morning doses today.  She denies any abd pain , nausea, vomiting or fever. Denies any recent sick contacts.pt states that's that she does missed a few doses of her medication including eliquis while she was on vacation,  Initial evaluation in the ER patient was found to be hypotensive with blood pressure systolic 92/56 with a MAP of 55  Also

## 2025-06-23 NOTE — PLAN OF CARE
Patient evaluated. In sinus bradycardia in the setting of likely hypovolemia and RUSTAM leading to delayed clearance of beta blockers. Currently carvedilol on hold. Dopamine not indicated and if pressor is required can resort to levophed. Will await TTE given elevated BNPs and picture of vascular congestion.CHF? Troponins elevated mildly in the setting of RUSTAM. Await official consult note in AM.

## 2025-06-23 NOTE — PROGRESS NOTES
Pt not familiar with her home medications, \"they all come in blister packs and I just pop them out\".   Called [facility] where she reportedly receives all of her medications. Awaiting a fax with her medication list.

## 2025-06-23 NOTE — PROGRESS NOTES
Pt c/o air hunger, \"I can't catch my breath\". Sat pt up, increased O2 via High Flow NC up to 10, and then 15LPM, and coached pt to keep NC in nose. RN called RT and requested breathing tx and PAP. Spo2 stayed mid-80s for several minutes on HHF. Pt quite anxious and restless. After resolution of hypoxia, HHF placed back to 10LPM which is keeping pt between % Spo2.

## 2025-06-23 NOTE — CARE COORDINATION
Case Management Assessment  Initial Evaluation    Date/Time of Evaluation: 6/23/2025 1332 PM  Assessment Completed by: Giulia Yung    If patient is discharged prior to next notation, then this note serves as note for discharge by case management.    Patient Name: Sultana Noyola                   YOB: 1955  Diagnosis: Hypotension [I95.9]  Acute on chronic respiratory failure (HCC) [J96.20]  RUSTAM (acute kidney injury) [N17.9]                   Date / Time: 6/22/2025  5:29 PM    Patient Admission Status: Inpatient   Readmission Risk (Low < 19, Mod (19-27), High > 27): Readmission Risk Score: 24.4    Current PCP: No primary care provider on file.  PCP verified by CM? (P) Yes (SARAHI Mary)    Chart Reviewed: Yes      History Provided by: (P) Patient  Patient Orientation: (P) Alert and Oriented, Person, Place, Situation, Self    Patient Cognition: (P) Alert    Hospitalization in the last 30 days (Readmission):  Yes    If yes, Readmission Assessment in  Navigator will be completed.    Advance Directives:      Code Status: Full Code   Patient's Primary Decision Maker is: (P) Legal Next of Kin      Discharge Planning:    Patient lives with: (P) Alone, Other (Comment) (lives in a senior residental community) Type of Home: (P) Apartment  Primary Care Giver: (P) Self  Patient Support Systems include: (P) Children   Current Financial resources: (P) Medicare  Current community resources: (P) None  Current services prior to admission: (P) Durable Medical Equipment, Oxygen Therapy            Current DME: (P) Other (Comment), Cane, Shower Chair, Oxygen Therapy (Comment) (has a rollator, bathroom grab bars and home O2 thru [facility])            Type of Home Care services:  (P) None    ADLS  Prior functional level: (P) Independent in ADLs/IADLs  Current functional level: (P) Assistance with the following:, Bathing, Dressing, Toileting, Cooking, Housework, Shopping, Mobility, Other (see comment) (needs assist d/t  hospitalization)    PT AM-PAC:   /24  OT AM-PAC:   /24    Family can provide assistance at DC: (P) Yes  Would you like Case Management to discuss the discharge plan with any other family members/significant others, and if so, who? (P) No  Plans to Return to Present Housing: (P) Yes  Other Identified Issues/Barriers to RETURNING to current housing: none  Potential Assistance needed at discharge: (P) N/A            Potential DME:    Patient expects to discharge to: (P) Apartment  Plan for transportation at discharge: (P) Other (see comment) (has transportation home)    Financial    Payor: MEDICARE / Plan: MEDICARE PART A AND B / Product Type: *No Product type* /     Does insurance require precert for SNF: No    Potential assistance Purchasing Medications: (P) No  Meds-to-Beds request: Yes      [facility] [place] [address] [address] -  [phone] - F [phone]  [facility] [address]  [address]  [place] [id number]  Phone: [phone] Fax: [phone]      Notes:    Factors facilitating achievement of predicted outcomes: Family support, Friend support, Motivated, Cooperative, Pleasant, Good insight into deficits, and Home is wheelchair accessible    Barriers to discharge: Limited family support, Decreased endurance, Long standing deficits, and Medical complications    Additional Case Management Notes: plan is to return home to [facility] and has transportation home     The Plan for Transition of Care is related to the following treatment goals of Hypotension [I95.9]  Acute on chronic respiratory failure (HCC) [J96.20]  RUSTAM (acute kidney injury) [N17.9]    IF APPLICABLE: The Patient and/or patient representative Sultana and her family were provided with a choice of provider and agrees with the discharge plan. Freedom of choice list with basic dialogue that supports the patient's individualized plan of care/goals and shares the quality data associated with the providers was

## 2025-06-23 NOTE — CONSULTS
Nephrology Consult Note    Reason for Consult: RUSTAM on CKD  Requesting Physician: Dr. Vu    Chief Complaint: Patient feeling weak, decreased oral intake and shortness of    History Obtained From:  patient, electronic medical record    History of Present Illness:              This is a 70 y.o. female who presented to the hospital for evaluation of decreased oral intake, shortness of breath and overall just feeling very weak.  She apparently had gone for a wedding to [facility] and was there for a week and a resort came back on Tuesday did okay for a day or so and then started developing cough with some wheezing and shortness of breath.  She typically uses 3 L of O2 at home because of diagnosis of emphysema and she had had to go up to 5 L of O2.  She never really complained of any chest pain, hemoptysis, fever chills or any orthopnea.  She started feeling lightheaded and dizzy and actually may have even had some orthostatic symptoms day or 2 prior to hospitalization.  She originally presented to [facility] and from there she was transferred over to us.  Patient definitely notices decreased urine output.  She denied any nausea, vomiting or abdominal pain.    On initial eval at the emergency room at [place] she was hypotensive with blood pressure in the 90s.  She was also bradycardic with heart rate running in the 35-45 range she was started on dopamine, received a fluid bolus, labs came back showing a creatinine of 3.7 with a lactic acid of 1.7, sodium of 133 and a potassium of 5.  LFTs were normal.  White count was somewhat elevated at 16.9 with a hemoglobin of 9.2.  Her troponin levels have stayed relatively flat.  She received fluids appropriately so and then was transferred to the ICU.  Her bradycardia seems to be improving.  Her creatinine that had gone up to 3.7 is now down to 3.1 as of today  She still complains of her mouth being very dry.  She is on nasal cannula O2    Patient is past  (GLYCOLAX) packet 17 g, Daily PRN  acetaminophen (TYLENOL) tablet 1,000 mg, Q6H PRN   Or  acetaminophen (TYLENOL) suppository 650 mg, Q6H PRN        Allergies:  Compazine [prochlorperazine maleate], Lithium, Other, Sulfa antibiotics, and Tetracyclines & related    Social History:   Social History     Socioeconomic History    Marital status:      Spouse name: Not on file    Number of children: 2    Years of education: Not on file    Highest education level: Not on file   Occupational History    Not on file   Tobacco Use    Smoking status: Former     Current packs/day: 0.00     Average packs/day: 1 pack/day for 47.9 years (47.9 ttl pk-yrs)     Types: Cigarettes     Start date: 1974     Quit date: 12/2021     Years since quitting: 3.5    Smokeless tobacco: Never   Substance and Sexual Activity    Alcohol use: No     Comment: heavy alcohol use between 1993 to 2003    Drug use: No    Sexual activity: Not on file   Other Topics Concern    Not on file   Social History Narrative    Lives alone in independent living. Retired  .     Social Drivers of Health     Financial Resource Strain: Not on file   Food Insecurity: No Food Insecurity (6/22/2025)    Hunger Vital Sign     Worried About Running Out of Food in the Last Year: Never true     Ran Out of Food in the Last Year: Never true   Transportation Needs: No Transportation Needs (6/22/2025)    PRAPARE - Transportation     Lack of Transportation (Medical): No     Lack of Transportation (Non-Medical): No   Physical Activity: Not on file   Stress: Not on file   Social Connections: Not on file   Intimate Partner Violence: Unknown (2/22/2024)    Received from [facility]     Fear of Current or Ex-Partner: Not on file     Emotionally Abused: Not on file     Physically Abused: Not on file     Sexually Abused: Not on file     Physically or Sexually Abused: Not on file   Housing Stability: Low Risk  (6/22/2025)    Housing

## 2025-06-24 ENCOUNTER — APPOINTMENT (OUTPATIENT)
Dept: GENERAL RADIOLOGY | Age: 70
DRG: 189 | End: 2025-06-24
Attending: INTERNAL MEDICINE
Payer: MEDICARE

## 2025-06-24 ENCOUNTER — APPOINTMENT (OUTPATIENT)
Dept: VASCULAR LAB | Age: 70
DRG: 189 | End: 2025-06-24
Attending: INTERNAL MEDICINE
Payer: MEDICARE

## 2025-06-24 LAB
ANION GAP SERPL CALCULATED.3IONS-SCNC: 11 MMOL/L (ref 9–16)
BASOPHILS # BLD: <0.03 K/UL (ref 0–0.2)
BASOPHILS NFR BLD: 0 % (ref 0–2)
BUN SERPL-MCNC: 37 MG/DL (ref 8–23)
CALCIUM SERPL-MCNC: 9 MG/DL (ref 8.6–10.4)
CHLORIDE SERPL-SCNC: 108 MMOL/L (ref 98–107)
CO2 SERPL-SCNC: 21 MMOL/L (ref 20–31)
CREAT SERPL-MCNC: 1.6 MG/DL (ref 0.6–0.9)
ECHO BSA: 2.01 M2
EKG ATRIAL RATE: 41 BPM
EKG ATRIAL RATE: 70 BPM
EKG ATRIAL RATE: 75 BPM
EKG P AXIS: 39 DEGREES
EKG P AXIS: 59 DEGREES
EKG P AXIS: 67 DEGREES
EKG P-R INTERVAL: 144 MS
EKG P-R INTERVAL: 144 MS
EKG P-R INTERVAL: 168 MS
EKG Q-T INTERVAL: 398 MS
EKG Q-T INTERVAL: 436 MS
EKG Q-T INTERVAL: 498 MS
EKG QRS DURATION: 104 MS
EKG QRS DURATION: 92 MS
EKG QRS DURATION: 94 MS
EKG QTC CALCULATION (BAZETT): 410 MS
EKG QTC CALCULATION (BAZETT): 444 MS
EKG QTC CALCULATION (BAZETT): 470 MS
EKG R AXIS: -1 DEGREES
EKG R AXIS: 12 DEGREES
EKG R AXIS: 8 DEGREES
EKG T AXIS: 102 DEGREES
EKG T AXIS: 70 DEGREES
EKG T AXIS: 83 DEGREES
EKG VENTRICULAR RATE: 41 BPM
EKG VENTRICULAR RATE: 70 BPM
EKG VENTRICULAR RATE: 75 BPM
EOSINOPHIL # BLD: <0.03 K/UL (ref 0–0.44)
EOSINOPHILS RELATIVE PERCENT: 0 % (ref 1–4)
ERYTHROCYTE [DISTWIDTH] IN BLOOD BY AUTOMATED COUNT: 13.5 % (ref 11.8–14.4)
GFR, ESTIMATED: 34 ML/MIN/1.73M2
GLUCOSE BLD-MCNC: 102 MG/DL (ref 65–105)
GLUCOSE BLD-MCNC: 148 MG/DL (ref 65–105)
GLUCOSE BLD-MCNC: 160 MG/DL (ref 65–105)
GLUCOSE BLD-MCNC: 170 MG/DL (ref 65–105)
GLUCOSE SERPL-MCNC: 106 MG/DL (ref 74–99)
HCT VFR BLD AUTO: 24.2 % (ref 36.3–47.1)
HGB BLD-MCNC: 7.5 G/DL (ref 11.9–15.1)
IMM GRANULOCYTES # BLD AUTO: 0.07 K/UL (ref 0–0.3)
IMM GRANULOCYTES NFR BLD: 1 %
LYMPHOCYTES NFR BLD: 0.72 K/UL (ref 1.1–3.7)
LYMPHOCYTES RELATIVE PERCENT: 10 % (ref 24–43)
MCH RBC QN AUTO: 29.1 PG (ref 25.2–33.5)
MCHC RBC AUTO-ENTMCNC: 31 G/DL (ref 28.4–34.8)
MCV RBC AUTO: 93.8 FL (ref 82.6–102.9)
MONOCYTES NFR BLD: 0.64 K/UL (ref 0.1–1.2)
MONOCYTES NFR BLD: 9 % (ref 3–12)
NEUTROPHILS NFR BLD: 80 % (ref 36–65)
NEUTS SEG NFR BLD: 5.66 K/UL (ref 1.5–8.1)
NRBC BLD-RTO: 0 PER 100 WBC
PARTIAL THROMBOPLASTIN TIME: 72.4 SEC (ref 23–36.5)
PLATELET # BLD AUTO: 193 K/UL (ref 138–453)
PMV BLD AUTO: 11.8 FL (ref 8.1–13.5)
POTASSIUM SERPL-SCNC: 5.1 MMOL/L (ref 3.7–5.3)
RBC # BLD AUTO: 2.58 M/UL (ref 3.95–5.11)
SODIUM SERPL-SCNC: 140 MMOL/L (ref 136–145)
WBC OTHER # BLD: 7.1 K/UL (ref 3.5–11.3)

## 2025-06-24 PROCEDURE — 6370000000 HC RX 637 (ALT 250 FOR IP)

## 2025-06-24 PROCEDURE — 99291 CRITICAL CARE FIRST HOUR: CPT | Performed by: STUDENT IN AN ORGANIZED HEALTH CARE EDUCATION/TRAINING PROGRAM

## 2025-06-24 PROCEDURE — 94761 N-INVAS EAR/PLS OXIMETRY MLT: CPT

## 2025-06-24 PROCEDURE — 99232 SBSQ HOSP IP/OBS MODERATE 35: CPT | Performed by: INTERNAL MEDICINE

## 2025-06-24 PROCEDURE — 93010 ELECTROCARDIOGRAM REPORT: CPT | Performed by: INTERNAL MEDICINE

## 2025-06-24 PROCEDURE — 85025 COMPLETE CBC W/AUTO DIFF WBC: CPT

## 2025-06-24 PROCEDURE — 2580000003 HC RX 258

## 2025-06-24 PROCEDURE — 93970 EXTREMITY STUDY: CPT | Performed by: SURGERY

## 2025-06-24 PROCEDURE — 2700000000 HC OXYGEN THERAPY PER DAY

## 2025-06-24 PROCEDURE — 74018 RADEX ABDOMEN 1 VIEW: CPT

## 2025-06-24 PROCEDURE — 99223 1ST HOSP IP/OBS HIGH 75: CPT | Performed by: STUDENT IN AN ORGANIZED HEALTH CARE EDUCATION/TRAINING PROGRAM

## 2025-06-24 PROCEDURE — 93970 EXTREMITY STUDY: CPT

## 2025-06-24 PROCEDURE — 36415 COLL VENOUS BLD VENIPUNCTURE: CPT

## 2025-06-24 PROCEDURE — 94640 AIRWAY INHALATION TREATMENT: CPT

## 2025-06-24 PROCEDURE — 80048 BASIC METABOLIC PNL TOTAL CA: CPT

## 2025-06-24 PROCEDURE — 2060000000 HC ICU INTERMEDIATE R&B

## 2025-06-24 PROCEDURE — 6360000002 HC RX W HCPCS

## 2025-06-24 PROCEDURE — 85730 THROMBOPLASTIN TIME PARTIAL: CPT

## 2025-06-24 PROCEDURE — 82947 ASSAY GLUCOSE BLOOD QUANT: CPT

## 2025-06-24 PROCEDURE — 2500000003 HC RX 250 WO HCPCS

## 2025-06-24 RX ORDER — HEPARIN SODIUM 1000 [USP'U]/ML
2000 INJECTION, SOLUTION INTRAVENOUS; SUBCUTANEOUS PRN
Status: DISCONTINUED | OUTPATIENT
Start: 2025-06-24 | End: 2025-06-24

## 2025-06-24 RX ORDER — HEPARIN SODIUM 1000 [USP'U]/ML
4000 INJECTION, SOLUTION INTRAVENOUS; SUBCUTANEOUS PRN
Status: DISCONTINUED | OUTPATIENT
Start: 2025-06-24 | End: 2025-06-24

## 2025-06-24 RX ORDER — HEPARIN SODIUM 10000 [USP'U]/100ML
5-30 INJECTION, SOLUTION INTRAVENOUS CONTINUOUS
Status: DISCONTINUED | OUTPATIENT
Start: 2025-06-24 | End: 2025-06-24

## 2025-06-24 RX ORDER — OXYCODONE AND ACETAMINOPHEN 5; 325 MG/1; MG/1
1 TABLET ORAL ONCE
Refills: 0 | Status: COMPLETED | OUTPATIENT
Start: 2025-06-24 | End: 2025-06-24

## 2025-06-24 RX ORDER — OXYCODONE AND ACETAMINOPHEN 5; 325 MG/1; MG/1
1 TABLET ORAL EVERY 6 HOURS PRN
Refills: 0 | Status: DISCONTINUED | OUTPATIENT
Start: 2025-06-24 | End: 2025-06-27 | Stop reason: HOSPADM

## 2025-06-24 RX ORDER — CILOSTAZOL 100 MG/1
50 TABLET ORAL 2 TIMES DAILY
Status: CANCELLED | OUTPATIENT
Start: 2025-06-24

## 2025-06-24 RX ORDER — HEPARIN SODIUM 1000 [USP'U]/ML
4000 INJECTION, SOLUTION INTRAVENOUS; SUBCUTANEOUS ONCE
Status: DISCONTINUED | OUTPATIENT
Start: 2025-06-24 | End: 2025-06-24

## 2025-06-24 RX ORDER — IPRATROPIUM BROMIDE AND ALBUTEROL SULFATE 2.5; .5 MG/3ML; MG/3ML
1 SOLUTION RESPIRATORY (INHALATION)
Status: DISCONTINUED | OUTPATIENT
Start: 2025-06-24 | End: 2025-06-26

## 2025-06-24 RX ADMIN — BUDESONIDE AND FORMOTEROL FUMARATE DIHYDRATE 2 PUFF: 80; 4.5 AEROSOL RESPIRATORY (INHALATION) at 20:43

## 2025-06-24 RX ADMIN — OXCARBAZEPINE 450 MG: 300 TABLET, FILM COATED ORAL at 08:46

## 2025-06-24 RX ADMIN — HYDRALAZINE HYDROCHLORIDE 50 MG: 50 TABLET ORAL at 13:49

## 2025-06-24 RX ADMIN — DULOXETINE 20 MG: 20 CAPSULE, DELAYED RELEASE ORAL at 08:47

## 2025-06-24 RX ADMIN — PREDNISONE 40 MG: 20 TABLET ORAL at 08:45

## 2025-06-24 RX ADMIN — OXYCODONE HYDROCHLORIDE AND ACETAMINOPHEN 1 TABLET: 5; 325 TABLET ORAL at 18:18

## 2025-06-24 RX ADMIN — CLOPIDOGREL BISULFATE 75 MG: 75 TABLET, FILM COATED ORAL at 08:45

## 2025-06-24 RX ADMIN — Medication 6 MG: at 22:31

## 2025-06-24 RX ADMIN — IPRATROPIUM BROMIDE AND ALBUTEROL SULFATE 1 DOSE: .5; 2.5 SOLUTION RESPIRATORY (INHALATION) at 08:55

## 2025-06-24 RX ADMIN — MIRTAZAPINE 15 MG: 15 TABLET, FILM COATED ORAL at 20:27

## 2025-06-24 RX ADMIN — SODIUM CHLORIDE: 0.9 INJECTION, SOLUTION INTRAVENOUS at 05:39

## 2025-06-24 RX ADMIN — BUSPIRONE HYDROCHLORIDE 10 MG: 10 TABLET ORAL at 20:28

## 2025-06-24 RX ADMIN — IPRATROPIUM BROMIDE AND ALBUTEROL SULFATE 1 DOSE: .5; 2.5 SOLUTION RESPIRATORY (INHALATION) at 20:42

## 2025-06-24 RX ADMIN — HYDRALAZINE HYDROCHLORIDE 50 MG: 50 TABLET ORAL at 22:31

## 2025-06-24 RX ADMIN — SODIUM CHLORIDE, PRESERVATIVE FREE 10 ML: 5 INJECTION INTRAVENOUS at 19:35

## 2025-06-24 RX ADMIN — BUSPIRONE HYDROCHLORIDE 10 MG: 10 TABLET ORAL at 08:45

## 2025-06-24 RX ADMIN — AMLODIPINE BESYLATE 10 MG: 10 TABLET ORAL at 08:45

## 2025-06-24 RX ADMIN — CEFEPIME 1000 MG: 1 INJECTION, POWDER, FOR SOLUTION INTRAMUSCULAR; INTRAVENOUS at 19:31

## 2025-06-24 RX ADMIN — OXYCODONE HYDROCHLORIDE AND ACETAMINOPHEN 1 TABLET: 5; 325 TABLET ORAL at 11:53

## 2025-06-24 RX ADMIN — BUSPIRONE HYDROCHLORIDE 10 MG: 10 TABLET ORAL at 13:49

## 2025-06-24 RX ADMIN — IPRATROPIUM BROMIDE AND ALBUTEROL SULFATE 1 DOSE: .5; 2.5 SOLUTION RESPIRATORY (INHALATION) at 11:35

## 2025-06-24 RX ADMIN — CEFEPIME 1000 MG: 1 INJECTION, POWDER, FOR SOLUTION INTRAMUSCULAR; INTRAVENOUS at 05:43

## 2025-06-24 RX ADMIN — PANTOPRAZOLE SODIUM 40 MG: 40 TABLET, DELAYED RELEASE ORAL at 08:45

## 2025-06-24 RX ADMIN — CILOSTAZOL 50 MG: 100 TABLET ORAL at 20:29

## 2025-06-24 RX ADMIN — HYDRALAZINE HYDROCHLORIDE 50 MG: 50 TABLET ORAL at 08:45

## 2025-06-24 RX ADMIN — TRAZODONE HYDROCHLORIDE 100 MG: 50 TABLET ORAL at 20:27

## 2025-06-24 RX ADMIN — APIXABAN 5 MG: 5 TABLET, FILM COATED ORAL at 20:27

## 2025-06-24 RX ADMIN — SODIUM CHLORIDE, PRESERVATIVE FREE 10 ML: 5 INJECTION INTRAVENOUS at 08:45

## 2025-06-24 RX ADMIN — BUDESONIDE AND FORMOTEROL FUMARATE DIHYDRATE 2 PUFF: 80; 4.5 AEROSOL RESPIRATORY (INHALATION) at 08:55

## 2025-06-24 RX ADMIN — ROSUVASTATIN CALCIUM 10 MG: 10 TABLET, FILM COATED ORAL at 20:27

## 2025-06-24 ASSESSMENT — PAIN SCALES - GENERAL
PAINLEVEL_OUTOF10: 6
PAINLEVEL_OUTOF10: 6
PAINLEVEL_OUTOF10: 8

## 2025-06-24 ASSESSMENT — PAIN DESCRIPTION - ORIENTATION
ORIENTATION: LEFT;UPPER
ORIENTATION: LEFT;UPPER

## 2025-06-24 ASSESSMENT — PAIN DESCRIPTION - LOCATION
LOCATION: ABDOMEN
LOCATION: ABDOMEN

## 2025-06-24 ASSESSMENT — PAIN DESCRIPTION - PAIN TYPE: TYPE: ACUTE PAIN

## 2025-06-24 NOTE — PROGRESS NOTES
for the past 8 hrs:   BP Temp Temp src Pulse Resp SpO2   06/24/25 1135 -- -- -- 92 24 92 %   06/24/25 1035 (!) 160/61 98.3 °F (36.8 °C) -- 64 16 92 %   06/24/25 1000 (!) 160/57 98.3 °F (36.8 °C) -- 76 19 94 %   06/24/25 0903 -- -- -- 65 11 94 %   06/24/25 0900 (!) 167/64 98.3 °F (36.8 °C) -- 66 14 95 %   06/24/25 0840 (!) 172/65 98.4 °F (36.9 °C) -- 64 16 95 %   06/24/25 0800 (!) 190/63 98.3 °F (36.8 °C) Bladder 73 18 94 %   06/24/25 0700 (!) 168/63 98.1 °F (36.7 °C) -- 66 18 98 %   06/24/25 0600 (!) 163/60 98.1 °F (36.7 °C) -- 65 15 94 %   06/24/25 0500 (!) 150/56 98 °F (36.7 °C) -- 66 18 --   06/24/25 0400 (!) 172/63 98 °F (36.7 °C) Bladder 69 16 94 %         Intake/Output Summary (Last 24 hours) at 6/24/2025 1150  Last data filed at 6/24/2025 1037  Gross per 24 hour   Intake 1857.07 ml   Output 1770 ml   Net 87.07 ml     Date 06/24/25 0000 - 06/24/25 2359   Shift 9451-4242 9078-6364 5112-0103 24 Hour Total   INTAKE   P.O.(mL/kg/hr)  120  120   I.V.(mL/kg) 874(9.7) 214.6(2.4)  1088.6(12.1)   IV Piggyback(mL/kg) 55.9(0.6) 43.7(0.5)  99.6(1.1)   Shift Total(mL/kg) 929.9(10.4) 378.3(4.2)  1308.1(14.6)   OUTPUT   Urine(mL/kg/hr) 400(0.6) 270  670   Shift Total(mL/kg) 400(4.5) 270(3)  670(7.5)   Weight (kg) 89.8 89.8 89.8 89.8     Wt Readings from Last 3 Encounters:   06/23/25 89.8 kg (198 lb)   06/22/25 85.7 kg (189 lb)   04/10/25 90.2 kg (198 lb 12.8 oz)     Body mass index is 33.97 kg/m².        PHYSICAL EXAM:  Constitutional: Appears well, no distress   EENT: PERRLA, EOMI, sclera clear, anicteric, oropharynx clear, no lesions, neck supple with midline trachea.  Neck: Supple, symmetrical, trachea midline, no adenopathy, thyroid symmetric, no jvd skin normal  Respiratory: clear to auscultation, no wheezes or rales and unlabored breathing. No intercostal tenderness  Cardiovascular: regular rate and rhythm, normal S1, S2, no murmur noted and 2+ pulses throughout  Abdomen: soft, nontender, nondistended, no masses or  20.7* 15.6* 7.1   RBC 3.03* 2.86* 2.58*   HGB 8.8* 8.4* 7.5*   HCT 28.5* 26.4* 24.2*   MCV 94.1 92.3 93.8   MCH 29.0 29.4 29.1   MCHC 30.9 31.8 31.0   RDW 14.1 14.1 13.5    246 193   MPV 11.9 12.1 11.8        Last 3 Blood Glucose:   Recent Labs     06/22/25  1117 06/22/25  1846 06/23/25  0307 06/24/25  0519   GLUCOSE 167* 130* 134* 106*        PT/INR:    Lab Results   Component Value Date/Time    PROTIME 18.4 06/22/2025 06:46 PM    INR 1.5 06/22/2025 06:46 PM     PTT:    Lab Results   Component Value Date/Time    APTT 72.4 06/24/2025 05:19 AM    APTT 26.6 03/10/2022 11:37 AM       Comprehensive Metabolic Profile:   Recent Labs     06/22/25  1117 06/22/25  1846 06/23/25  0307 06/24/25  0519   * 135* 138 140   K 5.0 4.9 4.8 5.1   CL 99 100 105 108*   CO2 22 22 21 21   BUN 48* 52* 48* 37*   CREATININE 3.7* 3.7* 3.1* 1.6*   GLUCOSE 167* 130* 134* 106*   CALCIUM 9.0 9.1 8.8 9.0   BILITOT 0.4  --   --   --    ALKPHOS 81  --   --   --    AST 21  --   --   --    ALT 24  --   --   --       Magnesium:   Lab Results   Component Value Date/Time    MG 2.4 06/22/2025 11:17 AM    MG 2.1 04/10/2025 04:14 AM    MG 2.0 03/27/2025 12:34 AM     Phosphorus:   Lab Results   Component Value Date/Time    PHOS 3.9 03/24/2025 08:09 AM    PHOS 3.9 05/07/2023 04:59 AM     Ionized Calcium:   Lab Results   Component Value Date/Time    CAION 1.25 05/07/2023 04:59 AM    CAION 1.59 06/08/2022 11:30 AM    CAION 1.39 06/01/2022 12:10 PM        Urinalysis:   Lab Results   Component Value Date/Time    NITRU NEGATIVE 06/22/2025 07:43 PM    COLORU Dark Yellow 06/22/2025 07:43 PM    PHUR 5.0 06/22/2025 07:43 PM    PHUR 7.0 10/09/2023 07:15 PM    WBCUA 2 TO 5 06/22/2025 07:43 PM    WBCUA 0-2 08/05/2019 08:24 AM    RBCUA 0 TO 2 06/22/2025 07:43 PM    RBCUA 6-10 08/05/2019 08:24 AM    MUCUS 1+ 05/05/2023 05:49 PM    TRICHOMONAS NOT REPORTED 05/31/2015 01:19 PM    YEAST NOT REPORTED 05/31/2015 01:19 PM    BACTERIA None 06/22/2025 07:43 PM     evaluation due to RUSTAM, D-Dimer only 0.6, PE unlikely, heparin gtt was discontinued 6/24  Resume statins, Plavix     Pulm  History of COPD/emphysema on 3 L of oxygen at home  ANTHONY  Elevated D-dimer 0.6  Chest x-ray concerning for pulmonary vascular congestion  Will send respiratory cultures and panel  Will keep on BiPAP overnight  Continue on home inhaler Symbicort   Patient has elevated D-dimer 0.6 could be likely secondary to dehydration but patient has recent travel history  Will get bilateral lower extremity venous dubplex    No results for input(s): \"POCPH\", \"POCPCO2\", \"POCPO2\", \"POCHCO3\", \"[id number]\" in the last 72 hours.    GI/Nutrition  ADULT DIET; Regular; No Added Salt (3-4 gm); Low Potassium (Less than 3000 mg/day)  ADULT ORAL NUTRITION SUPPLEMENT; Breakfast, Lunch, Dinner; Diabetic Oral Supplement    Renal/lytes  RUSTAM   Decreased oral intake  Urinary retention  CKD stage IIIa  Baseline creatinine 1.1-1.2, peak creatinine 3.7, BUN 48, bicarb 22,  Cr. 1.6 today  Patient states anuric for the past 2 days  Bladder scan containing 180 mL  Valentino placed  Start on IV fluids 500 mL bolus  Maintenance fluid at 100 mL/h-normal saline  UA and urine culture  Nephrology Consult  Appreciate recommendations  Renal U/S  Additional Urine labs  Continue NS 50mL/hr  Strict I&O  Avoid nephrotoxics  Stat BMP and daily CMP      Intake/Output Summary (Last 24 hours) at 6/24/2025 1150  Last data filed at 6/24/2025 1037  Gross per 24 hour   Intake 1857.07 ml   Output 1770 ml   Net 87.07 ml       Recent Labs     06/22/25  1117 06/22/25  1846 06/23/25  0307 06/24/25  0519   *   < > 138 140   K 5.0   < > 4.8 5.1   CL 99   < > 105 108*   CO2 22   < > 21 21   BUN 48*   < > 48* 37*   CREATININE 3.7*   < > 3.1* 1.6*   MG 2.4  --   --   --     < > = values in this interval not displayed.       Heme  Continue to monitor daily    Recent Labs     06/23/25  0307 06/24/25  0519   HGB 8.4* 7.5*   HCT 26.4* 24.2*       Endocrine  Monitor

## 2025-06-24 NOTE — CONSULTS
[facility]    Consult / H&P               Today's Date: 6/24/2025  Patient Name: Sultana Noyola  Date of admission: 6/22/2025  5:29 PM  Patient's age: 70 y.o., 1955  Admission Dx: Hypotension [I95.9]  Acute on chronic respiratory failure (HCC) [J96.20]  RUSTAM (acute kidney injury) [N17.9]    Requesting Physician: Stevan Vu MD    Cardiac Evaluation Reason:  Bradycardia    History Obtained From: patient and chart review     History of Present Illness:    70-year-old PMHx A-fib on Eliquis, PAD on Plavix, HFpEF, CKD 3, COPD, and ANTHONY presented with shortness of breath worsening over several days prior to presentation.  Denied chest pain initially.  Initial clinical picture of dehydration with decreased oral intake, RUSTAM, and urinary retention.  Cardiology consulted for initial bradycardia requiring dopamine    Past Medical History:   has a past medical history of ADHD (attention deficit hyperactivity disorder), Anxiety, BiPAP (biphasic positive airway pressure) dependence, Bipolar 1 disorder (HCC), Chronic back pain, Depression, Diabetes (HCC), Emphysema lung (HCC), Valentino catheter in place, Hyperlipidemia, Hypertension, Nursing home resident, Pancreatitis, PICC (peripherally inserted central catheter) in place, Pleural effusion, Respiratory failure (HCC), and Stenosis of intestine (HCC).    Past Surgical History:   has a past surgical history that includes Appendectomy; Hysterectomy; Cholecystectomy; tumor removal; back surgery; Endoscopy, colon, diagnostic; Foot surgery (Left, 11/04/2022); tumor removal; thoracentesis (Left, 05/26/2022); and hc temporary dialysis catheter (3/23/2025).     Home Medications:    Prior to Admission medications    Medication Sig Start Date End Date Taking? Authorizing Provider   Cholecalciferol (VITAMIN D3) 1.25 MG (31656 UT) CAPS Take 5,000 Units by mouth Daily   Yes Provider, MD Delia   loperamide (IMODIUM) 2 MG capsule Take 1 capsule by mouth 4 times daily  on 6/24/2025 at 7:08 AM          Attending Physician Statement:    I have discussed the care of  Sultana Noyola , including pertinent history and exam findings, with the Cardiology fellow/resident.     I have seen and examined the patient and the key elements of all parts of the encounter have been performed by me. I agree with the assessment, plan and orders as documented by the fellow/resident, after I modified exam findings and plan of treatments, and the final version is my approved version of the assessment.     Kosta Poon MD, FACC, [facility]  Cardiology, Interventional Cardiology, [facility]

## 2025-06-24 NOTE — RT PROTOCOL NOTE
RT Inhaler-Nebulizer Bronchodilator Protocol Note    There is a bronchodilator order in the chart from a provider indicating to follow the RT Bronchodilator Protocol and there is an “Initiate RT Inhaler-Nebulizer Bronchodilator Protocol” order as well (see protocol at bottom of note).    CXR Findings:  XR CHEST PORTABLE  Result Date: 6/23/2025  Stable chest radiograph.     XR CHEST PORTABLE  Result Date: 6/22/2025  Cardiomegaly with mild pulmonary edema.       The findings from the last RT Protocol Assessment were as follows:   History Pulmonary Disease: Chronic pulmonary disease  Respiratory Pattern: Regular pattern and RR 12-20 bpm  Breath Sounds: Slightly diminished and/or crackles  Cough: Strong, spontaneous, non-productive  Indication for Bronchodilator Therapy: Decreased or absent breath sounds  Bronchodilator Assessment Score: 4    Aerosolized bronchodilator medication orders have been revised according to the RT Inhaler-Nebulizer Bronchodilator Protocol below.    Respiratory Therapist to perform RT Therapy Protocol Assessment initially then follow the protocol.  Repeat RT Therapy Protocol Assessment PRN for score 0-3 or on second treatment, BID, and PRN for scores above 3.    No Indications - adjust the frequency to every 6 hours PRN wheezing or bronchospasm, if no treatments needed after 48 hours then discontinue using Per Protocol order mode.     If indication present, adjust the RT bronchodilator orders based on the Bronchodilator Assessment Score as indicated below.  Use Inhaler orders unless patient has one or more of the following: on home nebulizer, not able to hold breath for 10 seconds, is not alert and oriented, cannot activate and use MDI correctly, or respiratory rate 25 breaths per minute or more, then use the equivalent nebulizer order(s) with same Frequency and PRN reasons based on the score.  If a patient is on this medication at home then do not decrease Frequency below that used at  home.    0-3 - enter or revise RT bronchodilator order(s) to equivalent RT Bronchodilator order with Frequency of every 4 hours PRN for wheezing or increased work of breathing using Per Protocol order mode.        4-6 - enter or revise RT Bronchodilator order(s) to two equivalent RT bronchodilator orders with one order with BID Frequency and one order with Frequency of every 4 hours PRN wheezing or increased work of breathing using Per Protocol order mode.        7-10 - enter or revise RT Bronchodilator order(s) to two equivalent RT bronchodilator orders with one order with TID Frequency and one order with Frequency of every 4 hours PRN wheezing or increased work of breathing using Per Protocol order mode.       11-13 - enter or revise RT Bronchodilator order(s) to one equivalent RT bronchodilator order with QID Frequency and an Albuterol order with Frequency of every 4 hours PRN wheezing or increased work of breathing using Per Protocol order mode.      Greater than 13 - enter or revise RT Bronchodilator order(s) to one equivalent RT bronchodilator order with every 4 hours Frequency and an Albuterol order with Frequency of every 2 hours PRN wheezing or increased work of breathing using Per Protocol order mode.     RT to enter RT Home Evaluation for COPD & MDI Assessment order using Per Protocol order mode.    Electronically signed by Charlotte Watkins RCP on 6/23/2025 at 8:49 PM

## 2025-06-24 NOTE — PROGRESS NOTES
Physician Progress Note      PATIENT:               BERHANE WHEELER  CSN #:                  [id number]  :                       1955  ADMIT DATE:       2025 5:29 PM  DISCH DATE:  RESPONDING  PROVIDER #:        RUTH GARZA          QUERY TEXT:    Based on your medical judgment, please clarify these findings and document if   any of the following are being evaluated and/or treated:    The clinical indicators include:  - admission for SOB, dizziness after recent travels.  - COPD on 3L /NC.    - OXYGEN THERAPY -   @ 1046 - 8 L/NC -> HFNC   @ 1842 - SPO2  @ 87% W/ 7 L.   @ 9- SPO2 @ 90% WITH PAP.   @ 0055 - 10 L    SPO2 @ 83% W/ 6L.  -   - PBICU - VBG - 7.457 / 41.5 / 37.2 / 29.3    - Oxygen therapy from 3L - PAP  - transfer [facility] for respiratory [facility].  - Symbicort, Albuterol, Buspar.  - IV Heparin gtt  Options provided:  -- Chronic respiratory failure with hypoxia  -- Chronic respiratory failure with hypercapnia  -- Chronic respiratory failure with hypoxia and hypercapnia  -- Acute on chronic respiratory failure with hypoxia  -- Acute on chronic respiratory failure with hypercapnia  -- Acute on chronic respiratory failure with hypoxia and hypercapnia  -- Other - I will add my own diagnosis  -- Disagree - Not applicable / Not valid  -- Disagree - Clinically unable to determine / Unknown  -- Refer to Clinical Documentation Reviewer    PROVIDER RESPONSE TEXT:    This patient is in acute on chronic respiratory failure with hypoxia and   hypercapnia.    Query created by: Noel Iglesias on 2025 1:49 PM      Electronically signed by:  RUTH GARZA 2025 9:42 AM

## 2025-06-24 NOTE — TRANSITION OF CARE
Critical care team - Resident sign-out to medicine service      Date and time: 6/24/2025 6:46 PM  Patient's name:  Sultana Noyola  Medical Record Number: [id number]  Patient's account/billing number: [id number]  Patient's YOB: 1955  Age: 70 y.o.  Date of Admission: 6/22/2025  5:29 PM  Length of stay during current admission: 2    Primary Care Physician: Tavia Bonner APRN - CNP    Code Status: Full Code    Mode of physician to physician communication:        [x] Via telephone   [] In person     Date and time of sign-out: 6/24/2025 6:46 PM    Accepting Internal Medicine resident: Dr. Aggarwal    Accepting Medicine team: IM Team     Accepting team's attending: Dr. Aggarwal    Patient's current ICU Bed:   3009    Patient's assigned bed on floor: 424        [] Med-Surg Monitored [x] Step-down       [] Psychiatry ICU       [] Psych floor     Reason for ICU admission:     Shortness of breath    ICU course summary:     70-year-old female presented to the hospital after shortness of breath, recently went to her granddaughters wedding in [place], came back and on for 2 days already having worsening shortness of breath usually is on 3 L oxygen but was not needing 5 L.  Along with that was feeling dizzy and had a fall, poor hydration for 4 to 5 days, was also oliguric.  On arrival to the ED patient was hypertensive with blood pressure 92 x 56 MAP 55, lab work showed sodium of 133, potassium of 5, BUN 48, creatinine 3.7, lactate of 1.7.  WBC was elevated 16.9.  Was started on IV hydration patient's creatinine improved on IV hydration.  She was also started on BiPAP overnight to help with pulmonary venous congestion.  Patient was experiencing some abdominal pain and discomfort today, has documented periumbilical hernia.  KUB done today showed nonobstructive bowel gas pattern.    Procedures during patient's ICU stay:         Current Vitals:     BP (!) 153/59   Pulse 75   Temp 98.5 °F (36.9 °C) (Bladder)    Resp 11   Ht 1.626 m (5' 4.02\")   Wt 89.8 kg (198 lb)   SpO2 94%   BMI 33.97 kg/m²       Cultures:     Blood cultures:                 [] None drawn      [] Negative             []  Positive (Details:  )  Urine Culture:                   [] None drawn      [] Negative             []  Positive (Details:  )  Sputum Culture:               [] None drawn       [] Negative             []  Positive (Details:  )   Endotracheal aspirate:     [] None drawn       [] Negative             []  Positive (Details:  )       Consults:     Nephrology, cardiology    Assessment:     Patient Active Problem List    Diagnosis Date Noted    Gangrene of toe of left foot ([facility]) 09/27/2022    Peripheral arterial disease 09/27/2022    Other chronic pancreatitis ([facility]) 09/27/2022    Congestive heart failure ([facility]) 06/23/2025    Hx of emphysema ([facility]) 06/23/2025    Chronic hypoxemic respiratory failure ([facility]) 06/23/2025    Acute kidney injury superimposed on CKD 06/22/2025    Acute kidney injury superimposed on chronic kidney disease 04/09/2025    Anemia 04/09/2025    Migraine 03/27/2025    Abnormal electrocardiography 03/26/2025    Chest pain 03/26/2025    Chronic obstructive pulmonary disease ([facility]) 03/25/2025    ANTHONY (obstructive sleep apnea) 03/25/2025    Ventricular tachyarrhythmia ([facility]) 03/24/2025    Hyperkalemia 03/23/2025    Acute kidney injury 03/23/2025    Stage 3a chronic kidney disease ([facility]) 03/23/2025    Symptomatic bradycardia 03/23/2025    Dyslipidemia 03/23/2025    Acute on chronic pancreatitis ([facility]) 05/06/2023    Anxiety 05/06/2023    Essential hypertension 05/06/2023    Bipolar 1 disorder ([facility]) 05/06/2023    Stenosis of intestine ([facility]) 05/06/2023    Hypoxia 05/06/2023    Multiple compression fractures of thoracic vertebra ([facility]) 05/06/2023     RUSTAM on CKD  Concern for aspiration pneumonia  Sinus bradycardia in the setting of beta-blocker  A-fib on Eliquis  HFpEF  Hyperlipidemia, hypertension  COPD on 3 L  Abdominal pain  History of  migraines        Recommended Follow-up:   Creatinine improving post hydration to 1.6, now fluids are discontinued  BiPAP overnight  Continue antibiotics for presumed aspiration pneumonia  Follow-up on CT abdomen pelvis without contrast  Continue home medications  Follow on nephro recs  Cardiology recommending to start beta-blocker at a lower dose once heart rate is more stable  PT OT, discharge planning        Above mentioned assessment and plan was discussed by me with the admitting medicine resident. The medicine team assigned to the patient by medicine admitting resident will be following up the patient from now onwards on the floor.       Florida Cain MD  Internal Medicine Resident  [place], [place]

## 2025-06-24 NOTE — PLAN OF CARE
Problem: Respiratory - Adult  Goal: Achieves optimal ventilation and oxygenation  6/23/2025 2043 by Charlotte Watkins, JAYNA  Outcome: Progressing   BRONCHOSPASM/BRONCHOCONSTRICTION     [x]         IMPROVE AERATION/BREATH SOUNDS  [x]   ADMINISTER BRONCHODILATOR THERAPY AS APPROPRIATE  [x]   ASSESS BREATH SOUNDS  []   IMPLEMENT AEROSOL/MDI PROTOCOL  [x]   PATIENT EDUCATION AS NEEDED

## 2025-06-24 NOTE — PLAN OF CARE
Problem: Discharge Planning  Goal: Discharge to home or other facility with appropriate resources  6/23/2025 2340 by Alicja Pratt RN  Outcome: Progressing  6/23/2025 1539 by Chely Ruiz  Outcome: Progressing     Problem: Pain  Goal: Verbalizes/displays adequate comfort level or baseline comfort level  6/23/2025 2340 by Alicja Pratt RN  Outcome: Progressing  6/23/2025 1539 by Chely Ruiz  Outcome: Progressing     Problem: Skin/Tissue Integrity  Goal: Skin integrity remains intact  Description: 1.  Monitor for areas of redness and/or skin breakdown  2.  Assess vascular access sites hourly  3.  Every 4-6 hours minimum:  Change oxygen saturation probe site  4.  Every 4-6 hours:  If on nasal continuous positive airway pressure, respiratory therapy assess nares and determine need for appliance change or resting period  6/23/2025 2340 by Alicja Pratt RN  Outcome: Progressing  6/23/2025 1539 by Chely Ruiz  Outcome: Progressing     Problem: Safety - Adult  Goal: Free from fall injury  6/23/2025 2340 by Alicja Pratt RN  Outcome: Progressing  6/23/2025 1539 by Chely Ruiz  Outcome: Progressing     Problem: ABCDS Injury Assessment  Goal: Absence of physical injury  6/23/2025 2340 by Alicja Pratt RN  Outcome: Progressing  Flowsheets (Taken 6/23/2025 2338)  Absence of Physical Injury: Implement safety measures based on patient assessment  6/23/2025 1539 by Chely Ruiz  Outcome: Progressing     Problem: Chronic Conditions and Co-morbidities  Goal: Patient's chronic conditions and co-morbidity symptoms are monitored and maintained or improved  6/23/2025 2340 by Alicja Pratt RN  Outcome: Progressing  6/23/2025 1539 by Chely Ruiz  Outcome: Progressing     Problem: Respiratory - Adult  Goal: Achieves optimal ventilation and oxygenation  6/23/2025 2340 by Alicja Pratt RN  Outcome: Progressing  6/23/2025 2043 by Charlotte Watkins RCP  Outcome:  Progressing  6/23/2025 1539 by Chely Ruiz  Outcome: Progressing     Problem: Cardiovascular - Adult  Goal: Maintains optimal cardiac output and hemodynamic stability  6/23/2025 2340 by Alicja Pratt RN  Outcome: Progressing  6/23/2025 1539 by Chely Ruiz  Outcome: Progressing  Goal: Absence of cardiac dysrhythmias or at baseline  6/23/2025 2340 by Alicja Pratt RN  Outcome: Progressing  6/23/2025 1539 by Chely Ruiz  Outcome: Progressing     Problem: Infection - Adult  Goal: Absence of infection at discharge  6/23/2025 2340 by Alicja Pratt RN  Outcome: Progressing  6/23/2025 1539 by Chely Ruiz  Outcome: Progressing     Problem: Metabolic/Fluid and Electrolytes - Adult  Goal: Electrolytes maintained within normal limits  6/23/2025 2340 by Alicja Pratt RN  Outcome: Progressing  6/23/2025 1539 by Chely Ruiz  Outcome: Progressing  Goal: Hemodynamic stability and optimal renal function maintained  6/23/2025 2340 by Alicja Pratt RN  Outcome: Progressing  6/23/2025 1539 by Chely Ruiz  Outcome: Progressing  Goal: Glucose maintained within prescribed range  6/23/2025 2340 by Alicja Pratt RN  Outcome: Progressing  6/23/2025 1539 by Chely Ruiz  Outcome: Progressing

## 2025-06-24 NOTE — PROGRESS NOTES
SUBJECTIVE    Patient was seen and examined.  Patient was lying flat.  She was alert and awake.  Her blood pressure is now up to 160 systolic.  Hydralazine and amlodipine was started this morning.    9 patient is on heparin for anticoagulation.    Urine output is improved and she put out close to 1700 in the last 24 hours.  Creatinine is now down to 1.6 mg/dL.    OBJECTIVE      CURRENT TEMPERATURE:  Temp: 98.3 °F (36.8 °C)  MAXIMUM TEMPERATURE OVER 24HRS:  Temp (24hrs), Av.2 °F (37.3 °C), Min:97.6 °F (36.4 °C), Max:100.6 °F (38.1 °C)    CURRENT RESPIRATORY RATE:  Respirations: 11  CURRENT PULSE:  Pulse: 65  CURRENT BLOOD PRESSURE:  BP: (!) 167/64  24HR BLOOD PRESSURE RANGE:  Systolic (24hrs), Av , Min:81 , Max:190   ; Diastolic (24hrs), Av, Min:32, Max:112    24HR INTAKE/OUTPUT:    Intake/Output Summary (Last 24 hours) at 2025 1016  Last data filed at 2025 0852  Gross per 24 hour   Intake 1888.57 ml   Output 1745 ml   Net 143.57 ml     WEIGHT :Patient Vitals for the past 96 hrs (Last 3 readings):   Weight   25 1215 89.8 kg (198 lb)   25 0600 89.8 kg (198 lb)   25 1730 88 kg (194 lb 1.6 oz)     PHYSICAL EXAM      GENERAL APPEARANCE: Awake and alert x 3.  Patient was complaining of pain on left upper abdomen.  SKIN: Warm to touch   EYES: Conjunctiva was normal  ENT: No thrush or pharyngeal congestion  NECK: No JVD or lymphadenopathy  .  PULMONARY: Bilateral air entry and clear  CADRDIOVASCULAR: S1 and S2 audible no S3   ABDOMEN: Belly was soft, she has tenderness on left upper quadrant and epigastric area.  There was no rebound tenderness.  There was no guarding.  EXTREMITIES: 1+ edema involving lower extremities  CURRENT MEDICATIONS      oxymetazoline (AFRIN) 0.05 % nasal spray 2 spray, BID PRN  predniSONE (DELTASONE) tablet 40 mg, Daily  mirtazapine (REMERON) tablet 15 mg, Nightly  OXcarbazepine (TRILEPTAL) tablet 450 mg, Daily  busPIRone (BUSPAR) tablet 10 mg,

## 2025-06-25 ENCOUNTER — APPOINTMENT (OUTPATIENT)
Dept: ULTRASOUND IMAGING | Age: 70
DRG: 189 | End: 2025-06-25
Attending: INTERNAL MEDICINE
Payer: MEDICARE

## 2025-06-25 ENCOUNTER — APPOINTMENT (OUTPATIENT)
Dept: GENERAL RADIOLOGY | Age: 70
DRG: 189 | End: 2025-06-25
Attending: INTERNAL MEDICINE
Payer: MEDICARE

## 2025-06-25 ENCOUNTER — RESULTS FOLLOW-UP (OUTPATIENT)
Dept: PULMONOLOGY | Age: 70
End: 2025-06-25

## 2025-06-25 PROBLEM — J96.21 ACUTE ON CHRONIC RESPIRATORY FAILURE WITH HYPOXIA (HCC): Status: ACTIVE | Noted: 2025-06-25

## 2025-06-25 LAB
ANION GAP SERPL CALCULATED.3IONS-SCNC: 13 MMOL/L (ref 9–16)
BASOPHILS # BLD: 0.04 K/UL (ref 0–0.2)
BASOPHILS NFR BLD: 0 % (ref 0–2)
BUN SERPL-MCNC: 31 MG/DL (ref 8–23)
CALCIUM SERPL-MCNC: 9.8 MG/DL (ref 8.6–10.4)
CHLORIDE SERPL-SCNC: 110 MMOL/L (ref 98–107)
CO2 SERPL-SCNC: 21 MMOL/L (ref 20–31)
CREAT SERPL-MCNC: 1.2 MG/DL (ref 0.6–0.9)
EOSINOPHIL # BLD: <0.03 K/UL (ref 0–0.44)
EOSINOPHILS RELATIVE PERCENT: 0 % (ref 1–4)
ERYTHROCYTE [DISTWIDTH] IN BLOOD BY AUTOMATED COUNT: 14 % (ref 11.8–14.4)
GFR, ESTIMATED: 49 ML/MIN/1.73M2
GLUCOSE BLD-MCNC: 156 MG/DL (ref 65–105)
GLUCOSE BLD-MCNC: 157 MG/DL (ref 65–105)
GLUCOSE BLD-MCNC: 178 MG/DL (ref 65–105)
GLUCOSE BLD-MCNC: 94 MG/DL (ref 65–105)
GLUCOSE SERPL-MCNC: 105 MG/DL (ref 74–99)
HCT VFR BLD AUTO: 26.8 % (ref 36.3–47.1)
HGB BLD-MCNC: 8.7 G/DL (ref 11.9–15.1)
IMM GRANULOCYTES # BLD AUTO: 0.13 K/UL (ref 0–0.3)
IMM GRANULOCYTES NFR BLD: 1 %
LYMPHOCYTES NFR BLD: 1.43 K/UL (ref 1.1–3.7)
LYMPHOCYTES RELATIVE PERCENT: 15 % (ref 24–43)
MCH RBC QN AUTO: 29.3 PG (ref 25.2–33.5)
MCHC RBC AUTO-ENTMCNC: 32.5 G/DL (ref 28.4–34.8)
MCV RBC AUTO: 90.2 FL (ref 82.6–102.9)
MONOCYTES NFR BLD: 0.96 K/UL (ref 0.1–1.2)
MONOCYTES NFR BLD: 10 % (ref 3–12)
NEUTROPHILS NFR BLD: 74 % (ref 36–65)
NEUTS SEG NFR BLD: 7.23 K/UL (ref 1.5–8.1)
NRBC BLD-RTO: 0 PER 100 WBC
PARTIAL THROMBOPLASTIN TIME: 34.3 SEC (ref 23–36.5)
PLATELET # BLD AUTO: 261 K/UL (ref 138–453)
PMV BLD AUTO: 11.7 FL (ref 8.1–13.5)
POTASSIUM SERPL-SCNC: 4.9 MMOL/L (ref 3.7–5.3)
RBC # BLD AUTO: 2.97 M/UL (ref 3.95–5.11)
SODIUM SERPL-SCNC: 144 MMOL/L (ref 136–145)
WBC OTHER # BLD: 9.8 K/UL (ref 3.5–11.3)

## 2025-06-25 PROCEDURE — 6370000000 HC RX 637 (ALT 250 FOR IP)

## 2025-06-25 PROCEDURE — 97535 SELF CARE MNGMENT TRAINING: CPT

## 2025-06-25 PROCEDURE — 6360000002 HC RX W HCPCS

## 2025-06-25 PROCEDURE — 6370000000 HC RX 637 (ALT 250 FOR IP): Performed by: NURSE PRACTITIONER

## 2025-06-25 PROCEDURE — 6360000002 HC RX W HCPCS: Performed by: STUDENT IN AN ORGANIZED HEALTH CARE EDUCATION/TRAINING PROGRAM

## 2025-06-25 PROCEDURE — 2580000003 HC RX 258: Performed by: STUDENT IN AN ORGANIZED HEALTH CARE EDUCATION/TRAINING PROGRAM

## 2025-06-25 PROCEDURE — 99233 SBSQ HOSP IP/OBS HIGH 50: CPT | Performed by: INTERNAL MEDICINE

## 2025-06-25 PROCEDURE — 36415 COLL VENOUS BLD VENIPUNCTURE: CPT

## 2025-06-25 PROCEDURE — 85730 THROMBOPLASTIN TIME PARTIAL: CPT

## 2025-06-25 PROCEDURE — 6370000000 HC RX 637 (ALT 250 FOR IP): Performed by: INTERNAL MEDICINE

## 2025-06-25 PROCEDURE — 85025 COMPLETE CBC W/AUTO DIFF WBC: CPT

## 2025-06-25 PROCEDURE — 2700000000 HC OXYGEN THERAPY PER DAY

## 2025-06-25 PROCEDURE — 2500000003 HC RX 250 WO HCPCS

## 2025-06-25 PROCEDURE — 97162 PT EVAL MOD COMPLEX 30 MIN: CPT

## 2025-06-25 PROCEDURE — 94640 AIRWAY INHALATION TREATMENT: CPT

## 2025-06-25 PROCEDURE — 2580000003 HC RX 258

## 2025-06-25 PROCEDURE — 99233 SBSQ HOSP IP/OBS HIGH 50: CPT | Performed by: NURSE PRACTITIONER

## 2025-06-25 PROCEDURE — 2060000000 HC ICU INTERMEDIATE R&B

## 2025-06-25 PROCEDURE — 76705 ECHO EXAM OF ABDOMEN: CPT

## 2025-06-25 PROCEDURE — 97530 THERAPEUTIC ACTIVITIES: CPT

## 2025-06-25 PROCEDURE — 80048 BASIC METABOLIC PNL TOTAL CA: CPT

## 2025-06-25 PROCEDURE — 82947 ASSAY GLUCOSE BLOOD QUANT: CPT

## 2025-06-25 PROCEDURE — 99221 1ST HOSP IP/OBS SF/LOW 40: CPT | Performed by: STUDENT IN AN ORGANIZED HEALTH CARE EDUCATION/TRAINING PROGRAM

## 2025-06-25 PROCEDURE — 94761 N-INVAS EAR/PLS OXIMETRY MLT: CPT

## 2025-06-25 PROCEDURE — 97166 OT EVAL MOD COMPLEX 45 MIN: CPT

## 2025-06-25 PROCEDURE — 71045 X-RAY EXAM CHEST 1 VIEW: CPT

## 2025-06-25 PROCEDURE — 99232 SBSQ HOSP IP/OBS MODERATE 35: CPT | Performed by: INTERNAL MEDICINE

## 2025-06-25 RX ORDER — CARVEDILOL 6.25 MG/1
6.25 TABLET ORAL 2 TIMES DAILY
Status: DISCONTINUED | OUTPATIENT
Start: 2025-06-25 | End: 2025-06-27 | Stop reason: HOSPADM

## 2025-06-25 RX ORDER — FUROSEMIDE 10 MG/ML
40 INJECTION INTRAMUSCULAR; INTRAVENOUS ONCE
Status: COMPLETED | OUTPATIENT
Start: 2025-06-25 | End: 2025-06-25

## 2025-06-25 RX ADMIN — Medication 3 MG: at 22:03

## 2025-06-25 RX ADMIN — BUDESONIDE AND FORMOTEROL FUMARATE DIHYDRATE 2 PUFF: 80; 4.5 AEROSOL RESPIRATORY (INHALATION) at 07:35

## 2025-06-25 RX ADMIN — IPRATROPIUM BROMIDE AND ALBUTEROL SULFATE 1 DOSE: .5; 2.5 SOLUTION RESPIRATORY (INHALATION) at 15:45

## 2025-06-25 RX ADMIN — HYDRALAZINE HYDROCHLORIDE 50 MG: 50 TABLET ORAL at 20:28

## 2025-06-25 RX ADMIN — AMLODIPINE BESYLATE 10 MG: 10 TABLET ORAL at 09:06

## 2025-06-25 RX ADMIN — SODIUM CHLORIDE, PRESERVATIVE FREE 10 ML: 5 INJECTION INTRAVENOUS at 09:11

## 2025-06-25 RX ADMIN — OXYCODONE HYDROCHLORIDE AND ACETAMINOPHEN 1 TABLET: 5; 325 TABLET ORAL at 09:07

## 2025-06-25 RX ADMIN — APIXABAN 5 MG: 5 TABLET, FILM COATED ORAL at 20:28

## 2025-06-25 RX ADMIN — FUROSEMIDE 40 MG: 10 INJECTION, SOLUTION INTRAMUSCULAR; INTRAVENOUS at 13:34

## 2025-06-25 RX ADMIN — OXYCODONE HYDROCHLORIDE AND ACETAMINOPHEN 1 TABLET: 5; 325 TABLET ORAL at 03:29

## 2025-06-25 RX ADMIN — OXYCODONE HYDROCHLORIDE AND ACETAMINOPHEN 1 TABLET: 5; 325 TABLET ORAL at 20:28

## 2025-06-25 RX ADMIN — BUDESONIDE AND FORMOTEROL FUMARATE DIHYDRATE 2 PUFF: 80; 4.5 AEROSOL RESPIRATORY (INHALATION) at 21:19

## 2025-06-25 RX ADMIN — IPRATROPIUM BROMIDE AND ALBUTEROL SULFATE 1 DOSE: .5; 2.5 SOLUTION RESPIRATORY (INHALATION) at 07:35

## 2025-06-25 RX ADMIN — CLOPIDOGREL BISULFATE 75 MG: 75 TABLET, FILM COATED ORAL at 09:07

## 2025-06-25 RX ADMIN — Medication 2 SPRAY: at 04:39

## 2025-06-25 RX ADMIN — SODIUM CHLORIDE, PRESERVATIVE FREE 10 ML: 5 INJECTION INTRAVENOUS at 20:31

## 2025-06-25 RX ADMIN — PREDNISONE 40 MG: 20 TABLET ORAL at 09:07

## 2025-06-25 RX ADMIN — IPRATROPIUM BROMIDE AND ALBUTEROL SULFATE 1 DOSE: .5; 2.5 SOLUTION RESPIRATORY (INHALATION) at 21:19

## 2025-06-25 RX ADMIN — DULOXETINE 20 MG: 20 CAPSULE, DELAYED RELEASE ORAL at 09:11

## 2025-06-25 RX ADMIN — HYDRALAZINE HYDROCHLORIDE 50 MG: 50 TABLET ORAL at 13:40

## 2025-06-25 RX ADMIN — CILOSTAZOL 50 MG: 100 TABLET ORAL at 20:40

## 2025-06-25 RX ADMIN — HYDRALAZINE HYDROCHLORIDE 50 MG: 50 TABLET ORAL at 05:56

## 2025-06-25 RX ADMIN — PIPERACILLIN AND TAZOBACTAM 3375 MG: 3; .375 INJECTION, POWDER, LYOPHILIZED, FOR SOLUTION INTRAVENOUS at 17:20

## 2025-06-25 RX ADMIN — APIXABAN 5 MG: 5 TABLET, FILM COATED ORAL at 09:07

## 2025-06-25 RX ADMIN — TRAZODONE HYDROCHLORIDE 100 MG: 50 TABLET ORAL at 20:28

## 2025-06-25 RX ADMIN — CARVEDILOL 6.25 MG: 6.25 TABLET, FILM COATED ORAL at 20:28

## 2025-06-25 RX ADMIN — BUSPIRONE HYDROCHLORIDE 10 MG: 10 TABLET ORAL at 09:07

## 2025-06-25 RX ADMIN — MIRTAZAPINE 15 MG: 15 TABLET, FILM COATED ORAL at 20:28

## 2025-06-25 RX ADMIN — OXYCODONE HYDROCHLORIDE AND ACETAMINOPHEN 1 TABLET: 5; 325 TABLET ORAL at 14:51

## 2025-06-25 RX ADMIN — PANTOPRAZOLE SODIUM 40 MG: 40 TABLET, DELAYED RELEASE ORAL at 09:07

## 2025-06-25 RX ADMIN — CEFEPIME 1000 MG: 1 INJECTION, POWDER, FOR SOLUTION INTRAMUSCULAR; INTRAVENOUS at 05:58

## 2025-06-25 RX ADMIN — CILOSTAZOL 50 MG: 100 TABLET ORAL at 09:07

## 2025-06-25 RX ADMIN — IPRATROPIUM BROMIDE AND ALBUTEROL SULFATE 1 DOSE: .5; 2.5 SOLUTION RESPIRATORY (INHALATION) at 11:46

## 2025-06-25 RX ADMIN — ROSUVASTATIN CALCIUM 10 MG: 10 TABLET, FILM COATED ORAL at 20:28

## 2025-06-25 RX ADMIN — OXCARBAZEPINE 450 MG: 300 TABLET, FILM COATED ORAL at 09:06

## 2025-06-25 RX ADMIN — BUSPIRONE HYDROCHLORIDE 10 MG: 10 TABLET ORAL at 20:28

## 2025-06-25 RX ADMIN — BUSPIRONE HYDROCHLORIDE 10 MG: 10 TABLET ORAL at 13:40

## 2025-06-25 ASSESSMENT — PAIN DESCRIPTION - DESCRIPTORS
DESCRIPTORS: ACHING
DESCRIPTORS: ACHING

## 2025-06-25 ASSESSMENT — PAIN SCALES - GENERAL
PAINLEVEL_OUTOF10: 8
PAINLEVEL_OUTOF10: 8
PAINLEVEL_OUTOF10: 9
PAINLEVEL_OUTOF10: 9

## 2025-06-25 ASSESSMENT — PAIN DESCRIPTION - ORIENTATION
ORIENTATION: LEFT;UPPER
ORIENTATION: LEFT

## 2025-06-25 ASSESSMENT — ENCOUNTER SYMPTOMS
SHORTNESS OF BREATH: 1
ABDOMINAL PAIN: 1

## 2025-06-25 ASSESSMENT — PAIN DESCRIPTION - LOCATION
LOCATION: ABDOMEN

## 2025-06-25 NOTE — PLAN OF CARE
Problem: Respiratory - Adult  Goal: Achieves optimal ventilation and oxygenation  6/24/2025 2047 by Sonal Cutler RCP  Outcome: Progressing     Problem: OXYGENATION/RESPIRATORY FUNCTION  Goal: Patient will maintain patent airway  Outcome: Ongoing  Goal: Patient will achieve/maintain normal respiratory rate/effort  Respiratory rate and effort will be within normal limits for the patient  Outcome: Ongoing    Problem: MECHANICAL VENTILATION  Goal: Patient will maintain patent airway  Outcome: Ongoing  Goal: Oral health is maintained or improved  Outcome: Ongoing  Goal: ET tube will be managed safely  Outcome: Ongoing  Goal: Ability to express needs and understand communication  Outcome: Ongoing  Goal: Mobility/activity is maintained at optimum level for patient  Outcome: Ongoing    Problem: ASPIRATION PRECAUTIONS  Goal: Patient’s risk of aspiration is minimized  Outcome: Ongoing    Problem: SKIN INTEGRITY  Goal: Skin integrity is maintained or improved  Outcome: Ongoing                  BRONCHOSPASM/BRONCHOCONSTRICTION     [x]         IMPROVE AERATION/BREATH SOUNDS  [x]   ADMINISTER BRONCHODILATOR THERAPY AS APPROPRIATE  [x]   ASSESS BREATH SOUNDS  []   IMPLEMENT AEROSOL/MDI PROTOCOL  [x]   PATIENT EDUCATION AS NEEDED

## 2025-06-25 NOTE — PROGRESS NOTES
INTENSIVE CARE UNIT  Resident Physician Progress Note    Patient - Sultana Noyola  Date of Admission -  6/22/2025  5:29 PM  Date of Evaluation -  6/25/2025  Room and Bed Number -  0421/0421-02   Hospital Day - 3    HPI:     History was obtained from the patient.  Sultana Noyola is a 70 y.o.   With PMH      AFIB on Eliquis  Hypertension  HFpEF  Peripheral arterial disease on Plavix  CKD stage IIIa  Dyslipidemia  COPD on 3 L of oxygen via nasal cannula  ANTHONY not on CPAP  Anxiety and depression  Recent V. tach s/p cardioversion-April 2025           Presented today to [facility] after EMS was called for shortness of breath.  The patient states that she went to her granddaughter's wedding to [place] came back on Tuesday, was doing okay until Wednesday when she started to notice shortness of breath, gradually worsening.  She denies any chest pain or productive cough associated with shortness of breath.  States that she uses 3 L of oxygen at home mostly at night previously, but started using 5 L of oxygen due to shortness of breath and was using it during the day as well.  Patient also said that she was feeling dizzy and had a fall 2 days ago states that she did not lost any consciousness but just stood up and felt dizzy and fell down denies any head injury.  Also stated that she found it difficult to pass urine, also states that she has not been able to eat and drink then for the past 4 to 5 days.  States that she thinks that she has last urine output was 2 days ago.  Patient states that she has been taking all her medications until last night but states might have missed morning doses today.  She denies any abd pain , nausea, vomiting or fever. Denies any recent sick contacts.pt states that's that she does missed a few doses of her medication including eliquis while she was on vacation,  Initial evaluation in the ER patient was found to be hypotensive with blood pressure systolic 92/56 with a MAP of 55  Also    BUN 48*   < > 48* 37* 31*   CREATININE 3.7*   < > 3.1* 1.6* 1.2*   GLUCOSE 167*   < > 134* 106* 105*   CALCIUM 9.0   < > 8.8 9.0 9.8   BILITOT 0.4  --   --   --   --    ALKPHOS 81  --   --   --   --    AST 21  --   --   --   --    ALT 24  --   --   --   --     < > = values in this interval not displayed.      Magnesium:   Lab Results   Component Value Date/Time    MG 2.4 06/22/2025 11:17 AM    MG 2.1 04/10/2025 04:14 AM    MG 2.0 03/27/2025 12:34 AM     Phosphorus:   Lab Results   Component Value Date/Time    PHOS 3.9 03/24/2025 08:09 AM    PHOS 3.9 05/07/2023 04:59 AM     Ionized Calcium:   Lab Results   Component Value Date/Time    CAION 1.25 05/07/2023 04:59 AM    CAION 1.59 06/08/2022 11:30 AM    CAION 1.39 06/01/2022 12:10 PM        Urinalysis:   Lab Results   Component Value Date/Time    NITRU NEGATIVE 06/22/2025 07:43 PM    COLORU Dark Yellow 06/22/2025 07:43 PM    PHUR 5.0 06/22/2025 07:43 PM    PHUR 7.0 10/09/2023 07:15 PM    WBCUA 2 TO 5 06/22/2025 07:43 PM    WBCUA 0-2 08/05/2019 08:24 AM    RBCUA 0 TO 2 06/22/2025 07:43 PM    RBCUA 6-10 08/05/2019 08:24 AM    MUCUS 1+ 05/05/2023 05:49 PM    TRICHOMONAS NOT REPORTED 05/31/2015 01:19 PM    YEAST NOT REPORTED 05/31/2015 01:19 PM    BACTERIA None 06/22/2025 07:43 PM    CLARITYU clear 05/31/2015 12:56 PM    SPECGRAV 1.020 05/31/2015 12:56 PM    LEUKOCYTESUR TRACE 06/22/2025 07:43 PM    UROBILINOGEN Normal 06/22/2025 07:43 PM    BILIRUBINUR NEGATIVE 06/22/2025 07:43 PM    BLOODU moderate 05/31/2015 12:56 PM    GLUCOSEU NEGATIVE 06/22/2025 07:43 PM    KETUA TRACE 06/22/2025 07:43 PM    AMORPHOUS NOT REPORTED 05/31/2015 01:19 PM       HgBA1c:    Lab Results   Component Value Date/Time    LABA1C 7.2 01/21/2025 09:40 AM     TSH:    Lab Results   Component Value Date/Time    TSH 0.11 06/23/2025 12:34 PM     Lactic Acid:   Lab Results   Component Value Date/Time    LACTA 1.7 06/22/2025 11:17 AM    LACTA 1.4 08/08/2023 05:06 PM      Troponin: No results for  cardioversion\  History of COPD/emphysema on 3 L of oxygen at home  ANTHONY  Elevated D-dimer 0.6  RUSTAM   Decreased oral intake  Urinary retention  CKD stage IIIa      PLAN:     Last chest x-ray 06/23/2025 mild venous congestion and left basilar atelectasis.  Patient is currently on IV fluid which has been stopped.  Follow-up with nephrology as patient had RUSTAM on CKD creatinine is improved but she complained of shortness of breath on ambulation is still requiring high flow oxygen.  Currently on 8 L of salter high flow will wean O2 to keep saturation 90%.  Patient on home O2 at 3 L.  Recommend to use BiPAP at night and intermittently and as needed during the daytime.  Continue with Symbicort and DuoNeb aerosol.   Patient is currently off diuretic in case of distress intermittent diuretic.  On Eliquis and on Plavix.  Monitor intake and output renal function and urine output.  In case of respiratory distress or worsening hypoxia will repeat chest x-ray.    Stevan Vu MD.    Pulmonary and critical care attending physician   [facility] respiratory specialist    6/25/2025 10:48 AM    Please note that this chart was generated using voice recognition Dragon dictation software.  Although every effort was made to ensure the accuracy of this automated transcription, some errors in transcription may have occurred.

## 2025-06-25 NOTE — PROGRESS NOTES
Physical Therapy  Facility/Department: [address] STEPDOWN   Physical Therapy Initial Evaluation    Patient Name: Sultana Noyola        MRN: [id number]    : 1955    Date of Service: 2025    \"70-year-old female with past medical history of hypertension, congestive heart failure with preserved EF, chronic kidney disease COPD obstructive sleep apnea, history of A-fib on Eliquis who presented initially to [place] emergency department with worsening shortness of breath, feeling dizzy, difficulty with urination, she was found to have hypotensive with MAP 55, also she was bradycardic with heart rate 35-45, she was started on dopamine infusion and she was started on BiPAP and she was transferred to [facility] for ICU care and nephrology consult, during ICU course patient weaned off the drip stabilized and she was transferred to medicine team on 2025 on stable condition \"    Past Medical History:  has a past medical history of ADHD (attention deficit hyperactivity disorder), Anxiety, Atrial fibrillation (HCC), BiPAP (biphasic positive airway pressure) dependence, Bipolar 1 disorder (HCC), Chronic back pain, COPD (chronic obstructive pulmonary disease) ([facility]), Depression, Diabetes (HCC), Emphysema lung (HCC), Valentino catheter in place, History of cardioversion, Hyperlipidemia, Hypertension, Nursing home resident, ANTHONY (obstructive sleep apnea), Pancreatitis, PICC (peripherally inserted central catheter) in place, Pleural effusion, Respiratory failure (HCC), and Stenosis of intestine ([facility]).  Past Surgical History:  has a past surgical history that includes Appendectomy; Hysterectomy; Cholecystectomy; tumor removal; back surgery; Endoscopy, colon, diagnostic; Foot surgery (Left, 2022); tumor removal; thoracentesis (Left, 2022); and hc temporary dialysis catheter (3/23/2025).    Discharge Recommendations  Discharge Recommendations: Patient would benefit from continued therapy after discharge  PT  Yes  Response To Previous Treatment: Not applicable  Family/Caregiver Present: No  Follows Commands: Within Functional Limits  Subjective  Subjective: RN and pt in agreement for PT eval. Pt supine in bed upon PT arrival, pt pleasant and cooperative throughout session. Pt on 9L sal[place] hiflo upon writer's entrance.  Pain  Pre-Pain: 5  Post-Pain: 5  Pain Location: Left;Other (Comment) (Flank)  Pain Descriptor: Sharp;Aching  Pain Interventions: Repositioning;Rest    Home Setup/Prior Level of Function  Social/Functional History  Lives With: Alone  Type of Home: Senior housing apartment (Independent living)  Home Layout: One level  Home Access: Level entry  Bathroom Shower/Tub: Walk-in shower  Bathroom Toilet: Handicap height  Bathroom Equipment: Built-in shower seat, Hand-held shower, Grab bars in shower  Home Equipment: Oxygen, Rollator, Cane (3L at night)  Has the patient had two or more falls in the past year or any fall with injury in the past year?: No (1 fall)  Receives Help From: Home health (Home health 2x/week)  Prior Level of Assist for ADLs: Independent  Prior Level of Assist for Homemaking: Needs assistance (Living community provides meals, pt is responsible for laundry and light cleaning. Community provides heavy cleaning services 1x/week)  Homemaking Responsibilities: Yes  Prior Level of Assist for Ambulation: Independent household ambulator, with or without device  Prior Level of Assist for Transfers: Independent  Active : No  Mode of Transportation: Family, SUV  Occupation: Retired  Type of Occupation:   Leisure & Hobbies: Crafting  Additional Comments: Pt reports family is able to provide prn assist upon discharge.    Vision/Hearing  Vision  Vision: Impaired  Vision Exceptions: Wears glasses at all times  Hearing  Hearing: Within functional limits    Objective  Cognition  Overall Cognitive Status: WFL    Joint Mobility  ROM RLE: WFL  ROM LLE: WFL  ROM RUE: See OT assessment- PT/ OT  1: Pt to perform independent bed mobility and functional transfers  Short Term Goal 2: Pt to ambulate 300ft w/ rollator independently  Short Term Goal 3: Tolerate 30 minutes of therapy to demo increased endruance  Short Term Goal 4: Demonstrate standing dynamic balance of good - to decrease fall risk    Minutes  PT Individual Minutes  Time In: 0953  Time Out: 1019  Minutes: 26  Time Code Minutes  Timed Code Treatment Minutes: 8 Minutes    Electronically signed by Zhanna Johnson PT on 6/25/25 at 12:43 PM EDT

## 2025-06-25 NOTE — PROGRESS NOTES
Occupational Therapy  Occupational Therapy Initial Evaluation  Facility/Department: [facility] 4B STEPDOWN   Patient Name: Sultana Noyola        MRN: [id number]    : 1955    Date of Service: 2025    PER CHART \"Sultana Noyola is a 70 y.o. female who presents with the complaint of increased shortness of breath.  EMS was called on scene because of shortness of breath.  They found her oxygen saturation despite 5 L nasal cannula to be in the mid 80s.  They transitioned the patient rapidly to BiPAP which did improve her symptoms but she is complaining of lightheadedness and dizziness here on arrival.  No fevers or chills.  She does have a bit of a cough.  Cough is nonproductive.  She denies chest pain.  No peripheral edema or calf tenderness.  Symptoms have been going on since Wednesday.  She supposed to be on 3 L per turn it up to 5. \"    Past Medical History:  has a past medical history of ADHD (attention deficit hyperactivity disorder), Anxiety, Atrial fibrillation (HCC), BiPAP (biphasic positive airway pressure) dependence, Bipolar 1 disorder (HCC), Chronic back pain, COPD (chronic obstructive pulmonary disease) (Formerly [facility]), Depression, Diabetes (HCC), Emphysema lung (HCC), Valentino catheter in place, History of cardioversion, Hyperlipidemia, Hypertension, Nursing home resident, ANTHONY (obstructive sleep apnea), Pancreatitis, PICC (peripherally inserted central catheter) in place, Pleural effusion, Respiratory failure (HCC), and Stenosis of intestine (Formerly [facility]).  Past Surgical History:  has a past surgical history that includes Appendectomy; Hysterectomy; Cholecystectomy; tumor removal; back surgery; Endoscopy, colon, diagnostic; Foot surgery (Left, 2022); tumor removal; thoracentesis (Left, 2022); and hc temporary dialysis catheter (3/23/2025).    Discharge Recommendations  Discharge Recommendations: Patient would benefit from continued therapy after discharge  OT Equipment Recommendations  Equipment Needed:  up.)  Standing: With support (Pt stood EOB ~2 minutes w/ SBA-static and CGA-dynamic to B release UEs of walker to adjust gown. Limited by decreased activity tolerance, pt fatigueing quickly w/ fearfulness of losing breath.)    Transfers/Mobility  Bed mobility  Supine to Sit: Contact guard assistance  Sit to Supine: Contact guard assistance  Scooting: Contact guard assistance  Bed Mobility Comments: HOB ~35 degrees          Transfers  Sit to stand: Contact guard assistance  Stand to sit: Contact guard assistance  Transfer Comments: On/off EOB w/ RW         Functional Mobility: Contact guard assistance  Functional Mobility Skilled Clinical Factors: Pt took a few functional side steps towards HOB w/ RW and CGA. Declined further progression of activity.     Patient Education  Patient Education  Education Given To: Patient  Education Provided: Plan of Care;Role of Therapy  Education Provided Comments: activity promotion, activity pacing, call light use for staff if needing moved/repositioned, positioning for improved functional sitting/standing balance, equiptment use, walker management, EC/S strategies, bed mobility training  Education Method: Demonstration;Verbal  Barriers to Learning: None  Education Outcome: Continued education needed;Demonstrated understanding;Verbalized understanding    Goals  Short Term Goals  Time Frame for Short Term Goals: By discharge, pt will:  Short Term Goal 1: Demo 25 minutes of functional activity tolerance during meaningful activities w/ 1 restbreak PRN  Short Term Goal 2: Demo functional sit<>stand transfers and functional mobility with Mod IND and use of LRAD PRN to promote increased independence throughout ADLs  Short Term Goal 3: Demo 10 minutes of dynamic standing tolerance with SBA to promote increased independence throughout ADLs  Short Term Goal 4: Demo LB ADL/toileting with SBA and use of DME/adaptive strategies PRN  Short Term Goal 5: Identify 2+ EC/WS strategies and demo IND  implementation PRN to promote increased independence throughout ADLs    Plan  Occupational Therapy Plan  Times Per Week: 3-4x/wk  Current Treatment Recommendations: Balance training, Functional mobility training, Endurance training, Safety education & training, Patient/Caregiver education & training, Equipment evaluation, education, & procurement, Self-Care / ADL, Cognitive/Perceptual training, Strengthening, Home management training    Minutes  OT Individual Minutes  Time In: 0952  Time Out: 1019  Minutes: 27  Time Code Minutes   Timed Code Treatment Minutes: 9 Minutes    Electronically signed by LUCINA Sheridan/L on 6/25/25 at 12:23 PM EDT

## 2025-06-25 NOTE — H&P
[facility]  Office: [phone]  Ace Hoskins DO, Jose Mays, DO, Harris Bray DO, Stephane Lyn, DO, Kirk Ledesma MD, Giselle Rich MD, Kati Martinez MD, Genoveva Marcus MD,  Eliseo Longo MD, Radha Bay MD, Carlin Aggarwal MD,  Savannah Lynn DO, Raul Cardona MD, Min Marks MD, Noel Hoskins DO, Rupal Gamez MD,  Ruben Araujo DO, Diann Duffy MD, Dara Ruffin MD, Sarita Olmstead MD,  Rory Alexander MD, Meg Mckinney MD, Sigrid Allan MD, Noam Agrawal MD, Patricio Morton MD, Demar Soto MD, Piotr Sims, DO, Mary Costa MD, Georgi Paulino DO, Angel Small MD, Savannah Sewell MD, Mohsin Reza, MD, Janessa German MD, Shirley Waterhouse, CNP,  Kathryn Montalvo CNP, Piotr Grande, CNP,  Patito Gallo, LISHA, Lizbeth Stewart CNP, Melida Funes, CNP, Andria Freeman, CNP, Stefani Murcia, CNP, Linnette Adame, PA-C, Adrienne Crandall, CNP, Avery Kent, CNP,  Kia Fritz, CNP, Clotilde Ching, CNP, Vinod Smith, PA-C, Yue Kan, PA-C, Andreina Hui, CNP,        Kirstin Jimenez, CNS, Lisa Wayne, CNP, Prudence Laird, CNP         [facility]   IN-PATIENT SERVICE   [facility]    HISTORY AND PHYSICAL EXAMINATION            Date:   6/25/2025  Patient name:  Sultana Noyola  Date of admission:  6/22/2025  5:29 PM  MRN:   [id number]  Account:  [id number]  YOB: 1955  PCP:    Tavia Bonner APRN - CNP  Room:   0421/0421-02  Code Status:    Full Code    Chief Complaint:     SOB    History Obtained From:     patient, electronic medical record    History of Present Illness:     70-year-old female with past medical history of hypertension, congestive heart failure with preserved EF, chronic kidney disease COPD obstructive sleep apnea, history of A-fib on Eliquis who presented initially to [place] emergency department with worsening shortness of breath, feeling dizzy, difficulty with urination, she was found to have hypotensive with

## 2025-06-25 NOTE — PROGRESS NOTES
[facility]   Progress Note                   Date:   6/25/2025  Patient name: Sultana Noyola  Date of admission:  6/22/2025  5:29 PM  MRN:   [id number]  YOB: 1955  PCP: Tavia Bonner, APRN - CNP    Reason for Admission: Hypotension [I95.9]  Acute on chronic respiratory failure (HCC) [J96.20]  RUSTAM (acute kidney injury) [N17.9]    Subjective:       Clinical Changes / Abnormalities: Pt seen and examined in the room.  Patient resting in bed. Pt denies any CP. She reports that she feels that her breathing is worse since admission and she is more short of breath which then cause her to be lightheaded.  Labs, vitals and tele reviewed- SR 65    Medications:   Scheduled Meds:   cefepime  2,000 mg IntraVENous Q24H    ipratropium 0.5 mg-albuterol 2.5 mg  1 Dose Inhalation Q4H RT    apixaban  5 mg Oral BID    predniSONE  40 mg Oral Daily    mirtazapine  15 mg Oral Nightly    OXcarbazepine  450 mg Oral Daily    busPIRone  10 mg Oral TID    insulin lispro  0-8 Units SubCUTAneous 4x Daily AC & HS    amLODIPine  10 mg Oral Daily    budesonide-formoterol  2 puff Inhalation BID    cilostazol  50 mg Oral BID    clopidogrel  75 mg Oral Daily    DULoxetine  20 mg Oral Daily    [Held by provider] gabapentin  300 mg Oral TID    hydrALAZINE  50 mg Oral 3 times per day    pantoprazole  40 mg Oral Daily    rosuvastatin  10 mg Oral Nightly    traZODone  100 mg Oral Nightly    sodium chloride flush  5-40 mL IntraVENous 2 times per day     Continuous Infusions:   dextrose      sodium chloride 10 mL/hr at 06/24/25 2040     CBC:   Recent Labs     06/23/25  0307 06/24/25  0519 06/25/25  0349   WBC 15.6* 7.1 9.8   HGB 8.4* 7.5* 8.7*    193 261     BMP:    Recent Labs     06/23/25  0307 06/24/25  0519 06/25/25  0349    140 144   K 4.8 5.1 4.9    108* 110*   CO2 21 21 21   BUN 48* 37* 31*   CREATININE 3.1* 1.6* 1.2*   GLUCOSE 134* 106* 105*     Hepatic:   Recent Labs     06/22/25  1117   AST 21

## 2025-06-25 NOTE — PROGRESS NOTES
Pharmacy Note     Renal Dose Adjustment    Sultana Noyola is a 70 y.o. female. Pharmacist assessment of renally cleared medications.    Recent Labs     06/24/25  0519 06/25/25  0349   BUN 37* 31*       Recent Labs     06/24/25  0519 06/25/25  0349   CREATININE 1.6* 1.2*       Estimated Creatinine Clearance: 47 mL/min (A) (based on SCr of 1.2 mg/dL (H)).    Height:   Ht Readings from Last 1 Encounters:   06/23/25 1.626 m (5' 4.02\")     Weight:  Wt Readings from Last 1 Encounters:   06/23/25 89.8 kg (198 lb)       The following medication dose has been adjusted based upon renal function per P&T Guidelines:             Cefepime 1000 mg IVPB q12 hrs for UTI was changed to:    Cefepime 2000 mg IVPB q 24hrs for UTI based on renal function

## 2025-06-25 NOTE — PLAN OF CARE
Problem: Respiratory - Adult  Goal: Achieves optimal ventilation and oxygenation  6/25/2025 0913 by Zainab Stark RCP  Outcome: Progressing  Flowsheets (Taken 6/25/2025 0913)  Achieves optimal ventilation and oxygenation:   Assess for changes in respiratory status   Respiratory therapy support as indicated   Oxygen supplementation based on oxygen saturation or arterial blood gases   Assess and instruct to report shortness of breath or any respiratory difficulty

## 2025-06-25 NOTE — PROGRESS NOTES
Renal Progress Note    Patient :  Sultana Noyola; 70 y.o. MRN# [id number]  Location:  0421/0421-02  Attending:  Noam Agrawal*  Admit Date:  6/22/2025   Hospital Day: 3      Subjective:     Having intermittent shortness of breath especially on exertion.  This afternoon after she went to the bathroom and came back she became quite short of breath.  Blood pressures went up to 180 systolic.  Chest x-ray showed pulmonary edema.  Responded to IV Lasix.  Now feels well.  Creatinine down to 1.2.  Blood pressures mostly controlled.  Has been taken off Coreg on admission due to bradycardia and now heart rates are better controlled.  Continues on Norvasc and hydralazine.  Prior to her last admission she was on lisinopril however developed hyperkalemia and was taken off it.  Renal function back to baseline creatinine down to 1.2.  Urine output good.  No lower extremity edema.  No fever chills nausea vomiting or diarrhea.  Labs today show sodium 144 potassium 4.9 chloride 110 bicarb 21 BUN 31 creatinine 1.2    History reviewed  Known history of COPD/emphysema from smoking, chronic kidney disease stage IIIa baseline 1.2-1.4 was following up with nephrology group at the [facility].  Hemoglobin A1c has been in the 7-8 range.  Ultrasound has shown kidney size to be around 10.5 cm.  Urine sediment has been benign.  Serological workup has been negative.  She has a tendency for hyperkalemia and required 1 dialysis session earlier this year.  She is now off ACE inhibitors completely.  She also has known history of COPD from smoking, is on home oxygen.  She came into the hospital with complaint of shortness of breath and generalized weakness.  Had been to a [place] in the [place] about a [place].  On presentation she was hypoxic requiring higher levels of oxygen, she also was complaining of dizziness and was bradycardic, heart rate in the 30-40 range.  She also had a creatinine of 3.7.  She was then transferred  from [place] to [facility]  She was treated appropriately, and intrarenal hemodynamics were established renal function improved.  Nephrology was consulted for acute kidney injury.  Outpatient Medications:     Medications Prior to Admission: Cholecalciferol (VITAMIN D3) 1.25 MG (92800 UT) CAPS, Take 5,000 Units by mouth Daily  loperamide (IMODIUM) 2 MG capsule, Take 1 capsule by mouth 4 times daily as needed for Diarrhea  lisinopril (PRINIVIL;ZESTRIL) 10 MG tablet, Take 1 tablet by mouth in the morning and at bedtime  vitamin B-12 (CYANOCOBALAMIN) 500 MCG tablet, Take 1 tablet by mouth daily  senna-docusate (PERICOLACE) 8.6-50 MG per tablet, Take 2 tablets by mouth daily  apixaban (ELIQUIS) 5 MG TABS tablet, Take 2 tablets by mouth 2 times daily  butalbital-acetaminophen-caffeine (FIORICET, ESGIC) -40 MG per tablet, Take 1 tablet by mouth every 6 hours as needed for Headaches  traMADol (ULTRAM) 50 MG tablet, Take 1 tablet by mouth 2 times daily as needed for Pain. Max Daily Amount: 100 mg  metFORMIN (GLUCOPHAGE) 500 MG tablet, Take 1 tablet by mouth 2 times daily (with meals) (Patient taking differently: Take 1 tablet by mouth daily (with breakfast))  rosuvastatin (CRESTOR) 20 MG tablet, Take 1 tablet by mouth nightly  hydrALAZINE (APRESOLINE) 50 MG tablet, Take 1 tablet by mouth every 8 hours  carvedilol (COREG) 25 MG tablet, Take 1 tablet by mouth 2 times daily (with meals) (Patient taking differently: Take 0.5 tablets by mouth 2 times daily (with meals))  amLODIPine (NORVASC) 10 MG tablet, Take 1 tablet by mouth daily  gabapentin (NEURONTIN) 300 MG capsule, Take 400 mg by mouth in the morning and at bedtime.  albuterol (ACCUNEB) 1.25 MG/3ML nebulizer solution, Inhale 3 mLs into the lungs every 6 hours as needed for Wheezing  budesonide-formoterol (SYMBICORT) 80-4.5 MCG/ACT AERO, Inhale 2 puffs into the lungs 2 times daily  OXcarbazepine (TRILEPTAL) 150 MG tablet, Take 3 tablets by mouth

## 2025-06-25 NOTE — PLAN OF CARE
Problem: Discharge Planning  Goal: Discharge to home or other facility with appropriate resources  6/24/2025 2043 by Elle Grayson RN  Outcome: Progressing    Problem: Pain  Goal: Verbalizes/displays adequate comfort level or baseline comfort level  6/24/2025 2043 by Elle Grayson RN  Outcome: Progressing    Problem: Skin/Tissue Integrity  Goal: Skin integrity remains intact  Description: 1.  Monitor for areas of redness and/or skin breakdown  2.  Assess vascular access sites hourly  3.  Every 4-6 hours minimum:  Change oxygen saturation probe site  4.  Every 4-6 hours:  If on nasal continuous positive airway pressure, respiratory therapy assess nares and determine need for appliance change or resting period  6/24/2025 2043 by Elle Grayson RN  Outcome: Progressing  Flowsheets (Taken 6/24/2025 2041)  Skin Integrity Remains Intact: Monitor for areas of redness and/or skin breakdown  6/24/2025 1704 by Chely Ruiz  Outcome: Progressing    Problem: Safety - Adult  Goal: Free from fall injury  6/24/2025 2043 by Elle Grayson RN  Outcome: Progressing  Flowsheets (Taken 6/24/2025 2041)  Free From Fall Injury: Based on caregiver fall risk screen, instruct family/caregiver to ask for assistance with transferring infant if caregiver noted to have fall risk factors  6/24/2025 1704 by Chely Ruiz  Outcome: Progressing     Problem: ABCDS Injury Assessment  Goal: Absence of physical injury  6/24/2025 2043 by Elle Grayson RN  Outcome: Progressing  Flowsheets (Taken 6/24/2025 2041)  Absence of Physical Injury: Implement safety measures based on patient assessment  6/24/2025 1704 by Chely Ruiz  Outcome: Progressing     Problem: Respiratory - Adult  Goal: Achieves optimal ventilation and oxygenation  6/24/2025 2043 by Elle Grayson RN  Outcome: Progressing  6/24/2025 1704 by Chely Ruiz  Outcome: Progressing

## 2025-06-25 NOTE — PROGRESS NOTES
Physician Progress Note      PATIENT:               BERHANE WHEELER  CSN #:                  [id number]  :                       1955  ADMIT DATE:       2025 5:29 PM  DISCH DATE:  RESPONDING  PROVIDER #:        Noam Agrawal MD          QUERY TEXT:    Atrial fibrillation is documented in the H&P. Please clarify the type:    The clinical indicators include:  - admission w/ ac resp failure with SOB and dizziness    - EKG- NSR-  - HR - 92 - 39  - telemetry - NSR    - PO meds- was on Eliquis and on hold at present time on admission along with   Lopressor.  - monitor rate, rhythm.  - POO meds- Plavix, Statin, Norvasc, Hydralazine  Options provided:  -- Chronic, unspecified  -- Paroxysmal  -- Chronic persistent  -- Longstanding persistent  -- Other persistent  -- Other - I will add my own diagnosis  -- Disagree - Not applicable / Not valid  -- Disagree - Clinically unable to determine / Unknown  -- Refer to Clinical Documentation Reviewer    PROVIDER RESPONSE TEXT:    The patient's atrial fibrillation is chronic, unspecified.    Query created by: Noel Iglesias on 2025 12:59 PM      Electronically signed by:  Noam Agrawal MD 2025 4:22 PM

## 2025-06-25 NOTE — PLAN OF CARE
Problem: Discharge Planning  Goal: Discharge to home or other facility with appropriate resources  Outcome: Progressing     Problem: Pain  Goal: Verbalizes/displays adequate comfort level or baseline comfort level  Outcome: Progressing     Problem: Skin/Tissue Integrity  Goal: Skin integrity remains intact  Description: 1.  Monitor for areas of redness and/or skin breakdown  2.  Assess vascular access sites hourly  3.  Every 4-6 hours minimum:  Change oxygen saturation probe site  4.  Every 4-6 hours:  If on nasal continuous positive airway pressure, respiratory therapy assess nares and determine need for appliance change or resting period  Outcome: Progressing     Problem: Safety - Adult  Goal: Free from fall injury  Outcome: Progressing     Problem: ABCDS Injury Assessment  Goal: Absence of physical injury  Outcome: Progressing     Problem: Chronic Conditions and Co-morbidities  Goal: Patient's chronic conditions and co-morbidity symptoms are monitored and maintained or improved  Outcome: Progressing     Problem: Respiratory - Adult  Goal: Achieves optimal ventilation and oxygenation  6/25/2025 1054 by Bhavik Aden, RN  Outcome: Progressing  6/25/2025 0913 by Zainab Stark RCP  Flowsheets (Taken 6/25/2025 0913)  Achieves optimal ventilation and oxygenation:   Assess for changes in respiratory status   Respiratory therapy support as indicated   Oxygen supplementation based on oxygen saturation or arterial blood gases   Assess and instruct to report shortness of breath or any respiratory difficulty  6/25/2025 0913 by Zainab Stark RCP  Outcome: Progressing  Flowsheets (Taken 6/25/2025 0913)  Achieves optimal ventilation and oxygenation:   Assess for changes in respiratory status   Respiratory therapy support as indicated   Oxygen supplementation based on oxygen saturation or arterial blood gases   Assess and instruct to report shortness of breath or any respiratory difficulty     Problem: Cardiovascular -

## 2025-06-26 LAB
ANION GAP SERPL CALCULATED.3IONS-SCNC: 13 MMOL/L (ref 9–16)
BASOPHILS # BLD: 0.03 K/UL (ref 0–0.2)
BASOPHILS NFR BLD: 0 % (ref 0–2)
BUN SERPL-MCNC: 28 MG/DL (ref 8–23)
CALCIUM SERPL-MCNC: 9.3 MG/DL (ref 8.6–10.4)
CHLORIDE SERPL-SCNC: 105 MMOL/L (ref 98–107)
CO2 SERPL-SCNC: 22 MMOL/L (ref 20–31)
CREAT SERPL-MCNC: 1.3 MG/DL (ref 0.6–0.9)
EOSINOPHIL # BLD: <0.03 K/UL (ref 0–0.44)
EOSINOPHILS RELATIVE PERCENT: 0 % (ref 1–4)
ERYTHROCYTE [DISTWIDTH] IN BLOOD BY AUTOMATED COUNT: 13.6 % (ref 11.8–14.4)
GFR, ESTIMATED: 44 ML/MIN/1.73M2
GLUCOSE BLD-MCNC: 137 MG/DL (ref 65–105)
GLUCOSE BLD-MCNC: 139 MG/DL (ref 65–105)
GLUCOSE BLD-MCNC: 188 MG/DL (ref 65–105)
GLUCOSE BLD-MCNC: 85 MG/DL (ref 65–105)
GLUCOSE SERPL-MCNC: 106 MG/DL (ref 74–99)
HCT VFR BLD AUTO: 24.6 % (ref 36.3–47.1)
HGB BLD-MCNC: 7.7 G/DL (ref 11.9–15.1)
IMM GRANULOCYTES # BLD AUTO: 0.1 K/UL (ref 0–0.3)
IMM GRANULOCYTES NFR BLD: 1 %
LYMPHOCYTES NFR BLD: 1.2 K/UL (ref 1.1–3.7)
LYMPHOCYTES RELATIVE PERCENT: 15 % (ref 24–43)
MCH RBC QN AUTO: 29.5 PG (ref 25.2–33.5)
MCHC RBC AUTO-ENTMCNC: 31.3 G/DL (ref 28.4–34.8)
MCV RBC AUTO: 94.3 FL (ref 82.6–102.9)
MONOCYTES NFR BLD: 0.81 K/UL (ref 0.1–1.2)
MONOCYTES NFR BLD: 10 % (ref 3–12)
NEUTROPHILS NFR BLD: 73 % (ref 36–65)
NEUTS SEG NFR BLD: 5.74 K/UL (ref 1.5–8.1)
NRBC BLD-RTO: 0 PER 100 WBC
PARTIAL THROMBOPLASTIN TIME: 37.1 SEC (ref 23–36.5)
PLATELET # BLD AUTO: 266 K/UL (ref 138–453)
PMV BLD AUTO: 11.3 FL (ref 8.1–13.5)
POTASSIUM SERPL-SCNC: 4.4 MMOL/L (ref 3.7–5.3)
RBC # BLD AUTO: 2.61 M/UL (ref 3.95–5.11)
SODIUM SERPL-SCNC: 140 MMOL/L (ref 136–145)
WBC OTHER # BLD: 7.9 K/UL (ref 3.5–11.3)

## 2025-06-26 PROCEDURE — 6370000000 HC RX 637 (ALT 250 FOR IP): Performed by: INTERNAL MEDICINE

## 2025-06-26 PROCEDURE — 99233 SBSQ HOSP IP/OBS HIGH 50: CPT | Performed by: NURSE PRACTITIONER

## 2025-06-26 PROCEDURE — 2500000003 HC RX 250 WO HCPCS

## 2025-06-26 PROCEDURE — 6370000000 HC RX 637 (ALT 250 FOR IP)

## 2025-06-26 PROCEDURE — 6370000000 HC RX 637 (ALT 250 FOR IP): Performed by: STUDENT IN AN ORGANIZED HEALTH CARE EDUCATION/TRAINING PROGRAM

## 2025-06-26 PROCEDURE — 36415 COLL VENOUS BLD VENIPUNCTURE: CPT

## 2025-06-26 PROCEDURE — 6360000002 HC RX W HCPCS: Performed by: STUDENT IN AN ORGANIZED HEALTH CARE EDUCATION/TRAINING PROGRAM

## 2025-06-26 PROCEDURE — 94761 N-INVAS EAR/PLS OXIMETRY MLT: CPT

## 2025-06-26 PROCEDURE — 85025 COMPLETE CBC W/AUTO DIFF WBC: CPT

## 2025-06-26 PROCEDURE — 85730 THROMBOPLASTIN TIME PARTIAL: CPT

## 2025-06-26 PROCEDURE — 94640 AIRWAY INHALATION TREATMENT: CPT

## 2025-06-26 PROCEDURE — 99233 SBSQ HOSP IP/OBS HIGH 50: CPT | Performed by: INTERNAL MEDICINE

## 2025-06-26 PROCEDURE — 2580000003 HC RX 258

## 2025-06-26 PROCEDURE — 2580000003 HC RX 258: Performed by: STUDENT IN AN ORGANIZED HEALTH CARE EDUCATION/TRAINING PROGRAM

## 2025-06-26 PROCEDURE — 2700000000 HC OXYGEN THERAPY PER DAY

## 2025-06-26 PROCEDURE — 82947 ASSAY GLUCOSE BLOOD QUANT: CPT

## 2025-06-26 PROCEDURE — 2060000000 HC ICU INTERMEDIATE R&B

## 2025-06-26 PROCEDURE — 80048 BASIC METABOLIC PNL TOTAL CA: CPT

## 2025-06-26 PROCEDURE — 6370000000 HC RX 637 (ALT 250 FOR IP): Performed by: NURSE PRACTITIONER

## 2025-06-26 PROCEDURE — 99232 SBSQ HOSP IP/OBS MODERATE 35: CPT | Performed by: STUDENT IN AN ORGANIZED HEALTH CARE EDUCATION/TRAINING PROGRAM

## 2025-06-26 RX ORDER — FUROSEMIDE 40 MG/1
40 TABLET ORAL DAILY
Status: DISCONTINUED | OUTPATIENT
Start: 2025-06-26 | End: 2025-06-27 | Stop reason: HOSPADM

## 2025-06-26 RX ORDER — PREDNISONE 20 MG/1
10 TABLET ORAL DAILY
Status: DISCONTINUED | OUTPATIENT
Start: 2025-06-30 | End: 2025-06-27 | Stop reason: HOSPADM

## 2025-06-26 RX ORDER — PREDNISONE 20 MG/1
20 TABLET ORAL DAILY
Status: DISCONTINUED | OUTPATIENT
Start: 2025-06-27 | End: 2025-06-27 | Stop reason: HOSPADM

## 2025-06-26 RX ORDER — IPRATROPIUM BROMIDE AND ALBUTEROL SULFATE 2.5; .5 MG/3ML; MG/3ML
1 SOLUTION RESPIRATORY (INHALATION)
Status: DISCONTINUED | OUTPATIENT
Start: 2025-06-26 | End: 2025-06-27 | Stop reason: HOSPADM

## 2025-06-26 RX ADMIN — OXYCODONE HYDROCHLORIDE AND ACETAMINOPHEN 1 TABLET: 5; 325 TABLET ORAL at 09:07

## 2025-06-26 RX ADMIN — IPRATROPIUM BROMIDE AND ALBUTEROL SULFATE 1 DOSE: .5; 2.5 SOLUTION RESPIRATORY (INHALATION) at 08:35

## 2025-06-26 RX ADMIN — OXYCODONE HYDROCHLORIDE AND ACETAMINOPHEN 1 TABLET: 5; 325 TABLET ORAL at 02:07

## 2025-06-26 RX ADMIN — IPRATROPIUM BROMIDE AND ALBUTEROL SULFATE 1 DOSE: .5; 2.5 SOLUTION RESPIRATORY (INHALATION) at 21:46

## 2025-06-26 RX ADMIN — OXYCODONE HYDROCHLORIDE AND ACETAMINOPHEN 1 TABLET: 5; 325 TABLET ORAL at 15:25

## 2025-06-26 RX ADMIN — AMOXICILLIN AND CLAVULANATE POTASSIUM 1 TABLET: 875; 125 TABLET, FILM COATED ORAL at 21:02

## 2025-06-26 RX ADMIN — BUDESONIDE AND FORMOTEROL FUMARATE DIHYDRATE 2 PUFF: 80; 4.5 AEROSOL RESPIRATORY (INHALATION) at 08:36

## 2025-06-26 RX ADMIN — APIXABAN 5 MG: 5 TABLET, FILM COATED ORAL at 21:02

## 2025-06-26 RX ADMIN — SODIUM CHLORIDE, PRESERVATIVE FREE 20 ML: 5 INJECTION INTRAVENOUS at 21:02

## 2025-06-26 RX ADMIN — BUSPIRONE HYDROCHLORIDE 10 MG: 10 TABLET ORAL at 09:08

## 2025-06-26 RX ADMIN — HYDRALAZINE HYDROCHLORIDE 50 MG: 50 TABLET ORAL at 12:59

## 2025-06-26 RX ADMIN — DULOXETINE 20 MG: 20 CAPSULE, DELAYED RELEASE ORAL at 09:08

## 2025-06-26 RX ADMIN — FUROSEMIDE 40 MG: 40 TABLET ORAL at 12:59

## 2025-06-26 RX ADMIN — SODIUM CHLORIDE: 0.9 INJECTION, SOLUTION INTRAVENOUS at 09:18

## 2025-06-26 RX ADMIN — BUSPIRONE HYDROCHLORIDE 10 MG: 10 TABLET ORAL at 12:59

## 2025-06-26 RX ADMIN — BUDESONIDE AND FORMOTEROL FUMARATE DIHYDRATE 2 PUFF: 80; 4.5 AEROSOL RESPIRATORY (INHALATION) at 21:46

## 2025-06-26 RX ADMIN — MIRTAZAPINE 15 MG: 15 TABLET, FILM COATED ORAL at 21:02

## 2025-06-26 RX ADMIN — PANTOPRAZOLE SODIUM 40 MG: 40 TABLET, DELAYED RELEASE ORAL at 09:09

## 2025-06-26 RX ADMIN — CARVEDILOL 6.25 MG: 6.25 TABLET, FILM COATED ORAL at 09:07

## 2025-06-26 RX ADMIN — PIPERACILLIN AND TAZOBACTAM 3375 MG: 3; .375 INJECTION, POWDER, LYOPHILIZED, FOR SOLUTION INTRAVENOUS at 00:19

## 2025-06-26 RX ADMIN — CILOSTAZOL 50 MG: 100 TABLET ORAL at 09:09

## 2025-06-26 RX ADMIN — INSULIN LISPRO 2 UNITS: 100 INJECTION, SOLUTION INTRAVENOUS; SUBCUTANEOUS at 17:16

## 2025-06-26 RX ADMIN — APIXABAN 5 MG: 5 TABLET, FILM COATED ORAL at 09:07

## 2025-06-26 RX ADMIN — CILOSTAZOL 50 MG: 100 TABLET ORAL at 21:02

## 2025-06-26 RX ADMIN — PREDNISONE 40 MG: 20 TABLET ORAL at 09:08

## 2025-06-26 RX ADMIN — CARVEDILOL 6.25 MG: 6.25 TABLET, FILM COATED ORAL at 21:02

## 2025-06-26 RX ADMIN — OXYCODONE HYDROCHLORIDE AND ACETAMINOPHEN 1 TABLET: 5; 325 TABLET ORAL at 21:02

## 2025-06-26 RX ADMIN — BUSPIRONE HYDROCHLORIDE 10 MG: 10 TABLET ORAL at 21:02

## 2025-06-26 RX ADMIN — PIPERACILLIN AND TAZOBACTAM 3375 MG: 3; .375 INJECTION, POWDER, LYOPHILIZED, FOR SOLUTION INTRAVENOUS at 09:21

## 2025-06-26 RX ADMIN — Medication 3 MG: at 23:35

## 2025-06-26 RX ADMIN — TRAZODONE HYDROCHLORIDE 100 MG: 50 TABLET ORAL at 21:02

## 2025-06-26 RX ADMIN — HYDRALAZINE HYDROCHLORIDE 50 MG: 50 TABLET ORAL at 05:33

## 2025-06-26 RX ADMIN — OXCARBAZEPINE 450 MG: 300 TABLET, FILM COATED ORAL at 09:09

## 2025-06-26 RX ADMIN — AMLODIPINE BESYLATE 10 MG: 10 TABLET ORAL at 09:08

## 2025-06-26 RX ADMIN — SODIUM CHLORIDE, PRESERVATIVE FREE 10 ML: 5 INJECTION INTRAVENOUS at 09:12

## 2025-06-26 RX ADMIN — HYDRALAZINE HYDROCHLORIDE 50 MG: 50 TABLET ORAL at 22:00

## 2025-06-26 RX ADMIN — CLOPIDOGREL BISULFATE 75 MG: 75 TABLET, FILM COATED ORAL at 09:10

## 2025-06-26 RX ADMIN — ROSUVASTATIN CALCIUM 10 MG: 10 TABLET, FILM COATED ORAL at 21:02

## 2025-06-26 ASSESSMENT — PAIN DESCRIPTION - DESCRIPTORS: DESCRIPTORS: SHARP

## 2025-06-26 ASSESSMENT — PAIN DESCRIPTION - ORIENTATION
ORIENTATION: LEFT
ORIENTATION: LEFT;UPPER

## 2025-06-26 ASSESSMENT — ENCOUNTER SYMPTOMS
ABDOMINAL PAIN: 1
SHORTNESS OF BREATH: 1

## 2025-06-26 ASSESSMENT — PAIN DESCRIPTION - LOCATION
LOCATION: ABDOMEN
LOCATION: ABDOMEN

## 2025-06-26 ASSESSMENT — PAIN SCALES - GENERAL
PAINLEVEL_OUTOF10: 8
PAINLEVEL_OUTOF10: 5
PAINLEVEL_OUTOF10: 8
PAINLEVEL_OUTOF10: 8

## 2025-06-26 ASSESSMENT — PAIN DESCRIPTION - ONSET: ONSET: ON-GOING

## 2025-06-26 ASSESSMENT — PAIN - FUNCTIONAL ASSESSMENT: PAIN_FUNCTIONAL_ASSESSMENT: PREVENTS OR INTERFERES SOME ACTIVE ACTIVITIES AND ADLS

## 2025-06-26 ASSESSMENT — PAIN DESCRIPTION - PAIN TYPE: TYPE: ACUTE PAIN

## 2025-06-26 ASSESSMENT — PAIN DESCRIPTION - FREQUENCY: FREQUENCY: CONTINUOUS

## 2025-06-26 NOTE — RT PROTOCOL NOTE
RT Inhaler-Nebulizer Bronchodilator Protocol Note    There is a bronchodilator order in the chart from a provider indicating to follow the RT Bronchodilator Protocol and there is an “Initiate RT Inhaler-Nebulizer Bronchodilator Protocol” order as well (see protocol at bottom of note).    CXR Findings:  XR CHEST PORTABLE  Result Date: 6/25/2025  1. Increased opacities right mid-lower lung and similar bibasilar opacities with probable small effusions.  Findings may represent worsening pneumonia and/or pulmonary edema. 2. Possible 12 mm nodular density left mid lung. Recommend follow-up to resolution.       The findings from the last RT Protocol Assessment were as follows:   History Pulmonary Disease: Chronic pulmonary disease  Respiratory Pattern: Regular pattern and RR 12-20 bpm  Breath Sounds: Slightly diminished and/or crackles  Cough: Strong, spontaneous, non-productive  Indication for Bronchodilator Therapy: None  Bronchodilator Assessment Score: 4    Aerosolized bronchodilator medication orders have been revised according to the RT Inhaler-Nebulizer Bronchodilator Protocol below.    Respiratory Therapist to perform RT Therapy Protocol Assessment initially then follow the protocol.  Repeat RT Therapy Protocol Assessment PRN for score 0-3 or on second treatment, BID, and PRN for scores above 3.    No Indications - adjust the frequency to every 6 hours PRN wheezing or bronchospasm, if no treatments needed after 48 hours then discontinue using Per Protocol order mode.     If indication present, adjust the RT bronchodilator orders based on the Bronchodilator Assessment Score as indicated below.  Use Inhaler orders unless patient has one or more of the following: on home nebulizer, not able to hold breath for 10 seconds, is not alert and oriented, cannot activate and use MDI correctly, or respiratory rate 25 breaths per minute or more, then use the equivalent nebulizer order(s) with same Frequency and PRN reasons

## 2025-06-26 NOTE — PROGRESS NOTES
Physician Progress Note    Patient - Sultana Noyola  Date of Admission -  6/22/2025  5:29 PM  Date of Evaluation -  6/26/2025  Room and Bed Number -  0421/0421-02   Hospital Day - 4  Cc - chf , pneumonia   HPI:     History was obtained from the patient.  Sultana Noyola is a 70 y.o.   With PMH      AFIB on Eliquis  Hypertension  HFpEF  Peripheral arterial disease on Plavix  CKD stage IIIa  Dyslipidemia  COPD on 3 L of oxygen via nasal cannula  ANTHONY not on CPAP  Anxiety and depression  Recent V. tach s/p cardioversion-April 2025           Presented today to [place] ER after EMS was called for shortness of breath.  The patient states that she went to her granddaughter's wedding to [place] came back on Tuesday, was doing okay until Wednesday when she started to notice shortness of breath, gradually worsening.  She denies any chest pain or productive cough associated with shortness of breath.  States that she uses 3 L of oxygen at home mostly at night previously, but started using 5 L of oxygen due to shortness of breath and was using it during the day as well.  Patient also said that she was feeling dizzy and had a fall 2 days ago states that she did not lost any consciousness but just stood up and felt dizzy and fell down denies any head injury.  Also stated that she found it difficult to pass urine, also states that she has not been able to eat and drink then for the past 4 to 5 days.  States that she thinks that she has last urine output was 2 days ago.  Patient states that she has been taking all her medications until last night but states might have missed morning doses today.  She denies any abd pain , nausea, vomiting or fever. Denies any recent sick contacts.pt states that's that she does missed a few doses of her medication including eliquis while she was on vacation,  Initial evaluation in the ER patient was found to be hypotensive with blood pressure systolic 92/56 with a MAP of 55  Also patient  was found to be bradycardic with heart rate running between 35-45, patient was started on the dopamine infusion in [place].  Lab workup was concerning showing sodium 133, potassium 5, BUN 48, creatinine 3.7, lactic acid 1.7, magnesium 2.4, glucose 167, also BNP was elevated 22,000, troponin 35, LFTs were normal, WBC 16.9, hemoglobin 9.2  Blood gas shows pH of 7.45, pCO2 41, bicarb 29.  Also patient was started on BiPAP, and was decided to be transferred [facility] for ICU care and nephrology consultation  Initial evaluation of patient shows that patient has been looking clinically very dehydrated,She is alert and oriented, talking in full sentences currently on 6 L of oxygen by nasal cannula saturating between 90 to 92%.    SUBJECTIVE:     OVERNIGHT EVENTS:   2025   Wbc better   Cultures negative   On  6 l o2   No wheeze   Xray chest - pulmonary vascular edema         OBJECTIVE:     VITAL SIGNS:  BP (!) 174/72   Pulse 61   Temp 97.9 °F (36.6 °C) (Oral)   Resp 21   Ht 1.626 m (5' 4.02\")   Wt 89.8 kg (198 lb)   SpO2 98%   BMI 33.97 kg/m²   Tmax over 24 hours:  Temp (24hrs), Av.8 °F (36.6 °C), Min:97.2 °F (36.2 °C), Max:98.6 °F (37 °C)      Patient Vitals for the past 8 hrs:   BP Temp Temp src Pulse Resp SpO2   25 0836 -- -- -- 61 21 --   25 0835 -- -- -- 66 21 98 %   25 0745 (!) 174/72 97.9 °F (36.6 °C) Oral 56 12 94 %   25 0344 131/62 98.6 °F (37 °C) Temporal 58 14 97 %   25 0237 -- -- -- -- 15 --         Intake/Output Summary (Last 24 hours) at 2025 0927  Last data filed at 2025 1723  Gross per 24 hour   Intake 200 ml   Output 1650 ml   Net -1450 ml         Wt Readings from Last 3 Encounters:   25 89.8 kg (198 lb)   25 85.7 kg (189 lb)   04/10/25 90.2 kg (198 lb 12.8 oz)     Body mass index is 33.97 kg/m².        PHYSICAL EXAM:  Constitutional: Appears well, mildly tachypneic/no acute distress  EENT: PERRLA, EOMI, sclera clear,

## 2025-06-26 NOTE — PLAN OF CARE
Problem: Discharge Planning  Goal: Discharge to home or other facility with appropriate resources  Outcome: Progressing  Flowsheets (Taken 6/26/2025 0900)  Discharge to home or other facility with appropriate resources: Identify barriers to discharge with patient and caregiver

## 2025-06-26 NOTE — PROGRESS NOTES
[facility]  Office: [phone]  Ace Hoskins, DO, Jose Mays, DO, Harris Bray DO, Stephane Lyn, DO, Kirk Ledesma MD, Giselle Rich MD, Kati Martinez MD, Genoveva Marcus MD,  Eliseo Longo MD, Radha Bay MD, Carlin Aggarwal MD,  Savannah Lynn DO, Raul Cardona MD, Min Marks MD, Noel Hoskins DO, Rupal Gamez MD,  Ruben Araujo DO, Diann Duffy MD, Dara Ruffin MD, Sarita Olmstead MD,  Rory Alexander MD, Meg Mckinney MD, Sigrid Allan MD, Noam Agrawal MD, Patricio Morton MD, Demar Soto MD, Piotr Sims, DO, Mary Costa MD, Georgi Paulino DO, Angel Small MD, Savannah Sewell MD, Mohsin Reza, MD, Janessa German MD, Shirley Waterhouse, CNP,  Kathryn Montalvo, CNP, Piotr Grande, CNP,  Patito Gallo, LISHA, Lizbeth Stewart, CNP, Melida Funes, CNP, Andria Freeman, CNP, Stefani Murcia, CNP, Linnette Adame, PA-C, Adrienne Crandall, CNP, Avery Kent, CNP,  Kia Fritz, CNP, Clotilde Ching, CNP, Vinod Smith, PA-C, Yue Kan, PA-C, Andreina Hui, CNP,        Kirstin Jimenez, CNS, Lisa Wayne, CNP, Prudence Laird, CNP         [facility]   IN-PATIENT SERVICE   [facility]    Progress Note    6/26/2025    11:41 AM    Name:   Sultana Noyola  MRN:     [id number]     Acct:      [id number]   Room:   0421/0421-02  IP Day:  4  Admit Date:  6/22/2025  5:29 PM    PCP:   Tavia Bonner APRN - CNP  Code Status:  Full Code    Subjective:     C/C: No chief complaint on file.    Interval History Status: improved.     Patient was seen and examined, at time of my evaluation patient reports that she is feeling better today, she weaned off to 6 L nasal cannula, she continued to report left upper quadrant abdominal pain slightly better, x-ray abdomen and ultrasound abdomen came back unremarkable we will continue to monitor will continue with pain management supportive care  Labs vital sign consulted note reviewed  Discussed with RN    Devante  failure (HCC) 6/23/2025 Yes    Hx of emphysema (HCC) 6/23/2025 Yes    Chronic hypoxemic respiratory failure (HCC) 6/23/2025 Yes    Acute on chronic respiratory failure with hypoxia (HCC) 6/25/2025 Yes       Plan:        Acute hypoxic respiratory failure requiring oxygen supplement, currently patient on 6 l, repeat chest x-ray yesterday shows worsening pneumonia and fluid overloaded, she was given 1 dose of Lasix, IV antibiotic was switched to Zosyn, patient feeling better today she is on 6 L, will continue to wean off as tolerated pulmonology is following will follow  recommendations   acute on chronic kidney disease present on admission-improving status post IV fluid, will consider diuresis as needed , monitor BMP, avoid nephrotoxic agent  Abdominal pain present on admission, x-ray abdomen unremarkable patient denies nausea vomiting ultrasound abdomen unremarkable, could be related to musculoskeletal rib pain after the fall, patient reports that she fell down 1 week prior admission, continue with pain management supportive care  Mildly elevated troponin most likely demand ischemia type II MI due to worsening kidney function, cardiology is following, recent stress test normal, no indication for further ischemic workup will follow with cardiology, continue Plavix and statin  History of A-fib heart rate controlled, continue Eliquis, continue to monitor on telemetry  Peripheral artery disease continue Plavix and statin  Hypertension continue amlodipine  Depression/anxiety continue with home medication  PT-OT       Noam Agrawal MD  6/26/2025  11:41 AM

## 2025-06-26 NOTE — PROGRESS NOTES
[facility]   Progress Note                   Date:   6/26/2025  Patient name: Sultana Noyola  Date of admission:  6/22/2025  5:29 PM  MRN:   [id number]  YOB: 1955  PCP: Tavia Bonner, SARAHI - CNP    Reason for Admission: Hypotension [I95.9]  Acute on chronic respiratory failure (HCC) [J96.20]  RUSTAM (acute kidney injury) [N17.9]    Subjective:       Clinical Changes / Abnormalities: Pt seen and examined in the room.  Patient resting in bed. Pt denies any CP. She reports that she feels that her breathing has improved, currently on 6L nasal cannula.  Labs, vitals and tele reviewed- SR 61    Medications:   Scheduled Meds:   amoxicillin-clavulanate  1 tablet Oral 2 times per day    [START ON 6/27/2025] predniSONE  20 mg Oral Daily    Followed by    [START ON 6/30/2025] predniSONE  10 mg Oral Daily    ipratropium 0.5 mg-albuterol 2.5 mg  1 Dose Inhalation BID RT    carvedilol  6.25 mg Oral BID    apixaban  5 mg Oral BID    mirtazapine  15 mg Oral Nightly    OXcarbazepine  450 mg Oral Daily    busPIRone  10 mg Oral TID    insulin lispro  0-8 Units SubCUTAneous 4x Daily AC & HS    amLODIPine  10 mg Oral Daily    budesonide-formoterol  2 puff Inhalation BID    cilostazol  50 mg Oral BID    clopidogrel  75 mg Oral Daily    DULoxetine  20 mg Oral Daily    [Held by provider] gabapentin  300 mg Oral TID    hydrALAZINE  50 mg Oral 3 times per day    pantoprazole  40 mg Oral Daily    rosuvastatin  10 mg Oral Nightly    traZODone  100 mg Oral Nightly    sodium chloride flush  5-40 mL IntraVENous 2 times per day     Continuous Infusions:   dextrose      sodium chloride 25 mL/hr at 06/26/25 0918     CBC:   Recent Labs     06/24/25  0519 06/25/25  0349 06/26/25  0300   WBC 7.1 9.8 7.9   HGB 7.5* 8.7* 7.7*    261 266     BMP:    Recent Labs     06/24/25  0519 06/25/25  0349 06/26/25  0300    144 140   K 5.1 4.9 4.4   * 110* 105   CO2 21 21 22   BUN 37* 31* 28*   CREATININE 1.6* 1.2*

## 2025-06-26 NOTE — CARE COORDINATION
Met w/ patient and plan is to return home to apartment at [facility] w/ [id number] and referral sent. Has transportation home.

## 2025-06-26 NOTE — PLAN OF CARE
Problem: Discharge Planning  Goal: Discharge to home or other facility with appropriate resources  6/25/2025 2126 by Brandy Shore RN  Outcome: Progressing  6/25/2025 1054 by Bhavik Aden RN  Outcome: Progressing     Problem: Pain  Goal: Verbalizes/displays adequate comfort level or baseline comfort level  6/25/2025 2126 by Brandy Shore RN  Outcome: Progressing  6/25/2025 1054 by Bhavik Aden RN  Outcome: Progressing     Problem: Skin/Tissue Integrity  Goal: Skin integrity remains intact  Description: 1.  Monitor for areas of redness and/or skin breakdown  2.  Assess vascular access sites hourly  3.  Every 4-6 hours minimum:  Change oxygen saturation probe site  4.  Every 4-6 hours:  If on nasal continuous positive airway pressure, respiratory therapy assess nares and determine need for appliance change or resting period  6/25/2025 2126 by Brandy Shore RN  Outcome: Progressing  6/25/2025 1054 by Bhavik Aden RN  Outcome: Progressing     Problem: Safety - Adult  Goal: Free from fall injury  6/25/2025 2126 by Brandy Shore RN  Outcome: Progressing  6/25/2025 1054 by Bhavik Aden RN  Outcome: Progressing     Problem: ABCDS Injury Assessment  Goal: Absence of physical injury  6/25/2025 2126 by Brnady Shore RN  Outcome: Progressing  6/25/2025 1054 by Bhavik Aden RN  Outcome: Progressing     Problem: Chronic Conditions and Co-morbidities  Goal: Patient's chronic conditions and co-morbidity symptoms are monitored and maintained or improved  6/25/2025 2126 by Brandy Shore RN  Outcome: Progressing  6/25/2025 1054 by Bhavik Aden RN  Outcome: Progressing     Problem: Respiratory - Adult  Goal: Achieves optimal ventilation and oxygenation  6/25/2025 2126 by Brandy Shore RN  Outcome: Progressing  6/25/2025 1054 by Bhavik Aden RN  Outcome: Progressing  6/25/2025 0913 by Zainab Stark RCP  Flowsheets (Taken 6/25/2025 0913)  Achieves optimal  ventilation and oxygenation:   Assess for changes in respiratory status   Respiratory therapy support as indicated   Oxygen supplementation based on oxygen saturation or arterial blood gases   Assess and instruct to report shortness of breath or any respiratory difficulty  6/25/2025 0913 by Zainab Stark RCP  Outcome: Progressing  Flowsheets (Taken 6/25/2025 0913)  Achieves optimal ventilation and oxygenation:   Assess for changes in respiratory status   Respiratory therapy support as indicated   Oxygen supplementation based on oxygen saturation or arterial blood gases   Assess and instruct to report shortness of breath or any respiratory difficulty     Problem: Cardiovascular - Adult  Goal: Maintains optimal cardiac output and hemodynamic stability  6/25/2025 2126 by Brandy Shore RN  Outcome: Progressing  6/25/2025 1054 by Bhavik Aden RN  Outcome: Progressing  Goal: Absence of cardiac dysrhythmias or at baseline  6/25/2025 2126 by Brandy Shore RN  Outcome: Progressing  6/25/2025 1054 by Bhavik Aden RN  Outcome: Progressing     Problem: Infection - Adult  Goal: Absence of infection at discharge  6/25/2025 2126 by Brandy Shore RN  Outcome: Progressing  6/25/2025 1054 by Bhavik Aden RN  Outcome: Progressing     Problem: Metabolic/Fluid and Electrolytes - Adult  Goal: Electrolytes maintained within normal limits  6/25/2025 2126 by Brandy Shore RN  Outcome: Progressing  6/25/2025 1054 by Bhavik Aden RN  Outcome: Progressing  Goal: Hemodynamic stability and optimal renal function maintained  6/25/2025 2126 by Brandy Shore RN  Outcome: Progressing  6/25/2025 1054 by Bhavik Aden RN  Outcome: Progressing  Goal: Glucose maintained within prescribed range  6/25/2025 2126 by Brandy Shore RN  Outcome: Progressing  6/25/2025 1054 by Bhavik Aden RN  Outcome: Progressing

## 2025-06-27 VITALS
HEIGHT: 64 IN | TEMPERATURE: 98.4 F | WEIGHT: 198 LBS | SYSTOLIC BLOOD PRESSURE: 153 MMHG | BODY MASS INDEX: 33.8 KG/M2 | OXYGEN SATURATION: 96 % | DIASTOLIC BLOOD PRESSURE: 71 MMHG | RESPIRATION RATE: 15 BRPM | HEART RATE: 70 BPM

## 2025-06-27 LAB
ANION GAP SERPL CALCULATED.3IONS-SCNC: 11 MMOL/L (ref 9–16)
BASOPHILS # BLD: 0.04 K/UL (ref 0–0.2)
BASOPHILS NFR BLD: 1 % (ref 0–2)
BUN SERPL-MCNC: 25 MG/DL (ref 8–23)
CALCIUM SERPL-MCNC: 9.3 MG/DL (ref 8.6–10.4)
CHLORIDE SERPL-SCNC: 105 MMOL/L (ref 98–107)
CO2 SERPL-SCNC: 26 MMOL/L (ref 20–31)
CREAT SERPL-MCNC: 1.2 MG/DL (ref 0.6–0.9)
EOSINOPHIL # BLD: 0.05 K/UL (ref 0–0.44)
EOSINOPHILS RELATIVE PERCENT: 1 % (ref 1–4)
ERYTHROCYTE [DISTWIDTH] IN BLOOD BY AUTOMATED COUNT: 13.4 % (ref 11.8–14.4)
GFR, ESTIMATED: 49 ML/MIN/1.73M2
GLUCOSE BLD-MCNC: 156 MG/DL (ref 65–105)
GLUCOSE BLD-MCNC: 99 MG/DL (ref 65–105)
GLUCOSE SERPL-MCNC: 96 MG/DL (ref 74–99)
HCT VFR BLD AUTO: 26.5 % (ref 36.3–47.1)
HGB BLD-MCNC: 8.3 G/DL (ref 11.9–15.1)
IMM GRANULOCYTES # BLD AUTO: 0.14 K/UL (ref 0–0.3)
IMM GRANULOCYTES NFR BLD: 2 %
LYMPHOCYTES NFR BLD: 1.73 K/UL (ref 1.1–3.7)
LYMPHOCYTES RELATIVE PERCENT: 23 % (ref 24–43)
MCH RBC QN AUTO: 28.7 PG (ref 25.2–33.5)
MCHC RBC AUTO-ENTMCNC: 31.3 G/DL (ref 28.4–34.8)
MCV RBC AUTO: 91.7 FL (ref 82.6–102.9)
MICROORGANISM SPEC CULT: NORMAL
MICROORGANISM SPEC CULT: NORMAL
MONOCYTES NFR BLD: 0.88 K/UL (ref 0.1–1.2)
MONOCYTES NFR BLD: 12 % (ref 3–12)
NEUTROPHILS NFR BLD: 61 % (ref 36–65)
NEUTS SEG NFR BLD: 4.81 K/UL (ref 1.5–8.1)
NRBC BLD-RTO: 0 PER 100 WBC
PLATELET # BLD AUTO: 284 K/UL (ref 138–453)
PMV BLD AUTO: 11 FL (ref 8.1–13.5)
POTASSIUM SERPL-SCNC: 3.9 MMOL/L (ref 3.7–5.3)
RBC # BLD AUTO: 2.89 M/UL (ref 3.95–5.11)
SERVICE CMNT-IMP: NORMAL
SERVICE CMNT-IMP: NORMAL
SODIUM SERPL-SCNC: 142 MMOL/L (ref 136–145)
SPECIMEN DESCRIPTION: NORMAL
SPECIMEN DESCRIPTION: NORMAL
WBC OTHER # BLD: 7.7 K/UL (ref 3.5–11.3)

## 2025-06-27 PROCEDURE — 6370000000 HC RX 637 (ALT 250 FOR IP)

## 2025-06-27 PROCEDURE — 6370000000 HC RX 637 (ALT 250 FOR IP): Performed by: INTERNAL MEDICINE

## 2025-06-27 PROCEDURE — 85025 COMPLETE CBC W/AUTO DIFF WBC: CPT

## 2025-06-27 PROCEDURE — 99232 SBSQ HOSP IP/OBS MODERATE 35: CPT | Performed by: INTERNAL MEDICINE

## 2025-06-27 PROCEDURE — 94640 AIRWAY INHALATION TREATMENT: CPT

## 2025-06-27 PROCEDURE — 99239 HOSP IP/OBS DSCHRG MGMT >30: CPT | Performed by: STUDENT IN AN ORGANIZED HEALTH CARE EDUCATION/TRAINING PROGRAM

## 2025-06-27 PROCEDURE — 2700000000 HC OXYGEN THERAPY PER DAY

## 2025-06-27 PROCEDURE — 2500000003 HC RX 250 WO HCPCS

## 2025-06-27 PROCEDURE — 6370000000 HC RX 637 (ALT 250 FOR IP): Performed by: STUDENT IN AN ORGANIZED HEALTH CARE EDUCATION/TRAINING PROGRAM

## 2025-06-27 PROCEDURE — 80048 BASIC METABOLIC PNL TOTAL CA: CPT

## 2025-06-27 PROCEDURE — 36415 COLL VENOUS BLD VENIPUNCTURE: CPT

## 2025-06-27 PROCEDURE — 82947 ASSAY GLUCOSE BLOOD QUANT: CPT

## 2025-06-27 RX ORDER — PREDNISONE 10 MG/1
TABLET ORAL
Qty: 7 TABLET | Refills: 0 | Status: SHIPPED | OUTPATIENT
Start: 2025-06-28 | End: 2025-07-07

## 2025-06-27 RX ORDER — FUROSEMIDE 40 MG/1
40 TABLET ORAL DAILY
Qty: 60 TABLET | Refills: 3 | Status: SHIPPED | OUTPATIENT
Start: 2025-06-28

## 2025-06-27 RX ADMIN — APIXABAN 5 MG: 5 TABLET, FILM COATED ORAL at 09:07

## 2025-06-27 RX ADMIN — AMLODIPINE BESYLATE 10 MG: 10 TABLET ORAL at 09:07

## 2025-06-27 RX ADMIN — IPRATROPIUM BROMIDE AND ALBUTEROL SULFATE 1 DOSE: .5; 2.5 SOLUTION RESPIRATORY (INHALATION) at 08:20

## 2025-06-27 RX ADMIN — FUROSEMIDE 40 MG: 40 TABLET ORAL at 09:07

## 2025-06-27 RX ADMIN — BUDESONIDE AND FORMOTEROL FUMARATE DIHYDRATE 2 PUFF: 80; 4.5 AEROSOL RESPIRATORY (INHALATION) at 08:20

## 2025-06-27 RX ADMIN — CLOPIDOGREL BISULFATE 75 MG: 75 TABLET, FILM COATED ORAL at 09:07

## 2025-06-27 RX ADMIN — OXCARBAZEPINE 450 MG: 300 TABLET, FILM COATED ORAL at 09:07

## 2025-06-27 RX ADMIN — PANTOPRAZOLE SODIUM 40 MG: 40 TABLET, DELAYED RELEASE ORAL at 09:07

## 2025-06-27 RX ADMIN — AMOXICILLIN AND CLAVULANATE POTASSIUM 1 TABLET: 875; 125 TABLET, FILM COATED ORAL at 09:07

## 2025-06-27 RX ADMIN — OXYCODONE HYDROCHLORIDE AND ACETAMINOPHEN 1 TABLET: 5; 325 TABLET ORAL at 10:12

## 2025-06-27 RX ADMIN — SODIUM CHLORIDE, PRESERVATIVE FREE 10 ML: 5 INJECTION INTRAVENOUS at 09:08

## 2025-06-27 RX ADMIN — BUSPIRONE HYDROCHLORIDE 10 MG: 10 TABLET ORAL at 09:07

## 2025-06-27 RX ADMIN — HYDRALAZINE HYDROCHLORIDE 50 MG: 50 TABLET ORAL at 04:00

## 2025-06-27 RX ADMIN — CARVEDILOL 6.25 MG: 6.25 TABLET, FILM COATED ORAL at 09:07

## 2025-06-27 RX ADMIN — PREDNISONE 20 MG: 20 TABLET ORAL at 09:08

## 2025-06-27 RX ADMIN — OXYCODONE HYDROCHLORIDE AND ACETAMINOPHEN 1 TABLET: 5; 325 TABLET ORAL at 03:52

## 2025-06-27 RX ADMIN — DULOXETINE 20 MG: 20 CAPSULE, DELAYED RELEASE ORAL at 09:07

## 2025-06-27 RX ADMIN — CILOSTAZOL 50 MG: 100 TABLET ORAL at 09:07

## 2025-06-27 ASSESSMENT — PAIN SCALES - GENERAL
PAINLEVEL_OUTOF10: 6
PAINLEVEL_OUTOF10: 7
PAINLEVEL_OUTOF10: 8
PAINLEVEL_OUTOF10: 8

## 2025-06-27 ASSESSMENT — PAIN - FUNCTIONAL ASSESSMENT
PAIN_FUNCTIONAL_ASSESSMENT: PREVENTS OR INTERFERES SOME ACTIVE ACTIVITIES AND ADLS
PAIN_FUNCTIONAL_ASSESSMENT: ACTIVITIES ARE NOT PREVENTED

## 2025-06-27 ASSESSMENT — PAIN DESCRIPTION - ORIENTATION
ORIENTATION: LEFT;UPPER
ORIENTATION: LEFT;UPPER
ORIENTATION: LEFT

## 2025-06-27 ASSESSMENT — PAIN DESCRIPTION - LOCATION
LOCATION: RIB CAGE
LOCATION: RIB CAGE
LOCATION: ABDOMEN

## 2025-06-27 ASSESSMENT — PAIN DESCRIPTION - DESCRIPTORS
DESCRIPTORS: SHARP
DESCRIPTORS: ACHING

## 2025-06-27 ASSESSMENT — PAIN DESCRIPTION - ONSET: ONSET: ON-GOING

## 2025-06-27 ASSESSMENT — PAIN DESCRIPTION - PAIN TYPE: TYPE: ACUTE PAIN

## 2025-06-27 NOTE — PROGRESS NOTES
Physical Therapy        Physical Therapy Cancel Note      DATE: 2025    NAME: Sultana Noyola  MRN: [id number]   : 1955      Patient not seen this date for Physical Therapy due to:    Patient Declined: PT refusing any OOB activity d/t taking a pain pill, writer educated pt on importance of mobility to regain independence still refusing. Will check back as time permits.       Electronically signed by Kevin Gloria PTA on 2025 at 10:22 AM

## 2025-06-27 NOTE — PLAN OF CARE
Problem: Respiratory - Adult  Goal: Achieves optimal ventilation and oxygenation  6/27/2025 0501 by Carlitos Pineda, RN  Outcome: Progressing

## 2025-06-27 NOTE — CARE COORDINATION
Discharge Report    [facility]  Clinical Case Management Department  Written by: Aminata Faulkner RN    Patient Name: Sultana Noyola  Attending Provider: Noam Agrawal*  Admit Date: 2025  5:29 PM  MRN: [id number]  Account: [id number]                     : 1955  Discharge Date: 25      Disposition: home    Met with patient to discuss transitional planning. Plan is return home with [address]. Patient has transportation home. Message to [address] to notify patient is discharging home today. Message sent to dr baker to request home care orders be placed and alvaro be completed.     Aminata Faulkner RN

## 2025-06-27 NOTE — DISCHARGE INSTR - COC
Continuity of Care Form    Patient Name: Sultana Noyola   :  1955  MRN:  [id number]    Admit date:  2025  Discharge date:  25    Code Status Order: Full Code    Advance Directives:     Admitting Physician:  Stevan Vu MD  PCP: Tavia Bonner, APRN - CNP    Discharging Nurse: Mary REBOLLEDO  Discharging Hospital Unit/Room#: 0421/0421-02  Discharging Unit Phone Number: [phone]    Emergency Contact:   Extended Emergency Contact Information  Primary Emergency Contact: Lynn Johnson  Address: [address] [place] [address]  Home Phone: [phone]  Relation: Child  Secondary Emergency Contact: LeroymercedesjenLa Nena           [place], [place]  Home Phone: [phone]  Relation: Child    Past Surgical History:  Past Surgical History:   Procedure Laterality Date    APPENDECTOMY      BACK SURGERY      CHOLECYSTECTOMY      ENDOSCOPY, COLON, DIAGNOSTIC      egd    FOOT SURGERY Left 2022    2 toes amputated    [facility] TEMPORARY DIALYSIS CATHETER  3/23/2025    HYSTERECTOMY (CERVIX STATUS UNKNOWN)      THORACENTESIS Left 2022    US THORACENTESIS    TUMOR REMOVAL      benign tumor removed from rt leg as child    TUMOR REMOVAL         Immunization History:   There is no immunization history for the selected administration types on file for this patient.    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    Hypoxia R09.02    Multiple compression fractures of thoracic vertebra ([facility]) S22.000A    Hyperkalemia E87.5    Acute kidney injury N17.9    Stage 3a chronic kidney disease (HCC) N18.31    Symptomatic bradycardia R00.1    Dyslipidemia E78.5    Ventricular tachyarrhythmia ([facility]) I47.20     (if transferring to Rehab): Good    Recommended Labs or Other Treatments After Discharge: ontinue to check  blood pressure regularly at home and follow-up with your primary doctor within 1 week of discharge to recheck  CBC BMP and to adjust your blood pressure medication and home medication if needed, and follow-up with pulmonology lung doctor after discharge     Physician Certification: I certify the above information and transfer of Sultana Noyola  is necessary for the continuing treatment of the diagnosis listed and that she requires Home Care for greater 30 days.     Update Admission H&P: No change in H&P    PHYSICIAN SIGNATURE:  Electronically signed by Noam Agrawal MD on 6/27/25 at 1:05 PM EDT

## 2025-06-27 NOTE — DISCHARGE SUMMARY
[facility]  Office: [phone]  Ace Hoskins, DO, Jose Mays, DO, Harris Bray DO, Stephane Lyn, DO, Kirk Ledesma MD, Giselle Rich MD, Kati Martinez MD, Genoveva Marcus MD,  Eliseo Longo MD, Radha Bay MD, Carlin Aggarwal MD,  Savannah Lynn DO, Raul Cardona MD, Min Marks MD, Noel Hoskins DO, Rupal Gamez MD,  Ruben Araujo DO, Diann Duffy MD, Dara Ruffin MD, Sarita Olmstead MD,  Rory Alexander MD, Meg Mckinney MD, Sigrid Allan MD, Noam Agrawal MD, Patricio Morton MD, Demar Soto MD, Piotr Sims, DO, Mary Costa MD, Georgi Paulino DO, Angel Small MD, Savannah Sewell MD, Mohsin Reza, MD, Janessa German MD, Shirley Waterhouse, CNP,  Kathryn Montalvo, CNP, Piotr Grande, CNP,  Patito Gallo, LISHA, Lizbeth Stewart, CNP, Melida Funes, CNP, Andria Freeman, CNP, Stefani Murcia, CNP, Linnette Adame, PA-C, Adrienne Crandlal, CNP, Avery Kent, CNP,  Kia Fritz, CNP, Clotilde Ching, CNP, Vinod Smith, PA-C, Yue Kan, PA-C, Andreina Hui, CNP,        Kirstin Jimenez, CNS, Lisa Wayne, CNP, Prudence Laird, CNP         [facility]   IN-PATIENT SERVICE   [facility]    Discharge Summary     Patient ID: Sultana Noyola  :  1955   MRN: [id number]     ACCOUNT:  [id number]   Patient's PCP: Tavia Bonner APRN - CNP  Admit Date: 2025   Discharge Date: 2025    Length of Stay: 5  Code Status:  Full Code  Admitting Physician: Stevan Vu MD  Discharge Physician: Noam Agrawal MD     Active Discharge Diagnoses:     Hospital Problem Lists:  Principal Problem:    Acute kidney injury superimposed on CKD  Active Problems:    Symptomatic bradycardia    Congestive heart failure (HCC)    Hx of emphysema (HCC)    Chronic hypoxemic respiratory failure (HCC)    Acute on chronic respiratory failure with hypoxia (HCC)  Resolved Problems:    * No resolved hospital problems. *      Admission Condition:

## 2025-06-27 NOTE — PROGRESS NOTES
CLINICAL PHARMACY NOTE: MEDS TO BEDS    Total # of Prescriptions Filled: 3   The following medications were delivered to the patient:  Furosemide 40 mg  Augmentin 875-125 mg  Prednisone 10 mg    Additional Documentation: dropped off to patient in room 421 on 6/27/25 at 12 pm by leah. No copay

## 2025-06-27 NOTE — PROGRESS NOTES
Physician Progress Note    Patient - Sultana Noyola  Date of Admission -  6/22/2025  5:29 PM  Date of Evaluation -  6/27/2025  Room and Bed Number -  0421/0421-02   Hospital Day - 5  Cc - chf , pneumonia   HPI:     History was obtained from the patient.  Sultana Noyola is a 70 y.o.   With PMH      AFIB on Eliquis  Hypertension  HFpEF  Peripheral arterial disease on Plavix  CKD stage IIIa  Dyslipidemia  COPD on 3 L of oxygen via nasal cannula  ANTHONY not on CPAP  Anxiety and depression  Recent V. tach s/p cardioversion-April 2025           Presented today to [facility] after EMS was called for shortness of breath.  The patient states that she went to her granddaughter's wedding to [place] came back on Tuesday, was doing okay until Wednesday when she started to notice shortness of breath, gradually worsening.  She denies any chest pain or productive cough associated with shortness of breath.  States that she uses 3 L of oxygen at home mostly at night previously, but started using 5 L of oxygen due to shortness of breath and was using it during the day as well.  Patient also said that she was feeling dizzy and had a fall 2 days ago states that she did not lost any consciousness but just stood up and felt dizzy and fell down denies any head injury.  Also stated that she found it difficult to pass urine, also states that she has not been able to eat and drink then for the past 4 to 5 days.  States that she thinks that she has last urine output was 2 days ago.  Patient states that she has been taking all her medications until last night but states might have missed morning doses today.  She denies any abd pain , nausea, vomiting or fever. Denies any recent sick contacts.pt states that's that she does missed a few doses of her medication including eliquis while she was on vacation,  Initial evaluation in the ER patient was found to be hypotensive with blood pressure systolic 92/56 with a MAP of 55  Also patient  was found to be bradycardic with heart rate running between 35-45, patient was started on the dopamine infusion in [place].  Lab workup was concerning showing sodium 133, potassium 5, BUN 48, creatinine 3.7, lactic acid 1.7, magnesium 2.4, glucose 167, also BNP was elevated 22,000, troponin 35, LFTs were normal, WBC 16.9, hemoglobin 9.2  Blood gas shows pH of 7.45, pCO2 41, bicarb 29.  Also patient was started on BiPAP, and was decided to be transferred [facility] for ICU care and nephrology consultation  Initial evaluation of patient shows that patient has been looking clinically very dehydrated,She is alert and oriented, talking in full sentences currently on 6 L of oxygen by nasal cannula saturating between 90 to 92%.    SUBJECTIVE:     OVERNIGHT EVENTS:   2025   No acute changes       OBJECTIVE:     VITAL SIGNS:  BP (!) 142/80   Pulse 59   Temp 98.2 °F (36.8 °C) (Oral)   Resp 15   Ht 1.626 m (5' 4.02\")   Wt 89.8 kg (198 lb)   SpO2 96%   BMI 33.97 kg/m²   Tmax over 24 hours:  Temp (24hrs), Av.2 °F (36.8 °C), Min:98.1 °F (36.7 °C), Max:98.4 °F (36.9 °C)      Patient Vitals for the past 8 hrs:   BP Temp Temp src Pulse Resp SpO2   25 1042 -- -- -- -- 15 --   25 1012 -- -- -- -- 16 --   25 0835 -- -- -- 59 -- --   25 0741 (!) 142/80 98.2 °F (36.8 °C) Oral 61 17 96 %   25 0422 -- -- -- -- 16 --   25 0352 (!) 159/75 98.2 °F (36.8 °C) Temporal 62 19 96 %         Intake/Output Summary (Last 24 hours) at 2025 1054  Last data filed at 2025 1038  Gross per 24 hour   Intake 1243.44 ml   Output 2550 ml   Net -1306.56 ml     Date 25 0000 - 25 2359   Shift 6006-8206 5384-8065 1321-8347 24 Hour Total   INTAKE   P.O.(mL/kg/hr)  240  240   Shift Total(mL/kg)  240(2.7)  240(2.7)   OUTPUT   Urine(mL/kg/hr)  400  400   Shift Total(mL/kg)  400(4.5)  400(4.5)   Weight (kg) 89.8 89.8 89.8 89.8       Wt Readings from Last 3 Encounters:   25 89.8  Eliquis and on Plavix.    Electronically signed by ELLE BARNETT MD on 6/27/2025 at 10:54 AM     Please note that this chart was generated using voice recognition Dragon dictation software.  Although every effort was made to ensure the accuracy of this automated transcription, some errors in transcription may have occurred.

## 2025-06-27 NOTE — PLAN OF CARE
Problem: Pain  Goal: Verbalizes/displays adequate comfort level or baseline comfort level  6/27/2025 0943 by Mary Krause, RN  Outcome: Progressing-Patient assessed for pain level this shift via 0-10 scale, reassessed appropriately post-interventions.     Problem: Skin/Tissue Integrity  Goal: Skin integrity remains intact  Description: 1.  Monitor for areas of redness and/or skin breakdown  2.  Assess vascular access sites hourly  3.  Every 4-6 hours minimum:  Change oxygen saturation probe site  4.  Every 4-6 hours:  If on nasal continuous positive airway pressure, respiratory therapy assess nares and determine need for appliance change or resting period  6/27/2025 0943 by Mary Krause, RN  Outcome: Progressing-No new skin abnormalities noted during this shift.      Problem: Safety - Adult  Goal: Free from fall injury  6/27/2025 0943 by Mary Krause, RN  Outcome: Progressing-Patient free from falls during this shift.     Problem: ABCDS Injury Assessment  Goal: Absence of physical injury  6/27/2025 0943 by Mary Krause, RN  Outcome: Progressing-no new injuries during this shift.

## 2025-06-27 NOTE — DISCHARGE INSTRUCTIONS
Please continue to check your blood pressure regularly at home and follow-up with your primary doctor within 1 week of discharge to recheck your CBC BMP and to adjust your blood pressure medication and home medication if needed, and follow-up with pulmonology lung doctor after discharge

## 2025-07-06 NOTE — PROGRESS NOTES
Physician Progress Note      PATIENT:               BERHANE WHEELER  CSN #:                  [id number]  :                       1955  ADMIT DATE:       2025 5:29 PM  DISCH DATE:        2025 1:07 PM  RESPONDING  PROVIDER #:        Noam Agrawal MD          QUERY TEXT:    Pneumonia is documented in the H&P and the following PN>  Please specify the   type of pneumonia and the causative organism:    The clinical indicators include:  admission with pneumonia and RUSTAM and had acuter respiratory failure  - h/o COPD, CHF    - C/[place] SOB [place].  - WBC-  - 16.9, 20.7;  - 15.6, - 7.1, -  9.8, - 7.9,    - 7.7.  - CXR -  - Cardiomegaly with mild pulmonary edema.  - CXR -  - Increased opacities right mid-lower lung and similar bibasilar   opacities with probable small effusions. Findings may represent worsening   pneumonia and/or pulmonary edema.    - IV -  - Maxipime.  -  Zosyn.  -  Augmentin. They   were discharged on 5 more days of Augmentin.  - Oxygen therapy  Options provided:  -- Gram negative pneumonia (excluding Haemophilus influenza)  -- Other - I will add my own diagnosis  -- Disagree - Not applicable / Not valid  -- Disagree - Clinically unable to determine / Unknown  -- Refer to Clinical Documentation Reviewer    PROVIDER RESPONSE TEXT:    This patient has gram negative pneumonia (excluding Haemophilus influenza)    Query created by: Noel Iglesias on 7/3/2025 8:47 AM      Electronically signed by:  Noam Agrawal MD 2025 1:45 PM

## 2025-08-09 ENCOUNTER — APPOINTMENT (OUTPATIENT)
Dept: GENERAL RADIOLOGY | Age: 70
DRG: 291 | End: 2025-08-09
Payer: MEDICARE

## 2025-08-09 ENCOUNTER — APPOINTMENT (OUTPATIENT)
Dept: ULTRASOUND IMAGING | Age: 70
DRG: 291 | End: 2025-08-09
Payer: MEDICARE

## 2025-08-09 ENCOUNTER — HOSPITAL ENCOUNTER (INPATIENT)
Age: 70
LOS: 9 days | Discharge: HOME | DRG: 291 | End: 2025-08-18
Attending: STUDENT IN AN ORGANIZED HEALTH CARE EDUCATION/TRAINING PROGRAM | Admitting: INTERNAL MEDICINE
Payer: MEDICARE

## 2025-08-09 ENCOUNTER — HOSPITAL ENCOUNTER (EMERGENCY)
Age: 70
Discharge: ANOTHER HOSPITAL | DRG: 291 | End: 2025-08-09
Attending: EMERGENCY MEDICINE
Payer: MEDICARE

## 2025-08-09 VITALS
WEIGHT: 205.03 LBS | BODY MASS INDEX: 35 KG/M2 | HEIGHT: 64 IN | OXYGEN SATURATION: 96 % | DIASTOLIC BLOOD PRESSURE: 37 MMHG | TEMPERATURE: 98.1 F | RESPIRATION RATE: 12 BRPM | SYSTOLIC BLOOD PRESSURE: 104 MMHG | HEART RATE: 39 BPM

## 2025-08-09 DIAGNOSIS — N17.9 ACUTE KIDNEY INJURY SUPERIMPOSED ON CKD: Primary | ICD-10-CM

## 2025-08-09 DIAGNOSIS — I50.9 CONGESTIVE HEART FAILURE, UNSPECIFIED HF CHRONICITY, UNSPECIFIED HEART FAILURE TYPE (HCC): ICD-10-CM

## 2025-08-09 DIAGNOSIS — E87.70 HYPERVOLEMIA, UNSPECIFIED HYPERVOLEMIA TYPE: ICD-10-CM

## 2025-08-09 DIAGNOSIS — E87.5 HYPERKALEMIA: ICD-10-CM

## 2025-08-09 DIAGNOSIS — I13.10 CARDIORENAL DISEASE: ICD-10-CM

## 2025-08-09 DIAGNOSIS — R42 DIZZINESS: ICD-10-CM

## 2025-08-09 DIAGNOSIS — R00.1 BRADYCARDIA: ICD-10-CM

## 2025-08-09 DIAGNOSIS — I50.9 ACUTE ON CHRONIC CONGESTIVE HEART FAILURE, UNSPECIFIED HEART FAILURE TYPE (HCC): Primary | ICD-10-CM

## 2025-08-09 DIAGNOSIS — N17.9 ACUTE KIDNEY INJURY: ICD-10-CM

## 2025-08-09 DIAGNOSIS — N18.9 ACUTE KIDNEY INJURY SUPERIMPOSED ON CKD: Primary | ICD-10-CM

## 2025-08-09 PROBLEM — N18.30 STAGE 3 CHRONIC KIDNEY DISEASE (HCC): Status: ACTIVE | Noted: 2025-03-23

## 2025-08-09 LAB
ALBUMIN SERPL-MCNC: 4.2 G/DL (ref 3.5–5.2)
ALBUMIN SERPL-MCNC: 4.3 G/DL (ref 3.5–5.2)
ALBUMIN/GLOB SERPL: 1.8 {RATIO} (ref 1–2.5)
ALBUMIN/GLOB SERPL: 2 {RATIO} (ref 1–2.5)
ALP SERPL-CCNC: 134 U/L (ref 35–104)
ALP SERPL-CCNC: 136 U/L (ref 35–104)
ALT SERPL-CCNC: 102 U/L (ref 10–35)
ALT SERPL-CCNC: 81 U/L (ref 10–35)
ANION GAP SERPL CALCULATED.3IONS-SCNC: 14 MMOL/L (ref 9–16)
ANION GAP SERPL CALCULATED.3IONS-SCNC: 14 MMOL/L (ref 9–16)
ANTI-XA UNFRAC HEPARIN: >2 IU/L
AST SERPL-CCNC: 117 U/L (ref 10–35)
AST SERPL-CCNC: 137 U/L (ref 10–35)
BACTERIA URNS QL MICRO: ABNORMAL
BASOPHILS # BLD: 0 K/UL (ref 0–0.2)
BASOPHILS # BLD: 0.1 K/UL (ref 0–0.2)
BASOPHILS NFR BLD: 0 % (ref 0–2)
BASOPHILS NFR BLD: 1 % (ref 0–2)
BILIRUB DIRECT SERPL-MCNC: 0.5 MG/DL (ref 0–0.2)
BILIRUB INDIRECT SERPL-MCNC: 0.3 MG/DL (ref 0–1)
BILIRUB SERPL-MCNC: 0.5 MG/DL (ref 0–1.2)
BILIRUB SERPL-MCNC: 0.8 MG/DL (ref 0–1.2)
BILIRUB UR QL STRIP: ABNORMAL
BILIRUB UR QL STRIP: ABNORMAL
BNP SERPL-MCNC: ABNORMAL PG/ML (ref 0–125)
BNP SERPL-MCNC: ABNORMAL PG/ML (ref 0–125)
BODY TEMPERATURE: 37
BUN BLD-MCNC: 25 MG/DL (ref 8–26)
BUN SERPL-MCNC: 22 MG/DL (ref 8–23)
BUN SERPL-MCNC: 23 MG/DL (ref 8–23)
CA-I BLD-SCNC: 1.31 MMOL/L (ref 1.15–1.33)
CALCIUM SERPL-MCNC: 9 MG/DL (ref 8.6–10.4)
CALCIUM SERPL-MCNC: 9.7 MG/DL (ref 8.6–10.4)
CASTS #/AREA URNS LPF: NORMAL /LPF (ref 0–8)
CHARACTER UR: ABNORMAL
CHLORIDE BLD-SCNC: 103 MMOL/L (ref 98–107)
CHLORIDE SERPL-SCNC: 100 MMOL/L (ref 98–107)
CHLORIDE SERPL-SCNC: 101 MMOL/L (ref 98–107)
CLARITY UR: ABNORMAL
CLARITY UR: CLEAR
CO2 BLD CALC-SCNC: 28 MMOL/L (ref 22–30)
CO2 SERPL-SCNC: 23 MMOL/L (ref 20–31)
CO2 SERPL-SCNC: 25 MMOL/L (ref 20–31)
COHGB MFR BLD: 0.4 % (ref 0–5)
COLOR UR: ABNORMAL
COLOR UR: YELLOW
CREAT SERPL-MCNC: 3.2 MG/DL (ref 0.6–0.9)
CREAT SERPL-MCNC: 3.5 MG/DL (ref 0.6–0.9)
CREAT UR-MCNC: 240 MG/DL (ref 28–217)
CREAT UR-MCNC: 289 MG/DL (ref 28–217)
CRP SERPL HS-MCNC: 16.5 MG/L (ref 0–5)
EGFR, POC: 13 ML/MIN/1.73M2
EOSINOPHIL # BLD: 0 K/UL (ref 0–0.4)
EOSINOPHIL # BLD: 0 K/UL (ref 0–0.4)
EOSINOPHILS RELATIVE PERCENT: 0 % (ref 1–4)
EOSINOPHILS RELATIVE PERCENT: 0 % (ref 1–4)
EPI CELLS #/AREA URNS HPF: ABNORMAL /HPF (ref 0–5)
EPI CELLS #/AREA URNS HPF: NORMAL /HPF (ref 0–5)
ERYTHROCYTE [DISTWIDTH] IN BLOOD BY AUTOMATED COUNT: 14.3 % (ref 11.8–14.4)
ERYTHROCYTE [DISTWIDTH] IN BLOOD BY AUTOMATED COUNT: 14.6 % (ref 11.8–14.4)
ERYTHROCYTE [DISTWIDTH] IN BLOOD BY AUTOMATED COUNT: 15.5 % (ref 12.5–15.4)
FIO2 ON VENT: ABNORMAL %
GFR, ESTIMATED: 13 ML/MIN/1.73M2
GFR, ESTIMATED: 15 ML/MIN/1.73M2
GLUCOSE BLD-MCNC: 184 MG/DL (ref 65–105)
GLUCOSE BLD-MCNC: 86 MG/DL (ref 74–100)
GLUCOSE SERPL-MCNC: 175 MG/DL (ref 74–99)
GLUCOSE SERPL-MCNC: 89 MG/DL (ref 74–99)
GLUCOSE UR STRIP-MCNC: NEGATIVE MG/DL
GLUCOSE UR STRIP-MCNC: NEGATIVE MG/DL
HCO3 VENOUS: 27.9 MMOL/L (ref 24–30)
HCO3 VENOUS: 28.7 MMOL/L (ref 22–29)
HCT VFR BLD AUTO: 28.5 % (ref 36.3–47.1)
HCT VFR BLD AUTO: 30.5 % (ref 36–46)
HCT VFR BLD AUTO: 30.9 % (ref 36.3–47.1)
HCT VFR BLD AUTO: 31 % (ref 36–46)
HGB BLD-MCNC: 8.9 G/DL (ref 11.9–15.1)
HGB BLD-MCNC: 9.5 G/DL (ref 11.9–15.1)
HGB BLD-MCNC: 9.8 G/DL (ref 12–16)
HGB UR QL STRIP.AUTO: NEGATIVE
HGB UR QL STRIP.AUTO: NEGATIVE
IMM GRANULOCYTES # BLD AUTO: 0 K/UL (ref 0–0.3)
IMM GRANULOCYTES NFR BLD: 0 %
INR PPP: 1.3
KETONES UR STRIP-MCNC: ABNORMAL MG/DL
KETONES UR STRIP-MCNC: NEGATIVE MG/DL
LEUKOCYTE ESTERASE UR QL STRIP: NEGATIVE
LEUKOCYTE ESTERASE UR QL STRIP: NEGATIVE
LIPASE SERPL-CCNC: 479 U/L (ref 13–60)
LYMPHOCYTES NFR BLD: 1.2 K/UL (ref 1–4.8)
LYMPHOCYTES NFR BLD: 1.4 K/UL (ref 1–4.8)
LYMPHOCYTES RELATIVE PERCENT: 9 % (ref 24–44)
LYMPHOCYTES RELATIVE PERCENT: 9 % (ref 24–44)
MAGNESIUM SERPL-MCNC: 2.8 MG/DL (ref 1.6–2.4)
MCH RBC QN AUTO: 28.6 PG (ref 26–34)
MCH RBC QN AUTO: 28.9 PG (ref 25.2–33.5)
MCH RBC QN AUTO: 29.3 PG (ref 25.2–33.5)
MCHC RBC AUTO-ENTMCNC: 30.7 G/DL (ref 28.4–34.8)
MCHC RBC AUTO-ENTMCNC: 31.2 G/DL (ref 28.4–34.8)
MCHC RBC AUTO-ENTMCNC: 31.9 G/DL (ref 31–37)
MCV RBC AUTO: 89.6 FL (ref 80–100)
MCV RBC AUTO: 92.5 FL (ref 82.6–102.9)
MCV RBC AUTO: 95.4 FL (ref 82.6–102.9)
MONOCYTES NFR BLD: 0.9 K/UL (ref 0.1–1.2)
MONOCYTES NFR BLD: 1.55 K/UL (ref 0.1–0.8)
MONOCYTES NFR BLD: 10 % (ref 1–7)
MONOCYTES NFR BLD: 7 % (ref 2–11)
MORPHOLOGY: NORMAL
MYOGLOBIN SERPL-MCNC: 74 NG/ML (ref 25–58)
NEGATIVE BASE EXCESS, ART: 2.9 MMOL/L (ref 0–2)
NEGATIVE BASE EXCESS, VEN: 0.6 MMOL/L (ref 0–2)
NEUTROPHILS NFR BLD: 81 % (ref 36–66)
NEUTROPHILS NFR BLD: 83 % (ref 36–66)
NEUTS SEG NFR BLD: 10.7 K/UL (ref 1.8–7.7)
NEUTS SEG NFR BLD: 12.55 K/UL (ref 1.8–7.7)
NITRITE UR QL STRIP: NEGATIVE
NITRITE UR QL STRIP: NEGATIVE
NRBC BLD-RTO: 0 PER 100 WBC
NRBC BLD-RTO: 0.1 PER 100 WBC
O2 DELIVERY DEVICE: ABNORMAL
O2 SAT, VEN: 30.3 % (ref 60–85)
O2 SAT, VEN: 44.1 % (ref 60–85)
PARTIAL THROMBOPLASTIN TIME: 24.9 SEC (ref 23–36.5)
PCO2 VENOUS: 62.2 MM HG (ref 41–51)
PCO2 VENOUS: 67.1 MM HG (ref 39–55)
PH UR STRIP: 5 [PH] (ref 5–8)
PH UR STRIP: 5.5 [PH] (ref 5–8)
PH VENOUS: 7.24 (ref 7.32–7.42)
PH VENOUS: 7.27 (ref 7.32–7.43)
PLATELET # BLD AUTO: 272 K/UL (ref 140–450)
PLATELET # BLD AUTO: 287 K/UL (ref 138–453)
PLATELET # BLD AUTO: 304 K/UL (ref 138–453)
PMV BLD AUTO: 11.8 FL (ref 8.1–13.5)
PMV BLD AUTO: 12 FL (ref 8.1–13.5)
PMV BLD AUTO: 9.5 FL (ref 6–12)
PO2 VENOUS: 23 MM HG (ref 30–50)
PO2 VENOUS: 28.6 MM HG (ref 30–50)
POC ANION GAP: 11 MMOL/L (ref 7–16)
POC CREATININE: 3.6 MG/DL (ref 0.51–1.19)
POC HCO3: 24.6 MMOL/L (ref 21–28)
POC HEMOGLOBIN (CALC): 10.5 G/DL (ref 12–16)
POC LACTIC ACID: 2.2 MMOL/L (ref 0.56–1.39)
POC O2 SATURATION: 86.3 % (ref 94–98)
POC PCO2: 54.9 MM HG (ref 35–48)
POC PH: 7.26 (ref 7.35–7.45)
POC PO2: 60.5 MM HG (ref 83–108)
POSITIVE BASE EXCESS, VEN: 0.8 MMOL/L (ref 0–3)
POTASSIUM BLD-SCNC: 5.1 MMOL/L (ref 3.5–4.5)
POTASSIUM SERPL-SCNC: 5.3 MMOL/L (ref 3.7–5.3)
POTASSIUM SERPL-SCNC: 5.7 MMOL/L (ref 3.7–5.3)
PROCALCITONIN SERPL-MCNC: 0.32 NG/ML (ref 0–0.09)
PROT SERPL-MCNC: 6.5 G/DL (ref 6.6–8.7)
PROT SERPL-MCNC: 6.6 G/DL (ref 6.6–8.7)
PROT UR STRIP-MCNC: ABNORMAL MG/DL
PROT UR STRIP-MCNC: ABNORMAL MG/DL
PROTHROMBIN TIME: 17 SEC (ref 11.7–14.9)
RBC # BLD AUTO: 3.08 M/UL (ref 3.95–5.11)
RBC # BLD AUTO: 3.24 M/UL (ref 3.95–5.11)
RBC # BLD AUTO: 3.41 M/UL (ref 4–5.2)
RBC #/AREA URNS HPF: ABNORMAL /HPF (ref 0–2)
RBC #/AREA URNS HPF: NORMAL /HPF (ref 0–4)
SAMPLE SITE: ABNORMAL
SODIUM BLD-SCNC: 141 MMOL/L (ref 138–146)
SODIUM SERPL-SCNC: 138 MMOL/L (ref 136–145)
SODIUM SERPL-SCNC: 139 MMOL/L (ref 136–145)
SODIUM UR-SCNC: <20 MMOL/L
SP GR UR STRIP: 1.02 (ref 1–1.03)
SP GR UR STRIP: >1.03 (ref 1–1.03)
TOTAL PROTEIN, URINE: 55 MG/DL
TOTAL PROTEIN, URINE: 93 MG/DL
TROPONIN I SERPL HS-MCNC: 37 NG/L (ref 0–14)
TROPONIN I SERPL HS-MCNC: 37 NG/L (ref 0–14)
URINE TOTAL PROTEIN CREATININE RATIO: 0.39 (ref 0–0.2)
UROBILINOGEN UR STRIP-ACNC: NORMAL EU/DL (ref 0–1)
UROBILINOGEN UR STRIP-ACNC: NORMAL EU/DL (ref 0–1)
WBC #/AREA URNS HPF: ABNORMAL /HPF (ref 0–5)
WBC #/AREA URNS HPF: NORMAL /HPF (ref 0–5)
WBC OTHER # BLD: 12.9 K/UL (ref 3.5–11)
WBC OTHER # BLD: 15.5 K/UL (ref 3.5–11.3)
WBC OTHER # BLD: 16.7 K/UL (ref 3.5–11.3)

## 2025-08-09 PROCEDURE — 6370000000 HC RX 637 (ALT 250 FOR IP)

## 2025-08-09 PROCEDURE — 36600 WITHDRAWAL OF ARTERIAL BLOOD: CPT

## 2025-08-09 PROCEDURE — 6360000002 HC RX W HCPCS: Performed by: INTERNAL MEDICINE

## 2025-08-09 PROCEDURE — 82947 ASSAY GLUCOSE BLOOD QUANT: CPT

## 2025-08-09 PROCEDURE — 76770 US EXAM ABDO BACK WALL COMP: CPT

## 2025-08-09 PROCEDURE — 6360000002 HC RX W HCPCS

## 2025-08-09 PROCEDURE — 84484 ASSAY OF TROPONIN QUANT: CPT

## 2025-08-09 PROCEDURE — 85025 COMPLETE CBC W/AUTO DIFF WBC: CPT

## 2025-08-09 PROCEDURE — 80076 HEPATIC FUNCTION PANEL: CPT

## 2025-08-09 PROCEDURE — 94640 AIRWAY INHALATION TREATMENT: CPT

## 2025-08-09 PROCEDURE — 83735 ASSAY OF MAGNESIUM: CPT

## 2025-08-09 PROCEDURE — 71045 X-RAY EXAM CHEST 1 VIEW: CPT

## 2025-08-09 PROCEDURE — 80053 COMPREHEN METABOLIC PANEL: CPT

## 2025-08-09 PROCEDURE — 82803 BLOOD GASES ANY COMBINATION: CPT

## 2025-08-09 PROCEDURE — 2500000003 HC RX 250 WO HCPCS: Performed by: EMERGENCY MEDICINE

## 2025-08-09 PROCEDURE — 87040 BLOOD CULTURE FOR BACTERIA: CPT

## 2025-08-09 PROCEDURE — 85520 HEPARIN ASSAY: CPT

## 2025-08-09 PROCEDURE — 2000000000 HC ICU R&B

## 2025-08-09 PROCEDURE — 6360000002 HC RX W HCPCS: Performed by: STUDENT IN AN ORGANIZED HEALTH CARE EDUCATION/TRAINING PROGRAM

## 2025-08-09 PROCEDURE — 81001 URINALYSIS AUTO W/SCOPE: CPT

## 2025-08-09 PROCEDURE — 2580000003 HC RX 258: Performed by: NURSE PRACTITIONER

## 2025-08-09 PROCEDURE — 93005 ELECTROCARDIOGRAM TRACING: CPT | Performed by: STUDENT IN AN ORGANIZED HEALTH CARE EDUCATION/TRAINING PROGRAM

## 2025-08-09 PROCEDURE — 85027 COMPLETE CBC AUTOMATED: CPT

## 2025-08-09 PROCEDURE — 85610 PROTHROMBIN TIME: CPT

## 2025-08-09 PROCEDURE — 84300 ASSAY OF URINE SODIUM: CPT

## 2025-08-09 PROCEDURE — 93005 ELECTROCARDIOGRAM TRACING: CPT | Performed by: NURSE PRACTITIONER

## 2025-08-09 PROCEDURE — 80048 BASIC METABOLIC PNL TOTAL CA: CPT

## 2025-08-09 PROCEDURE — 96375 TX/PRO/DX INJ NEW DRUG ADDON: CPT

## 2025-08-09 PROCEDURE — 2580000003 HC RX 258: Performed by: STUDENT IN AN ORGANIZED HEALTH CARE EDUCATION/TRAINING PROGRAM

## 2025-08-09 PROCEDURE — 85014 HEMATOCRIT: CPT

## 2025-08-09 PROCEDURE — 82805 BLOOD GASES W/O2 SATURATION: CPT

## 2025-08-09 PROCEDURE — 86140 C-REACTIVE PROTEIN: CPT

## 2025-08-09 PROCEDURE — 94761 N-INVAS EAR/PLS OXIMETRY MLT: CPT

## 2025-08-09 PROCEDURE — 36415 COLL VENOUS BLD VENIPUNCTURE: CPT

## 2025-08-09 PROCEDURE — 3E043XZ INTRODUCTION OF VASOPRESSOR INTO CENTRAL VEIN, PERCUTANEOUS APPROACH: ICD-10-PCS | Performed by: INTERNAL MEDICINE

## 2025-08-09 PROCEDURE — 99285 EMERGENCY DEPT VISIT HI MDM: CPT

## 2025-08-09 PROCEDURE — 85730 THROMBOPLASTIN TIME PARTIAL: CPT

## 2025-08-09 PROCEDURE — 2500000003 HC RX 250 WO HCPCS

## 2025-08-09 PROCEDURE — 2700000000 HC OXYGEN THERAPY PER DAY

## 2025-08-09 PROCEDURE — 82330 ASSAY OF CALCIUM: CPT

## 2025-08-09 PROCEDURE — 6370000000 HC RX 637 (ALT 250 FOR IP): Performed by: NURSE PRACTITIONER

## 2025-08-09 PROCEDURE — 83690 ASSAY OF LIPASE: CPT

## 2025-08-09 PROCEDURE — 6360000002 HC RX W HCPCS: Performed by: NURSE PRACTITIONER

## 2025-08-09 PROCEDURE — 84145 PROCALCITONIN (PCT): CPT

## 2025-08-09 PROCEDURE — 80051 ELECTROLYTE PANEL: CPT

## 2025-08-09 PROCEDURE — 84520 ASSAY OF UREA NITROGEN: CPT

## 2025-08-09 PROCEDURE — 82565 ASSAY OF CREATININE: CPT

## 2025-08-09 PROCEDURE — 83874 ASSAY OF MYOGLOBIN: CPT

## 2025-08-09 PROCEDURE — 99223 1ST HOSP IP/OBS HIGH 75: CPT | Performed by: STUDENT IN AN ORGANIZED HEALTH CARE EDUCATION/TRAINING PROGRAM

## 2025-08-09 PROCEDURE — 93005 ELECTROCARDIOGRAM TRACING: CPT

## 2025-08-09 PROCEDURE — 82570 ASSAY OF URINE CREATININE: CPT

## 2025-08-09 PROCEDURE — 83880 ASSAY OF NATRIURETIC PEPTIDE: CPT

## 2025-08-09 PROCEDURE — 84156 ASSAY OF PROTEIN URINE: CPT

## 2025-08-09 PROCEDURE — 94660 CPAP INITIATION&MGMT: CPT

## 2025-08-09 PROCEDURE — 5A09357 ASSISTANCE WITH RESPIRATORY VENTILATION, LESS THAN 24 CONSECUTIVE HOURS, CONTINUOUS POSITIVE AIRWAY PRESSURE: ICD-10-PCS | Performed by: EMERGENCY MEDICINE

## 2025-08-09 PROCEDURE — 96374 THER/PROPH/DIAG INJ IV PUSH: CPT

## 2025-08-09 PROCEDURE — 83605 ASSAY OF LACTIC ACID: CPT

## 2025-08-09 PROCEDURE — 99291 CRITICAL CARE FIRST HOUR: CPT | Performed by: INTERNAL MEDICINE

## 2025-08-09 RX ORDER — HEPARIN SODIUM 10000 [USP'U]/100ML
5-30 INJECTION, SOLUTION INTRAVENOUS CONTINUOUS
Status: DISCONTINUED | OUTPATIENT
Start: 2025-08-09 | End: 2025-08-11

## 2025-08-09 RX ORDER — HEPARIN SODIUM 1000 [USP'U]/ML
4000 INJECTION, SOLUTION INTRAVENOUS; SUBCUTANEOUS PRN
Status: DISCONTINUED | OUTPATIENT
Start: 2025-08-09 | End: 2025-08-09

## 2025-08-09 RX ORDER — HEPARIN SODIUM 10000 [USP'U]/100ML
5-30 INJECTION, SOLUTION INTRAVENOUS CONTINUOUS
Status: DISCONTINUED | OUTPATIENT
Start: 2025-08-09 | End: 2025-08-09

## 2025-08-09 RX ORDER — CLOPIDOGREL BISULFATE 75 MG/1
75 TABLET ORAL DAILY
Status: DISCONTINUED | OUTPATIENT
Start: 2025-08-09 | End: 2025-08-18 | Stop reason: HOSPADM

## 2025-08-09 RX ORDER — ONDANSETRON 4 MG/1
4 TABLET, ORALLY DISINTEGRATING ORAL EVERY 8 HOURS PRN
Status: DISCONTINUED | OUTPATIENT
Start: 2025-08-09 | End: 2025-08-18 | Stop reason: HOSPADM

## 2025-08-09 RX ORDER — HEPARIN SODIUM 1000 [USP'U]/ML
4000 INJECTION, SOLUTION INTRAVENOUS; SUBCUTANEOUS ONCE
Status: DISCONTINUED | OUTPATIENT
Start: 2025-08-09 | End: 2025-08-09

## 2025-08-09 RX ORDER — ATROPINE SULFATE 0.1 MG/ML
0.5 INJECTION INTRAVENOUS PRN
Status: DISCONTINUED | OUTPATIENT
Start: 2025-08-09 | End: 2025-08-18 | Stop reason: HOSPADM

## 2025-08-09 RX ORDER — INDOMETHACIN 25 MG/1
50 CAPSULE ORAL ONCE
Status: COMPLETED | OUTPATIENT
Start: 2025-08-09 | End: 2025-08-09

## 2025-08-09 RX ORDER — BUMETANIDE 0.25 MG/ML
1 INJECTION, SOLUTION INTRAMUSCULAR; INTRAVENOUS ONCE
Status: DISCONTINUED | OUTPATIENT
Start: 2025-08-09 | End: 2025-08-09 | Stop reason: HOSPADM

## 2025-08-09 RX ORDER — POTASSIUM CHLORIDE 7.45 MG/ML
10 INJECTION INTRAVENOUS PRN
Status: DISCONTINUED | OUTPATIENT
Start: 2025-08-09 | End: 2025-08-09

## 2025-08-09 RX ORDER — HEPARIN SODIUM 1000 [USP'U]/ML
4000 INJECTION, SOLUTION INTRAVENOUS; SUBCUTANEOUS PRN
Status: DISCONTINUED | OUTPATIENT
Start: 2025-08-09 | End: 2025-08-11

## 2025-08-09 RX ORDER — DOPAMINE HYDROCHLORIDE 160 MG/100ML
1-20 INJECTION, SOLUTION INTRAVENOUS CONTINUOUS
Status: DISCONTINUED | OUTPATIENT
Start: 2025-08-09 | End: 2025-08-11

## 2025-08-09 RX ORDER — EPINEPHRINE 1 MG/ML
INJECTION, SOLUTION INTRAMUSCULAR; SUBCUTANEOUS
Status: DISPENSED
Start: 2025-08-09 | End: 2025-08-10

## 2025-08-09 RX ORDER — ALBUTEROL SULFATE 0.83 MG/ML
2.5 SOLUTION RESPIRATORY (INHALATION) EVERY 4 HOURS PRN
Status: DISCONTINUED | OUTPATIENT
Start: 2025-08-09 | End: 2025-08-18 | Stop reason: HOSPADM

## 2025-08-09 RX ORDER — GLUCAGON 1 MG/ML
1 KIT INJECTION PRN
Status: DISCONTINUED | OUTPATIENT
Start: 2025-08-09 | End: 2025-08-09 | Stop reason: HOSPADM

## 2025-08-09 RX ORDER — PANTOPRAZOLE SODIUM 40 MG/1
40 TABLET, DELAYED RELEASE ORAL
Status: DISCONTINUED | OUTPATIENT
Start: 2025-08-10 | End: 2025-08-18 | Stop reason: HOSPADM

## 2025-08-09 RX ORDER — ACETAMINOPHEN 650 MG/1
650 SUPPOSITORY RECTAL EVERY 6 HOURS PRN
Status: DISCONTINUED | OUTPATIENT
Start: 2025-08-09 | End: 2025-08-18 | Stop reason: HOSPADM

## 2025-08-09 RX ORDER — HEPARIN SODIUM 1000 [USP'U]/ML
2000 INJECTION, SOLUTION INTRAVENOUS; SUBCUTANEOUS PRN
Status: DISCONTINUED | OUTPATIENT
Start: 2025-08-09 | End: 2025-08-11

## 2025-08-09 RX ORDER — HEPARIN SODIUM 1000 [USP'U]/ML
2000 INJECTION, SOLUTION INTRAVENOUS; SUBCUTANEOUS PRN
Status: DISCONTINUED | OUTPATIENT
Start: 2025-08-09 | End: 2025-08-09

## 2025-08-09 RX ORDER — CALCIUM GLUCONATE 20 MG/ML
2000 INJECTION, SOLUTION INTRAVENOUS ONCE
Status: COMPLETED | OUTPATIENT
Start: 2025-08-09 | End: 2025-08-09

## 2025-08-09 RX ORDER — MAGNESIUM SULFATE IN WATER 40 MG/ML
2000 INJECTION, SOLUTION INTRAVENOUS PRN
Status: DISCONTINUED | OUTPATIENT
Start: 2025-08-09 | End: 2025-08-09

## 2025-08-09 RX ORDER — CALCIUM GLUCONATE 20 MG/ML
INJECTION, SOLUTION INTRAVENOUS
Status: DISPENSED
Start: 2025-08-09 | End: 2025-08-10

## 2025-08-09 RX ORDER — DEXTROSE MONOHYDRATE 100 MG/ML
INJECTION, SOLUTION INTRAVENOUS CONTINUOUS PRN
Status: DISCONTINUED | OUTPATIENT
Start: 2025-08-09 | End: 2025-08-09 | Stop reason: HOSPADM

## 2025-08-09 RX ORDER — INDOMETHACIN 25 MG/1
CAPSULE ORAL
Status: COMPLETED
Start: 2025-08-09 | End: 2025-08-09

## 2025-08-09 RX ORDER — FUROSEMIDE 10 MG/ML
40 INJECTION INTRAMUSCULAR; INTRAVENOUS ONCE
Status: COMPLETED | OUTPATIENT
Start: 2025-08-09 | End: 2025-08-09

## 2025-08-09 RX ORDER — IPRATROPIUM BROMIDE AND ALBUTEROL SULFATE 2.5; .5 MG/3ML; MG/3ML
1 SOLUTION RESPIRATORY (INHALATION)
Status: DISCONTINUED | OUTPATIENT
Start: 2025-08-09 | End: 2025-08-18 | Stop reason: HOSPADM

## 2025-08-09 RX ORDER — DOPAMINE HYDROCHLORIDE 160 MG/100ML
1-20 INJECTION, SOLUTION INTRAVENOUS CONTINUOUS
Status: DISCONTINUED | OUTPATIENT
Start: 2025-08-09 | End: 2025-08-09

## 2025-08-09 RX ORDER — SODIUM CHLORIDE 0.9 % (FLUSH) 0.9 %
5-40 SYRINGE (ML) INJECTION PRN
Status: DISCONTINUED | OUTPATIENT
Start: 2025-08-09 | End: 2025-08-18 | Stop reason: HOSPADM

## 2025-08-09 RX ORDER — MIDODRINE HYDROCHLORIDE 5 MG/1
2.5 TABLET ORAL
Status: DISCONTINUED | OUTPATIENT
Start: 2025-08-10 | End: 2025-08-12

## 2025-08-09 RX ORDER — POLYETHYLENE GLYCOL 3350 17 G/17G
17 POWDER, FOR SOLUTION ORAL DAILY PRN
Status: DISCONTINUED | OUTPATIENT
Start: 2025-08-09 | End: 2025-08-15

## 2025-08-09 RX ORDER — CALCIUM GLUCONATE 20 MG/ML
1000 INJECTION, SOLUTION INTRAVENOUS ONCE
Status: COMPLETED | OUTPATIENT
Start: 2025-08-09 | End: 2025-08-09

## 2025-08-09 RX ORDER — ALBUTEROL SULFATE 0.83 MG/ML
2.5 SOLUTION RESPIRATORY (INHALATION) ONCE
Status: COMPLETED | OUTPATIENT
Start: 2025-08-09 | End: 2025-08-09

## 2025-08-09 RX ORDER — ATROPINE SULFATE 0.1 MG/ML
0.5 INJECTION INTRAVENOUS ONCE
Status: COMPLETED | OUTPATIENT
Start: 2025-08-09 | End: 2025-08-09

## 2025-08-09 RX ORDER — ROSUVASTATIN CALCIUM 10 MG/1
20 TABLET, COATED ORAL NIGHTLY
Status: DISCONTINUED | OUTPATIENT
Start: 2025-08-09 | End: 2025-08-10

## 2025-08-09 RX ORDER — BUDESONIDE AND FORMOTEROL FUMARATE DIHYDRATE 80; 4.5 UG/1; UG/1
2 AEROSOL RESPIRATORY (INHALATION) 2 TIMES DAILY
Status: DISCONTINUED | OUTPATIENT
Start: 2025-08-09 | End: 2025-08-18 | Stop reason: HOSPADM

## 2025-08-09 RX ORDER — SODIUM CHLORIDE 9 MG/ML
INJECTION, SOLUTION INTRAVENOUS PRN
Status: DISCONTINUED | OUTPATIENT
Start: 2025-08-09 | End: 2025-08-18 | Stop reason: HOSPADM

## 2025-08-09 RX ORDER — ONDANSETRON 2 MG/ML
4 INJECTION INTRAMUSCULAR; INTRAVENOUS EVERY 6 HOURS PRN
Status: DISCONTINUED | OUTPATIENT
Start: 2025-08-09 | End: 2025-08-18 | Stop reason: HOSPADM

## 2025-08-09 RX ORDER — GLUCAGON 1 MG/ML
1 KIT INJECTION PRN
Status: DISCONTINUED | OUTPATIENT
Start: 2025-08-09 | End: 2025-08-18 | Stop reason: HOSPADM

## 2025-08-09 RX ORDER — DEXTROSE MONOHYDRATE 100 MG/ML
INJECTION, SOLUTION INTRAVENOUS CONTINUOUS PRN
Status: DISCONTINUED | OUTPATIENT
Start: 2025-08-09 | End: 2025-08-18 | Stop reason: HOSPADM

## 2025-08-09 RX ORDER — SODIUM CHLORIDE 0.9 % (FLUSH) 0.9 %
5-40 SYRINGE (ML) INJECTION EVERY 12 HOURS SCHEDULED
Status: DISCONTINUED | OUTPATIENT
Start: 2025-08-09 | End: 2025-08-18 | Stop reason: HOSPADM

## 2025-08-09 RX ORDER — INSULIN LISPRO 100 [IU]/ML
0-4 INJECTION, SOLUTION INTRAVENOUS; SUBCUTANEOUS
Status: DISCONTINUED | OUTPATIENT
Start: 2025-08-09 | End: 2025-08-18 | Stop reason: HOSPADM

## 2025-08-09 RX ORDER — ACETAMINOPHEN 325 MG/1
650 TABLET ORAL EVERY 6 HOURS PRN
Status: DISCONTINUED | OUTPATIENT
Start: 2025-08-09 | End: 2025-08-18 | Stop reason: HOSPADM

## 2025-08-09 RX ORDER — POTASSIUM CHLORIDE 29.8 MG/ML
20 INJECTION INTRAVENOUS PRN
Status: DISCONTINUED | OUTPATIENT
Start: 2025-08-09 | End: 2025-08-09

## 2025-08-09 RX ADMIN — INSULIN HUMAN 10 UNITS: 100 INJECTION, SOLUTION PARENTERAL at 12:45

## 2025-08-09 RX ADMIN — ROSUVASTATIN CALCIUM 20 MG: 10 TABLET, FILM COATED ORAL at 20:56

## 2025-08-09 RX ADMIN — CALCIUM GLUCONATE 2000 MG: 20 INJECTION, SOLUTION INTRAVENOUS at 15:14

## 2025-08-09 RX ADMIN — ALBUTEROL SULFATE 2.5 MG: 2.5 SOLUTION RESPIRATORY (INHALATION) at 13:29

## 2025-08-09 RX ADMIN — EPINEPHRINE 5 MCG/MIN: 1 INJECTION INTRAMUSCULAR; INTRAVENOUS; SUBCUTANEOUS at 15:19

## 2025-08-09 RX ADMIN — HEPARIN SODIUM AND DEXTROSE 10 UNITS/KG/HR: 10000; 5 INJECTION INTRAVENOUS at 21:31

## 2025-08-09 RX ADMIN — DEXTROSE 250 ML: 10 SOLUTION INTRAVENOUS at 12:44

## 2025-08-09 RX ADMIN — SODIUM BICARBONATE 50 MEQ: 84 INJECTION INTRAVENOUS at 12:40

## 2025-08-09 RX ADMIN — SODIUM CHLORIDE, PRESERVATIVE FREE 10 ML: 5 INJECTION INTRAVENOUS at 20:54

## 2025-08-09 RX ADMIN — SODIUM ZIRCONIUM CYCLOSILICATE 10 G: 10 POWDER, FOR SUSPENSION ORAL at 12:52

## 2025-08-09 RX ADMIN — CALCIUM GLUCONATE 1000 MG: 20 INJECTION, SOLUTION INTRAVENOUS at 12:49

## 2025-08-09 RX ADMIN — INDOMETHACIN 50 MEQ: 25 CAPSULE ORAL at 12:40

## 2025-08-09 RX ADMIN — INSULIN LISPRO 1 UNITS: 100 INJECTION, SOLUTION INTRAVENOUS; SUBCUTANEOUS at 21:34

## 2025-08-09 RX ADMIN — DOPAMINE HYDROCHLORIDE 5 MCG/KG/MIN: 160 INJECTION, SOLUTION INTRAVENOUS at 23:00

## 2025-08-09 RX ADMIN — ATROPINE SULFATE INJECTION 0.5 MG: 0.1 INJECTION, SOLUTION INTRAVENOUS at 20:54

## 2025-08-09 RX ADMIN — SODIUM BICARBONATE 50 MEQ: 84 INJECTION INTRAVENOUS at 15:49

## 2025-08-09 RX ADMIN — WATER 1000 MG: 1 INJECTION INTRAMUSCULAR; INTRAVENOUS; SUBCUTANEOUS at 20:53

## 2025-08-09 RX ADMIN — FUROSEMIDE 40 MG: 10 INJECTION, SOLUTION INTRAMUSCULAR; INTRAVENOUS at 20:53

## 2025-08-09 ASSESSMENT — PAIN DESCRIPTION - DESCRIPTORS: DESCRIPTORS: ACHING

## 2025-08-09 ASSESSMENT — PAIN SCALES - GENERAL
PAINLEVEL_OUTOF10: 0
PAINLEVEL_OUTOF10: 8

## 2025-08-09 ASSESSMENT — PAIN DESCRIPTION - LOCATION: LOCATION: BACK

## 2025-08-09 ASSESSMENT — PAIN DESCRIPTION - PAIN TYPE: TYPE: CHRONIC PAIN

## 2025-08-09 ASSESSMENT — PAIN - FUNCTIONAL ASSESSMENT: PAIN_FUNCTIONAL_ASSESSMENT: 0-10

## 2025-08-09 ASSESSMENT — LIFESTYLE VARIABLES
HOW OFTEN DO YOU HAVE A DRINK CONTAINING ALCOHOL: PATIENT DECLINED
HOW MANY STANDARD DRINKS CONTAINING ALCOHOL DO YOU HAVE ON A TYPICAL DAY: PATIENT DECLINED

## 2025-08-10 LAB
ANION GAP SERPL CALCULATED.3IONS-SCNC: 10 MMOL/L (ref 9–16)
ANION GAP SERPL CALCULATED.3IONS-SCNC: 12 MMOL/L (ref 9–16)
ANION GAP SERPL CALCULATED.3IONS-SCNC: 17 MMOL/L (ref 9–16)
ANTI-XA UNFRAC HEPARIN: 1.93 IU/L
ANTI-XA UNFRAC HEPARIN: >2 IU/L
BASOPHILS # BLD: 0.08 K/UL (ref 0–0.2)
BASOPHILS NFR BLD: 1 % (ref 0–2)
BUN SERPL-MCNC: 28 MG/DL (ref 8–23)
CALCIUM SERPL-MCNC: 9.6 MG/DL (ref 8.6–10.4)
CHLORIDE SERPL-SCNC: 100 MMOL/L (ref 98–107)
CHLORIDE SERPL-SCNC: 98 MMOL/L (ref 98–107)
CHLORIDE SERPL-SCNC: 99 MMOL/L (ref 98–107)
CO2 SERPL-SCNC: 24 MMOL/L (ref 20–31)
CO2 SERPL-SCNC: 29 MMOL/L (ref 20–31)
CO2 SERPL-SCNC: 31 MMOL/L (ref 20–31)
CREAT SERPL-MCNC: 3.7 MG/DL (ref 0.6–0.9)
EKG ATRIAL RATE: 30 BPM
EKG ATRIAL RATE: 340 BPM
EKG ATRIAL RATE: 83 BPM
EKG Q-T INTERVAL: 422 MS
EKG Q-T INTERVAL: 454 MS
EKG Q-T INTERVAL: 482 MS
EKG QRS DURATION: 106 MS
EKG QRS DURATION: 74 MS
EKG QRS DURATION: 92 MS
EKG QTC CALCULATION (BAZETT): 392 MS
EKG QTC CALCULATION (BAZETT): 413 MS
EKG QTC CALCULATION (BAZETT): 481 MS
EKG R AXIS: -5 DEGREES
EKG R AXIS: 4 DEGREES
EKG R AXIS: 5 DEGREES
EKG T AXIS: 102 DEGREES
EKG T AXIS: 94 DEGREES
EKG T AXIS: 95 DEGREES
EKG VENTRICULAR RATE: 40 BPM
EKG VENTRICULAR RATE: 50 BPM
EKG VENTRICULAR RATE: 78 BPM
EOSINOPHIL # BLD: 0.05 K/UL (ref 0–0.44)
EOSINOPHILS RELATIVE PERCENT: 0 % (ref 1–4)
ERYTHROCYTE [DISTWIDTH] IN BLOOD BY AUTOMATED COUNT: 14.5 % (ref 11.8–14.4)
GFR, ESTIMATED: 13 ML/MIN/1.73M2
GLUCOSE BLD-MCNC: 125 MG/DL (ref 65–105)
GLUCOSE BLD-MCNC: 85 MG/DL (ref 65–105)
GLUCOSE SERPL-MCNC: 157 MG/DL (ref 74–99)
HCT VFR BLD AUTO: 27.9 % (ref 36.3–47.1)
HGB BLD-MCNC: 8.9 G/DL (ref 11.9–15.1)
IMM GRANULOCYTES # BLD AUTO: 0.11 K/UL (ref 0–0.3)
IMM GRANULOCYTES NFR BLD: 1 %
LYMPHOCYTES NFR BLD: 2.25 K/UL (ref 1.1–3.7)
LYMPHOCYTES RELATIVE PERCENT: 15 % (ref 24–43)
MCH RBC QN AUTO: 28.5 PG (ref 25.2–33.5)
MCHC RBC AUTO-ENTMCNC: 31.9 G/DL (ref 28.4–34.8)
MCV RBC AUTO: 89.4 FL (ref 82.6–102.9)
MONOCYTES NFR BLD: 1.49 K/UL (ref 0.1–1.2)
MONOCYTES NFR BLD: 10 % (ref 3–12)
MRSA, DNA, NASAL: ABNORMAL
NEUTROPHILS NFR BLD: 73 % (ref 36–65)
NEUTS SEG NFR BLD: 10.85 K/UL (ref 1.5–8.1)
NRBC BLD-RTO: 0 PER 100 WBC
PARTIAL THROMBOPLASTIN TIME: 144.6 SEC (ref 23–36.5)
PARTIAL THROMBOPLASTIN TIME: 41.8 SEC (ref 23–36.5)
PARTIAL THROMBOPLASTIN TIME: 74.9 SEC (ref 23–36.5)
PLATELET # BLD AUTO: 287 K/UL (ref 138–453)
PMV BLD AUTO: 11.7 FL (ref 8.1–13.5)
POTASSIUM SERPL-SCNC: 3.7 MMOL/L (ref 3.7–5.3)
POTASSIUM SERPL-SCNC: 4.1 MMOL/L (ref 3.7–5.3)
POTASSIUM SERPL-SCNC: 4.8 MMOL/L (ref 3.7–5.3)
RBC # BLD AUTO: 3.12 M/UL (ref 3.95–5.11)
RBC # BLD: ABNORMAL 10*6/UL
SODIUM SERPL-SCNC: 139 MMOL/L (ref 136–145)
SODIUM SERPL-SCNC: 140 MMOL/L (ref 136–145)
SODIUM SERPL-SCNC: 141 MMOL/L (ref 136–145)
SPECIMEN DESCRIPTION: ABNORMAL
WBC OTHER # BLD: 14.8 K/UL (ref 3.5–11.3)

## 2025-08-10 PROCEDURE — 6370000000 HC RX 637 (ALT 250 FOR IP)

## 2025-08-10 PROCEDURE — 94640 AIRWAY INHALATION TREATMENT: CPT

## 2025-08-10 PROCEDURE — 80051 ELECTROLYTE PANEL: CPT

## 2025-08-10 PROCEDURE — 6360000002 HC RX W HCPCS: Performed by: INTERNAL MEDICINE

## 2025-08-10 PROCEDURE — 85730 THROMBOPLASTIN TIME PARTIAL: CPT

## 2025-08-10 PROCEDURE — 2580000003 HC RX 258: Performed by: STUDENT IN AN ORGANIZED HEALTH CARE EDUCATION/TRAINING PROGRAM

## 2025-08-10 PROCEDURE — 85520 HEPARIN ASSAY: CPT

## 2025-08-10 PROCEDURE — 93010 ELECTROCARDIOGRAM REPORT: CPT | Performed by: INTERNAL MEDICINE

## 2025-08-10 PROCEDURE — 6360000002 HC RX W HCPCS

## 2025-08-10 PROCEDURE — 2580000003 HC RX 258

## 2025-08-10 PROCEDURE — 36415 COLL VENOUS BLD VENIPUNCTURE: CPT

## 2025-08-10 PROCEDURE — 2500000003 HC RX 250 WO HCPCS

## 2025-08-10 PROCEDURE — 94660 CPAP INITIATION&MGMT: CPT

## 2025-08-10 PROCEDURE — 2000000000 HC ICU R&B

## 2025-08-10 PROCEDURE — 80048 BASIC METABOLIC PNL TOTAL CA: CPT

## 2025-08-10 PROCEDURE — 87641 MR-STAPH DNA AMP PROBE: CPT

## 2025-08-10 PROCEDURE — 85025 COMPLETE CBC W/AUTO DIFF WBC: CPT

## 2025-08-10 PROCEDURE — 99222 1ST HOSP IP/OBS MODERATE 55: CPT | Performed by: INTERNAL MEDICINE

## 2025-08-10 PROCEDURE — 94761 N-INVAS EAR/PLS OXIMETRY MLT: CPT

## 2025-08-10 PROCEDURE — 99291 CRITICAL CARE FIRST HOUR: CPT | Performed by: INTERNAL MEDICINE

## 2025-08-10 PROCEDURE — 6360000002 HC RX W HCPCS: Performed by: STUDENT IN AN ORGANIZED HEALTH CARE EDUCATION/TRAINING PROGRAM

## 2025-08-10 PROCEDURE — 99232 SBSQ HOSP IP/OBS MODERATE 35: CPT | Performed by: STUDENT IN AN ORGANIZED HEALTH CARE EDUCATION/TRAINING PROGRAM

## 2025-08-10 PROCEDURE — 82947 ASSAY GLUCOSE BLOOD QUANT: CPT

## 2025-08-10 RX ORDER — TRAZODONE HYDROCHLORIDE 100 MG/1
100 TABLET ORAL NIGHTLY
Status: DISCONTINUED | OUTPATIENT
Start: 2025-08-10 | End: 2025-08-18 | Stop reason: HOSPADM

## 2025-08-10 RX ORDER — ROSUVASTATIN CALCIUM 10 MG/1
10 TABLET, COATED ORAL NIGHTLY
Status: DISCONTINUED | OUTPATIENT
Start: 2025-08-10 | End: 2025-08-18 | Stop reason: HOSPADM

## 2025-08-10 RX ORDER — TRAMADOL HYDROCHLORIDE 50 MG/1
50 TABLET ORAL 2 TIMES DAILY PRN
Status: DISCONTINUED | OUTPATIENT
Start: 2025-08-10 | End: 2025-08-18 | Stop reason: HOSPADM

## 2025-08-10 RX ADMIN — FUROSEMIDE 10 MG/HR: 10 INJECTION, SOLUTION INTRAMUSCULAR; INTRAVENOUS at 10:50

## 2025-08-10 RX ADMIN — MIDODRINE HYDROCHLORIDE 2.5 MG: 5 TABLET ORAL at 18:13

## 2025-08-10 RX ADMIN — IPRATROPIUM BROMIDE AND ALBUTEROL SULFATE 1 DOSE: .5; 2.5 SOLUTION RESPIRATORY (INHALATION) at 19:57

## 2025-08-10 RX ADMIN — BUDESONIDE AND FORMOTEROL FUMARATE DIHYDRATE 2 PUFF: 80; 4.5 AEROSOL RESPIRATORY (INHALATION) at 09:06

## 2025-08-10 RX ADMIN — MIDODRINE HYDROCHLORIDE 2.5 MG: 5 TABLET ORAL at 14:07

## 2025-08-10 RX ADMIN — ROSUVASTATIN CALCIUM 10 MG: 20 TABLET, FILM COATED ORAL at 19:34

## 2025-08-10 RX ADMIN — WATER 1000 MG: 1 INJECTION INTRAMUSCULAR; INTRAVENOUS; SUBCUTANEOUS at 16:52

## 2025-08-10 RX ADMIN — BUDESONIDE AND FORMOTEROL FUMARATE DIHYDRATE 2 PUFF: 80; 4.5 AEROSOL RESPIRATORY (INHALATION) at 19:57

## 2025-08-10 RX ADMIN — SODIUM CHLORIDE, PRESERVATIVE FREE 10 ML: 5 INJECTION INTRAVENOUS at 07:48

## 2025-08-10 RX ADMIN — IPRATROPIUM BROMIDE AND ALBUTEROL SULFATE 1 DOSE: .5; 2.5 SOLUTION RESPIRATORY (INHALATION) at 09:06

## 2025-08-10 RX ADMIN — IPRATROPIUM BROMIDE AND ALBUTEROL SULFATE 1 DOSE: .5; 2.5 SOLUTION RESPIRATORY (INHALATION) at 16:18

## 2025-08-10 RX ADMIN — SODIUM CHLORIDE, PRESERVATIVE FREE 10 ML: 5 INJECTION INTRAVENOUS at 19:34

## 2025-08-10 RX ADMIN — PANTOPRAZOLE SODIUM 40 MG: 40 TABLET, DELAYED RELEASE ORAL at 06:33

## 2025-08-10 RX ADMIN — HEPARIN SODIUM AND DEXTROSE 10 UNITS/KG/HR: 10000; 5 INJECTION INTRAVENOUS at 19:41

## 2025-08-10 RX ADMIN — TRAZODONE HYDROCHLORIDE 100 MG: 50 TABLET ORAL at 19:34

## 2025-08-10 RX ADMIN — EPINEPHRINE 3 MCG/MIN: 1 INJECTION INTRAMUSCULAR; INTRAVENOUS; SUBCUTANEOUS at 03:46

## 2025-08-10 RX ADMIN — TRAMADOL HYDROCHLORIDE 50 MG: 50 TABLET, COATED ORAL at 07:48

## 2025-08-10 RX ADMIN — ACETAMINOPHEN 650 MG: 325 TABLET ORAL at 03:48

## 2025-08-10 RX ADMIN — CLOPIDOGREL BISULFATE 75 MG: 75 TABLET, FILM COATED ORAL at 07:47

## 2025-08-10 RX ADMIN — HEPARIN SODIUM 4000 UNITS: 1000 INJECTION, SOLUTION INTRAVENOUS; SUBCUTANEOUS at 04:29

## 2025-08-10 RX ADMIN — MIDODRINE HYDROCHLORIDE 2.5 MG: 5 TABLET ORAL at 07:47

## 2025-08-10 RX ADMIN — FUROSEMIDE 10 MG/HR: 10 INJECTION, SOLUTION INTRAMUSCULAR; INTRAVENOUS at 19:44

## 2025-08-10 ASSESSMENT — PAIN DESCRIPTION - LOCATION
LOCATION: GENERALIZED
LOCATION: ABDOMEN
LOCATION: ABDOMEN
LOCATION: HEAD

## 2025-08-10 ASSESSMENT — PAIN SCALES - GENERAL
PAINLEVEL_OUTOF10: 6
PAINLEVEL_OUTOF10: 5
PAINLEVEL_OUTOF10: 5
PAINLEVEL_OUTOF10: 6

## 2025-08-10 ASSESSMENT — PAIN - FUNCTIONAL ASSESSMENT
PAIN_FUNCTIONAL_ASSESSMENT: 0-10

## 2025-08-10 ASSESSMENT — PAIN DESCRIPTION - ORIENTATION: ORIENTATION: LEFT

## 2025-08-11 PROBLEM — E87.70 HYPERVOLEMIA: Status: ACTIVE | Noted: 2025-08-11

## 2025-08-11 PROBLEM — I13.10 CARDIORENAL DISEASE: Status: ACTIVE | Noted: 2025-03-24

## 2025-08-11 PROBLEM — R00.1 JUNCTIONAL BRADYCARDIA: Status: ACTIVE | Noted: 2025-08-11

## 2025-08-11 PROBLEM — I48.0 PAROXYSMAL A-FIB (HCC): Status: ACTIVE | Noted: 2025-08-11

## 2025-08-11 LAB
ANION GAP SERPL CALCULATED.3IONS-SCNC: 13 MMOL/L (ref 9–16)
ANION GAP SERPL CALCULATED.3IONS-SCNC: 14 MMOL/L (ref 9–16)
ANION GAP SERPL CALCULATED.3IONS-SCNC: 14 MMOL/L (ref 9–16)
ANION GAP SERPL CALCULATED.3IONS-SCNC: 16 MMOL/L (ref 9–16)
BASOPHILS # BLD: 0.04 K/UL (ref 0–0.2)
BASOPHILS NFR BLD: 1 % (ref 0–2)
BUN SERPL-MCNC: 25 MG/DL (ref 8–23)
CALCIUM SERPL-MCNC: 9.2 MG/DL (ref 8.6–10.4)
CHLORIDE SERPL-SCNC: 91 MMOL/L (ref 98–107)
CHLORIDE SERPL-SCNC: 93 MMOL/L (ref 98–107)
CHLORIDE SERPL-SCNC: 97 MMOL/L (ref 98–107)
CHLORIDE SERPL-SCNC: 98 MMOL/L (ref 98–107)
CO2 SERPL-SCNC: 29 MMOL/L (ref 20–31)
CO2 SERPL-SCNC: 31 MMOL/L (ref 20–31)
CO2 SERPL-SCNC: 32 MMOL/L (ref 20–31)
CO2 SERPL-SCNC: 37 MMOL/L (ref 20–31)
CREAT SERPL-MCNC: 1.9 MG/DL (ref 0.6–0.9)
EKG ATRIAL RATE: 288 BPM
EKG Q-T INTERVAL: 492 MS
EKG QRS DURATION: 102 MS
EKG QTC CALCULATION (BAZETT): 391 MS
EKG R AXIS: -11 DEGREES
EKG T AXIS: 92 DEGREES
EKG VENTRICULAR RATE: 38 BPM
EOSINOPHIL # BLD: 0.18 K/UL (ref 0–0.44)
EOSINOPHILS RELATIVE PERCENT: 2 % (ref 1–4)
ERYTHROCYTE [DISTWIDTH] IN BLOOD BY AUTOMATED COUNT: 14.3 % (ref 11.8–14.4)
GFR, ESTIMATED: 28 ML/MIN/1.73M2
GLUCOSE BLD-MCNC: 106 MG/DL (ref 65–105)
GLUCOSE BLD-MCNC: 126 MG/DL (ref 65–105)
GLUCOSE BLD-MCNC: 92 MG/DL (ref 65–105)
GLUCOSE BLD-MCNC: 93 MG/DL (ref 65–105)
GLUCOSE SERPL-MCNC: 104 MG/DL (ref 74–99)
HCT VFR BLD AUTO: 29.8 % (ref 36.3–47.1)
HGB BLD-MCNC: 9.3 G/DL (ref 11.9–15.1)
IMM GRANULOCYTES # BLD AUTO: 0.04 K/UL (ref 0–0.3)
IMM GRANULOCYTES NFR BLD: 1 %
LYMPHOCYTES NFR BLD: 1.32 K/UL (ref 1.1–3.7)
LYMPHOCYTES RELATIVE PERCENT: 16 % (ref 24–43)
MCH RBC QN AUTO: 28.9 PG (ref 25.2–33.5)
MCHC RBC AUTO-ENTMCNC: 31.2 G/DL (ref 28.4–34.8)
MCV RBC AUTO: 92.5 FL (ref 82.6–102.9)
MONOCYTES NFR BLD: 0.88 K/UL (ref 0.1–1.2)
MONOCYTES NFR BLD: 10 % (ref 3–12)
NEUTROPHILS NFR BLD: 71 % (ref 36–65)
NEUTS SEG NFR BLD: 6.05 K/UL (ref 1.5–8.1)
NRBC BLD-RTO: 0 PER 100 WBC
PARTIAL THROMBOPLASTIN TIME: 76.5 SEC (ref 23–36.5)
PARTIAL THROMBOPLASTIN TIME: 79.3 SEC (ref 23–36.5)
PLATELET # BLD AUTO: 226 K/UL (ref 138–453)
PMV BLD AUTO: 10.9 FL (ref 8.1–13.5)
POTASSIUM SERPL-SCNC: 3.6 MMOL/L (ref 3.7–5.3)
POTASSIUM SERPL-SCNC: 3.9 MMOL/L (ref 3.7–5.3)
POTASSIUM SERPL-SCNC: 3.9 MMOL/L (ref 3.7–5.3)
POTASSIUM SERPL-SCNC: 5 MMOL/L (ref 3.7–5.3)
RBC # BLD AUTO: 3.22 M/UL (ref 3.95–5.11)
SODIUM SERPL-SCNC: 141 MMOL/L (ref 136–145)
SODIUM SERPL-SCNC: 142 MMOL/L (ref 136–145)
WBC OTHER # BLD: 8.5 K/UL (ref 3.5–11.3)

## 2025-08-11 PROCEDURE — 99232 SBSQ HOSP IP/OBS MODERATE 35: CPT | Performed by: INTERNAL MEDICINE

## 2025-08-11 PROCEDURE — 2500000003 HC RX 250 WO HCPCS

## 2025-08-11 PROCEDURE — 99233 SBSQ HOSP IP/OBS HIGH 50: CPT | Performed by: STUDENT IN AN ORGANIZED HEALTH CARE EDUCATION/TRAINING PROGRAM

## 2025-08-11 PROCEDURE — 80051 ELECTROLYTE PANEL: CPT

## 2025-08-11 PROCEDURE — 94761 N-INVAS EAR/PLS OXIMETRY MLT: CPT

## 2025-08-11 PROCEDURE — 36415 COLL VENOUS BLD VENIPUNCTURE: CPT

## 2025-08-11 PROCEDURE — 85730 THROMBOPLASTIN TIME PARTIAL: CPT

## 2025-08-11 PROCEDURE — 6360000002 HC RX W HCPCS

## 2025-08-11 PROCEDURE — 94640 AIRWAY INHALATION TREATMENT: CPT

## 2025-08-11 PROCEDURE — 80048 BASIC METABOLIC PNL TOTAL CA: CPT

## 2025-08-11 PROCEDURE — 6370000000 HC RX 637 (ALT 250 FOR IP)

## 2025-08-11 PROCEDURE — 1200000000 HC SEMI PRIVATE

## 2025-08-11 PROCEDURE — 94660 CPAP INITIATION&MGMT: CPT

## 2025-08-11 PROCEDURE — 2700000000 HC OXYGEN THERAPY PER DAY

## 2025-08-11 PROCEDURE — 2580000003 HC RX 258

## 2025-08-11 PROCEDURE — 85025 COMPLETE CBC W/AUTO DIFF WBC: CPT

## 2025-08-11 PROCEDURE — 82947 ASSAY GLUCOSE BLOOD QUANT: CPT

## 2025-08-11 RX ORDER — FUROSEMIDE 10 MG/ML
40 INJECTION INTRAMUSCULAR; INTRAVENOUS 2 TIMES DAILY
Status: DISCONTINUED | OUTPATIENT
Start: 2025-08-11 | End: 2025-08-12

## 2025-08-11 RX ADMIN — BUDESONIDE AND FORMOTEROL FUMARATE DIHYDRATE 2 PUFF: 80; 4.5 AEROSOL RESPIRATORY (INHALATION) at 08:08

## 2025-08-11 RX ADMIN — TRAZODONE HYDROCHLORIDE 100 MG: 50 TABLET ORAL at 21:21

## 2025-08-11 RX ADMIN — FUROSEMIDE 10 MG/HR: 10 INJECTION, SOLUTION INTRAMUSCULAR; INTRAVENOUS at 04:36

## 2025-08-11 RX ADMIN — IPRATROPIUM BROMIDE AND ALBUTEROL SULFATE 1 DOSE: .5; 2.5 SOLUTION RESPIRATORY (INHALATION) at 15:34

## 2025-08-11 RX ADMIN — APIXABAN 5 MG: 5 TABLET, FILM COATED ORAL at 14:41

## 2025-08-11 RX ADMIN — PANTOPRAZOLE SODIUM 40 MG: 40 TABLET, DELAYED RELEASE ORAL at 06:04

## 2025-08-11 RX ADMIN — FUROSEMIDE 40 MG: 10 INJECTION, SOLUTION INTRAMUSCULAR; INTRAVENOUS at 17:59

## 2025-08-11 RX ADMIN — CLOPIDOGREL BISULFATE 75 MG: 75 TABLET, FILM COATED ORAL at 07:18

## 2025-08-11 RX ADMIN — TRAMADOL HYDROCHLORIDE 50 MG: 50 TABLET, COATED ORAL at 12:00

## 2025-08-11 RX ADMIN — APIXABAN 5 MG: 5 TABLET, FILM COATED ORAL at 21:21

## 2025-08-11 RX ADMIN — IPRATROPIUM BROMIDE AND ALBUTEROL SULFATE 1 DOSE: .5; 2.5 SOLUTION RESPIRATORY (INHALATION) at 08:08

## 2025-08-11 RX ADMIN — WATER 1000 MG: 1 INJECTION INTRAMUSCULAR; INTRAVENOUS; SUBCUTANEOUS at 17:59

## 2025-08-11 RX ADMIN — FUROSEMIDE 10 MG/HR: 10 INJECTION, SOLUTION INTRAMUSCULAR; INTRAVENOUS at 14:43

## 2025-08-11 RX ADMIN — SODIUM CHLORIDE, PRESERVATIVE FREE 10 ML: 5 INJECTION INTRAVENOUS at 07:18

## 2025-08-11 RX ADMIN — ROSUVASTATIN CALCIUM 10 MG: 20 TABLET, FILM COATED ORAL at 21:21

## 2025-08-11 RX ADMIN — SODIUM CHLORIDE, PRESERVATIVE FREE 10 ML: 5 INJECTION INTRAVENOUS at 21:21

## 2025-08-11 ASSESSMENT — PAIN DESCRIPTION - LOCATION
LOCATION: ABDOMEN

## 2025-08-11 ASSESSMENT — PAIN - FUNCTIONAL ASSESSMENT
PAIN_FUNCTIONAL_ASSESSMENT: 0-10
PAIN_FUNCTIONAL_ASSESSMENT: 0-10

## 2025-08-11 ASSESSMENT — PAIN SCALES - GENERAL
PAINLEVEL_OUTOF10: 8
PAINLEVEL_OUTOF10: 6
PAINLEVEL_OUTOF10: 5

## 2025-08-12 PROBLEM — E87.20 METABOLIC ACIDOSIS: Status: ACTIVE | Noted: 2025-08-12

## 2025-08-12 LAB
ANION GAP SERPL CALCULATED.3IONS-SCNC: 13 MMOL/L (ref 9–16)
ANION GAP SERPL CALCULATED.3IONS-SCNC: 14 MMOL/L (ref 9–16)
ANION GAP SERPL CALCULATED.3IONS-SCNC: 16 MMOL/L (ref 9–16)
BASOPHILS # BLD: 0.04 K/UL (ref 0–0.2)
BASOPHILS NFR BLD: 1 % (ref 0–2)
BUN SERPL-MCNC: 24 MG/DL (ref 8–23)
CALCIUM SERPL-MCNC: 9.7 MG/DL (ref 8.6–10.4)
CHLORIDE SERPL-SCNC: 89 MMOL/L (ref 98–107)
CHLORIDE SERPL-SCNC: 91 MMOL/L (ref 98–107)
CHLORIDE SERPL-SCNC: 92 MMOL/L (ref 98–107)
CO2 SERPL-SCNC: 34 MMOL/L (ref 20–31)
CO2 SERPL-SCNC: 35 MMOL/L (ref 20–31)
CO2 SERPL-SCNC: 36 MMOL/L (ref 20–31)
CREAT SERPL-MCNC: 1.5 MG/DL (ref 0.6–0.9)
EOSINOPHIL # BLD: 0.22 K/UL (ref 0–0.44)
EOSINOPHILS RELATIVE PERCENT: 3 % (ref 1–4)
ERYTHROCYTE [DISTWIDTH] IN BLOOD BY AUTOMATED COUNT: 14.1 % (ref 11.8–14.4)
GFR, ESTIMATED: 37 ML/MIN/1.73M2
GLUCOSE BLD-MCNC: 103 MG/DL (ref 65–105)
GLUCOSE BLD-MCNC: 114 MG/DL (ref 65–105)
GLUCOSE BLD-MCNC: 115 MG/DL (ref 65–105)
GLUCOSE BLD-MCNC: 119 MG/DL (ref 65–105)
GLUCOSE BLD-MCNC: 121 MG/DL (ref 65–105)
GLUCOSE SERPL-MCNC: 90 MG/DL (ref 74–99)
HCT VFR BLD AUTO: 33.3 % (ref 36.3–47.1)
HGB BLD-MCNC: 10.4 G/DL (ref 11.9–15.1)
IMM GRANULOCYTES # BLD AUTO: 0.04 K/UL (ref 0–0.3)
IMM GRANULOCYTES NFR BLD: 1 %
LYMPHOCYTES NFR BLD: 1.25 K/UL (ref 1.1–3.7)
LYMPHOCYTES RELATIVE PERCENT: 14 % (ref 24–43)
MCH RBC QN AUTO: 28.3 PG (ref 25.2–33.5)
MCHC RBC AUTO-ENTMCNC: 31.2 G/DL (ref 28.4–34.8)
MCV RBC AUTO: 90.5 FL (ref 82.6–102.9)
MONOCYTES NFR BLD: 1.01 K/UL (ref 0.1–1.2)
MONOCYTES NFR BLD: 12 % (ref 3–12)
NEUTROPHILS NFR BLD: 69 % (ref 36–65)
NEUTS SEG NFR BLD: 6.17 K/UL (ref 1.5–8.1)
NRBC BLD-RTO: 0 PER 100 WBC
PLATELET # BLD AUTO: 255 K/UL (ref 138–453)
PMV BLD AUTO: 11.2 FL (ref 8.1–13.5)
POTASSIUM SERPL-SCNC: 3.6 MMOL/L (ref 3.7–5.3)
POTASSIUM SERPL-SCNC: 4.4 MMOL/L (ref 3.7–5.3)
POTASSIUM SERPL-SCNC: 4.9 MMOL/L (ref 3.7–5.3)
RBC # BLD AUTO: 3.68 M/UL (ref 3.95–5.11)
SODIUM SERPL-SCNC: 138 MMOL/L (ref 136–145)
SODIUM SERPL-SCNC: 140 MMOL/L (ref 136–145)
SODIUM SERPL-SCNC: 142 MMOL/L (ref 136–145)
WBC OTHER # BLD: 8.7 K/UL (ref 3.5–11.3)

## 2025-08-12 PROCEDURE — 6360000002 HC RX W HCPCS

## 2025-08-12 PROCEDURE — 85025 COMPLETE CBC W/AUTO DIFF WBC: CPT

## 2025-08-12 PROCEDURE — 99232 SBSQ HOSP IP/OBS MODERATE 35: CPT | Performed by: INTERNAL MEDICINE

## 2025-08-12 PROCEDURE — 80051 ELECTROLYTE PANEL: CPT

## 2025-08-12 PROCEDURE — 6370000000 HC RX 637 (ALT 250 FOR IP)

## 2025-08-12 PROCEDURE — 80048 BASIC METABOLIC PNL TOTAL CA: CPT

## 2025-08-12 PROCEDURE — 6370000000 HC RX 637 (ALT 250 FOR IP): Performed by: NURSE PRACTITIONER

## 2025-08-12 PROCEDURE — 1200000000 HC SEMI PRIVATE

## 2025-08-12 PROCEDURE — 94761 N-INVAS EAR/PLS OXIMETRY MLT: CPT

## 2025-08-12 PROCEDURE — 99233 SBSQ HOSP IP/OBS HIGH 50: CPT | Performed by: NURSE PRACTITIONER

## 2025-08-12 PROCEDURE — 94640 AIRWAY INHALATION TREATMENT: CPT

## 2025-08-12 PROCEDURE — 82947 ASSAY GLUCOSE BLOOD QUANT: CPT

## 2025-08-12 PROCEDURE — 2700000000 HC OXYGEN THERAPY PER DAY

## 2025-08-12 PROCEDURE — 2500000003 HC RX 250 WO HCPCS

## 2025-08-12 PROCEDURE — 6370000000 HC RX 637 (ALT 250 FOR IP): Performed by: INTERNAL MEDICINE

## 2025-08-12 PROCEDURE — 36415 COLL VENOUS BLD VENIPUNCTURE: CPT

## 2025-08-12 PROCEDURE — 99233 SBSQ HOSP IP/OBS HIGH 50: CPT | Performed by: HOSPITALIST

## 2025-08-12 PROCEDURE — 97162 PT EVAL MOD COMPLEX 30 MIN: CPT

## 2025-08-12 PROCEDURE — 97530 THERAPEUTIC ACTIVITIES: CPT

## 2025-08-12 RX ORDER — SPIRONOLACTONE 25 MG/1
25 TABLET ORAL DAILY
Status: DISCONTINUED | OUTPATIENT
Start: 2025-08-12 | End: 2025-08-18 | Stop reason: HOSPADM

## 2025-08-12 RX ORDER — HYDRALAZINE HYDROCHLORIDE 50 MG/1
25 TABLET, FILM COATED ORAL EVERY 8 HOURS SCHEDULED
Status: DISCONTINUED | OUTPATIENT
Start: 2025-08-12 | End: 2025-08-13

## 2025-08-12 RX ORDER — FUROSEMIDE 40 MG/1
40 TABLET ORAL 2 TIMES DAILY
Status: DISCONTINUED | OUTPATIENT
Start: 2025-08-12 | End: 2025-08-15

## 2025-08-12 RX ORDER — MIDODRINE HYDROCHLORIDE 5 MG/1
2.5 TABLET ORAL 3 TIMES DAILY PRN
Status: DISCONTINUED | OUTPATIENT
Start: 2025-08-12 | End: 2025-08-18 | Stop reason: HOSPADM

## 2025-08-12 RX ORDER — POTASSIUM CHLORIDE 1500 MG/1
40 TABLET, EXTENDED RELEASE ORAL ONCE
Status: COMPLETED | OUTPATIENT
Start: 2025-08-12 | End: 2025-08-12

## 2025-08-12 RX ORDER — DULOXETIN HYDROCHLORIDE 20 MG/1
20 CAPSULE, DELAYED RELEASE ORAL DAILY
Status: DISCONTINUED | OUTPATIENT
Start: 2025-08-12 | End: 2025-08-18 | Stop reason: HOSPADM

## 2025-08-12 RX ORDER — CARVEDILOL 12.5 MG/1
12.5 TABLET ORAL 2 TIMES DAILY WITH MEALS
Status: DISCONTINUED | OUTPATIENT
Start: 2025-08-12 | End: 2025-08-18 | Stop reason: HOSPADM

## 2025-08-12 RX ORDER — CILOSTAZOL 100 MG/1
50 TABLET ORAL 2 TIMES DAILY
Status: DISCONTINUED | OUTPATIENT
Start: 2025-08-12 | End: 2025-08-18 | Stop reason: HOSPADM

## 2025-08-12 RX ORDER — AMLODIPINE BESYLATE 10 MG/1
10 TABLET ORAL DAILY
Status: DISCONTINUED | OUTPATIENT
Start: 2025-08-12 | End: 2025-08-13

## 2025-08-12 RX ORDER — FUROSEMIDE 40 MG/1
40 TABLET ORAL DAILY
Status: DISCONTINUED | OUTPATIENT
Start: 2025-08-13 | End: 2025-08-12

## 2025-08-12 RX ORDER — BUSPIRONE HYDROCHLORIDE 10 MG/1
10 TABLET ORAL 3 TIMES DAILY
Status: DISCONTINUED | OUTPATIENT
Start: 2025-08-12 | End: 2025-08-18 | Stop reason: HOSPADM

## 2025-08-12 RX ORDER — MIRTAZAPINE 15 MG/1
15 TABLET, FILM COATED ORAL NIGHTLY
Status: DISCONTINUED | OUTPATIENT
Start: 2025-08-12 | End: 2025-08-18 | Stop reason: HOSPADM

## 2025-08-12 RX ADMIN — TRAZODONE HYDROCHLORIDE 100 MG: 50 TABLET ORAL at 21:17

## 2025-08-12 RX ADMIN — BUDESONIDE AND FORMOTEROL FUMARATE DIHYDRATE 2 PUFF: 80; 4.5 AEROSOL RESPIRATORY (INHALATION) at 08:15

## 2025-08-12 RX ADMIN — CLOPIDOGREL BISULFATE 75 MG: 75 TABLET, FILM COATED ORAL at 08:51

## 2025-08-12 RX ADMIN — POTASSIUM CHLORIDE 40 MEQ: 1500 TABLET, EXTENDED RELEASE ORAL at 14:06

## 2025-08-12 RX ADMIN — ACETAMINOPHEN 650 MG: 325 TABLET ORAL at 14:08

## 2025-08-12 RX ADMIN — PANTOPRAZOLE SODIUM 40 MG: 40 TABLET, DELAYED RELEASE ORAL at 06:27

## 2025-08-12 RX ADMIN — SPIRONOLACTONE 25 MG: 25 TABLET, FILM COATED ORAL at 16:26

## 2025-08-12 RX ADMIN — APIXABAN 5 MG: 5 TABLET, FILM COATED ORAL at 08:51

## 2025-08-12 RX ADMIN — ROSUVASTATIN CALCIUM 10 MG: 20 TABLET, FILM COATED ORAL at 21:17

## 2025-08-12 RX ADMIN — SODIUM CHLORIDE, PRESERVATIVE FREE 10 ML: 5 INJECTION INTRAVENOUS at 21:17

## 2025-08-12 RX ADMIN — FUROSEMIDE 40 MG: 10 INJECTION, SOLUTION INTRAMUSCULAR; INTRAVENOUS at 08:51

## 2025-08-12 RX ADMIN — BUSPIRONE HYDROCHLORIDE 10 MG: 10 TABLET ORAL at 14:06

## 2025-08-12 RX ADMIN — DULOXETINE 20 MG: 20 CAPSULE, DELAYED RELEASE ORAL at 12:11

## 2025-08-12 RX ADMIN — CILOSTAZOL 50 MG: 100 TABLET ORAL at 21:25

## 2025-08-12 RX ADMIN — IPRATROPIUM BROMIDE AND ALBUTEROL SULFATE 1 DOSE: .5; 2.5 SOLUTION RESPIRATORY (INHALATION) at 08:15

## 2025-08-12 RX ADMIN — BUSPIRONE HYDROCHLORIDE 10 MG: 10 TABLET ORAL at 21:17

## 2025-08-12 RX ADMIN — CILOSTAZOL 50 MG: 100 TABLET ORAL at 12:11

## 2025-08-12 RX ADMIN — APIXABAN 5 MG: 5 TABLET, FILM COATED ORAL at 21:17

## 2025-08-12 RX ADMIN — BUDESONIDE AND FORMOTEROL FUMARATE DIHYDRATE 2 PUFF: 80; 4.5 AEROSOL RESPIRATORY (INHALATION) at 20:53

## 2025-08-12 RX ADMIN — MIRTAZAPINE 15 MG: 15 TABLET, FILM COATED ORAL at 21:17

## 2025-08-12 RX ADMIN — IPRATROPIUM BROMIDE AND ALBUTEROL SULFATE 1 DOSE: .5; 2.5 SOLUTION RESPIRATORY (INHALATION) at 20:53

## 2025-08-12 RX ADMIN — OXCARBAZEPINE 450 MG: 300 TABLET, FILM COATED ORAL at 12:11

## 2025-08-12 RX ADMIN — TRAMADOL HYDROCHLORIDE 50 MG: 50 TABLET, COATED ORAL at 12:13

## 2025-08-12 RX ADMIN — HYDRALAZINE HYDROCHLORIDE 25 MG: 50 TABLET ORAL at 09:42

## 2025-08-12 RX ADMIN — WATER 1000 MG: 1 INJECTION INTRAMUSCULAR; INTRAVENOUS; SUBCUTANEOUS at 18:10

## 2025-08-12 RX ADMIN — Medication 3 MG: at 21:17

## 2025-08-12 RX ADMIN — HYDRALAZINE HYDROCHLORIDE 25 MG: 50 TABLET ORAL at 21:17

## 2025-08-12 RX ADMIN — FUROSEMIDE 40 MG: 40 TABLET ORAL at 16:26

## 2025-08-12 ASSESSMENT — PAIN SCALES - GENERAL
PAINLEVEL_OUTOF10: 8
PAINLEVEL_OUTOF10: 7
PAINLEVEL_OUTOF10: 2
PAINLEVEL_OUTOF10: 5

## 2025-08-12 ASSESSMENT — PAIN - FUNCTIONAL ASSESSMENT
PAIN_FUNCTIONAL_ASSESSMENT: 0-10

## 2025-08-13 ENCOUNTER — APPOINTMENT (OUTPATIENT)
Dept: ULTRASOUND IMAGING | Age: 70
DRG: 291 | End: 2025-08-13
Payer: MEDICARE

## 2025-08-13 LAB
ANION GAP SERPL CALCULATED.3IONS-SCNC: 17 MMOL/L (ref 9–16)
BASOPHILS # BLD: 0.06 K/UL (ref 0–0.2)
BASOPHILS NFR BLD: 1 % (ref 0–2)
BUN SERPL-MCNC: 28 MG/DL (ref 8–23)
CALCIUM SERPL-MCNC: 10.6 MG/DL (ref 8.6–10.4)
CHLORIDE SERPL-SCNC: 89 MMOL/L (ref 98–107)
CO2 SERPL-SCNC: 33 MMOL/L (ref 20–31)
CREAT SERPL-MCNC: 1.6 MG/DL (ref 0.6–0.9)
EOSINOPHIL # BLD: 0.32 K/UL (ref 0–0.44)
EOSINOPHILS RELATIVE PERCENT: 3 % (ref 1–4)
ERYTHROCYTE [DISTWIDTH] IN BLOOD BY AUTOMATED COUNT: 14.3 % (ref 11.8–14.4)
GFR, ESTIMATED: 34 ML/MIN/1.73M2
GLUCOSE BLD-MCNC: 118 MG/DL (ref 65–105)
GLUCOSE BLD-MCNC: 125 MG/DL (ref 65–105)
GLUCOSE BLD-MCNC: 125 MG/DL (ref 65–105)
GLUCOSE BLD-MCNC: 161 MG/DL (ref 65–105)
GLUCOSE SERPL-MCNC: 121 MG/DL (ref 74–99)
HCT VFR BLD AUTO: 35.5 % (ref 36.3–47.1)
HGB BLD-MCNC: 11.6 G/DL (ref 11.9–15.1)
IMM GRANULOCYTES # BLD AUTO: 0.06 K/UL (ref 0–0.3)
IMM GRANULOCYTES NFR BLD: 1 %
LIPASE SERPL-CCNC: 67 U/L (ref 13–60)
LYMPHOCYTES NFR BLD: 1.27 K/UL (ref 1.1–3.7)
LYMPHOCYTES RELATIVE PERCENT: 12 % (ref 24–43)
MCH RBC QN AUTO: 28.9 PG (ref 25.2–33.5)
MCHC RBC AUTO-ENTMCNC: 32.7 G/DL (ref 28.4–34.8)
MCV RBC AUTO: 88.3 FL (ref 82.6–102.9)
MONOCYTES NFR BLD: 1.08 K/UL (ref 0.1–1.2)
MONOCYTES NFR BLD: 10 % (ref 3–12)
NEUTROPHILS NFR BLD: 75 % (ref 36–65)
NEUTS SEG NFR BLD: 8.29 K/UL (ref 1.5–8.1)
NRBC BLD-RTO: 0 PER 100 WBC
PLATELET # BLD AUTO: ABNORMAL K/UL (ref 138–453)
PLATELET, FLUORESCENCE: 280 K/UL (ref 138–453)
PLATELETS.RETICULATED NFR BLD AUTO: 6.1 % (ref 1.1–10.3)
POTASSIUM SERPL-SCNC: 5.1 MMOL/L (ref 3.7–5.3)
RBC # BLD AUTO: 4.02 M/UL (ref 3.95–5.11)
SODIUM SERPL-SCNC: 139 MMOL/L (ref 136–145)
WBC OTHER # BLD: 11.1 K/UL (ref 3.5–11.3)

## 2025-08-13 PROCEDURE — 6370000000 HC RX 637 (ALT 250 FOR IP): Performed by: STUDENT IN AN ORGANIZED HEALTH CARE EDUCATION/TRAINING PROGRAM

## 2025-08-13 PROCEDURE — 97166 OT EVAL MOD COMPLEX 45 MIN: CPT

## 2025-08-13 PROCEDURE — 6370000000 HC RX 637 (ALT 250 FOR IP)

## 2025-08-13 PROCEDURE — 6360000002 HC RX W HCPCS: Performed by: STUDENT IN AN ORGANIZED HEALTH CARE EDUCATION/TRAINING PROGRAM

## 2025-08-13 PROCEDURE — 83690 ASSAY OF LIPASE: CPT

## 2025-08-13 PROCEDURE — 6370000000 HC RX 637 (ALT 250 FOR IP): Performed by: NURSE PRACTITIONER

## 2025-08-13 PROCEDURE — 99232 SBSQ HOSP IP/OBS MODERATE 35: CPT | Performed by: STUDENT IN AN ORGANIZED HEALTH CARE EDUCATION/TRAINING PROGRAM

## 2025-08-13 PROCEDURE — 99232 SBSQ HOSP IP/OBS MODERATE 35: CPT | Performed by: HOSPITALIST

## 2025-08-13 PROCEDURE — 2500000003 HC RX 250 WO HCPCS

## 2025-08-13 PROCEDURE — 76705 ECHO EXAM OF ABDOMEN: CPT

## 2025-08-13 PROCEDURE — 99232 SBSQ HOSP IP/OBS MODERATE 35: CPT | Performed by: INTERNAL MEDICINE

## 2025-08-13 PROCEDURE — 80048 BASIC METABOLIC PNL TOTAL CA: CPT

## 2025-08-13 PROCEDURE — 85025 COMPLETE CBC W/AUTO DIFF WBC: CPT

## 2025-08-13 PROCEDURE — 85055 RETICULATED PLATELET ASSAY: CPT

## 2025-08-13 PROCEDURE — 36415 COLL VENOUS BLD VENIPUNCTURE: CPT

## 2025-08-13 PROCEDURE — 2580000003 HC RX 258

## 2025-08-13 PROCEDURE — 82947 ASSAY GLUCOSE BLOOD QUANT: CPT

## 2025-08-13 PROCEDURE — 99233 SBSQ HOSP IP/OBS HIGH 50: CPT | Performed by: NURSE PRACTITIONER

## 2025-08-13 PROCEDURE — 2700000000 HC OXYGEN THERAPY PER DAY

## 2025-08-13 PROCEDURE — 94660 CPAP INITIATION&MGMT: CPT

## 2025-08-13 PROCEDURE — 97530 THERAPEUTIC ACTIVITIES: CPT

## 2025-08-13 PROCEDURE — 97535 SELF CARE MNGMENT TRAINING: CPT

## 2025-08-13 PROCEDURE — 1200000000 HC SEMI PRIVATE

## 2025-08-13 PROCEDURE — 94640 AIRWAY INHALATION TREATMENT: CPT

## 2025-08-13 PROCEDURE — 2500000003 HC RX 250 WO HCPCS: Performed by: STUDENT IN AN ORGANIZED HEALTH CARE EDUCATION/TRAINING PROGRAM

## 2025-08-13 RX ORDER — ACETAZOLAMIDE 250 MG/1
250 TABLET ORAL 2 TIMES DAILY
Status: COMPLETED | OUTPATIENT
Start: 2025-08-13 | End: 2025-08-14

## 2025-08-13 RX ORDER — 0.9 % SODIUM CHLORIDE 0.9 %
250 INTRAVENOUS SOLUTION INTRAVENOUS ONCE
Status: COMPLETED | OUTPATIENT
Start: 2025-08-13 | End: 2025-08-13

## 2025-08-13 RX ORDER — HYDRALAZINE HYDROCHLORIDE 50 MG/1
50 TABLET, FILM COATED ORAL EVERY 8 HOURS SCHEDULED
Status: DISCONTINUED | OUTPATIENT
Start: 2025-08-13 | End: 2025-08-18 | Stop reason: HOSPADM

## 2025-08-13 RX ORDER — AMLODIPINE BESYLATE 2.5 MG/1
2.5 TABLET ORAL DAILY
Status: DISCONTINUED | OUTPATIENT
Start: 2025-08-13 | End: 2025-08-17

## 2025-08-13 RX ADMIN — BUSPIRONE HYDROCHLORIDE 10 MG: 10 TABLET ORAL at 12:11

## 2025-08-13 RX ADMIN — IPRATROPIUM BROMIDE AND ALBUTEROL SULFATE 1 DOSE: .5; 2.5 SOLUTION RESPIRATORY (INHALATION) at 15:30

## 2025-08-13 RX ADMIN — HYDRALAZINE HYDROCHLORIDE 50 MG: 50 TABLET ORAL at 16:00

## 2025-08-13 RX ADMIN — SODIUM CHLORIDE, PRESERVATIVE FREE 10 ML: 5 INJECTION INTRAVENOUS at 20:19

## 2025-08-13 RX ADMIN — BUDESONIDE AND FORMOTEROL FUMARATE DIHYDRATE 2 PUFF: 80; 4.5 AEROSOL RESPIRATORY (INHALATION) at 07:49

## 2025-08-13 RX ADMIN — CLOPIDOGREL BISULFATE 75 MG: 75 TABLET, FILM COATED ORAL at 12:12

## 2025-08-13 RX ADMIN — PANTOPRAZOLE SODIUM 40 MG: 40 TABLET, DELAYED RELEASE ORAL at 06:45

## 2025-08-13 RX ADMIN — DULOXETINE 20 MG: 20 CAPSULE, DELAYED RELEASE ORAL at 12:11

## 2025-08-13 RX ADMIN — TRAMADOL HYDROCHLORIDE 50 MG: 50 TABLET, COATED ORAL at 12:46

## 2025-08-13 RX ADMIN — BUDESONIDE AND FORMOTEROL FUMARATE DIHYDRATE 2 PUFF: 80; 4.5 AEROSOL RESPIRATORY (INHALATION) at 19:43

## 2025-08-13 RX ADMIN — ROSUVASTATIN CALCIUM 10 MG: 20 TABLET, FILM COATED ORAL at 20:17

## 2025-08-13 RX ADMIN — SODIUM CHLORIDE, PRESERVATIVE FREE 10 ML: 5 INJECTION INTRAVENOUS at 12:52

## 2025-08-13 RX ADMIN — ACETAZOLAMIDE 250 MG: 250 TABLET ORAL at 12:51

## 2025-08-13 RX ADMIN — HYDRALAZINE HYDROCHLORIDE 25 MG: 50 TABLET ORAL at 06:45

## 2025-08-13 RX ADMIN — MIRTAZAPINE 15 MG: 15 TABLET, FILM COATED ORAL at 20:14

## 2025-08-13 RX ADMIN — CILOSTAZOL 50 MG: 100 TABLET ORAL at 12:12

## 2025-08-13 RX ADMIN — SPIRONOLACTONE 25 MG: 25 TABLET, FILM COATED ORAL at 12:12

## 2025-08-13 RX ADMIN — IPRATROPIUM BROMIDE AND ALBUTEROL SULFATE 1 DOSE: .5; 2.5 SOLUTION RESPIRATORY (INHALATION) at 07:49

## 2025-08-13 RX ADMIN — OXCARBAZEPINE 450 MG: 300 TABLET, FILM COATED ORAL at 12:40

## 2025-08-13 RX ADMIN — BUSPIRONE HYDROCHLORIDE 10 MG: 10 TABLET ORAL at 16:00

## 2025-08-13 RX ADMIN — HYDRALAZINE HYDROCHLORIDE 50 MG: 50 TABLET ORAL at 22:26

## 2025-08-13 RX ADMIN — IPRATROPIUM BROMIDE AND ALBUTEROL SULFATE 1 DOSE: .5; 2.5 SOLUTION RESPIRATORY (INHALATION) at 19:43

## 2025-08-13 RX ADMIN — APIXABAN 5 MG: 5 TABLET, FILM COATED ORAL at 12:12

## 2025-08-13 RX ADMIN — SODIUM CHLORIDE 250 ML: 0.9 INJECTION, SOLUTION INTRAVENOUS at 15:57

## 2025-08-13 RX ADMIN — IPRATROPIUM BROMIDE AND ALBUTEROL SULFATE 1 DOSE: .5; 2.5 SOLUTION RESPIRATORY (INHALATION) at 12:13

## 2025-08-13 RX ADMIN — ACETAZOLAMIDE 250 MG: 250 TABLET ORAL at 20:16

## 2025-08-13 RX ADMIN — BUSPIRONE HYDROCHLORIDE 10 MG: 10 TABLET ORAL at 20:16

## 2025-08-13 RX ADMIN — WATER 1000 MG: 1 INJECTION INTRAMUSCULAR; INTRAVENOUS; SUBCUTANEOUS at 20:19

## 2025-08-13 RX ADMIN — CILOSTAZOL 50 MG: 100 TABLET ORAL at 20:15

## 2025-08-13 RX ADMIN — APIXABAN 5 MG: 5 TABLET, FILM COATED ORAL at 20:17

## 2025-08-13 ASSESSMENT — PAIN - FUNCTIONAL ASSESSMENT: PAIN_FUNCTIONAL_ASSESSMENT: 0-10

## 2025-08-13 ASSESSMENT — PAIN SCALES - GENERAL: PAINLEVEL_OUTOF10: 8

## 2025-08-14 LAB
ALBUMIN SERPL-MCNC: 4.1 G/DL (ref 3.5–5.2)
ALBUMIN/GLOB SERPL: 1.2 {RATIO} (ref 1–2.5)
ALP SERPL-CCNC: 110 U/L (ref 35–104)
ALT SERPL-CCNC: 71 U/L (ref 10–35)
ANION GAP SERPL CALCULATED.3IONS-SCNC: 17 MMOL/L (ref 9–16)
AST SERPL-CCNC: 24 U/L (ref 10–35)
BASOPHILS # BLD: 0.09 K/UL (ref 0–0.2)
BASOPHILS NFR BLD: 1 % (ref 0–2)
BILIRUB DIRECT SERPL-MCNC: <0.1 MG/DL (ref 0–0.2)
BILIRUB INDIRECT SERPL-MCNC: ABNORMAL MG/DL (ref 0–1)
BILIRUB SERPL-MCNC: 0.4 MG/DL (ref 0–1.2)
BUN SERPL-MCNC: 27 MG/DL (ref 8–23)
CALCIUM SERPL-MCNC: 10.8 MG/DL (ref 8.6–10.4)
CHLORIDE SERPL-SCNC: 95 MMOL/L (ref 98–107)
CO2 SERPL-SCNC: 27 MMOL/L (ref 20–31)
CREAT SERPL-MCNC: 1.5 MG/DL (ref 0.6–0.9)
EOSINOPHIL # BLD: 0.49 K/UL (ref 0–0.44)
EOSINOPHILS RELATIVE PERCENT: 5 % (ref 1–4)
ERYTHROCYTE [DISTWIDTH] IN BLOOD BY AUTOMATED COUNT: 14.6 % (ref 11.8–14.4)
GFR, ESTIMATED: 37 ML/MIN/1.73M2
GLOBULIN SER CALC-MCNC: 3.5 G/DL
GLUCOSE BLD-MCNC: 118 MG/DL (ref 65–105)
GLUCOSE BLD-MCNC: 136 MG/DL (ref 65–105)
GLUCOSE BLD-MCNC: 159 MG/DL (ref 65–105)
GLUCOSE BLD-MCNC: 167 MG/DL (ref 65–105)
GLUCOSE SERPL-MCNC: 111 MG/DL (ref 74–99)
HCT VFR BLD AUTO: 37.5 % (ref 36.3–47.1)
HGB BLD-MCNC: 11.9 G/DL (ref 11.9–15.1)
IMM GRANULOCYTES # BLD AUTO: 0.06 K/UL (ref 0–0.3)
IMM GRANULOCYTES NFR BLD: 1 %
LYMPHOCYTES NFR BLD: 1.58 K/UL (ref 1.1–3.7)
LYMPHOCYTES RELATIVE PERCENT: 15 % (ref 24–43)
MCH RBC QN AUTO: 29 PG (ref 25.2–33.5)
MCHC RBC AUTO-ENTMCNC: 31.7 G/DL (ref 28.4–34.8)
MCV RBC AUTO: 91.5 FL (ref 82.6–102.9)
MICROORGANISM SPEC CULT: NORMAL
MICROORGANISM SPEC CULT: NORMAL
MONOCYTES NFR BLD: 1.33 K/UL (ref 0.1–1.2)
MONOCYTES NFR BLD: 13 % (ref 3–12)
NEUTROPHILS NFR BLD: 65 % (ref 36–65)
NEUTS SEG NFR BLD: 7.07 K/UL (ref 1.5–8.1)
NRBC BLD-RTO: 0 PER 100 WBC
PLATELET # BLD AUTO: 309 K/UL (ref 138–453)
PMV BLD AUTO: 11.1 FL (ref 8.1–13.5)
POTASSIUM SERPL-SCNC: 4.4 MMOL/L (ref 3.7–5.3)
PROT SERPL-MCNC: 7.6 G/DL (ref 6.6–8.7)
RBC # BLD AUTO: 4.1 M/UL (ref 3.95–5.11)
RBC # BLD: ABNORMAL 10*6/UL
SERVICE CMNT-IMP: NORMAL
SERVICE CMNT-IMP: NORMAL
SODIUM SERPL-SCNC: 139 MMOL/L (ref 136–145)
SPECIMEN DESCRIPTION: NORMAL
SPECIMEN DESCRIPTION: NORMAL
WBC OTHER # BLD: 10.6 K/UL (ref 3.5–11.3)

## 2025-08-14 PROCEDURE — 36415 COLL VENOUS BLD VENIPUNCTURE: CPT

## 2025-08-14 PROCEDURE — 6370000000 HC RX 637 (ALT 250 FOR IP): Performed by: NURSE PRACTITIONER

## 2025-08-14 PROCEDURE — 99231 SBSQ HOSP IP/OBS SF/LOW 25: CPT | Performed by: INTERNAL MEDICINE

## 2025-08-14 PROCEDURE — 97110 THERAPEUTIC EXERCISES: CPT

## 2025-08-14 PROCEDURE — 6370000000 HC RX 637 (ALT 250 FOR IP)

## 2025-08-14 PROCEDURE — 99232 SBSQ HOSP IP/OBS MODERATE 35: CPT | Performed by: STUDENT IN AN ORGANIZED HEALTH CARE EDUCATION/TRAINING PROGRAM

## 2025-08-14 PROCEDURE — 6370000000 HC RX 637 (ALT 250 FOR IP): Performed by: STUDENT IN AN ORGANIZED HEALTH CARE EDUCATION/TRAINING PROGRAM

## 2025-08-14 PROCEDURE — 94640 AIRWAY INHALATION TREATMENT: CPT

## 2025-08-14 PROCEDURE — 97530 THERAPEUTIC ACTIVITIES: CPT

## 2025-08-14 PROCEDURE — 82947 ASSAY GLUCOSE BLOOD QUANT: CPT

## 2025-08-14 PROCEDURE — 2500000003 HC RX 250 WO HCPCS

## 2025-08-14 PROCEDURE — 6360000002 HC RX W HCPCS: Performed by: NURSE PRACTITIONER

## 2025-08-14 PROCEDURE — 80076 HEPATIC FUNCTION PANEL: CPT

## 2025-08-14 PROCEDURE — 94760 N-INVAS EAR/PLS OXIMETRY 1: CPT

## 2025-08-14 PROCEDURE — 2700000000 HC OXYGEN THERAPY PER DAY

## 2025-08-14 PROCEDURE — 85025 COMPLETE CBC W/AUTO DIFF WBC: CPT

## 2025-08-14 PROCEDURE — 99233 SBSQ HOSP IP/OBS HIGH 50: CPT | Performed by: NURSE PRACTITIONER

## 2025-08-14 PROCEDURE — 80048 BASIC METABOLIC PNL TOTAL CA: CPT

## 2025-08-14 PROCEDURE — 1200000000 HC SEMI PRIVATE

## 2025-08-14 PROCEDURE — 99232 SBSQ HOSP IP/OBS MODERATE 35: CPT | Performed by: HOSPITALIST

## 2025-08-14 RX ORDER — FENTANYL CITRATE 50 UG/ML
25 INJECTION, SOLUTION INTRAMUSCULAR; INTRAVENOUS ONCE
Status: COMPLETED | OUTPATIENT
Start: 2025-08-14 | End: 2025-08-14

## 2025-08-14 RX ADMIN — HYDRALAZINE HYDROCHLORIDE 50 MG: 50 TABLET ORAL at 21:39

## 2025-08-14 RX ADMIN — BUSPIRONE HYDROCHLORIDE 10 MG: 10 TABLET ORAL at 11:03

## 2025-08-14 RX ADMIN — IPRATROPIUM BROMIDE AND ALBUTEROL SULFATE 1 DOSE: .5; 2.5 SOLUTION RESPIRATORY (INHALATION) at 19:53

## 2025-08-14 RX ADMIN — OXCARBAZEPINE 450 MG: 300 TABLET, FILM COATED ORAL at 11:02

## 2025-08-14 RX ADMIN — TRAMADOL HYDROCHLORIDE 50 MG: 50 TABLET, COATED ORAL at 17:13

## 2025-08-14 RX ADMIN — BUSPIRONE HYDROCHLORIDE 10 MG: 10 TABLET ORAL at 21:39

## 2025-08-14 RX ADMIN — HYDRALAZINE HYDROCHLORIDE 50 MG: 50 TABLET ORAL at 06:27

## 2025-08-14 RX ADMIN — HYDRALAZINE HYDROCHLORIDE 50 MG: 50 TABLET ORAL at 16:53

## 2025-08-14 RX ADMIN — SPIRONOLACTONE 25 MG: 25 TABLET, FILM COATED ORAL at 11:04

## 2025-08-14 RX ADMIN — APIXABAN 5 MG: 5 TABLET, FILM COATED ORAL at 21:40

## 2025-08-14 RX ADMIN — SODIUM CHLORIDE, PRESERVATIVE FREE 10 ML: 5 INJECTION INTRAVENOUS at 11:05

## 2025-08-14 RX ADMIN — DULOXETINE 20 MG: 20 CAPSULE, DELAYED RELEASE ORAL at 11:02

## 2025-08-14 RX ADMIN — ACETAZOLAMIDE 250 MG: 250 TABLET ORAL at 21:43

## 2025-08-14 RX ADMIN — MIRTAZAPINE 15 MG: 15 TABLET, FILM COATED ORAL at 21:39

## 2025-08-14 RX ADMIN — BUSPIRONE HYDROCHLORIDE 10 MG: 10 TABLET ORAL at 16:53

## 2025-08-14 RX ADMIN — CILOSTAZOL 50 MG: 100 TABLET ORAL at 21:43

## 2025-08-14 RX ADMIN — CILOSTAZOL 50 MG: 100 TABLET ORAL at 11:03

## 2025-08-14 RX ADMIN — ACETAZOLAMIDE 250 MG: 250 TABLET ORAL at 11:02

## 2025-08-14 RX ADMIN — PANTOPRAZOLE SODIUM 40 MG: 40 TABLET, DELAYED RELEASE ORAL at 06:26

## 2025-08-14 RX ADMIN — AMLODIPINE BESYLATE 2.5 MG: 2.5 TABLET ORAL at 11:15

## 2025-08-14 RX ADMIN — TRAMADOL HYDROCHLORIDE 50 MG: 50 TABLET, COATED ORAL at 02:18

## 2025-08-14 RX ADMIN — APIXABAN 5 MG: 5 TABLET, FILM COATED ORAL at 11:03

## 2025-08-14 RX ADMIN — FENTANYL CITRATE 25 MCG: 50 INJECTION INTRAMUSCULAR; INTRAVENOUS at 22:27

## 2025-08-14 RX ADMIN — BUDESONIDE AND FORMOTEROL FUMARATE DIHYDRATE 2 PUFF: 80; 4.5 AEROSOL RESPIRATORY (INHALATION) at 19:54

## 2025-08-14 RX ADMIN — SODIUM CHLORIDE, PRESERVATIVE FREE 10 ML: 5 INJECTION INTRAVENOUS at 21:40

## 2025-08-14 RX ADMIN — CLOPIDOGREL BISULFATE 75 MG: 75 TABLET, FILM COATED ORAL at 11:03

## 2025-08-14 RX ADMIN — ROSUVASTATIN CALCIUM 10 MG: 20 TABLET, FILM COATED ORAL at 21:39

## 2025-08-14 ASSESSMENT — PAIN SCALES - GENERAL
PAINLEVEL_OUTOF10: 6
PAINLEVEL_OUTOF10: 8

## 2025-08-14 ASSESSMENT — PAIN - FUNCTIONAL ASSESSMENT
PAIN_FUNCTIONAL_ASSESSMENT: 0-10

## 2025-08-14 ASSESSMENT — PAIN DESCRIPTION - LOCATION
LOCATION: RIB CAGE
LOCATION: CHEST

## 2025-08-15 LAB
ANION GAP SERPL CALCULATED.3IONS-SCNC: 13 MMOL/L (ref 9–16)
BASOPHILS # BLD: 0.09 K/UL (ref 0–0.2)
BASOPHILS NFR BLD: 1 % (ref 0–2)
BUN SERPL-MCNC: 34 MG/DL (ref 8–23)
CALCIUM SERPL-MCNC: 10.8 MG/DL (ref 8.6–10.4)
CHLORIDE SERPL-SCNC: 92 MMOL/L (ref 98–107)
CO2 SERPL-SCNC: 30 MMOL/L (ref 20–31)
CREAT SERPL-MCNC: 1.9 MG/DL (ref 0.6–0.9)
EOSINOPHIL # BLD: 0.56 K/UL (ref 0–0.44)
EOSINOPHILS RELATIVE PERCENT: 5 % (ref 1–4)
ERYTHROCYTE [DISTWIDTH] IN BLOOD BY AUTOMATED COUNT: 14.6 % (ref 11.8–14.4)
GFR, ESTIMATED: 28 ML/MIN/1.73M2
GLUCOSE BLD-MCNC: 146 MG/DL (ref 65–105)
GLUCOSE BLD-MCNC: 150 MG/DL (ref 65–105)
GLUCOSE BLD-MCNC: 175 MG/DL (ref 65–105)
GLUCOSE BLD-MCNC: 204 MG/DL (ref 65–105)
GLUCOSE SERPL-MCNC: 190 MG/DL (ref 74–99)
HCT VFR BLD AUTO: 38.3 % (ref 36.3–47.1)
HGB BLD-MCNC: 12.2 G/DL (ref 11.9–15.1)
IMM GRANULOCYTES # BLD AUTO: 0.07 K/UL (ref 0–0.3)
IMM GRANULOCYTES NFR BLD: 1 %
LYMPHOCYTES NFR BLD: 1.96 K/UL (ref 1.1–3.7)
LYMPHOCYTES RELATIVE PERCENT: 16 % (ref 24–43)
MCH RBC QN AUTO: 29 PG (ref 25.2–33.5)
MCHC RBC AUTO-ENTMCNC: 31.9 G/DL (ref 28.4–34.8)
MCV RBC AUTO: 91 FL (ref 82.6–102.9)
MONOCYTES NFR BLD: 1.45 K/UL (ref 0.1–1.2)
MONOCYTES NFR BLD: 12 % (ref 3–12)
NEUTROPHILS NFR BLD: 65 % (ref 36–65)
NEUTS SEG NFR BLD: 8.07 K/UL (ref 1.5–8.1)
NRBC BLD-RTO: 0 PER 100 WBC
PLATELET # BLD AUTO: 355 K/UL (ref 138–453)
PMV BLD AUTO: 11.3 FL (ref 8.1–13.5)
POTASSIUM SERPL-SCNC: 4 MMOL/L (ref 3.7–5.3)
RBC # BLD AUTO: 4.21 M/UL (ref 3.95–5.11)
RBC # BLD: ABNORMAL 10*6/UL
SODIUM SERPL-SCNC: 135 MMOL/L (ref 136–145)
WBC OTHER # BLD: 12.2 K/UL (ref 3.5–11.3)

## 2025-08-15 PROCEDURE — 6370000000 HC RX 637 (ALT 250 FOR IP)

## 2025-08-15 PROCEDURE — 94640 AIRWAY INHALATION TREATMENT: CPT

## 2025-08-15 PROCEDURE — 2700000000 HC OXYGEN THERAPY PER DAY

## 2025-08-15 PROCEDURE — 99232 SBSQ HOSP IP/OBS MODERATE 35: CPT | Performed by: INTERNAL MEDICINE

## 2025-08-15 PROCEDURE — 82947 ASSAY GLUCOSE BLOOD QUANT: CPT

## 2025-08-15 PROCEDURE — 36415 COLL VENOUS BLD VENIPUNCTURE: CPT

## 2025-08-15 PROCEDURE — 94761 N-INVAS EAR/PLS OXIMETRY MLT: CPT

## 2025-08-15 PROCEDURE — 85025 COMPLETE CBC W/AUTO DIFF WBC: CPT

## 2025-08-15 PROCEDURE — 80048 BASIC METABOLIC PNL TOTAL CA: CPT

## 2025-08-15 PROCEDURE — 6370000000 HC RX 637 (ALT 250 FOR IP): Performed by: NURSE PRACTITIONER

## 2025-08-15 PROCEDURE — 99232 SBSQ HOSP IP/OBS MODERATE 35: CPT | Performed by: HOSPITALIST

## 2025-08-15 PROCEDURE — 2500000003 HC RX 250 WO HCPCS

## 2025-08-15 PROCEDURE — 1200000000 HC SEMI PRIVATE

## 2025-08-15 RX ORDER — DOCUSATE SODIUM 100 MG/1
100 CAPSULE, LIQUID FILLED ORAL DAILY
Status: DISCONTINUED | OUTPATIENT
Start: 2025-08-15 | End: 2025-08-18 | Stop reason: HOSPADM

## 2025-08-15 RX ORDER — FUROSEMIDE 40 MG/1
40 TABLET ORAL DAILY
Status: DISCONTINUED | OUTPATIENT
Start: 2025-08-15 | End: 2025-08-17

## 2025-08-15 RX ORDER — POLYETHYLENE GLYCOL 3350 17 G/17G
17 POWDER, FOR SOLUTION ORAL DAILY
Status: DISCONTINUED | OUTPATIENT
Start: 2025-08-15 | End: 2025-08-18 | Stop reason: HOSPADM

## 2025-08-15 RX ORDER — SPIRONOLACTONE 25 MG/1
25 TABLET ORAL DAILY
Qty: 30 TABLET | Refills: 3 | Status: CANCELLED | OUTPATIENT
Start: 2025-08-15

## 2025-08-15 RX ADMIN — AMLODIPINE BESYLATE 2.5 MG: 2.5 TABLET ORAL at 08:47

## 2025-08-15 RX ADMIN — TRAMADOL HYDROCHLORIDE 50 MG: 50 TABLET, COATED ORAL at 21:30

## 2025-08-15 RX ADMIN — SODIUM CHLORIDE, PRESERVATIVE FREE 10 ML: 5 INJECTION INTRAVENOUS at 21:24

## 2025-08-15 RX ADMIN — IPRATROPIUM BROMIDE AND ALBUTEROL SULFATE 1 DOSE: .5; 2.5 SOLUTION RESPIRATORY (INHALATION) at 12:24

## 2025-08-15 RX ADMIN — ACETAMINOPHEN 650 MG: 325 TABLET ORAL at 21:30

## 2025-08-15 RX ADMIN — FUROSEMIDE 40 MG: 40 TABLET ORAL at 08:46

## 2025-08-15 RX ADMIN — CLOPIDOGREL BISULFATE 75 MG: 75 TABLET, FILM COATED ORAL at 08:46

## 2025-08-15 RX ADMIN — ROSUVASTATIN CALCIUM 10 MG: 20 TABLET, FILM COATED ORAL at 21:24

## 2025-08-15 RX ADMIN — PANTOPRAZOLE SODIUM 40 MG: 40 TABLET, DELAYED RELEASE ORAL at 06:02

## 2025-08-15 RX ADMIN — APIXABAN 5 MG: 5 TABLET, FILM COATED ORAL at 21:24

## 2025-08-15 RX ADMIN — BUDESONIDE AND FORMOTEROL FUMARATE DIHYDRATE 2 PUFF: 80; 4.5 AEROSOL RESPIRATORY (INHALATION) at 08:33

## 2025-08-15 RX ADMIN — HYDRALAZINE HYDROCHLORIDE 50 MG: 50 TABLET ORAL at 21:24

## 2025-08-15 RX ADMIN — DULOXETINE 20 MG: 20 CAPSULE, DELAYED RELEASE ORAL at 08:47

## 2025-08-15 RX ADMIN — IPRATROPIUM BROMIDE AND ALBUTEROL SULFATE 1 DOSE: .5; 2.5 SOLUTION RESPIRATORY (INHALATION) at 15:47

## 2025-08-15 RX ADMIN — DOCUSATE SODIUM 100 MG: 100 CAPSULE, LIQUID FILLED ORAL at 08:46

## 2025-08-15 RX ADMIN — HYDRALAZINE HYDROCHLORIDE 50 MG: 50 TABLET ORAL at 14:24

## 2025-08-15 RX ADMIN — SPIRONOLACTONE 25 MG: 25 TABLET, FILM COATED ORAL at 08:47

## 2025-08-15 RX ADMIN — APIXABAN 5 MG: 5 TABLET, FILM COATED ORAL at 08:46

## 2025-08-15 RX ADMIN — SODIUM CHLORIDE, PRESERVATIVE FREE 10 ML: 5 INJECTION INTRAVENOUS at 08:50

## 2025-08-15 RX ADMIN — OXCARBAZEPINE 450 MG: 300 TABLET, FILM COATED ORAL at 08:47

## 2025-08-15 RX ADMIN — IPRATROPIUM BROMIDE AND ALBUTEROL SULFATE 1 DOSE: .5; 2.5 SOLUTION RESPIRATORY (INHALATION) at 08:33

## 2025-08-15 RX ADMIN — MIRTAZAPINE 15 MG: 15 TABLET, FILM COATED ORAL at 21:24

## 2025-08-15 RX ADMIN — POLYETHYLENE GLYCOL 3350 17 G: 17 POWDER, FOR SOLUTION ORAL at 08:46

## 2025-08-15 RX ADMIN — TRAMADOL HYDROCHLORIDE 50 MG: 50 TABLET, COATED ORAL at 06:04

## 2025-08-15 RX ADMIN — HYDRALAZINE HYDROCHLORIDE 50 MG: 50 TABLET ORAL at 06:02

## 2025-08-15 RX ADMIN — Medication 3 MG: at 21:30

## 2025-08-15 RX ADMIN — BUSPIRONE HYDROCHLORIDE 10 MG: 10 TABLET ORAL at 14:24

## 2025-08-15 RX ADMIN — BUSPIRONE HYDROCHLORIDE 10 MG: 10 TABLET ORAL at 08:46

## 2025-08-15 RX ADMIN — CILOSTAZOL 50 MG: 100 TABLET ORAL at 08:47

## 2025-08-15 RX ADMIN — INSULIN LISPRO 1 UNITS: 100 INJECTION, SOLUTION INTRAVENOUS; SUBCUTANEOUS at 17:31

## 2025-08-15 RX ADMIN — ACETAMINOPHEN 650 MG: 325 TABLET ORAL at 06:04

## 2025-08-15 RX ADMIN — CILOSTAZOL 50 MG: 100 TABLET ORAL at 21:28

## 2025-08-15 RX ADMIN — BUSPIRONE HYDROCHLORIDE 10 MG: 10 TABLET ORAL at 21:24

## 2025-08-15 ASSESSMENT — PAIN - FUNCTIONAL ASSESSMENT
PAIN_FUNCTIONAL_ASSESSMENT: ACTIVITIES ARE NOT PREVENTED
PAIN_FUNCTIONAL_ASSESSMENT: 0-10

## 2025-08-15 ASSESSMENT — PAIN SCALES - GENERAL
PAINLEVEL_OUTOF10: 8
PAINLEVEL_OUTOF10: 0
PAINLEVEL_OUTOF10: 8
PAINLEVEL_OUTOF10: 8
PAINLEVEL_OUTOF10: 0

## 2025-08-15 ASSESSMENT — PAIN DESCRIPTION - DESCRIPTORS
DESCRIPTORS: ACHING

## 2025-08-15 ASSESSMENT — PAIN DESCRIPTION - ORIENTATION
ORIENTATION: LEFT;UPPER

## 2025-08-15 ASSESSMENT — PAIN DESCRIPTION - LOCATION
LOCATION: ABDOMEN

## 2025-08-15 ASSESSMENT — PAIN DESCRIPTION - FREQUENCY: FREQUENCY: CONTINUOUS

## 2025-08-15 ASSESSMENT — PAIN DESCRIPTION - ONSET: ONSET: ON-GOING

## 2025-08-15 ASSESSMENT — PAIN DESCRIPTION - PAIN TYPE: TYPE: ACUTE PAIN

## 2025-08-16 ENCOUNTER — APPOINTMENT (OUTPATIENT)
Dept: GENERAL RADIOLOGY | Age: 70
DRG: 291 | End: 2025-08-16
Payer: MEDICARE

## 2025-08-16 PROBLEM — I95.9 HYPOTENSION: Status: ACTIVE | Noted: 2025-08-16

## 2025-08-16 LAB
ANION GAP SERPL CALCULATED.3IONS-SCNC: 16 MMOL/L (ref 9–16)
BASOPHILS # BLD: 0.09 K/UL (ref 0–0.2)
BASOPHILS NFR BLD: 1 % (ref 0–2)
BNP SERPL-MCNC: 590 PG/ML (ref 0–125)
BUN SERPL-MCNC: 41 MG/DL (ref 8–23)
CALCIUM SERPL-MCNC: 10.9 MG/DL (ref 8.6–10.4)
CHLORIDE SERPL-SCNC: 95 MMOL/L (ref 98–107)
CO2 SERPL-SCNC: 26 MMOL/L (ref 20–31)
CREAT SERPL-MCNC: 2.3 MG/DL (ref 0.6–0.9)
CRITICAL ACTION: NORMAL
CRITICAL NOTIFICATION DATE/TIME: NORMAL
CRITICAL NOTIFICATION: NORMAL
CRITICAL VALUE READ BACK: YES
EOSINOPHIL # BLD: 0.07 K/UL (ref 0–0.44)
EOSINOPHILS RELATIVE PERCENT: 1 % (ref 1–4)
ERYTHROCYTE [DISTWIDTH] IN BLOOD BY AUTOMATED COUNT: 14.6 % (ref 11.8–14.4)
FIO2: 60
GFR, ESTIMATED: 22 ML/MIN/1.73M2
GLUCOSE BLD-MCNC: 168 MG/DL (ref 65–105)
GLUCOSE BLD-MCNC: 168 MG/DL (ref 65–105)
GLUCOSE BLD-MCNC: 184 MG/DL (ref 65–105)
GLUCOSE BLD-MCNC: 210 MG/DL (ref 65–105)
GLUCOSE BLD-MCNC: 226 MG/DL (ref 74–100)
GLUCOSE SERPL-MCNC: 174 MG/DL (ref 74–99)
HCT VFR BLD AUTO: 39.4 % (ref 36.3–47.1)
HGB BLD-MCNC: 11.9 G/DL (ref 11.9–15.1)
IMM GRANULOCYTES # BLD AUTO: 0.06 K/UL (ref 0–0.3)
IMM GRANULOCYTES NFR BLD: 1 %
LYMPHOCYTES NFR BLD: 1.04 K/UL (ref 1.1–3.7)
LYMPHOCYTES RELATIVE PERCENT: 9 % (ref 24–43)
MCH RBC QN AUTO: 28.2 PG (ref 25.2–33.5)
MCHC RBC AUTO-ENTMCNC: 30.2 G/DL (ref 28.4–34.8)
MCV RBC AUTO: 93.4 FL (ref 82.6–102.9)
MODE: ABNORMAL
MONOCYTES NFR BLD: 0.99 K/UL (ref 0.1–1.2)
MONOCYTES NFR BLD: 8 % (ref 3–12)
NEUTROPHILS NFR BLD: 80 % (ref 36–65)
NEUTS SEG NFR BLD: 9.81 K/UL (ref 1.5–8.1)
NRBC BLD-RTO: 0 PER 100 WBC
PLATELET # BLD AUTO: 354 K/UL (ref 138–453)
PMV BLD AUTO: 11.1 FL (ref 8.1–13.5)
POC HCO3: 32.5 MMOL/L (ref 21–28)
POC O2 SATURATION: 99.4 % (ref 94–98)
POC PCO2: 76.2 MM HG (ref 35–48)
POC PH: 7.24 (ref 7.35–7.45)
POC PO2: 195.8 MM HG (ref 83–108)
POSITIVE BASE EXCESS, ART: 2.4 MMOL/L (ref 0–3)
POTASSIUM SERPL-SCNC: 4.7 MMOL/L (ref 3.7–5.3)
RBC # BLD AUTO: 4.22 M/UL (ref 3.95–5.11)
RBC # BLD: ABNORMAL 10*6/UL
SAMPLE SITE: ABNORMAL
SODIUM SERPL-SCNC: 137 MMOL/L (ref 136–145)
WBC OTHER # BLD: 12.1 K/UL (ref 3.5–11.3)

## 2025-08-16 PROCEDURE — 6370000000 HC RX 637 (ALT 250 FOR IP)

## 2025-08-16 PROCEDURE — 85025 COMPLETE CBC W/AUTO DIFF WBC: CPT

## 2025-08-16 PROCEDURE — 82947 ASSAY GLUCOSE BLOOD QUANT: CPT

## 2025-08-16 PROCEDURE — 94660 CPAP INITIATION&MGMT: CPT

## 2025-08-16 PROCEDURE — 94640 AIRWAY INHALATION TREATMENT: CPT

## 2025-08-16 PROCEDURE — 93005 ELECTROCARDIOGRAM TRACING: CPT | Performed by: HOSPITALIST

## 2025-08-16 PROCEDURE — 36600 WITHDRAWAL OF ARTERIAL BLOOD: CPT

## 2025-08-16 PROCEDURE — 2500000003 HC RX 250 WO HCPCS

## 2025-08-16 PROCEDURE — 80048 BASIC METABOLIC PNL TOTAL CA: CPT

## 2025-08-16 PROCEDURE — 94761 N-INVAS EAR/PLS OXIMETRY MLT: CPT

## 2025-08-16 PROCEDURE — 99232 SBSQ HOSP IP/OBS MODERATE 35: CPT | Performed by: HOSPITALIST

## 2025-08-16 PROCEDURE — 6370000000 HC RX 637 (ALT 250 FOR IP): Performed by: NURSE PRACTITIONER

## 2025-08-16 PROCEDURE — 36415 COLL VENOUS BLD VENIPUNCTURE: CPT

## 2025-08-16 PROCEDURE — 71045 X-RAY EXAM CHEST 1 VIEW: CPT

## 2025-08-16 PROCEDURE — 83880 ASSAY OF NATRIURETIC PEPTIDE: CPT

## 2025-08-16 PROCEDURE — 51798 US URINE CAPACITY MEASURE: CPT

## 2025-08-16 PROCEDURE — 2060000000 HC ICU INTERMEDIATE R&B

## 2025-08-16 PROCEDURE — 83930 ASSAY OF BLOOD OSMOLALITY: CPT

## 2025-08-16 PROCEDURE — 2700000000 HC OXYGEN THERAPY PER DAY

## 2025-08-16 PROCEDURE — 82803 BLOOD GASES ANY COMBINATION: CPT

## 2025-08-16 RX ORDER — ALBUTEROL SULFATE 90 UG/1
2 INHALANT RESPIRATORY (INHALATION)
Status: DISCONTINUED | OUTPATIENT
Start: 2025-08-16 | End: 2025-08-18 | Stop reason: HOSPADM

## 2025-08-16 RX ORDER — CARVEDILOL 12.5 MG/1
12.5 TABLET ORAL
Status: COMPLETED | OUTPATIENT
Start: 2025-08-16 | End: 2025-08-16

## 2025-08-16 RX ADMIN — TRAMADOL HYDROCHLORIDE 50 MG: 50 TABLET, COATED ORAL at 10:27

## 2025-08-16 RX ADMIN — SODIUM CHLORIDE, PRESERVATIVE FREE 10 ML: 5 INJECTION INTRAVENOUS at 09:56

## 2025-08-16 RX ADMIN — MIRTAZAPINE 15 MG: 15 TABLET, FILM COATED ORAL at 20:26

## 2025-08-16 RX ADMIN — IPRATROPIUM BROMIDE AND ALBUTEROL SULFATE 1 DOSE: .5; 2.5 SOLUTION RESPIRATORY (INHALATION) at 19:43

## 2025-08-16 RX ADMIN — Medication 3 MG: at 20:26

## 2025-08-16 RX ADMIN — CILOSTAZOL 50 MG: 100 TABLET ORAL at 20:34

## 2025-08-16 RX ADMIN — TRAMADOL HYDROCHLORIDE 50 MG: 50 TABLET, COATED ORAL at 20:26

## 2025-08-16 RX ADMIN — IPRATROPIUM BROMIDE AND ALBUTEROL SULFATE 1 DOSE: .5; 2.5 SOLUTION RESPIRATORY (INHALATION) at 17:13

## 2025-08-16 RX ADMIN — HYDRALAZINE HYDROCHLORIDE 50 MG: 50 TABLET ORAL at 22:19

## 2025-08-16 RX ADMIN — SODIUM CHLORIDE, PRESERVATIVE FREE 10 ML: 5 INJECTION INTRAVENOUS at 20:35

## 2025-08-16 RX ADMIN — BUSPIRONE HYDROCHLORIDE 10 MG: 10 TABLET ORAL at 20:26

## 2025-08-16 RX ADMIN — CILOSTAZOL 50 MG: 100 TABLET ORAL at 09:56

## 2025-08-16 RX ADMIN — DULOXETINE 20 MG: 20 CAPSULE, DELAYED RELEASE ORAL at 09:57

## 2025-08-16 RX ADMIN — AMLODIPINE BESYLATE 2.5 MG: 2.5 TABLET ORAL at 09:56

## 2025-08-16 RX ADMIN — CLOPIDOGREL BISULFATE 75 MG: 75 TABLET, FILM COATED ORAL at 09:57

## 2025-08-16 RX ADMIN — POLYETHYLENE GLYCOL 3350 17 G: 17 POWDER, FOR SOLUTION ORAL at 09:56

## 2025-08-16 RX ADMIN — ROSUVASTATIN CALCIUM 10 MG: 20 TABLET, FILM COATED ORAL at 20:26

## 2025-08-16 RX ADMIN — CARVEDILOL 12.5 MG: 12.5 TABLET, FILM COATED ORAL at 06:16

## 2025-08-16 RX ADMIN — MICONAZOLE NITRATE: 20 POWDER TOPICAL at 20:34

## 2025-08-16 RX ADMIN — APIXABAN 5 MG: 5 TABLET, FILM COATED ORAL at 20:26

## 2025-08-16 RX ADMIN — SPIRONOLACTONE 25 MG: 25 TABLET, FILM COATED ORAL at 09:55

## 2025-08-16 RX ADMIN — DOCUSATE SODIUM 100 MG: 100 CAPSULE, LIQUID FILLED ORAL at 09:57

## 2025-08-16 RX ADMIN — MICONAZOLE NITRATE: 20 POWDER TOPICAL at 12:41

## 2025-08-16 RX ADMIN — ONDANSETRON 4 MG: 4 TABLET, ORALLY DISINTEGRATING ORAL at 03:23

## 2025-08-16 RX ADMIN — BUSPIRONE HYDROCHLORIDE 10 MG: 10 TABLET ORAL at 09:58

## 2025-08-16 RX ADMIN — IPRATROPIUM BROMIDE AND ALBUTEROL SULFATE 1 DOSE: .5; 2.5 SOLUTION RESPIRATORY (INHALATION) at 11:50

## 2025-08-16 RX ADMIN — INSULIN LISPRO 1 UNITS: 100 INJECTION, SOLUTION INTRAVENOUS; SUBCUTANEOUS at 17:27

## 2025-08-16 RX ADMIN — PANTOPRAZOLE SODIUM 40 MG: 40 TABLET, DELAYED RELEASE ORAL at 06:16

## 2025-08-16 RX ADMIN — IPRATROPIUM BROMIDE AND ALBUTEROL SULFATE 1 DOSE: .5; 2.5 SOLUTION RESPIRATORY (INHALATION) at 06:51

## 2025-08-16 RX ADMIN — OXCARBAZEPINE 450 MG: 300 TABLET, FILM COATED ORAL at 09:55

## 2025-08-16 RX ADMIN — APIXABAN 5 MG: 5 TABLET, FILM COATED ORAL at 09:55

## 2025-08-16 ASSESSMENT — PAIN DESCRIPTION - ORIENTATION
ORIENTATION: LEFT
ORIENTATION: LEFT;UPPER

## 2025-08-16 ASSESSMENT — PAIN SCALES - GENERAL
PAINLEVEL_OUTOF10: 6
PAINLEVEL_OUTOF10: 1
PAINLEVEL_OUTOF10: 0
PAINLEVEL_OUTOF10: 1
PAINLEVEL_OUTOF10: 5
PAINLEVEL_OUTOF10: 3
PAINLEVEL_OUTOF10: 1
PAINLEVEL_OUTOF10: 1

## 2025-08-16 ASSESSMENT — PAIN DESCRIPTION - DESCRIPTORS: DESCRIPTORS: ACHING;CRAMPING

## 2025-08-16 ASSESSMENT — PAIN DESCRIPTION - LOCATION
LOCATION: ABDOMEN
LOCATION: ABDOMEN

## 2025-08-16 ASSESSMENT — PAIN - FUNCTIONAL ASSESSMENT
PAIN_FUNCTIONAL_ASSESSMENT: 0-10
PAIN_FUNCTIONAL_ASSESSMENT: 0-10

## 2025-08-17 LAB
ANION GAP SERPL CALCULATED.3IONS-SCNC: 13 MMOL/L (ref 9–16)
ANION GAP SERPL CALCULATED.3IONS-SCNC: 14 MMOL/L (ref 9–16)
BASOPHILS # BLD: 0.06 K/UL (ref 0–0.2)
BASOPHILS NFR BLD: 1 % (ref 0–2)
BILIRUB UR QL STRIP: NEGATIVE
BODY TEMPERATURE: 37
BUN SERPL-MCNC: 49 MG/DL (ref 8–23)
BUN SERPL-MCNC: 49 MG/DL (ref 8–23)
CALCIUM SERPL-MCNC: 10 MG/DL (ref 8.6–10.4)
CALCIUM SERPL-MCNC: 10.2 MG/DL (ref 8.6–10.4)
CASTS #/AREA URNS LPF: NORMAL /LPF (ref 0–2)
CASTS #/AREA URNS LPF: NORMAL /LPF (ref 0–2)
CHLORIDE SERPL-SCNC: 90 MMOL/L (ref 98–107)
CHLORIDE SERPL-SCNC: 92 MMOL/L (ref 98–107)
CLARITY UR: ABNORMAL
CO2 SERPL-SCNC: 26 MMOL/L (ref 20–31)
CO2 SERPL-SCNC: 27 MMOL/L (ref 20–31)
COHGB MFR BLD: 0.3 % (ref 0–5)
COLOR UR: YELLOW
CREAT SERPL-MCNC: 2.6 MG/DL (ref 0.6–0.9)
CREAT SERPL-MCNC: 2.9 MG/DL (ref 0.6–0.9)
EKG ATRIAL RATE: 114 BPM
EKG P AXIS: 42 DEGREES
EKG P-R INTERVAL: 146 MS
EKG Q-T INTERVAL: 334 MS
EKG QRS DURATION: 96 MS
EKG QTC CALCULATION (BAZETT): 460 MS
EKG R AXIS: -19 DEGREES
EKG T AXIS: 120 DEGREES
EKG VENTRICULAR RATE: 114 BPM
EOSINOPHIL # BLD: 0.23 K/UL (ref 0–0.44)
EOSINOPHILS RELATIVE PERCENT: 2 % (ref 1–4)
EPI CELLS #/AREA URNS HPF: NORMAL /HPF (ref 0–5)
ERYTHROCYTE [DISTWIDTH] IN BLOOD BY AUTOMATED COUNT: 14.4 % (ref 11.8–14.4)
FIO2 ON VENT: ABNORMAL %
GFR, ESTIMATED: 17 ML/MIN/1.73M2
GFR, ESTIMATED: 19 ML/MIN/1.73M2
GLUCOSE BLD-MCNC: 138 MG/DL (ref 65–105)
GLUCOSE BLD-MCNC: 176 MG/DL (ref 65–105)
GLUCOSE BLD-MCNC: 177 MG/DL (ref 65–105)
GLUCOSE BLD-MCNC: 96 MG/DL (ref 65–105)
GLUCOSE SERPL-MCNC: 104 MG/DL (ref 74–99)
GLUCOSE SERPL-MCNC: 176 MG/DL (ref 74–99)
GLUCOSE UR STRIP-MCNC: NEGATIVE MG/DL
HCO3 VENOUS: 29.5 MMOL/L (ref 24–30)
HCT VFR BLD AUTO: 32.1 % (ref 36.3–47.1)
HGB BLD-MCNC: 10.2 G/DL (ref 11.9–15.1)
HGB UR QL STRIP.AUTO: ABNORMAL
IMM GRANULOCYTES # BLD AUTO: 0.03 K/UL (ref 0–0.3)
IMM GRANULOCYTES NFR BLD: 0 %
KETONES UR STRIP-MCNC: ABNORMAL MG/DL
LEUKOCYTE ESTERASE UR QL STRIP: NEGATIVE
LYMPHOCYTES NFR BLD: 1.68 K/UL (ref 1.1–3.7)
LYMPHOCYTES RELATIVE PERCENT: 15 % (ref 24–43)
MCH RBC QN AUTO: 28.7 PG (ref 25.2–33.5)
MCHC RBC AUTO-ENTMCNC: 31.8 G/DL (ref 28.4–34.8)
MCV RBC AUTO: 90.4 FL (ref 82.6–102.9)
MONOCYTES NFR BLD: 1 K/UL (ref 0.1–1.2)
MONOCYTES NFR BLD: 9 % (ref 3–12)
NEUTROPHILS NFR BLD: 73 % (ref 36–65)
NEUTS SEG NFR BLD: 8.25 K/UL (ref 1.5–8.1)
NITRITE UR QL STRIP: NEGATIVE
NRBC BLD-RTO: 0 PER 100 WBC
O2 SAT, VEN: 76.2 % (ref 60–85)
OSMOLALITY UR: 364 MOSM/KG (ref 80–1300)
PCO2 VENOUS: 59.6 MM HG (ref 39–55)
PH UR STRIP: 5 [PH] (ref 5–8)
PH VENOUS: 7.31 (ref 7.32–7.42)
PLATELET # BLD AUTO: 278 K/UL (ref 138–453)
PMV BLD AUTO: 11.2 FL (ref 8.1–13.5)
PO2 VENOUS: 45.3 MM HG (ref 30–50)
POSITIVE BASE EXCESS, VEN: 2.2 MMOL/L (ref 0–2)
POTASSIUM SERPL-SCNC: 4.1 MMOL/L (ref 3.7–5.3)
POTASSIUM SERPL-SCNC: 4.3 MMOL/L (ref 3.7–5.3)
PROT UR STRIP-MCNC: NEGATIVE MG/DL
RBC # BLD AUTO: 3.55 M/UL (ref 3.95–5.11)
RBC #/AREA URNS HPF: NORMAL /HPF (ref 0–2)
SODIUM SERPL-SCNC: 129 MMOL/L (ref 136–145)
SODIUM SERPL-SCNC: 130 MMOL/L (ref 136–145)
SODIUM SERPL-SCNC: 133 MMOL/L (ref 136–145)
SODIUM UR-SCNC: <20 MMOL/L
SP GR UR STRIP: 1.02 (ref 1–1.03)
UROBILINOGEN UR STRIP-ACNC: NORMAL EU/DL (ref 0–1)
WBC #/AREA URNS HPF: NORMAL /HPF (ref 0–5)
WBC OTHER # BLD: 11.3 K/UL (ref 3.5–11.3)

## 2025-08-17 PROCEDURE — 6370000000 HC RX 637 (ALT 250 FOR IP): Performed by: INTERNAL MEDICINE

## 2025-08-17 PROCEDURE — 97530 THERAPEUTIC ACTIVITIES: CPT

## 2025-08-17 PROCEDURE — 85025 COMPLETE CBC W/AUTO DIFF WBC: CPT

## 2025-08-17 PROCEDURE — 99233 SBSQ HOSP IP/OBS HIGH 50: CPT | Performed by: NURSE PRACTITIONER

## 2025-08-17 PROCEDURE — 2580000003 HC RX 258: Performed by: INTERNAL MEDICINE

## 2025-08-17 PROCEDURE — 80048 BASIC METABOLIC PNL TOTAL CA: CPT

## 2025-08-17 PROCEDURE — 6370000000 HC RX 637 (ALT 250 FOR IP)

## 2025-08-17 PROCEDURE — 6370000000 HC RX 637 (ALT 250 FOR IP): Performed by: NURSE PRACTITIONER

## 2025-08-17 PROCEDURE — 99233 SBSQ HOSP IP/OBS HIGH 50: CPT | Performed by: INTERNAL MEDICINE

## 2025-08-17 PROCEDURE — 84295 ASSAY OF SERUM SODIUM: CPT

## 2025-08-17 PROCEDURE — 84300 ASSAY OF URINE SODIUM: CPT

## 2025-08-17 PROCEDURE — 82947 ASSAY GLUCOSE BLOOD QUANT: CPT

## 2025-08-17 PROCEDURE — 2500000003 HC RX 250 WO HCPCS

## 2025-08-17 PROCEDURE — 94640 AIRWAY INHALATION TREATMENT: CPT

## 2025-08-17 PROCEDURE — 82805 BLOOD GASES W/O2 SATURATION: CPT

## 2025-08-17 PROCEDURE — 2700000000 HC OXYGEN THERAPY PER DAY

## 2025-08-17 PROCEDURE — 2060000000 HC ICU INTERMEDIATE R&B

## 2025-08-17 PROCEDURE — 97116 GAIT TRAINING THERAPY: CPT

## 2025-08-17 PROCEDURE — 99232 SBSQ HOSP IP/OBS MODERATE 35: CPT | Performed by: STUDENT IN AN ORGANIZED HEALTH CARE EDUCATION/TRAINING PROGRAM

## 2025-08-17 PROCEDURE — 97110 THERAPEUTIC EXERCISES: CPT

## 2025-08-17 PROCEDURE — 36415 COLL VENOUS BLD VENIPUNCTURE: CPT

## 2025-08-17 PROCEDURE — 81001 URINALYSIS AUTO W/SCOPE: CPT

## 2025-08-17 PROCEDURE — 94761 N-INVAS EAR/PLS OXIMETRY MLT: CPT

## 2025-08-17 PROCEDURE — 97535 SELF CARE MNGMENT TRAINING: CPT

## 2025-08-17 PROCEDURE — 51702 INSERT TEMP BLADDER CATH: CPT

## 2025-08-17 PROCEDURE — 83935 ASSAY OF URINE OSMOLALITY: CPT

## 2025-08-17 PROCEDURE — 51798 US URINE CAPACITY MEASURE: CPT

## 2025-08-17 RX ORDER — FUROSEMIDE 40 MG/1
40 TABLET ORAL 2 TIMES DAILY
Status: DISCONTINUED | OUTPATIENT
Start: 2025-08-17 | End: 2025-08-17

## 2025-08-17 RX ORDER — SODIUM CHLORIDE 9 MG/ML
INJECTION, SOLUTION INTRAVENOUS CONTINUOUS
Status: ACTIVE | OUTPATIENT
Start: 2025-08-17 | End: 2025-08-17

## 2025-08-17 RX ORDER — AMLODIPINE BESYLATE 5 MG/1
5 TABLET ORAL DAILY
Status: DISCONTINUED | OUTPATIENT
Start: 2025-08-17 | End: 2025-08-18 | Stop reason: HOSPADM

## 2025-08-17 RX ADMIN — OXCARBAZEPINE 450 MG: 300 TABLET, FILM COATED ORAL at 10:37

## 2025-08-17 RX ADMIN — IPRATROPIUM BROMIDE AND ALBUTEROL SULFATE 1 DOSE: .5; 2.5 SOLUTION RESPIRATORY (INHALATION) at 08:07

## 2025-08-17 RX ADMIN — BUDESONIDE AND FORMOTEROL FUMARATE DIHYDRATE 2 PUFF: 80; 4.5 AEROSOL RESPIRATORY (INHALATION) at 19:41

## 2025-08-17 RX ADMIN — BUSPIRONE HYDROCHLORIDE 10 MG: 10 TABLET ORAL at 13:22

## 2025-08-17 RX ADMIN — DULOXETINE 20 MG: 20 CAPSULE, DELAYED RELEASE ORAL at 10:23

## 2025-08-17 RX ADMIN — SODIUM CHLORIDE, PRESERVATIVE FREE 10 ML: 5 INJECTION INTRAVENOUS at 20:20

## 2025-08-17 RX ADMIN — SODIUM CHLORIDE: 0.9 INJECTION, SOLUTION INTRAVENOUS at 17:04

## 2025-08-17 RX ADMIN — BUDESONIDE AND FORMOTEROL FUMARATE DIHYDRATE 2 PUFF: 80; 4.5 AEROSOL RESPIRATORY (INHALATION) at 08:11

## 2025-08-17 RX ADMIN — MICONAZOLE NITRATE: 20 POWDER TOPICAL at 10:23

## 2025-08-17 RX ADMIN — HYDRALAZINE HYDROCHLORIDE 50 MG: 50 TABLET ORAL at 05:28

## 2025-08-17 RX ADMIN — PANTOPRAZOLE SODIUM 40 MG: 40 TABLET, DELAYED RELEASE ORAL at 05:28

## 2025-08-17 RX ADMIN — SPIRONOLACTONE 25 MG: 25 TABLET, FILM COATED ORAL at 10:22

## 2025-08-17 RX ADMIN — CLOPIDOGREL BISULFATE 75 MG: 75 TABLET, FILM COATED ORAL at 10:22

## 2025-08-17 RX ADMIN — IPRATROPIUM BROMIDE AND ALBUTEROL SULFATE 1 DOSE: .5; 2.5 SOLUTION RESPIRATORY (INHALATION) at 11:13

## 2025-08-17 RX ADMIN — AMLODIPINE BESYLATE 5 MG: 5 TABLET ORAL at 10:22

## 2025-08-17 RX ADMIN — Medication 3 MG: at 20:19

## 2025-08-17 RX ADMIN — MICONAZOLE NITRATE: 20 POWDER TOPICAL at 20:20

## 2025-08-17 RX ADMIN — IPRATROPIUM BROMIDE AND ALBUTEROL SULFATE 1 DOSE: .5; 2.5 SOLUTION RESPIRATORY (INHALATION) at 15:38

## 2025-08-17 RX ADMIN — HYDRALAZINE HYDROCHLORIDE 50 MG: 50 TABLET ORAL at 21:53

## 2025-08-17 RX ADMIN — IPRATROPIUM BROMIDE AND ALBUTEROL SULFATE 1 DOSE: .5; 2.5 SOLUTION RESPIRATORY (INHALATION) at 19:41

## 2025-08-17 RX ADMIN — DOCUSATE SODIUM 100 MG: 100 CAPSULE, LIQUID FILLED ORAL at 10:22

## 2025-08-17 RX ADMIN — POLYETHYLENE GLYCOL 3350 17 G: 17 POWDER, FOR SOLUTION ORAL at 10:22

## 2025-08-17 RX ADMIN — SODIUM CHLORIDE, PRESERVATIVE FREE 10 ML: 5 INJECTION INTRAVENOUS at 10:22

## 2025-08-17 RX ADMIN — CILOSTAZOL 50 MG: 100 TABLET ORAL at 20:19

## 2025-08-17 RX ADMIN — BUSPIRONE HYDROCHLORIDE 10 MG: 10 TABLET ORAL at 20:19

## 2025-08-17 RX ADMIN — FUROSEMIDE 40 MG: 40 TABLET ORAL at 13:22

## 2025-08-17 RX ADMIN — CILOSTAZOL 50 MG: 100 TABLET ORAL at 10:24

## 2025-08-17 RX ADMIN — HYDRALAZINE HYDROCHLORIDE 50 MG: 50 TABLET ORAL at 13:23

## 2025-08-17 RX ADMIN — ACETAMINOPHEN 650 MG: 325 TABLET ORAL at 05:27

## 2025-08-17 RX ADMIN — BUSPIRONE HYDROCHLORIDE 10 MG: 10 TABLET ORAL at 10:22

## 2025-08-17 RX ADMIN — TRAMADOL HYDROCHLORIDE 50 MG: 50 TABLET, COATED ORAL at 20:19

## 2025-08-17 RX ADMIN — MIRTAZAPINE 15 MG: 15 TABLET, FILM COATED ORAL at 20:19

## 2025-08-17 RX ADMIN — ROSUVASTATIN CALCIUM 10 MG: 20 TABLET, FILM COATED ORAL at 20:19

## 2025-08-17 RX ADMIN — APIXABAN 5 MG: 5 TABLET, FILM COATED ORAL at 10:22

## 2025-08-17 RX ADMIN — APIXABAN 5 MG: 5 TABLET, FILM COATED ORAL at 20:19

## 2025-08-17 ASSESSMENT — PAIN SCALES - GENERAL
PAINLEVEL_OUTOF10: 0
PAINLEVEL_OUTOF10: 0
PAINLEVEL_OUTOF10: 8
PAINLEVEL_OUTOF10: 0
PAINLEVEL_OUTOF10: 6
PAINLEVEL_OUTOF10: 0
PAINLEVEL_OUTOF10: 4

## 2025-08-17 ASSESSMENT — PAIN DESCRIPTION - LOCATION
LOCATION: ABDOMEN
LOCATION: ABDOMEN

## 2025-08-17 ASSESSMENT — PAIN DESCRIPTION - ORIENTATION
ORIENTATION: LEFT;UPPER
ORIENTATION: LEFT

## 2025-08-17 ASSESSMENT — PAIN DESCRIPTION - DESCRIPTORS: DESCRIPTORS: ACHING;SORE

## 2025-08-17 ASSESSMENT — PAIN - FUNCTIONAL ASSESSMENT
PAIN_FUNCTIONAL_ASSESSMENT: 0-10
PAIN_FUNCTIONAL_ASSESSMENT: ACTIVITIES ARE NOT PREVENTED

## 2025-08-18 ENCOUNTER — APPOINTMENT (OUTPATIENT)
Dept: GENERAL RADIOLOGY | Age: 70
DRG: 291 | End: 2025-08-18
Payer: MEDICARE

## 2025-08-18 VITALS
DIASTOLIC BLOOD PRESSURE: 68 MMHG | RESPIRATION RATE: 15 BRPM | HEART RATE: 80 BPM | SYSTOLIC BLOOD PRESSURE: 164 MMHG | WEIGHT: 213.85 LBS | TEMPERATURE: 98.4 F | BODY MASS INDEX: 36.51 KG/M2 | OXYGEN SATURATION: 97 % | HEIGHT: 64 IN

## 2025-08-18 LAB
ANION GAP SERPL CALCULATED.3IONS-SCNC: 13 MMOL/L (ref 9–16)
BASOPHILS # BLD: 0.06 K/UL (ref 0–0.2)
BASOPHILS NFR BLD: 1 % (ref 0–2)
BODY TEMPERATURE: 37
BUN SERPL-MCNC: 45 MG/DL (ref 8–23)
CALCIUM SERPL-MCNC: 9.8 MG/DL (ref 8.6–10.4)
CHLORIDE SERPL-SCNC: 93 MMOL/L (ref 98–107)
CO2 SERPL-SCNC: 26 MMOL/L (ref 20–31)
COHGB MFR BLD: 0.1 % (ref 0–5)
CREAT SERPL-MCNC: 2.2 MG/DL (ref 0.6–0.9)
EOSINOPHIL # BLD: 0.29 K/UL (ref 0–0.44)
EOSINOPHILS RELATIVE PERCENT: 3 % (ref 1–4)
ERYTHROCYTE [DISTWIDTH] IN BLOOD BY AUTOMATED COUNT: 14.3 % (ref 11.8–14.4)
FIO2 ON VENT: ABNORMAL %
GFR, ESTIMATED: 24 ML/MIN/1.73M2
GLUCOSE BLD-MCNC: 111 MG/DL (ref 65–105)
GLUCOSE BLD-MCNC: 156 MG/DL (ref 65–105)
GLUCOSE SERPL-MCNC: 100 MG/DL (ref 74–99)
HCO3 VENOUS: 29.7 MMOL/L (ref 24–30)
HCT VFR BLD AUTO: 29.5 % (ref 36.3–47.1)
HGB BLD-MCNC: 9.4 G/DL (ref 11.9–15.1)
IMM GRANULOCYTES # BLD AUTO: 0.04 K/UL (ref 0–0.3)
IMM GRANULOCYTES NFR BLD: 0 %
LYMPHOCYTES NFR BLD: 1.67 K/UL (ref 1.1–3.7)
LYMPHOCYTES RELATIVE PERCENT: 16 % (ref 24–43)
MCH RBC QN AUTO: 29.2 PG (ref 25.2–33.5)
MCHC RBC AUTO-ENTMCNC: 31.9 G/DL (ref 28.4–34.8)
MCV RBC AUTO: 91.6 FL (ref 82.6–102.9)
MONOCYTES NFR BLD: 1.09 K/UL (ref 0.1–1.2)
MONOCYTES NFR BLD: 11 % (ref 3–12)
NEUTROPHILS NFR BLD: 69 % (ref 36–65)
NEUTS SEG NFR BLD: 7.28 K/UL (ref 1.5–8.1)
NRBC BLD-RTO: 0 PER 100 WBC
O2 SAT, VEN: 95.4 % (ref 60–85)
OSMOLALITY SERPL: 303 MOSM/KG (ref 275–295)
PCO2 VENOUS: 43.8 MM HG (ref 39–55)
PH VENOUS: 7.45 (ref 7.32–7.42)
PLATELET # BLD AUTO: 244 K/UL (ref 138–453)
PMV BLD AUTO: 11 FL (ref 8.1–13.5)
PO2 VENOUS: 74.1 MM HG (ref 30–50)
POSITIVE BASE EXCESS, VEN: 5.1 MMOL/L (ref 0–2)
POTASSIUM SERPL-SCNC: 3.7 MMOL/L (ref 3.7–5.3)
RBC # BLD AUTO: 3.22 M/UL (ref 3.95–5.11)
SODIUM SERPL-SCNC: 131 MMOL/L (ref 136–145)
SODIUM SERPL-SCNC: 132 MMOL/L (ref 136–145)
SODIUM SERPL-SCNC: 132 MMOL/L (ref 136–145)
WBC OTHER # BLD: 10.4 K/UL (ref 3.5–11.3)

## 2025-08-18 PROCEDURE — 2500000003 HC RX 250 WO HCPCS

## 2025-08-18 PROCEDURE — 99233 SBSQ HOSP IP/OBS HIGH 50: CPT | Performed by: NURSE PRACTITIONER

## 2025-08-18 PROCEDURE — 6370000000 HC RX 637 (ALT 250 FOR IP)

## 2025-08-18 PROCEDURE — 51702 INSERT TEMP BLADDER CATH: CPT

## 2025-08-18 PROCEDURE — 99232 SBSQ HOSP IP/OBS MODERATE 35: CPT | Performed by: INTERNAL MEDICINE

## 2025-08-18 PROCEDURE — 94761 N-INVAS EAR/PLS OXIMETRY MLT: CPT

## 2025-08-18 PROCEDURE — 97110 THERAPEUTIC EXERCISES: CPT

## 2025-08-18 PROCEDURE — 97116 GAIT TRAINING THERAPY: CPT

## 2025-08-18 PROCEDURE — 85025 COMPLETE CBC W/AUTO DIFF WBC: CPT

## 2025-08-18 PROCEDURE — 36415 COLL VENOUS BLD VENIPUNCTURE: CPT

## 2025-08-18 PROCEDURE — 71045 X-RAY EXAM CHEST 1 VIEW: CPT

## 2025-08-18 PROCEDURE — 82805 BLOOD GASES W/O2 SATURATION: CPT

## 2025-08-18 PROCEDURE — 99233 SBSQ HOSP IP/OBS HIGH 50: CPT | Performed by: INTERNAL MEDICINE

## 2025-08-18 PROCEDURE — 82947 ASSAY GLUCOSE BLOOD QUANT: CPT

## 2025-08-18 PROCEDURE — 80048 BASIC METABOLIC PNL TOTAL CA: CPT

## 2025-08-18 PROCEDURE — 6370000000 HC RX 637 (ALT 250 FOR IP): Performed by: NURSE PRACTITIONER

## 2025-08-18 PROCEDURE — 2700000000 HC OXYGEN THERAPY PER DAY

## 2025-08-18 PROCEDURE — 94640 AIRWAY INHALATION TREATMENT: CPT

## 2025-08-18 PROCEDURE — 84295 ASSAY OF SERUM SODIUM: CPT

## 2025-08-18 RX ORDER — SPIRONOLACTONE 25 MG/1
25 TABLET ORAL DAILY
Status: ON HOLD | DISCHARGE
Start: 2025-08-19

## 2025-08-18 RX ORDER — M-VIT,TX,IRON,MINS/CALC/FOLIC 27MG-0.4MG
1 TABLET ORAL DAILY
Status: DISCONTINUED | OUTPATIENT
Start: 2025-08-18 | End: 2025-08-18 | Stop reason: HOSPADM

## 2025-08-18 RX ORDER — FUROSEMIDE 40 MG/1
40 TABLET ORAL
Qty: 60 TABLET | Refills: 3 | Status: ON HOLD | OUTPATIENT
Start: 2025-08-18

## 2025-08-18 RX ORDER — LANOLIN ALCOHOL/MO/W.PET/CERES
100 CREAM (GRAM) TOPICAL DAILY
Status: DISCONTINUED | OUTPATIENT
Start: 2025-08-18 | End: 2025-08-18 | Stop reason: HOSPADM

## 2025-08-18 RX ORDER — CARVEDILOL 12.5 MG/1
12.5 TABLET ORAL 2 TIMES DAILY WITH MEALS
Qty: 60 TABLET | Refills: 3 | Status: ON HOLD | OUTPATIENT
Start: 2025-08-18

## 2025-08-18 RX ADMIN — HYDRALAZINE HYDROCHLORIDE 50 MG: 50 TABLET ORAL at 06:09

## 2025-08-18 RX ADMIN — ACETAMINOPHEN 650 MG: 325 TABLET ORAL at 15:30

## 2025-08-18 RX ADMIN — APIXABAN 5 MG: 5 TABLET, FILM COATED ORAL at 07:51

## 2025-08-18 RX ADMIN — IPRATROPIUM BROMIDE AND ALBUTEROL SULFATE 1 DOSE: .5; 2.5 SOLUTION RESPIRATORY (INHALATION) at 11:21

## 2025-08-18 RX ADMIN — Medication 1 TABLET: at 14:46

## 2025-08-18 RX ADMIN — AMLODIPINE BESYLATE 5 MG: 5 TABLET ORAL at 08:56

## 2025-08-18 RX ADMIN — DOCUSATE SODIUM 100 MG: 100 CAPSULE, LIQUID FILLED ORAL at 08:56

## 2025-08-18 RX ADMIN — BUSPIRONE HYDROCHLORIDE 10 MG: 10 TABLET ORAL at 08:56

## 2025-08-18 RX ADMIN — CILOSTAZOL 50 MG: 100 TABLET ORAL at 10:05

## 2025-08-18 RX ADMIN — Medication 100 MG: at 14:46

## 2025-08-18 RX ADMIN — DULOXETINE 20 MG: 20 CAPSULE, DELAYED RELEASE ORAL at 08:56

## 2025-08-18 RX ADMIN — POLYETHYLENE GLYCOL 3350 17 G: 17 POWDER, FOR SOLUTION ORAL at 08:56

## 2025-08-18 RX ADMIN — BUDESONIDE AND FORMOTEROL FUMARATE DIHYDRATE 2 PUFF: 80; 4.5 AEROSOL RESPIRATORY (INHALATION) at 07:29

## 2025-08-18 RX ADMIN — SODIUM CHLORIDE, PRESERVATIVE FREE 10 ML: 5 INJECTION INTRAVENOUS at 08:56

## 2025-08-18 RX ADMIN — PANTOPRAZOLE SODIUM 40 MG: 40 TABLET, DELAYED RELEASE ORAL at 06:09

## 2025-08-18 RX ADMIN — TRAMADOL HYDROCHLORIDE 50 MG: 50 TABLET, COATED ORAL at 09:06

## 2025-08-18 RX ADMIN — MICONAZOLE NITRATE: 20 POWDER TOPICAL at 08:57

## 2025-08-18 RX ADMIN — HYDRALAZINE HYDROCHLORIDE 50 MG: 50 TABLET ORAL at 14:46

## 2025-08-18 RX ADMIN — ALBUTEROL SULFATE 2 PUFF: 90 AEROSOL, METERED RESPIRATORY (INHALATION) at 15:51

## 2025-08-18 RX ADMIN — IPRATROPIUM BROMIDE AND ALBUTEROL SULFATE 1 DOSE: .5; 2.5 SOLUTION RESPIRATORY (INHALATION) at 07:27

## 2025-08-18 RX ADMIN — SPIRONOLACTONE 25 MG: 25 TABLET, FILM COATED ORAL at 08:56

## 2025-08-18 RX ADMIN — OXCARBAZEPINE 450 MG: 300 TABLET, FILM COATED ORAL at 08:56

## 2025-08-18 RX ADMIN — CLOPIDOGREL BISULFATE 75 MG: 75 TABLET, FILM COATED ORAL at 07:34

## 2025-08-18 RX ADMIN — BUSPIRONE HYDROCHLORIDE 10 MG: 10 TABLET ORAL at 14:46

## 2025-08-18 ASSESSMENT — ENCOUNTER SYMPTOMS
VOMITING: 0
ABDOMINAL PAIN: 0
SHORTNESS OF BREATH: 0
COUGH: 0
WHEEZING: 0

## 2025-08-18 ASSESSMENT — PAIN SCALES - GENERAL
PAINLEVEL_OUTOF10: 0
PAINLEVEL_OUTOF10: 0
PAINLEVEL_OUTOF10: 2
PAINLEVEL_OUTOF10: 5

## 2025-08-18 ASSESSMENT — PAIN - FUNCTIONAL ASSESSMENT: PAIN_FUNCTIONAL_ASSESSMENT: 0-10

## 2025-08-31 ENCOUNTER — HOSPITAL ENCOUNTER (INPATIENT)
Age: 70
LOS: 1 days | Discharge: ANOTHER HOSPITAL | End: 2025-08-31
Attending: EMERGENCY MEDICINE | Admitting: INTERNAL MEDICINE
Payer: MEDICARE

## 2025-08-31 ENCOUNTER — APPOINTMENT (OUTPATIENT)
Dept: CT IMAGING | Age: 70
End: 2025-08-31
Payer: MEDICARE

## 2025-08-31 ENCOUNTER — APPOINTMENT (OUTPATIENT)
Dept: GENERAL RADIOLOGY | Age: 70
End: 2025-08-31
Payer: MEDICARE

## 2025-08-31 ENCOUNTER — HOSPITAL ENCOUNTER (INPATIENT)
Age: 70
LOS: 6 days | Discharge: NURSING, REHAB OR LONG-TERM CARE | End: 2025-09-06
Attending: INTERNAL MEDICINE | Admitting: INTERNAL MEDICINE
Payer: MEDICARE

## 2025-08-31 VITALS
HEART RATE: 56 BPM | OXYGEN SATURATION: 100 % | HEIGHT: 64 IN | TEMPERATURE: 97.5 F | BODY MASS INDEX: 34.25 KG/M2 | SYSTOLIC BLOOD PRESSURE: 60 MMHG | RESPIRATION RATE: 26 BRPM | WEIGHT: 200.62 LBS | DIASTOLIC BLOOD PRESSURE: 23 MMHG

## 2025-08-31 DIAGNOSIS — R52 PAIN: ICD-10-CM

## 2025-08-31 DIAGNOSIS — J18.9 MULTIFOCAL PNEUMONIA: Primary | ICD-10-CM

## 2025-08-31 DIAGNOSIS — I48.91 ATRIAL FIBRILLATION, UNSPECIFIED TYPE (HCC): ICD-10-CM

## 2025-08-31 DIAGNOSIS — J96.22 ACUTE ON CHRONIC RESPIRATORY FAILURE WITH HYPOXIA AND HYPERCAPNIA (HCC): Primary | ICD-10-CM

## 2025-08-31 DIAGNOSIS — J96.21 ACUTE ON CHRONIC RESPIRATORY FAILURE WITH HYPOXIA AND HYPERCAPNIA (HCC): Primary | ICD-10-CM

## 2025-08-31 PROBLEM — J96.00 ACUTE RESPIRATORY FAILURE (HCC): Status: ACTIVE | Noted: 2025-08-31

## 2025-08-31 PROBLEM — J96.20 ACUTE ON CHRONIC RESPIRATORY FAILURE (HCC): Status: ACTIVE | Noted: 2025-08-31

## 2025-08-31 LAB
ALBUMIN SERPL-MCNC: 3.7 G/DL (ref 3.5–5.2)
ALBUMIN SERPL-MCNC: 4.3 G/DL (ref 3.5–5.2)
ALBUMIN/GLOB SERPL: 1.7 {RATIO} (ref 1–2.5)
ALBUMIN/GLOB SERPL: 1.8 {RATIO} (ref 1–2.5)
ALLEN TEST: POSITIVE
ALLEN TEST: POSITIVE
ALP SERPL-CCNC: 63 U/L (ref 35–104)
ALP SERPL-CCNC: 78 U/L (ref 35–104)
ALT SERPL-CCNC: 12 U/L (ref 10–35)
ALT SERPL-CCNC: 13 U/L (ref 10–35)
AMMONIA PLAS-SCNC: 20 UMOL/L (ref 11–51)
ANION GAP SERPL CALCULATED.3IONS-SCNC: 8 MMOL/L (ref 9–16)
ANION GAP SERPL CALCULATED.3IONS-SCNC: 9 MMOL/L (ref 9–16)
ANTI-XA UNFRAC HEPARIN: >2 IU/L
APAP SERPL-MCNC: <5 UG/ML (ref 10–30)
AST SERPL-CCNC: 18 U/L (ref 10–35)
AST SERPL-CCNC: 20 U/L (ref 10–35)
BACTERIA URNS QL MICRO: ABNORMAL
BASOPHILS # BLD: 0 K/UL (ref 0–0.2)
BASOPHILS # BLD: <0.03 K/UL (ref 0–0.2)
BASOPHILS NFR BLD: 0 % (ref 0–2)
BASOPHILS NFR BLD: 0 % (ref 0–2)
BILIRUB DIRECT SERPL-MCNC: 0.1 MG/DL (ref 0–0.2)
BILIRUB INDIRECT SERPL-MCNC: 0.3 MG/DL (ref 0–1)
BILIRUB SERPL-MCNC: 0.3 MG/DL (ref 0–1.2)
BILIRUB SERPL-MCNC: 0.4 MG/DL (ref 0–1.2)
BILIRUB UR QL STRIP: NEGATIVE
BNP SERPL-MCNC: 8889 PG/ML (ref 0–125)
BUN SERPL-MCNC: 43 MG/DL (ref 8–23)
BUN SERPL-MCNC: 50 MG/DL (ref 8–23)
CALCIUM SERPL-MCNC: 9.3 MG/DL (ref 8.6–10.4)
CALCIUM SERPL-MCNC: 9.8 MG/DL (ref 8.6–10.4)
CASTS #/AREA URNS LPF: ABNORMAL /LPF
CASTS #/AREA URNS LPF: ABNORMAL /LPF
CHLORIDE SERPL-SCNC: 104 MMOL/L (ref 98–107)
CHLORIDE SERPL-SCNC: 95 MMOL/L (ref 98–107)
CLARITY UR: CLEAR
CO2 SERPL-SCNC: 22 MMOL/L (ref 20–31)
CO2 SERPL-SCNC: 28 MMOL/L (ref 20–31)
COLOR UR: YELLOW
CREAT SERPL-MCNC: 3.7 MG/DL (ref 0.6–0.9)
CREAT SERPL-MCNC: 3.9 MG/DL (ref 0.6–0.9)
EOSINOPHIL # BLD: 0 K/UL (ref 0–0.4)
EOSINOPHIL # BLD: <0.03 K/UL (ref 0–0.44)
EOSINOPHILS RELATIVE PERCENT: 0 % (ref 1–4)
EOSINOPHILS RELATIVE PERCENT: 0 % (ref 1–4)
EPI CELLS #/AREA URNS HPF: ABNORMAL /HPF (ref 0–5)
ERYTHROCYTE [DISTWIDTH] IN BLOOD BY AUTOMATED COUNT: 14.3 % (ref 11.8–14.4)
ERYTHROCYTE [DISTWIDTH] IN BLOOD BY AUTOMATED COUNT: 15.4 % (ref 12.5–15.4)
ETHANOL PERCENT: NORMAL %
ETHANOLAMINE SERPL-MCNC: <10 MG/DL (ref 0–0.08)
FIO2: 100
FIO2: 60
FIO2: 60
GFR, ESTIMATED: 12 ML/MIN/1.73M2
GFR, ESTIMATED: 13 ML/MIN/1.73M2
GLUCOSE SERPL-MCNC: 152 MG/DL (ref 74–99)
GLUCOSE SERPL-MCNC: 186 MG/DL (ref 74–99)
GLUCOSE UR STRIP-MCNC: NEGATIVE MG/DL
HCT VFR BLD AUTO: 25.8 % (ref 36.3–47.1)
HCT VFR BLD AUTO: 31.9 % (ref 36–46)
HGB BLD-MCNC: 10.4 G/DL (ref 12–16)
HGB BLD-MCNC: 8 G/DL (ref 11.9–15.1)
HGB UR QL STRIP.AUTO: NEGATIVE
IMM GRANULOCYTES # BLD AUTO: 0.13 K/UL (ref 0–0.3)
IMM GRANULOCYTES NFR BLD: 1 %
INR PPP: 1.7
INR PPP: 1.7 (ref 0.9–1.2)
KETONES UR STRIP-MCNC: ABNORMAL MG/DL
LACTATE BLDV-SCNC: 0.9 MMOL/L (ref 0.5–2.2)
LACTATE BLDV-SCNC: 1.5 MMOL/L (ref 0.5–2.2)
LACTATE BLDV-SCNC: NORMAL MMOL/L (ref 0.5–2.2)
LEUKOCYTE ESTERASE UR QL STRIP: NEGATIVE
LYMPHOCYTES NFR BLD: 0.7 K/UL (ref 1.1–3.7)
LYMPHOCYTES NFR BLD: 1.02 K/UL (ref 1–4.8)
LYMPHOCYTES RELATIVE PERCENT: 4 % (ref 24–43)
LYMPHOCYTES RELATIVE PERCENT: 4 % (ref 24–44)
MAGNESIUM SERPL-MCNC: 2.9 MG/DL (ref 1.6–2.4)
MCH RBC QN AUTO: 28.4 PG (ref 25.2–33.5)
MCH RBC QN AUTO: 28.8 PG (ref 26–34)
MCHC RBC AUTO-ENTMCNC: 31 G/DL (ref 28.4–34.8)
MCHC RBC AUTO-ENTMCNC: 32.6 G/DL (ref 31–37)
MCV RBC AUTO: 88.5 FL (ref 80–100)
MCV RBC AUTO: 91.5 FL (ref 82.6–102.9)
MODE: ABNORMAL
MONOCYTES NFR BLD: 0.72 K/UL (ref 0.1–1.2)
MONOCYTES NFR BLD: 1.02 K/UL (ref 0.1–0.8)
MONOCYTES NFR BLD: 4 % (ref 1–7)
MONOCYTES NFR BLD: 4 % (ref 3–12)
MORPHOLOGY: ABNORMAL
NEGATIVE BASE EXCESS, ART: 0.3 MMOL/L (ref 0–2)
NEGATIVE BASE EXCESS, ART: 1 MMOL/L (ref 0–2)
NEGATIVE BASE EXCESS, ART: 2.2 MMOL/L (ref 0–2)
NEUTROPHILS NFR BLD: 92 % (ref 36–65)
NEUTROPHILS NFR BLD: 92 % (ref 36–66)
NEUTS SEG NFR BLD: 17.21 K/UL (ref 1.5–8.1)
NEUTS SEG NFR BLD: 23.36 K/UL (ref 1.8–7.7)
NITRITE UR QL STRIP: NEGATIVE
NRBC BLD-RTO: 0 PER 100 WBC
O2 DELIVERY DEVICE: ABNORMAL
PARTIAL THROMBOPLASTIN TIME: 36 SEC (ref 23–36.5)
PH UR STRIP: 5.5 [PH] (ref 5–8)
PHOSPHATE SERPL-MCNC: 3.4 MG/DL (ref 2.5–4.5)
PLATELET # BLD AUTO: 272 K/UL (ref 138–453)
PLATELET # BLD AUTO: 383 K/UL (ref 140–450)
PMV BLD AUTO: 10.2 FL (ref 6–12)
PMV BLD AUTO: 12.1 FL (ref 8.1–13.5)
POC HCO3: 25.9 MMOL/L (ref 21–28)
POC HCO3: 27.1 MMOL/L (ref 21–28)
POC HCO3: 27.3 MMOL/L (ref 21–28)
POC O2 SATURATION: 81.3 % (ref 94–98)
POC O2 SATURATION: 93.9 % (ref 94–98)
POC O2 SATURATION: 94.6 % (ref 94–98)
POC PCO2: 61 MM HG (ref 35–48)
POC PCO2: 62.3 MM HG (ref 35–48)
POC PCO2: 62.8 MM HG (ref 35–48)
POC PH: 7.22 (ref 7.35–7.45)
POC PH: 7.25 (ref 7.35–7.45)
POC PH: 7.26 (ref 7.35–7.45)
POC PO2: 54.4 MM HG (ref 83–108)
POC PO2: 82.8 MM HG (ref 83–108)
POC PO2: 89.4 MM HG (ref 83–108)
POTASSIUM SERPL-SCNC: 5.2 MMOL/L (ref 3.7–5.3)
POTASSIUM SERPL-SCNC: 5.7 MMOL/L (ref 3.7–5.3)
PROT SERPL-MCNC: 5.8 G/DL (ref 6.6–8.7)
PROT SERPL-MCNC: 6.8 G/DL (ref 6.6–8.7)
PROT UR STRIP-MCNC: ABNORMAL MG/DL
PROTHROMBIN TIME: 20.4 SEC (ref 11.7–14.9)
PROTHROMBIN TIME: 21 SEC (ref 11.8–14.6)
RBC # BLD AUTO: 2.82 M/UL (ref 3.95–5.11)
RBC # BLD AUTO: 3.61 M/UL (ref 4–5.2)
RBC #/AREA URNS HPF: ABNORMAL /HPF (ref 0–2)
SALICYLATES SERPL-MCNC: <0.5 MG/DL (ref 0–10)
SAMPLE SITE: ABNORMAL
SARS-COV-2 RDRP RESP QL NAA+PROBE: NOT DETECTED
SODIUM SERPL-SCNC: 132 MMOL/L (ref 136–145)
SODIUM SERPL-SCNC: 134 MMOL/L (ref 136–145)
SP GR UR STRIP: 1.02 (ref 1–1.03)
SPECIMEN DESCRIPTION: NORMAL
TROPONIN I SERPL HS-MCNC: 41 NG/L (ref 0–14)
TROPONIN I SERPL HS-MCNC: 47 NG/L (ref 0–14)
TSH SERPL DL<=0.05 MIU/L-ACNC: 0.94 UIU/ML (ref 0.27–4.2)
UROBILINOGEN UR STRIP-ACNC: NORMAL EU/DL (ref 0–1)
WBC #/AREA URNS HPF: ABNORMAL /HPF (ref 0–5)
WBC OTHER # BLD: 18.8 K/UL (ref 3.5–11.3)
WBC OTHER # BLD: 25.4 K/UL (ref 3.5–11)

## 2025-08-31 PROCEDURE — 84100 ASSAY OF PHOSPHORUS: CPT

## 2025-08-31 PROCEDURE — 1200000000 HC SEMI PRIVATE

## 2025-08-31 PROCEDURE — 2580000003 HC RX 258: Performed by: EMERGENCY MEDICINE

## 2025-08-31 PROCEDURE — 2000000000 HC ICU R&B

## 2025-08-31 PROCEDURE — 94660 CPAP INITIATION&MGMT: CPT

## 2025-08-31 PROCEDURE — 72125 CT NECK SPINE W/O DYE: CPT

## 2025-08-31 PROCEDURE — 6360000002 HC RX W HCPCS

## 2025-08-31 PROCEDURE — 6360000002 HC RX W HCPCS: Performed by: EMERGENCY MEDICINE

## 2025-08-31 PROCEDURE — 82803 BLOOD GASES ANY COMBINATION: CPT

## 2025-08-31 PROCEDURE — 36600 WITHDRAWAL OF ARTERIAL BLOOD: CPT

## 2025-08-31 PROCEDURE — 94640 AIRWAY INHALATION TREATMENT: CPT

## 2025-08-31 PROCEDURE — 71250 CT THORAX DX C-: CPT

## 2025-08-31 PROCEDURE — 51702 INSERT TEMP BLADDER CATH: CPT

## 2025-08-31 PROCEDURE — 87205 SMEAR GRAM STAIN: CPT

## 2025-08-31 PROCEDURE — 80179 DRUG ASSAY SALICYLATE: CPT

## 2025-08-31 PROCEDURE — 80048 BASIC METABOLIC PNL TOTAL CA: CPT

## 2025-08-31 PROCEDURE — 83880 ASSAY OF NATRIURETIC PEPTIDE: CPT

## 2025-08-31 PROCEDURE — 6360000002 HC RX W HCPCS: Performed by: STUDENT IN AN ORGANIZED HEALTH CARE EDUCATION/TRAINING PROGRAM

## 2025-08-31 PROCEDURE — 85610 PROTHROMBIN TIME: CPT

## 2025-08-31 PROCEDURE — 80143 DRUG ASSAY ACETAMINOPHEN: CPT

## 2025-08-31 PROCEDURE — G0480 DRUG TEST DEF 1-7 CLASSES: HCPCS

## 2025-08-31 PROCEDURE — 84443 ASSAY THYROID STIM HORMONE: CPT

## 2025-08-31 PROCEDURE — 2500000003 HC RX 250 WO HCPCS: Performed by: STUDENT IN AN ORGANIZED HEALTH CARE EDUCATION/TRAINING PROGRAM

## 2025-08-31 PROCEDURE — 80076 HEPATIC FUNCTION PANEL: CPT

## 2025-08-31 PROCEDURE — 87449 NOS EACH ORGANISM AG IA: CPT

## 2025-08-31 PROCEDURE — 80053 COMPREHEN METABOLIC PANEL: CPT

## 2025-08-31 PROCEDURE — 36415 COLL VENOUS BLD VENIPUNCTURE: CPT

## 2025-08-31 PROCEDURE — 99291 CRITICAL CARE FIRST HOUR: CPT | Performed by: INTERNAL MEDICINE

## 2025-08-31 PROCEDURE — 85520 HEPARIN ASSAY: CPT

## 2025-08-31 PROCEDURE — 70450 CT HEAD/BRAIN W/O DYE: CPT

## 2025-08-31 PROCEDURE — 87899 AGENT NOS ASSAY W/OPTIC: CPT

## 2025-08-31 PROCEDURE — 2580000003 HC RX 258: Performed by: INTERNAL MEDICINE

## 2025-08-31 PROCEDURE — 87635 SARS-COV-2 COVID-19 AMP PRB: CPT

## 2025-08-31 PROCEDURE — 84484 ASSAY OF TROPONIN QUANT: CPT

## 2025-08-31 PROCEDURE — 83605 ASSAY OF LACTIC ACID: CPT

## 2025-08-31 PROCEDURE — 82570 ASSAY OF URINE CREATININE: CPT

## 2025-08-31 PROCEDURE — 87641 MR-STAPH DNA AMP PROBE: CPT

## 2025-08-31 PROCEDURE — 87040 BLOOD CULTURE FOR BACTERIA: CPT

## 2025-08-31 PROCEDURE — 84300 ASSAY OF URINE SODIUM: CPT

## 2025-08-31 PROCEDURE — 2700000000 HC OXYGEN THERAPY PER DAY

## 2025-08-31 PROCEDURE — 82140 ASSAY OF AMMONIA: CPT

## 2025-08-31 PROCEDURE — 2580000003 HC RX 258: Performed by: STUDENT IN AN ORGANIZED HEALTH CARE EDUCATION/TRAINING PROGRAM

## 2025-08-31 PROCEDURE — 94761 N-INVAS EAR/PLS OXIMETRY MLT: CPT

## 2025-08-31 PROCEDURE — 93005 ELECTROCARDIOGRAM TRACING: CPT

## 2025-08-31 PROCEDURE — 6360000002 HC RX W HCPCS: Performed by: INTERNAL MEDICINE

## 2025-08-31 PROCEDURE — 6370000000 HC RX 637 (ALT 250 FOR IP)

## 2025-08-31 PROCEDURE — 2500000003 HC RX 250 WO HCPCS

## 2025-08-31 PROCEDURE — 83735 ASSAY OF MAGNESIUM: CPT

## 2025-08-31 PROCEDURE — 85730 THROMBOPLASTIN TIME PARTIAL: CPT

## 2025-08-31 PROCEDURE — 86738 MYCOPLASMA ANTIBODY: CPT

## 2025-08-31 PROCEDURE — 2580000003 HC RX 258

## 2025-08-31 PROCEDURE — 81001 URINALYSIS AUTO W/SCOPE: CPT

## 2025-08-31 PROCEDURE — 85025 COMPLETE CBC W/AUTO DIFF WBC: CPT

## 2025-08-31 RX ORDER — POLYETHYLENE GLYCOL 3350 17 G/17G
17 POWDER, FOR SOLUTION ORAL DAILY PRN
Status: DISCONTINUED | OUTPATIENT
Start: 2025-08-31 | End: 2025-09-06 | Stop reason: HOSPADM

## 2025-08-31 RX ORDER — ACETAMINOPHEN 325 MG/1
650 TABLET ORAL EVERY 6 HOURS PRN
Status: CANCELLED | OUTPATIENT
Start: 2025-08-31

## 2025-08-31 RX ORDER — 0.9 % SODIUM CHLORIDE 0.9 %
500 INTRAVENOUS SOLUTION INTRAVENOUS ONCE
Status: COMPLETED | OUTPATIENT
Start: 2025-08-31 | End: 2025-09-01

## 2025-08-31 RX ORDER — GABAPENTIN 100 MG/1
100 CAPSULE ORAL 3 TIMES DAILY
Status: CANCELLED | OUTPATIENT
Start: 2025-08-31

## 2025-08-31 RX ORDER — TRAZODONE HYDROCHLORIDE 50 MG/1
100 TABLET ORAL NIGHTLY
Status: CANCELLED | OUTPATIENT
Start: 2025-08-31

## 2025-08-31 RX ORDER — SODIUM CHLORIDE 0.9 % (FLUSH) 0.9 %
5-40 SYRINGE (ML) INJECTION EVERY 12 HOURS SCHEDULED
Status: CANCELLED | OUTPATIENT
Start: 2025-08-31

## 2025-08-31 RX ORDER — INSULIN LISPRO 100 [IU]/ML
0-4 INJECTION, SOLUTION INTRAVENOUS; SUBCUTANEOUS
Status: CANCELLED | OUTPATIENT
Start: 2025-08-31

## 2025-08-31 RX ORDER — POLYETHYLENE GLYCOL 3350 17 G/17G
17 POWDER, FOR SOLUTION ORAL DAILY PRN
Status: CANCELLED | OUTPATIENT
Start: 2025-08-31

## 2025-08-31 RX ORDER — CLOPIDOGREL BISULFATE 75 MG/1
75 TABLET ORAL DAILY
Status: CANCELLED | OUTPATIENT
Start: 2025-08-31

## 2025-08-31 RX ORDER — ONDANSETRON 4 MG/1
4 TABLET, ORALLY DISINTEGRATING ORAL EVERY 8 HOURS PRN
Status: CANCELLED | OUTPATIENT
Start: 2025-08-31

## 2025-08-31 RX ORDER — GLUCAGON 1 MG/ML
1 KIT INJECTION PRN
Status: CANCELLED | OUTPATIENT
Start: 2025-08-31

## 2025-08-31 RX ORDER — ONDANSETRON 2 MG/ML
4 INJECTION INTRAMUSCULAR; INTRAVENOUS EVERY 6 HOURS PRN
Status: DISCONTINUED | OUTPATIENT
Start: 2025-08-31 | End: 2025-09-06 | Stop reason: HOSPADM

## 2025-08-31 RX ORDER — LORAZEPAM 1 MG/1
1 TABLET ORAL 3 TIMES DAILY
Status: ON HOLD | COMMUNITY
End: 2025-09-06 | Stop reason: HOSPADM

## 2025-08-31 RX ORDER — SODIUM CHLORIDE 9 MG/ML
INJECTION, SOLUTION INTRAVENOUS PRN
Status: CANCELLED | OUTPATIENT
Start: 2025-08-31

## 2025-08-31 RX ORDER — CILOSTAZOL 50 MG/1
50 TABLET ORAL 2 TIMES DAILY
Status: CANCELLED | OUTPATIENT
Start: 2025-08-31

## 2025-08-31 RX ORDER — DEXTROSE MONOHYDRATE 100 MG/ML
INJECTION, SOLUTION INTRAVENOUS CONTINUOUS PRN
Status: DISCONTINUED | OUTPATIENT
Start: 2025-08-31 | End: 2025-09-06 | Stop reason: HOSPADM

## 2025-08-31 RX ORDER — SODIUM CHLORIDE 0.9 % (FLUSH) 0.9 %
5-40 SYRINGE (ML) INJECTION PRN
Status: DISCONTINUED | OUTPATIENT
Start: 2025-08-31 | End: 2025-09-06 | Stop reason: HOSPADM

## 2025-08-31 RX ORDER — ALBUTEROL SULFATE 0.83 MG/ML
2.5 SOLUTION RESPIRATORY (INHALATION) EVERY 4 HOURS PRN
Status: DISCONTINUED | OUTPATIENT
Start: 2025-08-31 | End: 2025-09-06 | Stop reason: HOSPADM

## 2025-08-31 RX ORDER — ROSUVASTATIN CALCIUM 20 MG/1
20 TABLET, COATED ORAL NIGHTLY
Status: CANCELLED | OUTPATIENT
Start: 2025-08-31

## 2025-08-31 RX ORDER — 0.9 % SODIUM CHLORIDE 0.9 %
750 INTRAVENOUS SOLUTION INTRAVENOUS ONCE
Status: COMPLETED | OUTPATIENT
Start: 2025-08-31 | End: 2025-08-31

## 2025-08-31 RX ORDER — SODIUM CHLORIDE 9 MG/ML
INJECTION, SOLUTION INTRAVENOUS PRN
Status: DISCONTINUED | OUTPATIENT
Start: 2025-08-31 | End: 2025-09-05 | Stop reason: SDUPTHER

## 2025-08-31 RX ORDER — ENOXAPARIN SODIUM 100 MG/ML
1 INJECTION SUBCUTANEOUS DAILY
Status: CANCELLED | OUTPATIENT
Start: 2025-08-31

## 2025-08-31 RX ORDER — INSULIN LISPRO 100 [IU]/ML
0-4 INJECTION, SOLUTION INTRAVENOUS; SUBCUTANEOUS
Status: DISCONTINUED | OUTPATIENT
Start: 2025-08-31 | End: 2025-09-06 | Stop reason: HOSPADM

## 2025-08-31 RX ORDER — IPRATROPIUM BROMIDE AND ALBUTEROL SULFATE 2.5; .5 MG/3ML; MG/3ML
1 SOLUTION RESPIRATORY (INHALATION)
Status: DISCONTINUED | OUTPATIENT
Start: 2025-08-31 | End: 2025-09-06 | Stop reason: HOSPADM

## 2025-08-31 RX ORDER — FUROSEMIDE 20 MG/1
40 TABLET ORAL
Status: CANCELLED | OUTPATIENT
Start: 2025-09-01

## 2025-08-31 RX ORDER — HEPARIN SODIUM 10000 [USP'U]/100ML
5-30 INJECTION, SOLUTION INTRAVENOUS CONTINUOUS
Status: DISCONTINUED | OUTPATIENT
Start: 2025-08-31 | End: 2025-09-05

## 2025-08-31 RX ORDER — NALOXONE HYDROCHLORIDE 0.4 MG/ML
0.4 INJECTION, SOLUTION INTRAMUSCULAR; INTRAVENOUS; SUBCUTANEOUS ONCE
Status: DISCONTINUED | OUTPATIENT
Start: 2025-08-31 | End: 2025-08-31

## 2025-08-31 RX ORDER — AMLODIPINE BESYLATE 5 MG/1
10 TABLET ORAL DAILY
Status: CANCELLED | OUTPATIENT
Start: 2025-08-31

## 2025-08-31 RX ORDER — GABAPENTIN 100 MG/1
100 CAPSULE ORAL 3 TIMES DAILY
COMMUNITY

## 2025-08-31 RX ORDER — HEPARIN SODIUM 1000 [USP'U]/ML
4000 INJECTION, SOLUTION INTRAVENOUS; SUBCUTANEOUS ONCE
Status: COMPLETED | OUTPATIENT
Start: 2025-08-31 | End: 2025-08-31

## 2025-08-31 RX ORDER — ONDANSETRON 2 MG/ML
4 INJECTION INTRAMUSCULAR; INTRAVENOUS EVERY 6 HOURS PRN
Status: CANCELLED | OUTPATIENT
Start: 2025-08-31

## 2025-08-31 RX ORDER — DULOXETIN HYDROCHLORIDE 20 MG/1
20 CAPSULE, DELAYED RELEASE ORAL DAILY
Status: CANCELLED | OUTPATIENT
Start: 2025-08-31

## 2025-08-31 RX ORDER — LINEZOLID 2 MG/ML
600 INJECTION, SOLUTION INTRAVENOUS EVERY 12 HOURS
Status: DISCONTINUED | OUTPATIENT
Start: 2025-09-01 | End: 2025-09-01

## 2025-08-31 RX ORDER — HEPARIN SODIUM 10000 [USP'U]/100ML
5-30 INJECTION, SOLUTION INTRAVENOUS CONTINUOUS
Status: DISCONTINUED | OUTPATIENT
Start: 2025-08-31 | End: 2025-08-31

## 2025-08-31 RX ORDER — BUSPIRONE HYDROCHLORIDE 5 MG/1
5 TABLET ORAL 3 TIMES DAILY
Status: CANCELLED | OUTPATIENT
Start: 2025-08-31

## 2025-08-31 RX ORDER — GLUCAGON 1 MG/ML
1 KIT INJECTION PRN
Status: DISCONTINUED | OUTPATIENT
Start: 2025-08-31 | End: 2025-09-06 | Stop reason: HOSPADM

## 2025-08-31 RX ORDER — 0.9 % SODIUM CHLORIDE 0.9 %
250 INTRAVENOUS SOLUTION INTRAVENOUS ONCE
Status: COMPLETED | OUTPATIENT
Start: 2025-08-31 | End: 2025-08-31

## 2025-08-31 RX ORDER — HYDRALAZINE HYDROCHLORIDE 25 MG/1
50 TABLET, FILM COATED ORAL EVERY 8 HOURS SCHEDULED
Status: CANCELLED | OUTPATIENT
Start: 2025-08-31

## 2025-08-31 RX ORDER — SODIUM CHLORIDE 0.9 % (FLUSH) 0.9 %
5-40 SYRINGE (ML) INJECTION EVERY 12 HOURS SCHEDULED
Status: DISCONTINUED | OUTPATIENT
Start: 2025-08-31 | End: 2025-09-06 | Stop reason: HOSPADM

## 2025-08-31 RX ORDER — HEPARIN SODIUM 1000 [USP'U]/ML
4000 INJECTION, SOLUTION INTRAVENOUS; SUBCUTANEOUS PRN
Status: DISCONTINUED | OUTPATIENT
Start: 2025-08-31 | End: 2025-09-05

## 2025-08-31 RX ORDER — SPIRONOLACTONE 25 MG/1
25 TABLET ORAL DAILY
Status: CANCELLED | OUTPATIENT
Start: 2025-08-31

## 2025-08-31 RX ORDER — ACETAMINOPHEN 650 MG/1
650 SUPPOSITORY RECTAL EVERY 6 HOURS PRN
Status: CANCELLED | OUTPATIENT
Start: 2025-08-31

## 2025-08-31 RX ORDER — PANTOPRAZOLE SODIUM 40 MG/1
40 TABLET, DELAYED RELEASE ORAL DAILY
Status: CANCELLED | OUTPATIENT
Start: 2025-08-31

## 2025-08-31 RX ORDER — ONDANSETRON 4 MG/1
4 TABLET, ORALLY DISINTEGRATING ORAL EVERY 8 HOURS PRN
Status: DISCONTINUED | OUTPATIENT
Start: 2025-08-31 | End: 2025-09-06 | Stop reason: HOSPADM

## 2025-08-31 RX ORDER — MIRTAZAPINE 15 MG/1
15 TABLET, FILM COATED ORAL NIGHTLY
Status: CANCELLED | OUTPATIENT
Start: 2025-08-31

## 2025-08-31 RX ORDER — ATORVASTATIN CALCIUM 10 MG/1
10 TABLET, FILM COATED ORAL DAILY
Status: ON HOLD | COMMUNITY
End: 2025-09-06 | Stop reason: HOSPADM

## 2025-08-31 RX ORDER — HEPARIN SODIUM 1000 [USP'U]/ML
2000 INJECTION, SOLUTION INTRAVENOUS; SUBCUTANEOUS PRN
Status: DISCONTINUED | OUTPATIENT
Start: 2025-08-31 | End: 2025-09-05

## 2025-08-31 RX ORDER — IPRATROPIUM BROMIDE AND ALBUTEROL SULFATE 2.5; .5 MG/3ML; MG/3ML
1 SOLUTION RESPIRATORY (INHALATION) ONCE
Status: COMPLETED | OUTPATIENT
Start: 2025-08-31 | End: 2025-08-31

## 2025-08-31 RX ORDER — SODIUM CHLORIDE 0.9 % (FLUSH) 0.9 %
5-40 SYRINGE (ML) INJECTION PRN
Status: CANCELLED | OUTPATIENT
Start: 2025-08-31

## 2025-08-31 RX ORDER — DEXTROSE MONOHYDRATE 100 MG/ML
INJECTION, SOLUTION INTRAVENOUS CONTINUOUS PRN
Status: CANCELLED | OUTPATIENT
Start: 2025-08-31

## 2025-08-31 RX ADMIN — SODIUM CHLORIDE 500 ML: 0.9 INJECTION, SOLUTION INTRAVENOUS at 23:30

## 2025-08-31 RX ADMIN — HEPARIN SODIUM 4000 UNITS: 1000 INJECTION, SOLUTION INTRAVENOUS; SUBCUTANEOUS at 22:17

## 2025-08-31 RX ADMIN — CEFEPIME 2000 MG: 2 INJECTION, POWDER, FOR SOLUTION INTRAVENOUS at 12:02

## 2025-08-31 RX ADMIN — VANCOMYCIN HYDROCHLORIDE 2000 MG: 1 INJECTION, POWDER, LYOPHILIZED, FOR SOLUTION INTRAVENOUS at 12:48

## 2025-08-31 RX ADMIN — SODIUM CHLORIDE 250 ML: 0.9 INJECTION, SOLUTION INTRAVENOUS at 12:37

## 2025-08-31 RX ADMIN — SODIUM CHLORIDE 750 ML: 0.9 INJECTION, SOLUTION INTRAVENOUS at 14:13

## 2025-08-31 RX ADMIN — AZITHROMYCIN MONOHYDRATE 500 MG: 500 INJECTION, POWDER, LYOPHILIZED, FOR SOLUTION INTRAVENOUS at 15:35

## 2025-08-31 RX ADMIN — SODIUM BICARBONATE: 84 INJECTION, SOLUTION INTRAVENOUS at 22:55

## 2025-08-31 RX ADMIN — IPRATROPIUM BROMIDE AND ALBUTEROL SULFATE 1 DOSE: .5; 2.5 SOLUTION RESPIRATORY (INHALATION) at 11:15

## 2025-08-31 RX ADMIN — SODIUM CHLORIDE, PRESERVATIVE FREE 10 ML: 5 INJECTION INTRAVENOUS at 22:21

## 2025-08-31 RX ADMIN — SODIUM CHLORIDE: 0.9 INJECTION, SOLUTION INTRAVENOUS at 21:31

## 2025-08-31 RX ADMIN — PIPERACILLIN AND TAZOBACTAM 4500 MG: 4; .5 INJECTION, POWDER, LYOPHILIZED, FOR SOLUTION INTRAVENOUS at 22:20

## 2025-08-31 RX ADMIN — HEPARIN SODIUM 12 UNITS/KG/HR: 10000 INJECTION, SOLUTION INTRAVENOUS at 22:20

## 2025-08-31 RX ADMIN — WATER 125 MG: 1 INJECTION INTRAMUSCULAR; INTRAVENOUS; SUBCUTANEOUS at 11:23

## 2025-08-31 ASSESSMENT — PAIN SCALES - GENERAL: PAINLEVEL_OUTOF10: 0

## 2025-09-01 ENCOUNTER — APPOINTMENT (OUTPATIENT)
Dept: GENERAL RADIOLOGY | Age: 70
End: 2025-09-01
Attending: INTERNAL MEDICINE
Payer: MEDICARE

## 2025-09-01 PROBLEM — D72.825 BANDEMIA: Status: ACTIVE | Noted: 2025-09-01

## 2025-09-01 PROBLEM — E87.20 LACTIC ACIDOSIS: Status: ACTIVE | Noted: 2025-09-01

## 2025-09-01 LAB
ANION GAP SERPL CALCULATED.3IONS-SCNC: 11 MMOL/L (ref 9–16)
ANION GAP SERPL CALCULATED.3IONS-SCNC: 13 MMOL/L (ref 9–16)
BASOPHILS # BLD: 0.03 K/UL (ref 0–0.2)
BASOPHILS NFR BLD: 0 % (ref 0–2)
BODY TEMPERATURE: 37
BUN SERPL-MCNC: 45 MG/DL (ref 8–23)
BUN SERPL-MCNC: 51 MG/DL (ref 8–23)
CALCIUM SERPL-MCNC: 8.8 MG/DL (ref 8.6–10.4)
CALCIUM SERPL-MCNC: 8.9 MG/DL (ref 8.6–10.4)
CHLORIDE SERPL-SCNC: 102 MMOL/L (ref 98–107)
CHLORIDE SERPL-SCNC: 99 MMOL/L (ref 98–107)
CO2 SERPL-SCNC: 20 MMOL/L (ref 20–31)
CO2 SERPL-SCNC: 24 MMOL/L (ref 20–31)
COHGB MFR BLD: 0.1 % (ref 0–5)
COHGB MFR BLD: 0.2 % (ref 0–5)
COHGB MFR BLD: 0.3 % (ref 0–5)
COHGB MFR BLD: 0.4 % (ref 0–5)
CREAT SERPL-MCNC: 2.5 MG/DL (ref 0.6–0.9)
CREAT SERPL-MCNC: 3.4 MG/DL (ref 0.6–0.9)
CREAT UR-MCNC: 137 MG/DL (ref 28–217)
CREAT UR-MCNC: 367 MG/DL (ref 28–217)
EKG ATRIAL RATE: 64 BPM
EKG Q-T INTERVAL: 400 MS
EKG QRS DURATION: 116 MS
EKG QTC CALCULATION (BAZETT): 412 MS
EKG R AXIS: 5 DEGREES
EKG T AXIS: 119 DEGREES
EKG VENTRICULAR RATE: 64 BPM
EOSINOPHIL # BLD: <0.03 K/UL (ref 0–0.44)
EOSINOPHIL,URINE: NORMAL
EOSINOPHILS RELATIVE PERCENT: 0 % (ref 1–4)
ERYTHROCYTE [DISTWIDTH] IN BLOOD BY AUTOMATED COUNT: 14.4 % (ref 11.8–14.4)
FIO2 ON VENT: ABNORMAL %
GFR, ESTIMATED: 14 ML/MIN/1.73M2
GFR, ESTIMATED: 20 ML/MIN/1.73M2
GLUCOSE BLD-MCNC: 148 MG/DL (ref 65–105)
GLUCOSE BLD-MCNC: 160 MG/DL (ref 65–105)
GLUCOSE SERPL-MCNC: 154 MG/DL (ref 74–99)
GLUCOSE SERPL-MCNC: 211 MG/DL (ref 74–99)
HCO3 VENOUS: 21.9 MMOL/L (ref 24–30)
HCO3 VENOUS: 22.6 MMOL/L (ref 24–30)
HCO3 VENOUS: 23 MMOL/L (ref 24–30)
HCO3 VENOUS: 25 MMOL/L (ref 24–30)
HCT VFR BLD AUTO: 24.2 % (ref 36.3–47.1)
HGB BLD-MCNC: 7.5 G/DL (ref 11.9–15.1)
IMM GRANULOCYTES # BLD AUTO: 0.06 K/UL (ref 0–0.3)
IMM GRANULOCYTES NFR BLD: 0 %
L PNEUMO1 AG UR QL IA.RAPID: NEGATIVE
LYMPHOCYTES NFR BLD: 1.07 K/UL (ref 1.1–3.7)
LYMPHOCYTES RELATIVE PERCENT: 8 % (ref 24–43)
M PNEUMO IGM SER QL IA: 0.06
MCH RBC QN AUTO: 28.5 PG (ref 25.2–33.5)
MCHC RBC AUTO-ENTMCNC: 31 G/DL (ref 28.4–34.8)
MCV RBC AUTO: 92 FL (ref 82.6–102.9)
MONOCYTES NFR BLD: 1.18 K/UL (ref 0.1–1.2)
MONOCYTES NFR BLD: 8 % (ref 3–12)
MRSA, DNA, NASAL: NEGATIVE
NEGATIVE BASE EXCESS, VEN: 1.1 MMOL/L (ref 0–2)
NEGATIVE BASE EXCESS, VEN: 2.5 MMOL/L (ref 0–2)
NEGATIVE BASE EXCESS, VEN: 3.2 MMOL/L (ref 0–2)
NEGATIVE BASE EXCESS, VEN: 5.5 MMOL/L (ref 0–2)
NEUTROPHILS NFR BLD: 83 % (ref 36–65)
NEUTS SEG NFR BLD: 11.7 K/UL (ref 1.5–8.1)
NRBC BLD-RTO: 0 PER 100 WBC
O2 SAT, VEN: 31.3 % (ref 60–85)
O2 SAT, VEN: 48.3 % (ref 60–85)
O2 SAT, VEN: 83.9 % (ref 60–85)
O2 SAT, VEN: 86 % (ref 60–85)
PARTIAL THROMBOPLASTIN TIME: 39.1 SEC (ref 23–36.5)
PARTIAL THROMBOPLASTIN TIME: 50.8 SEC (ref 23–36.5)
PARTIAL THROMBOPLASTIN TIME: >180 SEC (ref 23–36.5)
PCO2 VENOUS: 43.1 MM HG (ref 39–55)
PCO2 VENOUS: 43.9 MM HG (ref 39–55)
PCO2 VENOUS: 48.8 MM HG (ref 39–55)
PCO2 VENOUS: 50.7 MM HG (ref 39–55)
PH VENOUS: 7.25 (ref 7.32–7.42)
PH VENOUS: 7.33 (ref 7.32–7.42)
PH VENOUS: 7.33 (ref 7.32–7.42)
PH VENOUS: 7.34 (ref 7.32–7.42)
PLATELET # BLD AUTO: 248 K/UL (ref 138–453)
PMV BLD AUTO: 12.3 FL (ref 8.1–13.5)
PO2 VENOUS: 22.7 MM HG (ref 30–50)
PO2 VENOUS: 28.3 MM HG (ref 30–50)
PO2 VENOUS: 50.7 MM HG (ref 30–50)
PO2 VENOUS: 54.4 MM HG (ref 30–50)
POTASSIUM SERPL-SCNC: 4.3 MMOL/L (ref 3.7–5.3)
POTASSIUM SERPL-SCNC: 5.3 MMOL/L (ref 3.7–5.3)
RBC # BLD AUTO: 2.63 M/UL (ref 3.95–5.11)
S PNEUM AG SPEC QL: NEGATIVE
SODIUM SERPL-SCNC: 134 MMOL/L (ref 136–145)
SODIUM SERPL-SCNC: 135 MMOL/L (ref 136–145)
SODIUM UR-SCNC: <20 MMOL/L
SODIUM UR-SCNC: <20 MMOL/L
SPECIMEN DESCRIPTION: NORMAL
SPECIMEN SOURCE: NORMAL
TOTAL PROTEIN, URINE: 27 MG/DL
URINE TOTAL PROTEIN CREATININE RATIO: 0.2 (ref 0–0.2)
WBC OTHER # BLD: 14 K/UL (ref 3.5–11.3)

## 2025-09-01 PROCEDURE — 82805 BLOOD GASES W/O2 SATURATION: CPT

## 2025-09-01 PROCEDURE — 6360000002 HC RX W HCPCS

## 2025-09-01 PROCEDURE — 84300 ASSAY OF URINE SODIUM: CPT

## 2025-09-01 PROCEDURE — 6360000002 HC RX W HCPCS: Performed by: STUDENT IN AN ORGANIZED HEALTH CARE EDUCATION/TRAINING PROGRAM

## 2025-09-01 PROCEDURE — 2500000003 HC RX 250 WO HCPCS: Performed by: STUDENT IN AN ORGANIZED HEALTH CARE EDUCATION/TRAINING PROGRAM

## 2025-09-01 PROCEDURE — 36556 INSERT NON-TUNNEL CV CATH: CPT | Performed by: INTERNAL MEDICINE

## 2025-09-01 PROCEDURE — 93005 ELECTROCARDIOGRAM TRACING: CPT

## 2025-09-01 PROCEDURE — 94640 AIRWAY INHALATION TREATMENT: CPT

## 2025-09-01 PROCEDURE — 36415 COLL VENOUS BLD VENIPUNCTURE: CPT

## 2025-09-01 PROCEDURE — 2580000003 HC RX 258: Performed by: STUDENT IN AN ORGANIZED HEALTH CARE EDUCATION/TRAINING PROGRAM

## 2025-09-01 PROCEDURE — 2700000000 HC OXYGEN THERAPY PER DAY

## 2025-09-01 PROCEDURE — 99222 1ST HOSP IP/OBS MODERATE 55: CPT | Performed by: INTERNAL MEDICINE

## 2025-09-01 PROCEDURE — 2500000003 HC RX 250 WO HCPCS

## 2025-09-01 PROCEDURE — 84156 ASSAY OF PROTEIN URINE: CPT

## 2025-09-01 PROCEDURE — 36556 INSERT NON-TUNNEL CV CATH: CPT

## 2025-09-01 PROCEDURE — 93010 ELECTROCARDIOGRAM REPORT: CPT | Performed by: STUDENT IN AN ORGANIZED HEALTH CARE EDUCATION/TRAINING PROGRAM

## 2025-09-01 PROCEDURE — 6370000000 HC RX 637 (ALT 250 FOR IP)

## 2025-09-01 PROCEDURE — 85730 THROMBOPLASTIN TIME PARTIAL: CPT

## 2025-09-01 PROCEDURE — 2580000003 HC RX 258

## 2025-09-01 PROCEDURE — 83516 IMMUNOASSAY NONANTIBODY: CPT

## 2025-09-01 PROCEDURE — 94761 N-INVAS EAR/PLS OXIMETRY MLT: CPT

## 2025-09-01 PROCEDURE — 6370000000 HC RX 637 (ALT 250 FOR IP): Performed by: STUDENT IN AN ORGANIZED HEALTH CARE EDUCATION/TRAINING PROGRAM

## 2025-09-01 PROCEDURE — 82570 ASSAY OF URINE CREATININE: CPT

## 2025-09-01 PROCEDURE — 2000000000 HC ICU R&B

## 2025-09-01 PROCEDURE — 85025 COMPLETE CBC W/AUTO DIFF WBC: CPT

## 2025-09-01 PROCEDURE — 82947 ASSAY GLUCOSE BLOOD QUANT: CPT

## 2025-09-01 PROCEDURE — 94660 CPAP INITIATION&MGMT: CPT

## 2025-09-01 PROCEDURE — 99291 CRITICAL CARE FIRST HOUR: CPT | Performed by: INTERNAL MEDICINE

## 2025-09-01 PROCEDURE — 80048 BASIC METABOLIC PNL TOTAL CA: CPT

## 2025-09-01 PROCEDURE — 71045 X-RAY EXAM CHEST 1 VIEW: CPT

## 2025-09-01 PROCEDURE — G0545 PR INHERENT VISIT TO INPT: HCPCS | Performed by: INTERNAL MEDICINE

## 2025-09-01 PROCEDURE — 99223 1ST HOSP IP/OBS HIGH 75: CPT | Performed by: INTERNAL MEDICINE

## 2025-09-01 RX ORDER — 0.9 % SODIUM CHLORIDE 0.9 %
500 INTRAVENOUS SOLUTION INTRAVENOUS ONCE
Status: COMPLETED | OUTPATIENT
Start: 2025-09-01 | End: 2025-09-01

## 2025-09-01 RX ORDER — GABAPENTIN 100 MG/1
100 CAPSULE ORAL 3 TIMES DAILY
Status: DISCONTINUED | OUTPATIENT
Start: 2025-09-01 | End: 2025-09-06 | Stop reason: HOSPADM

## 2025-09-01 RX ORDER — FENTANYL CITRATE 50 UG/ML
50 INJECTION, SOLUTION INTRAMUSCULAR; INTRAVENOUS ONCE
Status: COMPLETED | OUTPATIENT
Start: 2025-09-01 | End: 2025-09-01

## 2025-09-01 RX ORDER — HYDRALAZINE HYDROCHLORIDE 50 MG/1
50 TABLET, FILM COATED ORAL EVERY 8 HOURS SCHEDULED
Status: DISCONTINUED | OUTPATIENT
Start: 2025-09-01 | End: 2025-09-06 | Stop reason: HOSPADM

## 2025-09-01 RX ORDER — TRAZODONE HYDROCHLORIDE 100 MG/1
100 TABLET ORAL NIGHTLY
Status: DISCONTINUED | OUTPATIENT
Start: 2025-09-01 | End: 2025-09-06 | Stop reason: HOSPADM

## 2025-09-01 RX ORDER — ROSUVASTATIN CALCIUM 20 MG/1
20 TABLET, COATED ORAL NIGHTLY
Status: DISCONTINUED | OUTPATIENT
Start: 2025-09-01 | End: 2025-09-06 | Stop reason: HOSPADM

## 2025-09-01 RX ORDER — FENTANYL CITRATE 50 UG/ML
INJECTION, SOLUTION INTRAMUSCULAR; INTRAVENOUS
Status: COMPLETED
Start: 2025-09-01 | End: 2025-09-01

## 2025-09-01 RX ORDER — MIRTAZAPINE 15 MG/1
15 TABLET, FILM COATED ORAL NIGHTLY
Status: DISCONTINUED | OUTPATIENT
Start: 2025-09-01 | End: 2025-09-02

## 2025-09-01 RX ORDER — BUDESONIDE AND FORMOTEROL FUMARATE DIHYDRATE 80; 4.5 UG/1; UG/1
2 AEROSOL RESPIRATORY (INHALATION) 2 TIMES DAILY
Status: DISCONTINUED | OUTPATIENT
Start: 2025-09-01 | End: 2025-09-06 | Stop reason: HOSPADM

## 2025-09-01 RX ORDER — CILOSTAZOL 100 MG/1
50 TABLET ORAL 2 TIMES DAILY
Status: DISCONTINUED | OUTPATIENT
Start: 2025-09-01 | End: 2025-09-06 | Stop reason: HOSPADM

## 2025-09-01 RX ORDER — SPIRONOLACTONE 25 MG/1
25 TABLET ORAL DAILY
Status: DISCONTINUED | OUTPATIENT
Start: 2025-09-01 | End: 2025-09-06 | Stop reason: HOSPADM

## 2025-09-01 RX ORDER — AMLODIPINE BESYLATE 5 MG/1
5 TABLET ORAL DAILY
Status: DISCONTINUED | OUTPATIENT
Start: 2025-09-01 | End: 2025-09-04

## 2025-09-01 RX ORDER — CLOPIDOGREL BISULFATE 75 MG/1
75 TABLET ORAL DAILY
Status: DISCONTINUED | OUTPATIENT
Start: 2025-09-01 | End: 2025-09-06 | Stop reason: HOSPADM

## 2025-09-01 RX ORDER — OXCARBAZEPINE 150 MG/1
75 TABLET, FILM COATED ORAL DAILY
Status: DISCONTINUED | OUTPATIENT
Start: 2025-09-01 | End: 2025-09-03

## 2025-09-01 RX ORDER — PANTOPRAZOLE SODIUM 40 MG/1
40 TABLET, DELAYED RELEASE ORAL DAILY
Status: DISCONTINUED | OUTPATIENT
Start: 2025-09-01 | End: 2025-09-06 | Stop reason: HOSPADM

## 2025-09-01 RX ORDER — LINEZOLID 2 MG/ML
600 INJECTION, SOLUTION INTRAVENOUS EVERY 12 HOURS
Status: DISCONTINUED | OUTPATIENT
Start: 2025-09-01 | End: 2025-09-02

## 2025-09-01 RX ORDER — DULOXETIN HYDROCHLORIDE 30 MG/1
30 CAPSULE, DELAYED RELEASE ORAL DAILY
Status: DISCONTINUED | OUTPATIENT
Start: 2025-09-01 | End: 2025-09-06 | Stop reason: HOSPADM

## 2025-09-01 RX ORDER — SODIUM CHLORIDE 9 MG/ML
INJECTION, SOLUTION INTRAVENOUS CONTINUOUS
Status: DISCONTINUED | OUTPATIENT
Start: 2025-09-01 | End: 2025-09-01

## 2025-09-01 RX ADMIN — IPRATROPIUM BROMIDE AND ALBUTEROL SULFATE 1 DOSE: .5; 2.5 SOLUTION RESPIRATORY (INHALATION) at 20:53

## 2025-09-01 RX ADMIN — FENTANYL CITRATE 50 MCG: 50 INJECTION INTRAMUSCULAR; INTRAVENOUS at 15:58

## 2025-09-01 RX ADMIN — SODIUM CHLORIDE: 0.9 INJECTION, SOLUTION INTRAVENOUS at 09:28

## 2025-09-01 RX ADMIN — FENTANYL CITRATE 50 MCG: 50 INJECTION, SOLUTION INTRAMUSCULAR; INTRAVENOUS at 15:58

## 2025-09-01 RX ADMIN — ALBUTEROL SULFATE 2.5 MG: 2.5 SOLUTION RESPIRATORY (INHALATION) at 06:08

## 2025-09-01 RX ADMIN — FENTANYL CITRATE 50 MCG: 50 INJECTION INTRAMUSCULAR; INTRAVENOUS at 11:16

## 2025-09-01 RX ADMIN — IPRATROPIUM BROMIDE AND ALBUTEROL SULFATE 1 DOSE: .5; 2.5 SOLUTION RESPIRATORY (INHALATION) at 00:06

## 2025-09-01 RX ADMIN — EPINEPHRINE 1 MCG/MIN: 1 INJECTION INTRAMUSCULAR; INTRAVENOUS; SUBCUTANEOUS at 13:34

## 2025-09-01 RX ADMIN — LINEZOLID 600 MG: 600 INJECTION, SOLUTION INTRAVENOUS at 05:39

## 2025-09-01 RX ADMIN — IPRATROPIUM BROMIDE AND ALBUTEROL SULFATE 1 DOSE: .5; 2.5 SOLUTION RESPIRATORY (INHALATION) at 08:18

## 2025-09-01 RX ADMIN — SODIUM CHLORIDE, PRESERVATIVE FREE 10 ML: 5 INJECTION INTRAVENOUS at 09:14

## 2025-09-01 RX ADMIN — LINEZOLID 600 MG: 600 INJECTION, SOLUTION INTRAVENOUS at 14:59

## 2025-09-01 RX ADMIN — BUDESONIDE AND FORMOTEROL FUMARATE DIHYDRATE 2 PUFF: 80; 4.5 AEROSOL RESPIRATORY (INHALATION) at 20:53

## 2025-09-01 RX ADMIN — FENTANYL CITRATE 50 MCG: 50 INJECTION, SOLUTION INTRAMUSCULAR; INTRAVENOUS at 15:09

## 2025-09-01 RX ADMIN — FENTANYL CITRATE 50 MCG: 50 INJECTION INTRAMUSCULAR; INTRAVENOUS at 15:09

## 2025-09-01 RX ADMIN — PIPERACILLIN AND TAZOBACTAM 4500 MG: 4; .5 INJECTION, POWDER, LYOPHILIZED, FOR SOLUTION INTRAVENOUS at 22:12

## 2025-09-01 RX ADMIN — SODIUM CHLORIDE 500 ML: 0.9 INJECTION, SOLUTION INTRAVENOUS at 08:45

## 2025-09-01 RX ADMIN — SODIUM ZIRCONIUM CYCLOSILICATE 10 G: 10 POWDER, FOR SUSPENSION ORAL at 00:07

## 2025-09-01 RX ADMIN — BUDESONIDE AND FORMOTEROL FUMARATE DIHYDRATE 2 PUFF: 80; 4.5 AEROSOL RESPIRATORY (INHALATION) at 08:18

## 2025-09-01 RX ADMIN — SODIUM CHLORIDE, PRESERVATIVE FREE 10 ML: 5 INJECTION INTRAVENOUS at 22:12

## 2025-09-01 RX ADMIN — SODIUM BICARBONATE: 84 INJECTION, SOLUTION INTRAVENOUS at 22:49

## 2025-09-01 RX ADMIN — PIPERACILLIN AND TAZOBACTAM 4500 MG: 4; .5 INJECTION, POWDER, LYOPHILIZED, FOR SOLUTION INTRAVENOUS at 10:30

## 2025-09-01 ASSESSMENT — PULMONARY FUNCTION TESTS
PIF_VALUE: 13
PIF_VALUE: 14
PIF_VALUE: 14
PIF_VALUE: 16

## 2025-09-01 ASSESSMENT — PAIN SCALES - GENERAL
PAINLEVEL_OUTOF10: 0

## 2025-09-01 ASSESSMENT — ENCOUNTER SYMPTOMS
COUGH: 1
COLOR CHANGE: 0
ABDOMINAL PAIN: 0
APNEA: 0
SHORTNESS OF BREATH: 1
EYE DISCHARGE: 0

## 2025-09-02 ENCOUNTER — APPOINTMENT (OUTPATIENT)
Dept: ULTRASOUND IMAGING | Age: 70
End: 2025-09-02
Attending: INTERNAL MEDICINE
Payer: MEDICARE

## 2025-09-02 PROBLEM — R71.0 DROP IN HEMOGLOBIN: Status: ACTIVE | Noted: 2025-09-02

## 2025-09-02 PROBLEM — I48.19 PERSISTENT ATRIAL FIBRILLATION (HCC): Status: ACTIVE | Noted: 2025-09-02

## 2025-09-02 PROBLEM — I47.29 NSVT (NONSUSTAINED VENTRICULAR TACHYCARDIA) (HCC): Status: ACTIVE | Noted: 2025-09-02

## 2025-09-02 LAB
ANION GAP SERPL CALCULATED.3IONS-SCNC: 9 MMOL/L (ref 9–16)
BASOPHILS # BLD: 0 K/UL (ref 0–0.2)
BASOPHILS NFR BLD: 0 % (ref 0–2)
BODY TEMPERATURE: 37
BUN SERPL-MCNC: 35 MG/DL (ref 8–23)
CALCIUM SERPL-MCNC: 8.7 MG/DL (ref 8.6–10.4)
CHLORIDE SERPL-SCNC: 103 MMOL/L (ref 98–107)
CO2 SERPL-SCNC: 28 MMOL/L (ref 20–31)
COHGB MFR BLD: 0.3 % (ref 0–5)
COHGB MFR BLD: 0.3 % (ref 0–5)
COHGB MFR BLD: 0.4 % (ref 0–5)
CREAT SERPL-MCNC: 1.7 MG/DL (ref 0.6–0.9)
EKG ATRIAL RATE: 15 BPM
EKG Q-T INTERVAL: 418 MS
EKG QRS DURATION: 100 MS
EKG QTC CALCULATION (BAZETT): 392 MS
EKG R AXIS: -8 DEGREES
EKG T AXIS: 90 DEGREES
EKG VENTRICULAR RATE: 53 BPM
EOSINOPHIL # BLD: 0.09 K/UL (ref 0–0.44)
EOSINOPHILS RELATIVE PERCENT: 1 % (ref 1–4)
ERYTHROCYTE [DISTWIDTH] IN BLOOD BY AUTOMATED COUNT: 14.5 % (ref 11.8–14.4)
FIO2 ON VENT: ABNORMAL %
FREE KAPPA/LAMBDA RATIO: 0.49 (ref 0.22–1.74)
GFR, ESTIMATED: 32 ML/MIN/1.73M2
GLUCOSE BLD-MCNC: 111 MG/DL (ref 65–105)
GLUCOSE BLD-MCNC: 115 MG/DL (ref 65–105)
GLUCOSE BLD-MCNC: 122 MG/DL (ref 65–105)
GLUCOSE SERPL-MCNC: 102 MG/DL (ref 74–99)
HCO3 VENOUS: 25.7 MMOL/L (ref 24–30)
HCO3 VENOUS: 27.7 MMOL/L (ref 24–30)
HCO3 VENOUS: 28.1 MMOL/L (ref 24–30)
HCT VFR BLD AUTO: 18.9 % (ref 36.3–47.1)
HCT VFR BLD AUTO: 22.7 % (ref 36.3–47.1)
HGB BLD-MCNC: 6 G/DL (ref 11.9–15.1)
HGB BLD-MCNC: 7.2 G/DL (ref 11.9–15.1)
IGA SERPL-MCNC: 50 MG/DL (ref 70–400)
IGG SERPL-MCNC: 329 MG/DL (ref 700–1600)
IGM SERPL-MCNC: 51 MG/DL (ref 40–230)
IMM GRANULOCYTES # BLD AUTO: 0 K/UL (ref 0–0.3)
IMM GRANULOCYTES NFR BLD: 0 %
KAPPA LC FREE SER-MCNC: 13.7 MG/L
LAMBDA LC FREE SERPL-MCNC: 27.9 MG/L (ref 4.2–27.7)
LIPASE SERPL-CCNC: 58 U/L (ref 13–60)
LYMPHOCYTES NFR BLD: 1.41 K/UL (ref 1.1–3.7)
LYMPHOCYTES RELATIVE PERCENT: 16 % (ref 24–43)
MCH RBC QN AUTO: 28.6 PG (ref 25.2–33.5)
MCHC RBC AUTO-ENTMCNC: 31.7 G/DL (ref 28.4–34.8)
MCV RBC AUTO: 90 FL (ref 82.6–102.9)
MONOCYTES NFR BLD: 0.79 K/UL (ref 0.1–1.2)
MONOCYTES NFR BLD: 9 % (ref 3–12)
MORPHOLOGY: ABNORMAL
NEUTROPHILS NFR BLD: 74 % (ref 36–65)
NEUTS SEG NFR BLD: 6.51 K/UL (ref 1.5–8.1)
NRBC BLD-RTO: 0 PER 100 WBC
O2 SAT, VEN: 79.6 % (ref 60–85)
O2 SAT, VEN: 87.4 % (ref 60–85)
O2 SAT, VEN: 99.3 % (ref 60–85)
PARTIAL THROMBOPLASTIN TIME: 113.4 SEC (ref 23–36.5)
PARTIAL THROMBOPLASTIN TIME: 61.8 SEC (ref 23–36.5)
PCO2 VENOUS: 30 MM HG (ref 39–55)
PCO2 VENOUS: 31.4 MM HG (ref 39–55)
PCO2 VENOUS: 34.9 MM HG (ref 39–55)
PH VENOUS: 7.52 (ref 7.32–7.42)
PH VENOUS: 7.55 (ref 7.32–7.42)
PH VENOUS: 7.57 (ref 7.32–7.42)
PLATELET # BLD AUTO: 226 K/UL (ref 138–453)
PMV BLD AUTO: 12 FL (ref 8.1–13.5)
PO2 VENOUS: 154.7 MM HG (ref 30–50)
PO2 VENOUS: 37.4 MM HG (ref 30–50)
PO2 VENOUS: 47.1 MM HG (ref 30–50)
POSITIVE BASE EXCESS, VEN: 3.5 MMOL/L (ref 0–2)
POSITIVE BASE EXCESS, VEN: 4.7 MMOL/L (ref 0–2)
POSITIVE BASE EXCESS, VEN: 6.3 MMOL/L (ref 0–2)
POTASSIUM SERPL-SCNC: 3.8 MMOL/L (ref 3.7–5.3)
RBC # BLD AUTO: 2.1 M/UL (ref 3.95–5.11)
SODIUM SERPL-SCNC: 140 MMOL/L (ref 136–145)
WBC OTHER # BLD: 8.8 K/UL (ref 3.5–11.3)

## 2025-09-02 PROCEDURE — 6370000000 HC RX 637 (ALT 250 FOR IP)

## 2025-09-02 PROCEDURE — 2060000000 HC ICU INTERMEDIATE R&B

## 2025-09-02 PROCEDURE — 2580000003 HC RX 258: Performed by: INTERNAL MEDICINE

## 2025-09-02 PROCEDURE — 6360000002 HC RX W HCPCS

## 2025-09-02 PROCEDURE — 82947 ASSAY GLUCOSE BLOOD QUANT: CPT

## 2025-09-02 PROCEDURE — 82784 ASSAY IGA/IGD/IGG/IGM EACH: CPT

## 2025-09-02 PROCEDURE — 85025 COMPLETE CBC W/AUTO DIFF WBC: CPT

## 2025-09-02 PROCEDURE — 2580000003 HC RX 258: Performed by: STUDENT IN AN ORGANIZED HEALTH CARE EDUCATION/TRAINING PROGRAM

## 2025-09-02 PROCEDURE — 85018 HEMOGLOBIN: CPT

## 2025-09-02 PROCEDURE — 36415 COLL VENOUS BLD VENIPUNCTURE: CPT

## 2025-09-02 PROCEDURE — 82805 BLOOD GASES W/O2 SATURATION: CPT

## 2025-09-02 PROCEDURE — 2700000000 HC OXYGEN THERAPY PER DAY

## 2025-09-02 PROCEDURE — 2500000003 HC RX 250 WO HCPCS: Performed by: STUDENT IN AN ORGANIZED HEALTH CARE EDUCATION/TRAINING PROGRAM

## 2025-09-02 PROCEDURE — 85730 THROMBOPLASTIN TIME PARTIAL: CPT

## 2025-09-02 PROCEDURE — 83521 IG LIGHT CHAINS FREE EACH: CPT

## 2025-09-02 PROCEDURE — P9016 RBC LEUKOCYTES REDUCED: HCPCS

## 2025-09-02 PROCEDURE — 99233 SBSQ HOSP IP/OBS HIGH 50: CPT | Performed by: INTERNAL MEDICINE

## 2025-09-02 PROCEDURE — 83690 ASSAY OF LIPASE: CPT

## 2025-09-02 PROCEDURE — 76770 US EXAM ABDO BACK WALL COMP: CPT

## 2025-09-02 PROCEDURE — 86923 COMPATIBILITY TEST ELECTRIC: CPT

## 2025-09-02 PROCEDURE — G0545 PR INHERENT VISIT TO INPT: HCPCS | Performed by: INTERNAL MEDICINE

## 2025-09-02 PROCEDURE — 94640 AIRWAY INHALATION TREATMENT: CPT

## 2025-09-02 PROCEDURE — 6370000000 HC RX 637 (ALT 250 FOR IP): Performed by: STUDENT IN AN ORGANIZED HEALTH CARE EDUCATION/TRAINING PROGRAM

## 2025-09-02 PROCEDURE — 6360000002 HC RX W HCPCS: Performed by: STUDENT IN AN ORGANIZED HEALTH CARE EDUCATION/TRAINING PROGRAM

## 2025-09-02 PROCEDURE — 80048 BASIC METABOLIC PNL TOTAL CA: CPT

## 2025-09-02 PROCEDURE — 86850 RBC ANTIBODY SCREEN: CPT

## 2025-09-02 PROCEDURE — 86901 BLOOD TYPING SEROLOGIC RH(D): CPT

## 2025-09-02 PROCEDURE — 85014 HEMATOCRIT: CPT

## 2025-09-02 PROCEDURE — 86900 BLOOD TYPING SEROLOGIC ABO: CPT

## 2025-09-02 PROCEDURE — 99291 CRITICAL CARE FIRST HOUR: CPT | Performed by: INTERNAL MEDICINE

## 2025-09-02 PROCEDURE — 36430 TRANSFUSION BLD/BLD COMPNT: CPT

## 2025-09-02 PROCEDURE — 94761 N-INVAS EAR/PLS OXIMETRY MLT: CPT

## 2025-09-02 PROCEDURE — 99232 SBSQ HOSP IP/OBS MODERATE 35: CPT | Performed by: INTERNAL MEDICINE

## 2025-09-02 RX ORDER — DICYCLOMINE HYDROCHLORIDE 10 MG/1
20 CAPSULE ORAL ONCE
Status: COMPLETED | OUTPATIENT
Start: 2025-09-02 | End: 2025-09-02

## 2025-09-02 RX ORDER — ACETAMINOPHEN 325 MG/1
650 TABLET ORAL EVERY 4 HOURS PRN
Status: DISCONTINUED | OUTPATIENT
Start: 2025-09-02 | End: 2025-09-06 | Stop reason: HOSPADM

## 2025-09-02 RX ORDER — AMIODARONE HYDROCHLORIDE 200 MG/1
400 TABLET ORAL 2 TIMES DAILY
Status: DISCONTINUED | OUTPATIENT
Start: 2025-09-02 | End: 2025-09-06 | Stop reason: HOSPADM

## 2025-09-02 RX ORDER — HYDRALAZINE HYDROCHLORIDE 20 MG/ML
10 INJECTION INTRAMUSCULAR; INTRAVENOUS EVERY 6 HOURS PRN
Status: DISCONTINUED | OUTPATIENT
Start: 2025-09-02 | End: 2025-09-06 | Stop reason: HOSPADM

## 2025-09-02 RX ORDER — LABETALOL HYDROCHLORIDE 5 MG/ML
10 INJECTION, SOLUTION INTRAVENOUS EVERY 6 HOURS PRN
Status: DISCONTINUED | OUTPATIENT
Start: 2025-09-02 | End: 2025-09-02

## 2025-09-02 RX ORDER — DEXTROSE MONOHYDRATE AND SODIUM CHLORIDE 5; .9 G/100ML; G/100ML
INJECTION, SOLUTION INTRAVENOUS CONTINUOUS
Status: DISCONTINUED | OUTPATIENT
Start: 2025-09-02 | End: 2025-09-04

## 2025-09-02 RX ORDER — FENTANYL CITRATE 50 UG/ML
25 INJECTION, SOLUTION INTRAMUSCULAR; INTRAVENOUS EVERY 4 HOURS PRN
Status: DISCONTINUED | OUTPATIENT
Start: 2025-09-02 | End: 2025-09-05

## 2025-09-02 RX ORDER — SODIUM CHLORIDE 9 MG/ML
INJECTION, SOLUTION INTRAVENOUS PRN
Status: DISCONTINUED | OUTPATIENT
Start: 2025-09-02 | End: 2025-09-02

## 2025-09-02 RX ADMIN — FENTANYL CITRATE 25 MCG: 50 INJECTION INTRAMUSCULAR; INTRAVENOUS at 17:15

## 2025-09-02 RX ADMIN — AMIODARONE HYDROCHLORIDE 400 MG: 200 TABLET ORAL at 21:02

## 2025-09-02 RX ADMIN — AMIODARONE HYDROCHLORIDE 400 MG: 200 TABLET ORAL at 13:01

## 2025-09-02 RX ADMIN — HYDRALAZINE HYDROCHLORIDE 50 MG: 50 TABLET ORAL at 21:07

## 2025-09-02 RX ADMIN — SODIUM CHLORIDE, PRESERVATIVE FREE 10 ML: 5 INJECTION INTRAVENOUS at 21:03

## 2025-09-02 RX ADMIN — AMLODIPINE BESYLATE 5 MG: 5 TABLET ORAL at 09:13

## 2025-09-02 RX ADMIN — IPRATROPIUM BROMIDE AND ALBUTEROL SULFATE 1 DOSE: .5; 2.5 SOLUTION RESPIRATORY (INHALATION) at 20:10

## 2025-09-02 RX ADMIN — SODIUM CHLORIDE, PRESERVATIVE FREE 10 ML: 5 INJECTION INTRAVENOUS at 07:57

## 2025-09-02 RX ADMIN — FENTANYL CITRATE 25 MCG: 50 INJECTION INTRAMUSCULAR; INTRAVENOUS at 13:16

## 2025-09-02 RX ADMIN — FENTANYL CITRATE 25 MCG: 50 INJECTION INTRAMUSCULAR; INTRAVENOUS at 21:36

## 2025-09-02 RX ADMIN — FENTANYL CITRATE 25 MCG: 50 INJECTION INTRAMUSCULAR; INTRAVENOUS at 09:16

## 2025-09-02 RX ADMIN — DICYCLOMINE HYDROCHLORIDE 20 MG: 10 CAPSULE ORAL at 05:10

## 2025-09-02 RX ADMIN — GABAPENTIN 100 MG: 100 CAPSULE ORAL at 21:02

## 2025-09-02 RX ADMIN — GABAPENTIN 100 MG: 100 CAPSULE ORAL at 09:13

## 2025-09-02 RX ADMIN — HEPARIN SODIUM 13 UNITS/KG/HR: 10000 INJECTION, SOLUTION INTRAVENOUS at 13:40

## 2025-09-02 RX ADMIN — IPRATROPIUM BROMIDE AND ALBUTEROL SULFATE 1 DOSE: .5; 2.5 SOLUTION RESPIRATORY (INHALATION) at 08:24

## 2025-09-02 RX ADMIN — HEPARIN SODIUM 4000 UNITS: 1000 INJECTION, SOLUTION INTRAVENOUS; SUBCUTANEOUS at 00:26

## 2025-09-02 RX ADMIN — DEXTROSE AND SODIUM CHLORIDE: 5; .9 INJECTION, SOLUTION INTRAVENOUS at 11:30

## 2025-09-02 RX ADMIN — SPIRONOLACTONE 25 MG: 25 TABLET, FILM COATED ORAL at 09:13

## 2025-09-02 RX ADMIN — GABAPENTIN 100 MG: 100 CAPSULE ORAL at 13:01

## 2025-09-02 RX ADMIN — TRAZODONE HYDROCHLORIDE 100 MG: 100 TABLET ORAL at 21:02

## 2025-09-02 RX ADMIN — BUDESONIDE AND FORMOTEROL FUMARATE DIHYDRATE 2 PUFF: 80; 4.5 AEROSOL RESPIRATORY (INHALATION) at 20:10

## 2025-09-02 RX ADMIN — BUDESONIDE AND FORMOTEROL FUMARATE DIHYDRATE 2 PUFF: 80; 4.5 AEROSOL RESPIRATORY (INHALATION) at 08:25

## 2025-09-02 RX ADMIN — PIPERACILLIN AND TAZOBACTAM 4500 MG: 4; .5 INJECTION, POWDER, LYOPHILIZED, FOR SOLUTION INTRAVENOUS at 21:15

## 2025-09-02 RX ADMIN — HYDRALAZINE HYDROCHLORIDE 50 MG: 50 TABLET ORAL at 13:01

## 2025-09-02 RX ADMIN — LINEZOLID 600 MG: 600 INJECTION, SOLUTION INTRAVENOUS at 02:15

## 2025-09-02 RX ADMIN — PIPERACILLIN AND TAZOBACTAM 4500 MG: 4; .5 INJECTION, POWDER, LYOPHILIZED, FOR SOLUTION INTRAVENOUS at 09:55

## 2025-09-02 ASSESSMENT — PAIN SCALES - GENERAL
PAINLEVEL_OUTOF10: 8
PAINLEVEL_OUTOF10: 5
PAINLEVEL_OUTOF10: 5
PAINLEVEL_OUTOF10: 9
PAINLEVEL_OUTOF10: 2
PAINLEVEL_OUTOF10: 0
PAINLEVEL_OUTOF10: 6
PAINLEVEL_OUTOF10: 7
PAINLEVEL_OUTOF10: 8
PAINLEVEL_OUTOF10: 0
PAINLEVEL_OUTOF10: 8
PAINLEVEL_OUTOF10: 9
PAINLEVEL_OUTOF10: 8

## 2025-09-02 ASSESSMENT — ENCOUNTER SYMPTOMS
PHOTOPHOBIA: 0
EYE DISCHARGE: 0
ABDOMINAL PAIN: 0
SHORTNESS OF BREATH: 1
COUGH: 0
COLOR CHANGE: 0
APNEA: 0

## 2025-09-02 ASSESSMENT — PAIN DESCRIPTION - LOCATION: LOCATION: ABDOMEN

## 2025-09-02 ASSESSMENT — PAIN - FUNCTIONAL ASSESSMENT
PAIN_FUNCTIONAL_ASSESSMENT: 0-10

## 2025-09-02 ASSESSMENT — PAIN DESCRIPTION - DESCRIPTORS
DESCRIPTORS: ACHING
DESCRIPTORS: ACHING;CRAMPING

## 2025-09-02 ASSESSMENT — PAIN DESCRIPTION - ORIENTATION: ORIENTATION: LOWER

## 2025-09-03 ENCOUNTER — APPOINTMENT (OUTPATIENT)
Dept: GENERAL RADIOLOGY | Age: 70
End: 2025-09-03
Attending: INTERNAL MEDICINE
Payer: MEDICARE

## 2025-09-03 PROBLEM — I48.91 ATRIAL FIBRILLATION (HCC): Status: ACTIVE | Noted: 2025-08-11

## 2025-09-03 PROBLEM — J96.02 ACUTE RESPIRATORY FAILURE WITH HYPOXIA AND HYPERCAPNIA (HCC): Status: ACTIVE | Noted: 2025-08-31

## 2025-09-03 PROBLEM — J96.01 ACUTE HYPOXEMIC RESPIRATORY FAILURE (HCC): Status: ACTIVE | Noted: 2025-08-31

## 2025-09-03 LAB
ABO/RH: NORMAL
ANION GAP SERPL CALCULATED.3IONS-SCNC: 12 MMOL/L (ref 9–16)
ANTI-XA UNFRAC HEPARIN: 1.18 IU/L
ANTI-XA UNFRAC HEPARIN: <0.1 IU/L
ANTIBODY SCREEN: NEGATIVE
ARM BAND NUMBER: NORMAL
BASOPHILS # BLD: 0.03 K/UL (ref 0–0.2)
BASOPHILS NFR BLD: 0 % (ref 0–2)
BLOOD BANK BLOOD PRODUCT EXPIRATION DATE: NORMAL
BLOOD BANK DISPENSE STATUS: NORMAL
BLOOD BANK ISBT PRODUCT BLOOD TYPE: 5100
BLOOD BANK PRODUCT CODE: NORMAL
BLOOD BANK SAMPLE EXPIRATION: NORMAL
BLOOD BANK UNIT TYPE AND RH: NORMAL
BODY TEMPERATURE: 37
BPU ID: NORMAL
BUN SERPL-MCNC: 18 MG/DL (ref 8–23)
CALCIUM SERPL-MCNC: 9.2 MG/DL (ref 8.6–10.4)
CHLORIDE SERPL-SCNC: 105 MMOL/L (ref 98–107)
CO2 SERPL-SCNC: 24 MMOL/L (ref 20–31)
COHGB MFR BLD: 0.3 % (ref 0–5)
COHGB MFR BLD: 0.4 % (ref 0–5)
COHGB MFR BLD: 0.7 % (ref 0–5)
COHGB MFR BLD: 0.7 % (ref 0–5)
COHGB MFR BLD: 0.8 % (ref 0–5)
COMPONENT: NORMAL
CREAT SERPL-MCNC: 1.3 MG/DL (ref 0.6–0.9)
CROSSMATCH RESULT: NORMAL
EOSINOPHIL # BLD: 0.19 K/UL (ref 0–0.44)
EOSINOPHILS RELATIVE PERCENT: 2 % (ref 1–4)
ERYTHROCYTE [DISTWIDTH] IN BLOOD BY AUTOMATED COUNT: 14.7 % (ref 11.8–14.4)
FERRITIN SERPL-MCNC: 649 NG/ML
FIO2 ON VENT: ABNORMAL %
FOLATE SERPL-MCNC: 11.7 NG/ML (ref 4.8–24.2)
GFR, ESTIMATED: 44 ML/MIN/1.73M2
GLUCOSE BLD-MCNC: 101 MG/DL (ref 65–105)
GLUCOSE BLD-MCNC: 107 MG/DL (ref 65–105)
GLUCOSE BLD-MCNC: 112 MG/DL (ref 65–105)
GLUCOSE BLD-MCNC: 120 MG/DL (ref 65–105)
GLUCOSE SERPL-MCNC: 109 MG/DL (ref 74–99)
HCO3 VENOUS: 24.7 MMOL/L (ref 24–30)
HCO3 VENOUS: 24.7 MMOL/L (ref 24–30)
HCO3 VENOUS: 27.6 MMOL/L (ref 24–30)
HCO3 VENOUS: 28.4 MMOL/L (ref 24–30)
HCO3 VENOUS: 29.1 MMOL/L (ref 24–30)
HCT VFR BLD AUTO: 27.8 % (ref 36.3–47.1)
HGB BLD-MCNC: 8.8 G/DL (ref 11.9–15.1)
IMM GRANULOCYTES # BLD AUTO: 0.06 K/UL (ref 0–0.3)
IMM GRANULOCYTES NFR BLD: 1 %
IRON SATN MFR SERPL: 10 % (ref 20–55)
IRON SERPL-MCNC: 21 UG/DL (ref 37–145)
LYMPHOCYTES NFR BLD: 1.56 K/UL (ref 1.1–3.7)
LYMPHOCYTES RELATIVE PERCENT: 16 % (ref 24–43)
MCH RBC QN AUTO: 28.6 PG (ref 25.2–33.5)
MCHC RBC AUTO-ENTMCNC: 31.7 G/DL (ref 28.4–34.8)
MCV RBC AUTO: 90.3 FL (ref 82.6–102.9)
MONOCYTES NFR BLD: 0.98 K/UL (ref 0.1–1.2)
MONOCYTES NFR BLD: 10 % (ref 3–12)
NEUTROPHILS NFR BLD: 71 % (ref 36–65)
NEUTS SEG NFR BLD: 6.86 K/UL (ref 1.5–8.1)
NRBC BLD-RTO: 0 PER 100 WBC
O2 SAT, VEN: 54.7 % (ref 60–85)
O2 SAT, VEN: 65.4 % (ref 60–85)
O2 SAT, VEN: 73.4 % (ref 60–85)
O2 SAT, VEN: 93.4 % (ref 60–85)
O2 SAT, VEN: 95.2 % (ref 60–85)
PARTIAL THROMBOPLASTIN TIME: 78.6 SEC (ref 23–36.5)
PCO2 VENOUS: 26.9 MM HG (ref 39–55)
PCO2 VENOUS: 30.3 MM HG (ref 39–55)
PCO2 VENOUS: 36 MM HG (ref 39–55)
PCO2 VENOUS: 40.6 MM HG (ref 39–55)
PCO2 VENOUS: 43.2 MM HG (ref 39–55)
PH VENOUS: 7.45 (ref 7.32–7.42)
PH VENOUS: 7.46 (ref 7.32–7.42)
PH VENOUS: 7.5 (ref 7.32–7.42)
PH VENOUS: 7.53 (ref 7.32–7.42)
PH VENOUS: 7.58 (ref 7.32–7.42)
PLATELET # BLD AUTO: 242 K/UL (ref 138–453)
PMV BLD AUTO: 11.7 FL (ref 8.1–13.5)
PO2 VENOUS: 27.3 MM HG (ref 30–50)
PO2 VENOUS: 32.8 MM HG (ref 30–50)
PO2 VENOUS: 35.1 MM HG (ref 30–50)
PO2 VENOUS: 58.7 MM HG (ref 30–50)
PO2 VENOUS: 62.9 MM HG (ref 30–50)
POSITIVE BASE EXCESS, VEN: 2.1 MMOL/L (ref 0–2)
POSITIVE BASE EXCESS, VEN: 3.2 MMOL/L (ref 0–2)
POSITIVE BASE EXCESS, VEN: 4.3 MMOL/L (ref 0–2)
POSITIVE BASE EXCESS, VEN: 4.3 MMOL/L (ref 0–2)
POSITIVE BASE EXCESS, VEN: 4.5 MMOL/L (ref 0–2)
POTASSIUM SERPL-SCNC: 3.6 MMOL/L (ref 3.7–5.3)
RBC # BLD AUTO: 3.08 M/UL (ref 3.95–5.11)
RBC # BLD: ABNORMAL 10*6/UL
SODIUM SERPL-SCNC: 141 MMOL/L (ref 136–145)
TIBC SERPL-MCNC: 207 UG/DL (ref 250–450)
TRANSFUSION STATUS: NORMAL
UNIT DIVISION: 0
UNIT ISSUE DATE/TIME: NORMAL
UNSATURATED IRON BINDING CAPACITY: 186 UG/DL (ref 112–347)
VIT B12 SERPL-MCNC: 551 PG/ML (ref 232–1245)
WBC OTHER # BLD: 9.7 K/UL (ref 3.5–11.3)

## 2025-09-03 PROCEDURE — 85730 THROMBOPLASTIN TIME PARTIAL: CPT

## 2025-09-03 PROCEDURE — 99232 SBSQ HOSP IP/OBS MODERATE 35: CPT | Performed by: INTERNAL MEDICINE

## 2025-09-03 PROCEDURE — 83550 IRON BINDING TEST: CPT

## 2025-09-03 PROCEDURE — 6360000002 HC RX W HCPCS

## 2025-09-03 PROCEDURE — 6370000000 HC RX 637 (ALT 250 FOR IP)

## 2025-09-03 PROCEDURE — 94761 N-INVAS EAR/PLS OXIMETRY MLT: CPT

## 2025-09-03 PROCEDURE — 36415 COLL VENOUS BLD VENIPUNCTURE: CPT

## 2025-09-03 PROCEDURE — 85025 COMPLETE CBC W/AUTO DIFF WBC: CPT

## 2025-09-03 PROCEDURE — 2060000000 HC ICU INTERMEDIATE R&B

## 2025-09-03 PROCEDURE — 2500000003 HC RX 250 WO HCPCS: Performed by: STUDENT IN AN ORGANIZED HEALTH CARE EDUCATION/TRAINING PROGRAM

## 2025-09-03 PROCEDURE — 82728 ASSAY OF FERRITIN: CPT

## 2025-09-03 PROCEDURE — 82947 ASSAY GLUCOSE BLOOD QUANT: CPT

## 2025-09-03 PROCEDURE — 6370000000 HC RX 637 (ALT 250 FOR IP): Performed by: INTERNAL MEDICINE

## 2025-09-03 PROCEDURE — 99233 SBSQ HOSP IP/OBS HIGH 50: CPT | Performed by: STUDENT IN AN ORGANIZED HEALTH CARE EDUCATION/TRAINING PROGRAM

## 2025-09-03 PROCEDURE — 2580000003 HC RX 258: Performed by: STUDENT IN AN ORGANIZED HEALTH CARE EDUCATION/TRAINING PROGRAM

## 2025-09-03 PROCEDURE — 83540 ASSAY OF IRON: CPT

## 2025-09-03 PROCEDURE — 82805 BLOOD GASES W/O2 SATURATION: CPT

## 2025-09-03 PROCEDURE — 80048 BASIC METABOLIC PNL TOTAL CA: CPT

## 2025-09-03 PROCEDURE — 82746 ASSAY OF FOLIC ACID SERUM: CPT

## 2025-09-03 PROCEDURE — 82607 VITAMIN B-12: CPT

## 2025-09-03 PROCEDURE — 94640 AIRWAY INHALATION TREATMENT: CPT

## 2025-09-03 PROCEDURE — 2700000000 HC OXYGEN THERAPY PER DAY

## 2025-09-03 PROCEDURE — 85520 HEPARIN ASSAY: CPT

## 2025-09-03 PROCEDURE — G0545 PR INHERENT VISIT TO INPT: HCPCS | Performed by: INTERNAL MEDICINE

## 2025-09-03 PROCEDURE — 6360000002 HC RX W HCPCS: Performed by: STUDENT IN AN ORGANIZED HEALTH CARE EDUCATION/TRAINING PROGRAM

## 2025-09-03 PROCEDURE — 92611 MOTION FLUOROSCOPY/SWALLOW: CPT

## 2025-09-03 PROCEDURE — 74230 X-RAY XM SWLNG FUNCJ C+: CPT

## 2025-09-03 PROCEDURE — 6370000000 HC RX 637 (ALT 250 FOR IP): Performed by: STUDENT IN AN ORGANIZED HEALTH CARE EDUCATION/TRAINING PROGRAM

## 2025-09-03 RX ORDER — LACTOBACILLUS RHAMNOSUS GG 10B CELL
1 CAPSULE ORAL DAILY
Status: DISCONTINUED | OUTPATIENT
Start: 2025-09-03 | End: 2025-09-03

## 2025-09-03 RX ORDER — POTASSIUM CHLORIDE 1500 MG/1
40 TABLET, EXTENDED RELEASE ORAL ONCE
Status: COMPLETED | OUTPATIENT
Start: 2025-09-03 | End: 2025-09-03

## 2025-09-03 RX ORDER — LACTOBACILLUS RHAMNOSUS GG 10B CELL
2 CAPSULE ORAL
Status: DISCONTINUED | OUTPATIENT
Start: 2025-09-03 | End: 2025-09-06 | Stop reason: HOSPADM

## 2025-09-03 RX ORDER — LOPERAMIDE HYDROCHLORIDE 2 MG/1
2 CAPSULE ORAL ONCE
Status: COMPLETED | OUTPATIENT
Start: 2025-09-03 | End: 2025-09-03

## 2025-09-03 RX ADMIN — POTASSIUM CHLORIDE 40 MEQ: 1500 TABLET, EXTENDED RELEASE ORAL at 08:16

## 2025-09-03 RX ADMIN — SODIUM CHLORIDE, PRESERVATIVE FREE 10 ML: 5 INJECTION INTRAVENOUS at 08:16

## 2025-09-03 RX ADMIN — HYDRALAZINE HYDROCHLORIDE 50 MG: 50 TABLET ORAL at 21:56

## 2025-09-03 RX ADMIN — AMIODARONE HYDROCHLORIDE 400 MG: 200 TABLET ORAL at 08:15

## 2025-09-03 RX ADMIN — Medication 2 CAPSULE: at 17:22

## 2025-09-03 RX ADMIN — AMIODARONE HYDROCHLORIDE 400 MG: 200 TABLET ORAL at 21:53

## 2025-09-03 RX ADMIN — GABAPENTIN 100 MG: 100 CAPSULE ORAL at 08:15

## 2025-09-03 RX ADMIN — ROSUVASTATIN CALCIUM 20 MG: 20 TABLET, FILM COATED ORAL at 21:59

## 2025-09-03 RX ADMIN — PIPERACILLIN AND TAZOBACTAM 4500 MG: 4; .5 INJECTION, POWDER, LYOPHILIZED, FOR SOLUTION INTRAVENOUS at 10:43

## 2025-09-03 RX ADMIN — BUDESONIDE AND FORMOTEROL FUMARATE DIHYDRATE 2 PUFF: 80; 4.5 AEROSOL RESPIRATORY (INHALATION) at 08:50

## 2025-09-03 RX ADMIN — FENTANYL CITRATE 25 MCG: 50 INJECTION INTRAMUSCULAR; INTRAVENOUS at 10:33

## 2025-09-03 RX ADMIN — SODIUM CHLORIDE, PRESERVATIVE FREE 10 ML: 5 INJECTION INTRAVENOUS at 21:57

## 2025-09-03 RX ADMIN — PSYLLIUM HUSK 1 PACKET: 3.4 POWDER ORAL at 16:12

## 2025-09-03 RX ADMIN — HYDRALAZINE HYDROCHLORIDE 50 MG: 50 TABLET ORAL at 14:48

## 2025-09-03 RX ADMIN — FENTANYL CITRATE 25 MCG: 50 INJECTION INTRAMUSCULAR; INTRAVENOUS at 05:48

## 2025-09-03 RX ADMIN — AMLODIPINE BESYLATE 5 MG: 5 TABLET ORAL at 08:15

## 2025-09-03 RX ADMIN — FENTANYL CITRATE 25 MCG: 50 INJECTION INTRAMUSCULAR; INTRAVENOUS at 01:42

## 2025-09-03 RX ADMIN — TRAZODONE HYDROCHLORIDE 100 MG: 100 TABLET ORAL at 21:53

## 2025-09-03 RX ADMIN — GABAPENTIN 100 MG: 100 CAPSULE ORAL at 21:53

## 2025-09-03 RX ADMIN — HEPARIN SODIUM 4000 UNITS: 1000 INJECTION, SOLUTION INTRAVENOUS; SUBCUTANEOUS at 16:13

## 2025-09-03 RX ADMIN — SPIRONOLACTONE 25 MG: 25 TABLET, FILM COATED ORAL at 08:16

## 2025-09-03 RX ADMIN — HEPARIN SODIUM 2000 UNITS: 1000 INJECTION, SOLUTION INTRAVENOUS; SUBCUTANEOUS at 01:39

## 2025-09-03 RX ADMIN — PIPERACILLIN AND TAZOBACTAM 4500 MG: 4; .5 INJECTION, POWDER, LYOPHILIZED, FOR SOLUTION INTRAVENOUS at 22:05

## 2025-09-03 RX ADMIN — SODIUM CHLORIDE: 0.9 INJECTION, SOLUTION INTRAVENOUS at 10:42

## 2025-09-03 RX ADMIN — FENTANYL CITRATE 25 MCG: 50 INJECTION INTRAMUSCULAR; INTRAVENOUS at 19:45

## 2025-09-03 RX ADMIN — HEPARIN SODIUM 15 UNITS/KG/HR: 10000 INJECTION, SOLUTION INTRAVENOUS at 12:33

## 2025-09-03 RX ADMIN — GABAPENTIN 100 MG: 100 CAPSULE ORAL at 14:48

## 2025-09-03 RX ADMIN — HYDRALAZINE HYDROCHLORIDE 50 MG: 50 TABLET ORAL at 05:49

## 2025-09-03 RX ADMIN — IPRATROPIUM BROMIDE AND ALBUTEROL SULFATE 1 DOSE: .5; 2.5 SOLUTION RESPIRATORY (INHALATION) at 08:50

## 2025-09-03 RX ADMIN — LOPERAMIDE HYDROCHLORIDE 2 MG: 2 CAPSULE ORAL at 23:16

## 2025-09-03 RX ADMIN — Medication 1 CAPSULE: at 10:31

## 2025-09-03 RX ADMIN — FENTANYL CITRATE 25 MCG: 50 INJECTION INTRAMUSCULAR; INTRAVENOUS at 15:17

## 2025-09-03 ASSESSMENT — PAIN SCALES - GENERAL
PAINLEVEL_OUTOF10: 0
PAINLEVEL_OUTOF10: 5
PAINLEVEL_OUTOF10: 9
PAINLEVEL_OUTOF10: 7
PAINLEVEL_OUTOF10: 0
PAINLEVEL_OUTOF10: 9
PAINLEVEL_OUTOF10: 9
PAINLEVEL_OUTOF10: 8
PAINLEVEL_OUTOF10: 8
PAINLEVEL_OUTOF10: 7

## 2025-09-03 ASSESSMENT — PAIN - FUNCTIONAL ASSESSMENT
PAIN_FUNCTIONAL_ASSESSMENT: 0-10

## 2025-09-03 ASSESSMENT — ENCOUNTER SYMPTOMS
PHOTOPHOBIA: 0
ABDOMINAL PAIN: 0
COLOR CHANGE: 0
EYE DISCHARGE: 0
APNEA: 0
COUGH: 0

## 2025-09-03 ASSESSMENT — PAIN DESCRIPTION - LOCATION
LOCATION: ABDOMEN;BACK
LOCATION: BACK

## 2025-09-04 ENCOUNTER — APPOINTMENT (OUTPATIENT)
Dept: CT IMAGING | Age: 70
End: 2025-09-04
Attending: INTERNAL MEDICINE
Payer: MEDICARE

## 2025-09-04 ENCOUNTER — APPOINTMENT (OUTPATIENT)
Age: 70
End: 2025-09-04
Attending: INTERNAL MEDICINE
Payer: MEDICARE

## 2025-09-04 ENCOUNTER — APPOINTMENT (OUTPATIENT)
Dept: GENERAL RADIOLOGY | Age: 70
End: 2025-09-04
Attending: INTERNAL MEDICINE
Payer: MEDICARE

## 2025-09-04 PROBLEM — J96.01 ACUTE RESPIRATORY FAILURE WITH HYPOXIA AND HYPERCAPNIA (HCC): Status: ACTIVE | Noted: 2025-09-04

## 2025-09-04 PROBLEM — J96.02 ACUTE RESPIRATORY FAILURE WITH HYPOXIA AND HYPERCAPNIA (HCC): Status: ACTIVE | Noted: 2025-09-04

## 2025-09-04 PROBLEM — A09 INFECTIOUS DIARRHEA: Status: ACTIVE | Noted: 2025-09-04

## 2025-09-04 LAB
25(OH)D3 SERPL-MCNC: 108 NG/ML (ref 30–100)
ANCA MYELOPEROXIDASE: <0.3 AU/ML (ref 0–3.5)
ANCA PROTEINASE 3: <0.7 AU/ML (ref 0–2)
ANION GAP SERPL CALCULATED.3IONS-SCNC: 9 MMOL/L (ref 9–16)
ANTI-XA UNFRAC HEPARIN: 0.58 IU/L
ANTI-XA UNFRAC HEPARIN: 1.14 IU/L
BASOPHILS # BLD: 0.04 K/UL (ref 0–0.2)
BASOPHILS NFR BLD: 1 % (ref 0–2)
BUN SERPL-MCNC: 8 MG/DL (ref 8–23)
CALCIUM SERPL-MCNC: 8.8 MG/DL (ref 8.6–10.4)
CHLORIDE SERPL-SCNC: 110 MMOL/L (ref 98–107)
CO2 SERPL-SCNC: 21 MMOL/L (ref 20–31)
CREAT SERPL-MCNC: 1.1 MG/DL (ref 0.6–0.9)
EOSINOPHIL # BLD: 0.31 K/UL (ref 0–0.44)
EOSINOPHILS RELATIVE PERCENT: 4 % (ref 1–4)
ERYTHROCYTE [DISTWIDTH] IN BLOOD BY AUTOMATED COUNT: 14.6 % (ref 11.8–14.4)
GFR, ESTIMATED: 54 ML/MIN/1.73M2
GLUCOSE BLD-MCNC: 106 MG/DL (ref 65–105)
GLUCOSE BLD-MCNC: 121 MG/DL (ref 65–105)
GLUCOSE BLD-MCNC: 85 MG/DL (ref 65–105)
GLUCOSE BLD-MCNC: 88 MG/DL (ref 65–105)
GLUCOSE SERPL-MCNC: 119 MG/DL (ref 74–99)
HCT VFR BLD AUTO: 24.5 % (ref 36.3–47.1)
HGB BLD-MCNC: 7.9 G/DL (ref 11.9–15.1)
IMM GRANULOCYTES # BLD AUTO: 0.05 K/UL (ref 0–0.3)
IMM GRANULOCYTES NFR BLD: 1 %
LYMPHOCYTES NFR BLD: 1.22 K/UL (ref 1.1–3.7)
LYMPHOCYTES RELATIVE PERCENT: 15 % (ref 24–43)
MCH RBC QN AUTO: 28.6 PG (ref 25.2–33.5)
MCHC RBC AUTO-ENTMCNC: 32.2 G/DL (ref 28.4–34.8)
MCV RBC AUTO: 88.8 FL (ref 82.6–102.9)
MONOCYTES NFR BLD: 0.82 K/UL (ref 0.1–1.2)
MONOCYTES NFR BLD: 10 % (ref 3–12)
NEUTROPHILS NFR BLD: 71 % (ref 36–65)
NEUTS SEG NFR BLD: 5.93 K/UL (ref 1.5–8.1)
NRBC BLD-RTO: 0 PER 100 WBC
PLATELET # BLD AUTO: 237 K/UL (ref 138–453)
PMV BLD AUTO: 11.6 FL (ref 8.1–13.5)
POTASSIUM SERPL-SCNC: 3.3 MMOL/L (ref 3.7–5.3)
RBC # BLD AUTO: 2.76 M/UL (ref 3.95–5.11)
RBC # BLD: ABNORMAL 10*6/UL
SODIUM SERPL-SCNC: 140 MMOL/L (ref 136–145)
WBC OTHER # BLD: 8.4 K/UL (ref 3.5–11.3)

## 2025-09-04 PROCEDURE — 74018 RADEX ABDOMEN 1 VIEW: CPT

## 2025-09-04 PROCEDURE — 36415 COLL VENOUS BLD VENIPUNCTURE: CPT

## 2025-09-04 PROCEDURE — 99233 SBSQ HOSP IP/OBS HIGH 50: CPT | Performed by: STUDENT IN AN ORGANIZED HEALTH CARE EDUCATION/TRAINING PROGRAM

## 2025-09-04 PROCEDURE — 87329 GIARDIA AG IA: CPT

## 2025-09-04 PROCEDURE — 99232 SBSQ HOSP IP/OBS MODERATE 35: CPT | Performed by: INTERNAL MEDICINE

## 2025-09-04 PROCEDURE — 6370000000 HC RX 637 (ALT 250 FOR IP): Performed by: STUDENT IN AN ORGANIZED HEALTH CARE EDUCATION/TRAINING PROGRAM

## 2025-09-04 PROCEDURE — 6360000002 HC RX W HCPCS

## 2025-09-04 PROCEDURE — 82306 VITAMIN D 25 HYDROXY: CPT

## 2025-09-04 PROCEDURE — 6370000000 HC RX 637 (ALT 250 FOR IP): Performed by: INTERNAL MEDICINE

## 2025-09-04 PROCEDURE — 2060000000 HC ICU INTERMEDIATE R&B

## 2025-09-04 PROCEDURE — 2700000000 HC OXYGEN THERAPY PER DAY

## 2025-09-04 PROCEDURE — 2580000003 HC RX 258: Performed by: INTERNAL MEDICINE

## 2025-09-04 PROCEDURE — 71045 X-RAY EXAM CHEST 1 VIEW: CPT

## 2025-09-04 PROCEDURE — 2500000003 HC RX 250 WO HCPCS: Performed by: STUDENT IN AN ORGANIZED HEALTH CARE EDUCATION/TRAINING PROGRAM

## 2025-09-04 PROCEDURE — 6370000000 HC RX 637 (ALT 250 FOR IP)

## 2025-09-04 PROCEDURE — 2580000003 HC RX 258

## 2025-09-04 PROCEDURE — 87328 CRYPTOSPORIDIUM AG IA: CPT

## 2025-09-04 PROCEDURE — 85520 HEPARIN ASSAY: CPT

## 2025-09-04 PROCEDURE — 85025 COMPLETE CBC W/AUTO DIFF WBC: CPT

## 2025-09-04 PROCEDURE — 93308 TTE F-UP OR LMTD: CPT

## 2025-09-04 PROCEDURE — 2580000003 HC RX 258: Performed by: STUDENT IN AN ORGANIZED HEALTH CARE EDUCATION/TRAINING PROGRAM

## 2025-09-04 PROCEDURE — 6360000002 HC RX W HCPCS: Performed by: STUDENT IN AN ORGANIZED HEALTH CARE EDUCATION/TRAINING PROGRAM

## 2025-09-04 PROCEDURE — 97166 OT EVAL MOD COMPLEX 45 MIN: CPT

## 2025-09-04 PROCEDURE — 80048 BASIC METABOLIC PNL TOTAL CA: CPT

## 2025-09-04 PROCEDURE — 94761 N-INVAS EAR/PLS OXIMETRY MLT: CPT

## 2025-09-04 PROCEDURE — 87506 IADNA-DNA/RNA PROBE TQ 6-11: CPT

## 2025-09-04 PROCEDURE — 97530 THERAPEUTIC ACTIVITIES: CPT

## 2025-09-04 PROCEDURE — 94640 AIRWAY INHALATION TREATMENT: CPT

## 2025-09-04 PROCEDURE — 82947 ASSAY GLUCOSE BLOOD QUANT: CPT

## 2025-09-04 PROCEDURE — 74176 CT ABD & PELVIS W/O CONTRAST: CPT

## 2025-09-04 RX ORDER — AMLODIPINE BESYLATE 10 MG/1
10 TABLET ORAL DAILY
Status: DISCONTINUED | OUTPATIENT
Start: 2025-09-04 | End: 2025-09-06 | Stop reason: HOSPADM

## 2025-09-04 RX ORDER — SODIUM CHLORIDE, SODIUM LACTATE, POTASSIUM CHLORIDE, CALCIUM CHLORIDE 600; 310; 30; 20 MG/100ML; MG/100ML; MG/100ML; MG/100ML
INJECTION, SOLUTION INTRAVENOUS CONTINUOUS
Status: DISCONTINUED | OUTPATIENT
Start: 2025-09-04 | End: 2025-09-05

## 2025-09-04 RX ORDER — VANCOMYCIN HYDROCHLORIDE 125 MG/1
125 CAPSULE ORAL 4 TIMES DAILY
Status: DISCONTINUED | OUTPATIENT
Start: 2025-09-04 | End: 2025-09-05

## 2025-09-04 RX ORDER — POTASSIUM CHLORIDE 1500 MG/1
40 TABLET, EXTENDED RELEASE ORAL ONCE
Status: COMPLETED | OUTPATIENT
Start: 2025-09-04 | End: 2025-09-04

## 2025-09-04 RX ADMIN — PIPERACILLIN AND TAZOBACTAM 4500 MG: 4; .5 INJECTION, POWDER, LYOPHILIZED, FOR SOLUTION INTRAVENOUS at 11:19

## 2025-09-04 RX ADMIN — HYDRALAZINE HYDROCHLORIDE 50 MG: 50 TABLET ORAL at 04:40

## 2025-09-04 RX ADMIN — HYDRALAZINE HYDROCHLORIDE 50 MG: 50 TABLET ORAL at 13:15

## 2025-09-04 RX ADMIN — FENTANYL CITRATE 25 MCG: 50 INJECTION INTRAMUSCULAR; INTRAVENOUS at 21:56

## 2025-09-04 RX ADMIN — SODIUM CHLORIDE, PRESERVATIVE FREE 10 ML: 5 INJECTION INTRAVENOUS at 22:00

## 2025-09-04 RX ADMIN — FENTANYL CITRATE 25 MCG: 50 INJECTION INTRAMUSCULAR; INTRAVENOUS at 13:16

## 2025-09-04 RX ADMIN — AMLODIPINE BESYLATE 10 MG: 10 TABLET ORAL at 11:26

## 2025-09-04 RX ADMIN — FENTANYL CITRATE 25 MCG: 50 INJECTION INTRAMUSCULAR; INTRAVENOUS at 09:02

## 2025-09-04 RX ADMIN — AMIODARONE HYDROCHLORIDE 400 MG: 200 TABLET ORAL at 22:03

## 2025-09-04 RX ADMIN — ONDANSETRON 4 MG: 2 INJECTION, SOLUTION INTRAMUSCULAR; INTRAVENOUS at 05:19

## 2025-09-04 RX ADMIN — IPRATROPIUM BROMIDE AND ALBUTEROL SULFATE 1 DOSE: .5; 2.5 SOLUTION RESPIRATORY (INHALATION) at 20:02

## 2025-09-04 RX ADMIN — SPIRONOLACTONE 25 MG: 25 TABLET, FILM COATED ORAL at 11:26

## 2025-09-04 RX ADMIN — FENTANYL CITRATE 25 MCG: 50 INJECTION INTRAMUSCULAR; INTRAVENOUS at 00:18

## 2025-09-04 RX ADMIN — PANTOPRAZOLE SODIUM 40 MG: 40 TABLET, DELAYED RELEASE ORAL at 11:25

## 2025-09-04 RX ADMIN — ROSUVASTATIN CALCIUM 20 MG: 20 TABLET, FILM COATED ORAL at 22:04

## 2025-09-04 RX ADMIN — VANCOMYCIN HYDROCHLORIDE 125 MG: 125 CAPSULE ORAL at 16:48

## 2025-09-04 RX ADMIN — VANCOMYCIN HYDROCHLORIDE 125 MG: 125 CAPSULE ORAL at 22:03

## 2025-09-04 RX ADMIN — Medication 3 MG: at 23:56

## 2025-09-04 RX ADMIN — AMIODARONE HYDROCHLORIDE 400 MG: 200 TABLET ORAL at 11:24

## 2025-09-04 RX ADMIN — BUDESONIDE AND FORMOTEROL FUMARATE DIHYDRATE 2 PUFF: 80; 4.5 AEROSOL RESPIRATORY (INHALATION) at 20:03

## 2025-09-04 RX ADMIN — HYDRALAZINE HYDROCHLORIDE 50 MG: 50 TABLET ORAL at 22:04

## 2025-09-04 RX ADMIN — FENTANYL CITRATE 25 MCG: 50 INJECTION INTRAMUSCULAR; INTRAVENOUS at 17:36

## 2025-09-04 RX ADMIN — ONDANSETRON 4 MG: 2 INJECTION, SOLUTION INTRAMUSCULAR; INTRAVENOUS at 11:01

## 2025-09-04 RX ADMIN — GABAPENTIN 100 MG: 100 CAPSULE ORAL at 13:15

## 2025-09-04 RX ADMIN — DEXTROSE AND SODIUM CHLORIDE: 5; .9 INJECTION, SOLUTION INTRAVENOUS at 03:41

## 2025-09-04 RX ADMIN — GABAPENTIN 100 MG: 100 CAPSULE ORAL at 22:04

## 2025-09-04 RX ADMIN — POTASSIUM CHLORIDE 40 MEQ: 1500 TABLET, EXTENDED RELEASE ORAL at 11:30

## 2025-09-04 RX ADMIN — SODIUM CHLORIDE, SODIUM LACTATE, POTASSIUM CHLORIDE, AND CALCIUM CHLORIDE: .6; .31; .03; .02 INJECTION, SOLUTION INTRAVENOUS at 16:52

## 2025-09-04 RX ADMIN — PIPERACILLIN AND TAZOBACTAM 4500 MG: 4; .5 INJECTION, POWDER, LYOPHILIZED, FOR SOLUTION INTRAVENOUS at 22:17

## 2025-09-04 RX ADMIN — VANCOMYCIN HYDROCHLORIDE 125 MG: 125 CAPSULE ORAL at 13:19

## 2025-09-04 RX ADMIN — FENTANYL CITRATE 25 MCG: 50 INJECTION INTRAMUSCULAR; INTRAVENOUS at 04:40

## 2025-09-04 RX ADMIN — SODIUM CHLORIDE, PRESERVATIVE FREE 10 ML: 5 INJECTION INTRAVENOUS at 11:27

## 2025-09-04 RX ADMIN — TRAZODONE HYDROCHLORIDE 100 MG: 100 TABLET ORAL at 22:04

## 2025-09-04 RX ADMIN — HEPARIN SODIUM 16 UNITS/KG/HR: 10000 INJECTION, SOLUTION INTRAVENOUS at 09:33

## 2025-09-04 RX ADMIN — Medication 2 CAPSULE: at 16:48

## 2025-09-04 ASSESSMENT — PAIN - FUNCTIONAL ASSESSMENT
PAIN_FUNCTIONAL_ASSESSMENT: 0-10
PAIN_FUNCTIONAL_ASSESSMENT: PREVENTS OR INTERFERES SOME ACTIVE ACTIVITIES AND ADLS
PAIN_FUNCTIONAL_ASSESSMENT: 0-10

## 2025-09-04 ASSESSMENT — PAIN SCALES - GENERAL
PAINLEVEL_OUTOF10: 7
PAINLEVEL_OUTOF10: 8
PAINLEVEL_OUTOF10: 9
PAINLEVEL_OUTOF10: 9
PAINLEVEL_OUTOF10: 0
PAINLEVEL_OUTOF10: 6
PAINLEVEL_OUTOF10: 4
PAINLEVEL_OUTOF10: 6
PAINLEVEL_OUTOF10: 8
PAINLEVEL_OUTOF10: 9
PAINLEVEL_OUTOF10: 6
PAINLEVEL_OUTOF10: 7
PAINLEVEL_OUTOF10: 9
PAINLEVEL_OUTOF10: 8
PAINLEVEL_OUTOF10: 6
PAINLEVEL_OUTOF10: 9

## 2025-09-04 ASSESSMENT — PAIN DESCRIPTION - LOCATION
LOCATION: ABDOMEN;LEG
LOCATION: RIB CAGE
LOCATION: ABDOMEN;LEG
LOCATION: ABDOMEN

## 2025-09-04 ASSESSMENT — PAIN DESCRIPTION - ORIENTATION
ORIENTATION: LOWER
ORIENTATION: RIGHT
ORIENTATION: LEFT
ORIENTATION: LOWER

## 2025-09-04 ASSESSMENT — PAIN DESCRIPTION - PAIN TYPE
TYPE: ACUTE PAIN

## 2025-09-04 ASSESSMENT — PAIN DESCRIPTION - FREQUENCY
FREQUENCY: CONTINUOUS

## 2025-09-04 ASSESSMENT — PAIN DESCRIPTION - DESCRIPTORS
DESCRIPTORS: ACHING;THROBBING
DESCRIPTORS: ACHING
DESCRIPTORS: ACHING;THROBBING
DESCRIPTORS: ACHING
DESCRIPTORS: THROBBING;ACHING
DESCRIPTORS: ACHING;DISCOMFORT

## 2025-09-04 ASSESSMENT — PAIN DESCRIPTION - ONSET
ONSET: ON-GOING

## 2025-09-04 ASSESSMENT — ENCOUNTER SYMPTOMS
EYE PAIN: 0
PHOTOPHOBIA: 0
ABDOMINAL PAIN: 0
COLOR CHANGE: 0
FACIAL SWELLING: 0
COUGH: 0
APNEA: 0
SHORTNESS OF BREATH: 0
EYE REDNESS: 0

## 2025-09-05 LAB
ANION GAP SERPL CALCULATED.3IONS-SCNC: 9 MMOL/L (ref 9–16)
ANTI-XA UNFRAC HEPARIN: 0.31 IU/L
ANTI-XA UNFRAC HEPARIN: 0.34 IU/L
BASOPHILS # BLD: 0.05 K/UL (ref 0–0.2)
BASOPHILS NFR BLD: 1 % (ref 0–2)
BUN SERPL-MCNC: 5 MG/DL (ref 8–23)
C PARVUM AG STL QL IA: NEGATIVE
CALCIUM SERPL-MCNC: 9.1 MG/DL (ref 8.6–10.4)
CAMPYLOBACTER DNA SPEC NAA+PROBE: NORMAL
CHLORIDE SERPL-SCNC: 112 MMOL/L (ref 98–107)
CO2 SERPL-SCNC: 20 MMOL/L (ref 20–31)
CREAT SERPL-MCNC: 1 MG/DL (ref 0.6–0.9)
ECHO BSA: 1.94 M2
ECHO EST RA PRESSURE: 3 MMHG
ECHO LV EDV A2C: 77 ML
ECHO LV EDV A4C: 74 ML
ECHO LV EDV INDEX A4C: 39 ML/M2
ECHO LV EDV NDEX A2C: 40 ML/M2
ECHO LV EF PHYSICIAN: 58 %
ECHO LV EJECTION FRACTION A2C: 58 %
ECHO LV EJECTION FRACTION A4C: 57 %
ECHO LV EJECTION FRACTION BIPLANE: 58 % (ref 55–100)
ECHO LV ESV A2C: 32 ML
ECHO LV ESV A4C: 32 ML
ECHO LV ESV INDEX A2C: 17 ML/M2
ECHO LV ESV INDEX A4C: 17 ML/M2
EOSINOPHIL # BLD: 0.35 K/UL (ref 0–0.44)
EOSINOPHILS RELATIVE PERCENT: 4 % (ref 1–4)
ERYTHROCYTE [DISTWIDTH] IN BLOOD BY AUTOMATED COUNT: 14.6 % (ref 11.8–14.4)
ETEC ELTA+ESTB GENES STL QL NAA+PROBE: NORMAL
G LAMBLIA AG STL QL IA: NEGATIVE
GFR, ESTIMATED: 61 ML/MIN/1.73M2
GLUCOSE BLD-MCNC: 101 MG/DL (ref 65–105)
GLUCOSE BLD-MCNC: 89 MG/DL (ref 65–105)
GLUCOSE BLD-MCNC: 95 MG/DL (ref 65–105)
GLUCOSE SERPL-MCNC: 102 MG/DL (ref 74–99)
HCT VFR BLD AUTO: 26 % (ref 36.3–47.1)
HGB BLD-MCNC: 8 G/DL (ref 11.9–15.1)
IMM GRANULOCYTES # BLD AUTO: 0.06 K/UL (ref 0–0.3)
IMM GRANULOCYTES NFR BLD: 1 %
LIPASE SERPL-CCNC: 43 U/L (ref 13–60)
LYMPHOCYTES NFR BLD: 1.15 K/UL (ref 1.1–3.7)
LYMPHOCYTES RELATIVE PERCENT: 14 % (ref 24–43)
MCH RBC QN AUTO: 28.5 PG (ref 25.2–33.5)
MCHC RBC AUTO-ENTMCNC: 30.8 G/DL (ref 28.4–34.8)
MCV RBC AUTO: 92.5 FL (ref 82.6–102.9)
MICROORGANISM SPEC CULT: NORMAL
MONOCYTES NFR BLD: 0.71 K/UL (ref 0.1–1.2)
MONOCYTES NFR BLD: 8 % (ref 3–12)
NEUTROPHILS NFR BLD: 72 % (ref 36–65)
NEUTS SEG NFR BLD: 6.12 K/UL (ref 1.5–8.1)
NRBC BLD-RTO: 0 PER 100 WBC
P SHIGELLOIDES DNA STL QL NAA+PROBE: NORMAL
PLATELET # BLD AUTO: 235 K/UL (ref 138–453)
PMV BLD AUTO: 11.5 FL (ref 8.1–13.5)
POTASSIUM SERPL-SCNC: 4.2 MMOL/L (ref 3.7–5.3)
RBC # BLD AUTO: 2.81 M/UL (ref 3.95–5.11)
RBC # BLD: ABNORMAL 10*6/UL
SALMONELLA DNA SPEC QL NAA+PROBE: NORMAL
SERVICE CMNT-IMP: NORMAL
SHIGA TOXIN STX GENE SPEC NAA+PROBE: NORMAL
SHIGELLA DNA SPEC QL NAA+PROBE: NORMAL
SODIUM SERPL-SCNC: 141 MMOL/L (ref 136–145)
SOURCE: NORMAL
SPECIMEN DESCRIPTION: NORMAL
V CHOL+PARA RFBL+TRKH+TNAA STL QL NAA+PR: NORMAL
WBC OTHER # BLD: 8.4 K/UL (ref 3.5–11.3)
Y ENTERO RECN STL QL NAA+PROBE: NORMAL

## 2025-09-05 PROCEDURE — 99232 SBSQ HOSP IP/OBS MODERATE 35: CPT | Performed by: INTERNAL MEDICINE

## 2025-09-05 PROCEDURE — 80048 BASIC METABOLIC PNL TOTAL CA: CPT

## 2025-09-05 PROCEDURE — 6370000000 HC RX 637 (ALT 250 FOR IP): Performed by: INTERNAL MEDICINE

## 2025-09-05 PROCEDURE — 2500000003 HC RX 250 WO HCPCS: Performed by: INTERNAL MEDICINE

## 2025-09-05 PROCEDURE — 6370000000 HC RX 637 (ALT 250 FOR IP): Performed by: NURSE PRACTITIONER

## 2025-09-05 PROCEDURE — 2060000000 HC ICU INTERMEDIATE R&B

## 2025-09-05 PROCEDURE — 85025 COMPLETE CBC W/AUTO DIFF WBC: CPT

## 2025-09-05 PROCEDURE — 6360000002 HC RX W HCPCS: Performed by: STUDENT IN AN ORGANIZED HEALTH CARE EDUCATION/TRAINING PROGRAM

## 2025-09-05 PROCEDURE — 6360000002 HC RX W HCPCS: Performed by: INTERNAL MEDICINE

## 2025-09-05 PROCEDURE — 6370000000 HC RX 637 (ALT 250 FOR IP)

## 2025-09-05 PROCEDURE — 2580000003 HC RX 258

## 2025-09-05 PROCEDURE — G0545 PR INHERENT VISIT TO INPT: HCPCS | Performed by: INTERNAL MEDICINE

## 2025-09-05 PROCEDURE — 99232 SBSQ HOSP IP/OBS MODERATE 35: CPT | Performed by: STUDENT IN AN ORGANIZED HEALTH CARE EDUCATION/TRAINING PROGRAM

## 2025-09-05 PROCEDURE — 6360000002 HC RX W HCPCS

## 2025-09-05 PROCEDURE — 83690 ASSAY OF LIPASE: CPT

## 2025-09-05 PROCEDURE — 92526 ORAL FUNCTION THERAPY: CPT

## 2025-09-05 PROCEDURE — 82947 ASSAY GLUCOSE BLOOD QUANT: CPT

## 2025-09-05 PROCEDURE — 36415 COLL VENOUS BLD VENIPUNCTURE: CPT

## 2025-09-05 PROCEDURE — 2500000003 HC RX 250 WO HCPCS: Performed by: STUDENT IN AN ORGANIZED HEALTH CARE EDUCATION/TRAINING PROGRAM

## 2025-09-05 PROCEDURE — 85520 HEPARIN ASSAY: CPT

## 2025-09-05 RX ORDER — DIPHENHYDRAMINE HCL 25 MG
25 TABLET ORAL ONCE
Status: DISCONTINUED | OUTPATIENT
Start: 2025-09-05 | End: 2025-09-06 | Stop reason: HOSPADM

## 2025-09-05 RX ORDER — AMIODARONE HYDROCHLORIDE 200 MG/1
200 TABLET ORAL 2 TIMES DAILY
Status: DISCONTINUED | OUTPATIENT
Start: 2025-09-07 | End: 2025-09-06 | Stop reason: HOSPADM

## 2025-09-05 RX ORDER — LIDOCAINE HYDROCHLORIDE 10 MG/ML
50 INJECTION, SOLUTION INFILTRATION; PERINEURAL ONCE
Status: DISCONTINUED | OUTPATIENT
Start: 2025-09-05 | End: 2025-09-06 | Stop reason: HOSPADM

## 2025-09-05 RX ORDER — SODIUM CHLORIDE 9 MG/ML
INJECTION, SOLUTION INTRAVENOUS PRN
Status: DISCONTINUED | OUTPATIENT
Start: 2025-09-05 | End: 2025-09-06 | Stop reason: HOSPADM

## 2025-09-05 RX ORDER — SODIUM CHLORIDE 0.9 % (FLUSH) 0.9 %
5-40 SYRINGE (ML) INJECTION EVERY 12 HOURS SCHEDULED
Status: DISCONTINUED | OUTPATIENT
Start: 2025-09-05 | End: 2025-09-06 | Stop reason: HOSPADM

## 2025-09-05 RX ORDER — SODIUM CHLORIDE 0.9 % (FLUSH) 0.9 %
5-40 SYRINGE (ML) INJECTION PRN
Status: DISCONTINUED | OUTPATIENT
Start: 2025-09-05 | End: 2025-09-05 | Stop reason: SDUPTHER

## 2025-09-05 RX ORDER — OXYCODONE AND ACETAMINOPHEN 5; 325 MG/1; MG/1
1 TABLET ORAL EVERY 6 HOURS PRN
Refills: 0 | Status: DISCONTINUED | OUTPATIENT
Start: 2025-09-05 | End: 2025-09-06

## 2025-09-05 RX ORDER — LACTOBACILLUS RHAMNOSUS GG 10B CELL
2 CAPSULE ORAL
Qty: 42 CAPSULE | Refills: 0 | Status: SHIPPED | OUTPATIENT
Start: 2025-09-05 | End: 2025-09-12

## 2025-09-05 RX ADMIN — DULOXETINE 30 MG: 30 CAPSULE, DELAYED RELEASE ORAL at 08:54

## 2025-09-05 RX ADMIN — CILOSTAZOL 50 MG: 100 TABLET ORAL at 20:26

## 2025-09-05 RX ADMIN — ROSUVASTATIN CALCIUM 20 MG: 20 TABLET, FILM COATED ORAL at 20:30

## 2025-09-05 RX ADMIN — Medication 2 CAPSULE: at 08:55

## 2025-09-05 RX ADMIN — FENTANYL CITRATE 25 MCG: 50 INJECTION INTRAMUSCULAR; INTRAVENOUS at 06:29

## 2025-09-05 RX ADMIN — OXYCODONE HYDROCHLORIDE AND ACETAMINOPHEN 1 TABLET: 5; 325 TABLET ORAL at 23:45

## 2025-09-05 RX ADMIN — Medication 3 MG: at 23:45

## 2025-09-05 RX ADMIN — ONDANSETRON 4 MG: 2 INJECTION, SOLUTION INTRAMUSCULAR; INTRAVENOUS at 16:31

## 2025-09-05 RX ADMIN — AMLODIPINE BESYLATE 10 MG: 10 TABLET ORAL at 08:54

## 2025-09-05 RX ADMIN — SPIRONOLACTONE 25 MG: 25 TABLET, FILM COATED ORAL at 08:54

## 2025-09-05 RX ADMIN — GABAPENTIN 100 MG: 100 CAPSULE ORAL at 20:30

## 2025-09-05 RX ADMIN — AMIODARONE HYDROCHLORIDE 400 MG: 200 TABLET ORAL at 08:54

## 2025-09-05 RX ADMIN — SODIUM CHLORIDE, PRESERVATIVE FREE 10 ML: 5 INJECTION INTRAVENOUS at 10:02

## 2025-09-05 RX ADMIN — ONDANSETRON 4 MG: 2 INJECTION, SOLUTION INTRAMUSCULAR; INTRAVENOUS at 09:01

## 2025-09-05 RX ADMIN — SODIUM CHLORIDE, PRESERVATIVE FREE 10 ML: 5 INJECTION INTRAVENOUS at 20:07

## 2025-09-05 RX ADMIN — CLOPIDOGREL BISULFATE 75 MG: 75 TABLET, FILM COATED ORAL at 08:55

## 2025-09-05 RX ADMIN — FENTANYL CITRATE 25 MCG: 50 INJECTION INTRAMUSCULAR; INTRAVENOUS at 02:01

## 2025-09-05 RX ADMIN — CILOSTAZOL 50 MG: 100 TABLET ORAL at 09:53

## 2025-09-05 RX ADMIN — PANTOPRAZOLE SODIUM 40 MG: 40 TABLET, DELAYED RELEASE ORAL at 08:59

## 2025-09-05 RX ADMIN — SODIUM CHLORIDE, PRESERVATIVE FREE 10 ML: 5 INJECTION INTRAVENOUS at 08:59

## 2025-09-05 RX ADMIN — GABAPENTIN 100 MG: 100 CAPSULE ORAL at 15:18

## 2025-09-05 RX ADMIN — GABAPENTIN 100 MG: 100 CAPSULE ORAL at 08:59

## 2025-09-05 RX ADMIN — SODIUM CHLORIDE, SODIUM LACTATE, POTASSIUM CHLORIDE, AND CALCIUM CHLORIDE: .6; .31; .03; .02 INJECTION, SOLUTION INTRAVENOUS at 03:44

## 2025-09-05 RX ADMIN — Medication 2 CAPSULE: at 15:18

## 2025-09-05 RX ADMIN — TRAZODONE HYDROCHLORIDE 100 MG: 100 TABLET ORAL at 20:30

## 2025-09-05 RX ADMIN — HYDRALAZINE HYDROCHLORIDE 50 MG: 50 TABLET ORAL at 04:57

## 2025-09-05 RX ADMIN — FENTANYL CITRATE 25 MCG: 50 INJECTION INTRAMUSCULAR; INTRAVENOUS at 10:57

## 2025-09-05 RX ADMIN — APIXABAN 5 MG: 5 TABLET, FILM COATED ORAL at 08:55

## 2025-09-05 RX ADMIN — AMIODARONE HYDROCHLORIDE 400 MG: 200 TABLET ORAL at 20:30

## 2025-09-05 RX ADMIN — HYDRALAZINE HYDROCHLORIDE 50 MG: 50 TABLET ORAL at 23:45

## 2025-09-05 RX ADMIN — SODIUM CHLORIDE 1000 MG: 9 INJECTION INTRAMUSCULAR; INTRAVENOUS; SUBCUTANEOUS at 09:55

## 2025-09-05 RX ADMIN — HYDRALAZINE HYDROCHLORIDE 50 MG: 50 TABLET ORAL at 15:18

## 2025-09-05 RX ADMIN — APIXABAN 5 MG: 5 TABLET, FILM COATED ORAL at 20:30

## 2025-09-05 RX ADMIN — FENTANYL CITRATE 25 MCG: 50 INJECTION INTRAMUSCULAR; INTRAVENOUS at 20:07

## 2025-09-05 RX ADMIN — FENTANYL CITRATE 25 MCG: 50 INJECTION INTRAMUSCULAR; INTRAVENOUS at 15:23

## 2025-09-05 ASSESSMENT — PAIN - FUNCTIONAL ASSESSMENT
PAIN_FUNCTIONAL_ASSESSMENT: 0-10
PAIN_FUNCTIONAL_ASSESSMENT: WONG-BAKER FACES
PAIN_FUNCTIONAL_ASSESSMENT: 0-10
PAIN_FUNCTIONAL_ASSESSMENT: ACTIVITIES ARE NOT PREVENTED
PAIN_FUNCTIONAL_ASSESSMENT: 0-10

## 2025-09-05 ASSESSMENT — PAIN DESCRIPTION - LOCATION
LOCATION: ABDOMEN;LEG
LOCATION: ABDOMEN
LOCATION: LEG
LOCATION: ABDOMEN
LOCATION: ABDOMEN;LEG

## 2025-09-05 ASSESSMENT — PAIN DESCRIPTION - DESCRIPTORS
DESCRIPTORS: ACHING;DISCOMFORT

## 2025-09-05 ASSESSMENT — PAIN SCALES - GENERAL
PAINLEVEL_OUTOF10: 8
PAINLEVEL_OUTOF10: 9
PAINLEVEL_OUTOF10: 7
PAINLEVEL_OUTOF10: 6
PAINLEVEL_OUTOF10: 6
PAINLEVEL_OUTOF10: 9
PAINLEVEL_OUTOF10: 4
PAINLEVEL_OUTOF10: 0
PAINLEVEL_OUTOF10: 9
PAINLEVEL_OUTOF10: 8
PAINLEVEL_OUTOF10: 8
PAINLEVEL_OUTOF10: 6
PAINLEVEL_OUTOF10: 7
PAINLEVEL_OUTOF10: 7
PAINLEVEL_OUTOF10: 8

## 2025-09-05 ASSESSMENT — ENCOUNTER SYMPTOMS
FACIAL SWELLING: 0
EYE DISCHARGE: 0
COUGH: 0
COLOR CHANGE: 0
PHOTOPHOBIA: 0
SHORTNESS OF BREATH: 0
EYE REDNESS: 0
EYE PAIN: 0
APNEA: 0
ABDOMINAL PAIN: 0

## 2025-09-05 ASSESSMENT — PAIN DESCRIPTION - FREQUENCY: FREQUENCY: INTERMITTENT

## 2025-09-05 ASSESSMENT — PAIN DESCRIPTION - ORIENTATION
ORIENTATION: RIGHT;LEFT;MID;LOWER
ORIENTATION: RIGHT;LEFT;MID;LOWER
ORIENTATION: LEFT;LOWER
ORIENTATION: LEFT

## 2025-09-05 ASSESSMENT — PAIN DESCRIPTION - ONSET: ONSET: ON-GOING

## 2025-09-05 ASSESSMENT — PAIN SCALES - WONG BAKER
WONGBAKER_NUMERICALRESPONSE: NO HURT
WONGBAKER_NUMERICALRESPONSE: NO HURT

## 2025-09-05 ASSESSMENT — PAIN DESCRIPTION - PAIN TYPE: TYPE: CHRONIC PAIN

## 2025-09-06 VITALS
OXYGEN SATURATION: 97 % | DIASTOLIC BLOOD PRESSURE: 40 MMHG | SYSTOLIC BLOOD PRESSURE: 132 MMHG | HEART RATE: 65 BPM | WEIGHT: 184.3 LBS | RESPIRATION RATE: 14 BRPM | TEMPERATURE: 98.1 F | HEIGHT: 64 IN | BODY MASS INDEX: 31.47 KG/M2

## 2025-09-06 LAB
ANION GAP SERPL CALCULATED.3IONS-SCNC: 9 MMOL/L (ref 9–16)
BASOPHILS # BLD: 0.04 K/UL (ref 0–0.2)
BASOPHILS NFR BLD: 1 % (ref 0–2)
BUN SERPL-MCNC: 5 MG/DL (ref 8–23)
CALCIUM SERPL-MCNC: 9.1 MG/DL (ref 8.6–10.4)
CHLORIDE SERPL-SCNC: 111 MMOL/L (ref 98–107)
CO2 SERPL-SCNC: 21 MMOL/L (ref 20–31)
CREAT SERPL-MCNC: 1.1 MG/DL (ref 0.6–0.9)
EOSINOPHIL # BLD: 0.4 K/UL (ref 0–0.44)
EOSINOPHILS RELATIVE PERCENT: 5 % (ref 1–4)
ERYTHROCYTE [DISTWIDTH] IN BLOOD BY AUTOMATED COUNT: 14.6 % (ref 11.8–14.4)
GFR, ESTIMATED: 54 ML/MIN/1.73M2
GLUCOSE BLD-MCNC: 83 MG/DL (ref 65–105)
GLUCOSE SERPL-MCNC: 84 MG/DL (ref 74–99)
HCT VFR BLD AUTO: 24.7 % (ref 36.3–47.1)
HGB BLD-MCNC: 7.6 G/DL (ref 11.9–15.1)
IMM GRANULOCYTES # BLD AUTO: 0.04 K/UL (ref 0–0.3)
IMM GRANULOCYTES NFR BLD: 1 %
LYMPHOCYTES NFR BLD: 1.05 K/UL (ref 1.1–3.7)
LYMPHOCYTES RELATIVE PERCENT: 14 % (ref 24–43)
MCH RBC QN AUTO: 28.6 PG (ref 25.2–33.5)
MCHC RBC AUTO-ENTMCNC: 30.8 G/DL (ref 28.4–34.8)
MCV RBC AUTO: 92.9 FL (ref 82.6–102.9)
MONOCYTES NFR BLD: 0.69 K/UL (ref 0.1–1.2)
MONOCYTES NFR BLD: 9 % (ref 3–12)
NEUTROPHILS NFR BLD: 70 % (ref 36–65)
NEUTS SEG NFR BLD: 5.15 K/UL (ref 1.5–8.1)
NRBC BLD-RTO: 0 PER 100 WBC
PLATELET # BLD AUTO: 238 K/UL (ref 138–453)
PMV BLD AUTO: 10.9 FL (ref 8.1–13.5)
POTASSIUM SERPL-SCNC: 3.8 MMOL/L (ref 3.7–5.3)
RBC # BLD AUTO: 2.66 M/UL (ref 3.95–5.11)
RBC # BLD: ABNORMAL 10*6/UL
SODIUM SERPL-SCNC: 141 MMOL/L (ref 136–145)
WBC OTHER # BLD: 7.4 K/UL (ref 3.5–11.3)

## 2025-09-06 PROCEDURE — 99232 SBSQ HOSP IP/OBS MODERATE 35: CPT | Performed by: INTERNAL MEDICINE

## 2025-09-06 PROCEDURE — 2500000003 HC RX 250 WO HCPCS: Performed by: INTERNAL MEDICINE

## 2025-09-06 PROCEDURE — 6370000000 HC RX 637 (ALT 250 FOR IP): Performed by: NURSE PRACTITIONER

## 2025-09-06 PROCEDURE — 80048 BASIC METABOLIC PNL TOTAL CA: CPT

## 2025-09-06 PROCEDURE — 6370000000 HC RX 637 (ALT 250 FOR IP)

## 2025-09-06 PROCEDURE — 94761 N-INVAS EAR/PLS OXIMETRY MLT: CPT

## 2025-09-06 PROCEDURE — 2700000000 HC OXYGEN THERAPY PER DAY

## 2025-09-06 PROCEDURE — 6370000000 HC RX 637 (ALT 250 FOR IP): Performed by: INTERNAL MEDICINE

## 2025-09-06 PROCEDURE — 82947 ASSAY GLUCOSE BLOOD QUANT: CPT

## 2025-09-06 PROCEDURE — G0545 PR INHERENT VISIT TO INPT: HCPCS | Performed by: INTERNAL MEDICINE

## 2025-09-06 PROCEDURE — 85025 COMPLETE CBC W/AUTO DIFF WBC: CPT

## 2025-09-06 PROCEDURE — 76937 US GUIDE VASCULAR ACCESS: CPT

## 2025-09-06 PROCEDURE — 36410 VNPNXR 3YR/> PHY/QHP DX/THER: CPT

## 2025-09-06 PROCEDURE — 2709999900 HC NON-CHARGEABLE SUPPLY

## 2025-09-06 PROCEDURE — 6360000002 HC RX W HCPCS: Performed by: INTERNAL MEDICINE

## 2025-09-06 PROCEDURE — 97535 SELF CARE MNGMENT TRAINING: CPT

## 2025-09-06 PROCEDURE — 36415 COLL VENOUS BLD VENIPUNCTURE: CPT

## 2025-09-06 RX ORDER — AMIODARONE HYDROCHLORIDE 400 MG/1
400 TABLET ORAL 2 TIMES DAILY
Qty: 3 TABLET | Refills: 0 | Status: SHIPPED | OUTPATIENT
Start: 2025-09-06 | End: 2025-09-08

## 2025-09-06 RX ORDER — OXCARBAZEPINE 150 MG/1
150 TABLET, FILM COATED ORAL DAILY
Qty: 30 TABLET | Refills: 3 | Status: SHIPPED | OUTPATIENT
Start: 2025-09-06

## 2025-09-06 RX ORDER — AMLODIPINE BESYLATE 10 MG/1
5 TABLET ORAL DAILY
Qty: 30 TABLET | Refills: 3 | Status: SHIPPED | OUTPATIENT
Start: 2025-09-06

## 2025-09-06 RX ORDER — IPRATROPIUM BROMIDE AND ALBUTEROL SULFATE 2.5; .5 MG/3ML; MG/3ML
3 SOLUTION RESPIRATORY (INHALATION)
Qty: 540 ML | Refills: 3 | Status: SHIPPED | OUTPATIENT
Start: 2025-09-06

## 2025-09-06 RX ORDER — OXYCODONE AND ACETAMINOPHEN 5; 325 MG/1; MG/1
1 TABLET ORAL EVERY 4 HOURS PRN
Refills: 0 | Status: DISCONTINUED | OUTPATIENT
Start: 2025-09-06 | End: 2025-09-06 | Stop reason: HOSPADM

## 2025-09-06 RX ORDER — AMIODARONE HYDROCHLORIDE 200 MG/1
200 TABLET ORAL 2 TIMES DAILY
Qty: 60 TABLET | Refills: 3 | Status: SHIPPED | OUTPATIENT
Start: 2025-09-07

## 2025-09-06 RX ORDER — PANTOPRAZOLE SODIUM 40 MG/1
40 TABLET, DELAYED RELEASE ORAL DAILY
Qty: 30 TABLET | Refills: 3 | Status: SHIPPED | OUTPATIENT
Start: 2025-09-06

## 2025-09-06 RX ORDER — OXYCODONE AND ACETAMINOPHEN 5; 325 MG/1; MG/1
1 TABLET ORAL EVERY 4 HOURS PRN
Qty: 15 TABLET | Refills: 0 | Status: SHIPPED | OUTPATIENT
Start: 2025-09-06 | End: 2025-09-09

## 2025-09-06 RX ADMIN — DULOXETINE 30 MG: 30 CAPSULE, DELAYED RELEASE ORAL at 08:44

## 2025-09-06 RX ADMIN — OXYCODONE HYDROCHLORIDE AND ACETAMINOPHEN 1 TABLET: 5; 325 TABLET ORAL at 16:19

## 2025-09-06 RX ADMIN — AMIODARONE HYDROCHLORIDE 400 MG: 200 TABLET ORAL at 08:45

## 2025-09-06 RX ADMIN — OXYCODONE HYDROCHLORIDE AND ACETAMINOPHEN 1 TABLET: 5; 325 TABLET ORAL at 12:18

## 2025-09-06 RX ADMIN — GABAPENTIN 100 MG: 100 CAPSULE ORAL at 08:45

## 2025-09-06 RX ADMIN — GABAPENTIN 100 MG: 100 CAPSULE ORAL at 12:18

## 2025-09-06 RX ADMIN — CLOPIDOGREL BISULFATE 75 MG: 75 TABLET, FILM COATED ORAL at 08:45

## 2025-09-06 RX ADMIN — SPIRONOLACTONE 25 MG: 25 TABLET, FILM COATED ORAL at 08:45

## 2025-09-06 RX ADMIN — SODIUM CHLORIDE 1000 MG: 9 INJECTION INTRAMUSCULAR; INTRAVENOUS; SUBCUTANEOUS at 10:48

## 2025-09-06 RX ADMIN — PANTOPRAZOLE SODIUM 40 MG: 40 TABLET, DELAYED RELEASE ORAL at 08:45

## 2025-09-06 RX ADMIN — Medication 2 CAPSULE: at 16:19

## 2025-09-06 RX ADMIN — Medication 2 CAPSULE: at 08:45

## 2025-09-06 RX ADMIN — AMLODIPINE BESYLATE 10 MG: 10 TABLET ORAL at 08:45

## 2025-09-06 RX ADMIN — Medication 2 CAPSULE: at 12:18

## 2025-09-06 RX ADMIN — APIXABAN 5 MG: 5 TABLET, FILM COATED ORAL at 08:45

## 2025-09-06 RX ADMIN — CILOSTAZOL 50 MG: 100 TABLET ORAL at 08:46

## 2025-09-06 RX ADMIN — SODIUM CHLORIDE, PRESERVATIVE FREE 10 ML: 5 INJECTION INTRAVENOUS at 08:48

## 2025-09-06 RX ADMIN — HYDRALAZINE HYDROCHLORIDE 50 MG: 50 TABLET ORAL at 06:07

## 2025-09-06 RX ADMIN — OXYCODONE HYDROCHLORIDE AND ACETAMINOPHEN 1 TABLET: 5; 325 TABLET ORAL at 06:07

## 2025-09-06 RX ADMIN — HYDRALAZINE HYDROCHLORIDE 50 MG: 50 TABLET ORAL at 12:18

## 2025-09-06 ASSESSMENT — ENCOUNTER SYMPTOMS
CONSTIPATION: 0
EYE PAIN: 0
EYE DISCHARGE: 0
ABDOMINAL PAIN: 0
PHOTOPHOBIA: 0
FACIAL SWELLING: 0
COUGH: 0
APNEA: 0
SHORTNESS OF BREATH: 0

## 2025-09-06 ASSESSMENT — PAIN DESCRIPTION - DESCRIPTORS
DESCRIPTORS: DISCOMFORT;SPASM;SHARP
DESCRIPTORS: ACHING;DISCOMFORT

## 2025-09-06 ASSESSMENT — PAIN - FUNCTIONAL ASSESSMENT
PAIN_FUNCTIONAL_ASSESSMENT: 0-10

## 2025-09-06 ASSESSMENT — PAIN SCALES - GENERAL
PAINLEVEL_OUTOF10: 0
PAINLEVEL_OUTOF10: 4
PAINLEVEL_OUTOF10: 5
PAINLEVEL_OUTOF10: 8
PAINLEVEL_OUTOF10: 9
PAINLEVEL_OUTOF10: 9

## 2025-09-06 ASSESSMENT — PAIN DESCRIPTION - LOCATION
LOCATION: ABDOMEN;BACK
LOCATION: BACK
LOCATION: ABDOMEN

## 2025-09-06 ASSESSMENT — PAIN DESCRIPTION - ORIENTATION
ORIENTATION: MID;LOWER
ORIENTATION: RIGHT;LEFT;LOWER

## 2025-09-06 ASSESSMENT — PAIN SCALES - WONG BAKER: WONGBAKER_NUMERICALRESPONSE: NO HURT

## 2025-09-07 LAB — GLUCOSE BLD-MCNC: 93 MG/DL (ref 65–105)
